# Patient Record
Sex: MALE | Race: WHITE | Employment: OTHER | URBAN - METROPOLITAN AREA
[De-identification: names, ages, dates, MRNs, and addresses within clinical notes are randomized per-mention and may not be internally consistent; named-entity substitution may affect disease eponyms.]

---

## 2017-10-31 NOTE — TELEPHONE ENCOUNTER
Requested Prescriptions     Pending Prescriptions Disp Refills    lisinopril (PRINIVIL, ZESTRIL) 40 mg tablet [Pharmacy Med Name: LISINOPRIL TABS 40MG] 90 Tab 1     Sig: TAKE 1 TABLET AT BEDTIME       Last Refill: 4/24/17  Last visit:5/31/17

## 2017-11-01 RX ORDER — LISINOPRIL 40 MG/1
TABLET ORAL
Qty: 90 TAB | Refills: 1 | Status: SHIPPED | OUTPATIENT
Start: 2017-11-01 | End: 2018-09-18 | Stop reason: DRUGHIGH

## 2017-12-11 ENCOUNTER — OFFICE VISIT (OUTPATIENT)
Dept: INTERNAL MEDICINE CLINIC | Age: 62
End: 2017-12-11

## 2017-12-11 VITALS
WEIGHT: 159 LBS | HEIGHT: 72 IN | SYSTOLIC BLOOD PRESSURE: 122 MMHG | DIASTOLIC BLOOD PRESSURE: 72 MMHG | BODY MASS INDEX: 21.54 KG/M2 | OXYGEN SATURATION: 98 % | HEART RATE: 59 BPM

## 2017-12-11 DIAGNOSIS — M75.21 BICIPITAL TENDONITIS OF RIGHT SHOULDER: Primary | ICD-10-CM

## 2017-12-11 PROBLEM — E10.3599: Status: ACTIVE | Noted: 2017-12-11

## 2017-12-11 PROBLEM — R80.9 MICROALBUMINURIA: Status: ACTIVE | Noted: 2017-12-11

## 2017-12-11 PROBLEM — I10 ESSENTIAL HYPERTENSION: Status: ACTIVE | Noted: 2017-12-11

## 2017-12-11 PROBLEM — K52.9 ILEITIS: Status: ACTIVE | Noted: 2017-12-11

## 2017-12-11 PROBLEM — E78.00 HYPERCHOLESTEROLEMIA: Status: ACTIVE | Noted: 2017-12-11

## 2017-12-11 PROBLEM — Z79.899 LONG-TERM USE OF HIGH-RISK MEDICATION: Status: ACTIVE | Noted: 2017-12-11

## 2017-12-11 RX ORDER — DICLOFENAC SODIUM 75 MG/1
75 TABLET, DELAYED RELEASE ORAL 2 TIMES DAILY
Qty: 60 TAB | Refills: 0 | Status: SHIPPED | OUTPATIENT
Start: 2017-12-11 | End: 2018-06-05 | Stop reason: ALTCHOICE

## 2017-12-11 NOTE — PATIENT INSTRUCTIONS
Tendon Injury (Tendinopathy): Care Instructions  Your Care Instructions    Tendons are tough, flexible tissues that connect muscle to bone. A tendon can hurt or get torn from overuse or aging, especially tendons in the shoulder, elbow, wrist, hip, knee, or ankle. Tendon injuries may be called tendinopathy or tendinitis. Tendon injuries can occur from any motion you have to repeat in a job, sports, or daily activities. Tennis elbow is one common tendon injury. You can treat most tendon problems with over-the-counter pain medicine, rest, changes in your activities, and physical therapy. Follow-up care is a key part of your treatment and safety. Be sure to make and go to all appointments, and call your doctor if you are having problems. It's also a good idea to know your test results and keep a list of the medicines you take. How can you care for yourself at home? · Rest the sore area. You may have to stop doing the activity that caused the tendon pain for a while. · Take an over-the-counter pain medicine, such as acetaminophen (Tylenol), ibuprofen (Advil, Motrin), or naproxen (Aleve). Read and follow all instructions on the label. · Do not take two or more pain medicines at the same time unless the doctor told you to. Many pain medicines have acetaminophen, which is Tylenol. Too much acetaminophen (Tylenol) can be harmful. · Put ice or a cold pack on the sore area for 10 to 20 minutes at a time. Try to do this every 1 to 2 hours for the next 3 days (when you are awake) or until any swelling goes down. Put a thin cloth between the ice and your skin. · Prop up the sore area on a pillow when you ice it or anytime you sit or lie down during the next 3 days. Try to keep it above the level of your heart. This will help reduce swelling.   · Follow your doctor's advice for wearing and caring for a sling, splint, or cast. In some cases, you may wear one of these for a while to help your tendon heal.  · Follow your doctor's advice for stretching and physical therapy. Gently move your joint through its full range of motion. This will prevent stiffness in your joint. · Go back to your activity slowly. Warm up before and stretch after the activity. You also can try making some changes. For example, if a sport caused your tendon pain, alternate the sport with another activity. If using a tool causes pain, switch hands or change your . Stop the activity if it hurts. After the activity, apply ice to prevent pain and swelling. · Do not smoke. Smoking can slow healing. If you need help quitting, talk to your doctor about stop-smoking programs and medicines. These can increase your chances of quitting for good. When should you call for help? Watch closely for changes in your health, and be sure to contact your doctor if:  ? · Your pain gets worse. ? · You do not get better as expected. Where can you learn more? Go to http://mana-christin.info/. Enter A157 in the search box to learn more about \"Tendon Injury (Tendinopathy): Care Instructions. \"  Current as of: March 21, 2017  Content Version: 11.4  © 0300-8489 AMS-Qi. Care instructions adapted under license by [employer] (which disclaims liability or warranty for this information). If you have questions about a medical condition or this instruction, always ask your healthcare professional. [address] any warranty or liability for your use of this information.

## 2017-12-11 NOTE — PROGRESS NOTES
This note will not be viewable in [address] Subjective:     Mr. Mark Eddy presents to the office today with shoulder pain. The pain is localized to the right shoulder and is been there for 3 weeks. It came after he injured it when he fell while he was jogging on [facility]. Patient states that there were a lot of leaves on the path and it was covering some rocks which he tripped over. He fell on an outstretched right arm/shoulder. The patient has not taken any anti-inflammatory medication. He denies any neck pain or radicular discomfort. He notes no loss of strength in the upper extremity. Past Medical History:   Diagnosis Date    Diabetes [facility] [address]     Essential hypertension 12/11/2017    Hypercholesterolemia 12/11/2017    Hypertension     Ileitis 12/11/2017    Long-term use of high-risk medication 12/11/2017    Microalbuminuria 12/11/2017    Type I diabetes mellitus with proliferative retinopathy ([facility] [address] 12/11/2017     Past Surgical History:   Procedure Laterality Date    HX ORTHOPAEDIC      RIGHT KNEE OPEN SURGERY    HX TONSILLECTOMY       Allergies   Allergen Reactions    Percocet [Oxycodone-Acetaminophen] Other (comments)     AGGRESIVE BEHAVIOR     Current Outpatient Prescriptions   Medication Sig Dispense Refill    diclofenac EC (VOLTAREN) 75 mg EC tablet Take 1 Tab by mouth two (2) times a day. 60 Tab 0    lisinopril (PRINIVIL, ZESTRIL) 40 mg tablet TAKE 1 TABLET AT BEDTIME (Patient taking differently: 20mg daily) 90 Tab 1    pravastatin (PRAVACHOL) 40 mg tablet Take 40 mg by mouth nightly.   insulin pump (PATIENT SUPPLIED) Misc by SubCUTAneous route as needed.  multivitamin (ONE A DAY) tablet Take 1 Tab by mouth daily.  ascorbic acid (VITAMIN C) 500 mg tablet Take 500 mg by mouth daily.  Cholecalciferol, Vitamin D3, (VITAMIN D3) 1,000 unit cap Take  by mouth daily.          Social History     Social History    Marital status:      Spouse name: N/A   [address] [place] [address] Number of children: N/A    Years of education: N/A     Social History Main Topics    Smoking status: Former Smoker     Years: 5.00     Quit date: 1/4/1975    Smokeless tobacco: Never Used    Alcohol use 10.5 oz/week     21 Glasses of wine per week    Drug use: None    Sexual activity: Not Asked     Other Topics Concern    None     Social History Narrative     Family History   Problem Relation Age of Onset    Cancer Mother      LUNG    Cancer Father      LUNG       Review of Systems:  GEN: no weight loss, weight gain, fatigue or night sweats  CV: no PND, orthopnea, or palpitations  Resp: no dyspnea on exertion, no cough  Abd: no nausea, vomiting or diarrhea  EXT: denies edema, claudication. Positive for right shoulder pain. Endocrine: no hair loss, excessive thirst or polyuria  Neurological ROS: no TIA or stroke symptoms  ROS otherwise negative      Objective:     Visit Vitals    /72 (BP 1 Location: Left arm, BP Patient Position: Sitting)    Pulse (!) 59    Ht 6' (1.829 m)    Wt 159 lb (72.1 kg)    SpO2 98%    BMI 21.56 kg/m2     Body mass index is 21.56 kg/(m^2). General:   alert, cooperative and no distress   Extremities: No edema or cyanosis. The range of motion of the right shoulder is normal although abduction is slightly limited. He has no pain over the supra or infra spinatus muscles. Speeds test is positive. There was no pain over the [facility] joint. Upper extremity strength with normal.   Neuro: ..alert, oriented x3,speech normal in context and clarity, cranial nerves II-XII intact,motor strength: full proximally and distally,gait: normal  reflexes: full and symmetric     Physical exam otherwise negative         Assessment/Plan:     Diagnoses and all orders for this visit:    Bicipital tendonitis of right shoulder  -     diclofenac EC (VOLTAREN) 75 mg EC tablet; Take 1 Tab by mouth two (2) times a day., Normal, Disp-60 Tab, R-0        Other instructions:    The patient will apply warm compresses to the right shoulder symptomatically. Start NSAID. We will refer to Rubia Orourke if there is no improvement in 2-3 weeks time. Follow-up Disposition:  Return if symptoms worsen or fail to improve.     Claudia Nichole MD

## 2017-12-11 NOTE — PROGRESS NOTES
Mundo Serna is a 58 y.o. male presenting for Shoulder Pain  . 1. Have you been to the ER, urgent care clinic since your last visit? Hospitalized since your last visit? No    2. Have you seen or consulted any other health care providers outside of the [address] [place] [address] since your last visit? Include any pap smears or colon screening. No    No flowsheet data found. No flowsheet data found. PHQ over the last two weeks 12/11/2017   Little interest or pleasure in doing things Not at all   Feeling down, depressed or hopeless Not at all   Total Score PHQ 2 0       There are no discontinued medications.

## 2018-01-01 RX ORDER — PRAVASTATIN SODIUM 40 MG/1
TABLET ORAL
Qty: 90 TAB | Refills: 1 | Status: SHIPPED | COMMUNITY
Start: 2018-01-01 | End: 2018-07-01 | Stop reason: SDUPTHER

## 2018-01-29 ENCOUNTER — TELEPHONE (OUTPATIENT)
Dept: INTERNAL MEDICINE CLINIC | Age: 63
End: 2018-01-29

## 2018-01-29 NOTE — TELEPHONE ENCOUNTER
Patient would like to know what he can do next for his shoulder pain that he was seen for back on 12/11/17 states that he finished the voltaren and it has not helped. Would like to know when he should heal or if he needs to see a specialist or maybe another prescription?

## 2018-02-22 ENCOUNTER — OFFICE VISIT (OUTPATIENT)
Dept: INTERNAL MEDICINE CLINIC | Age: 63
End: 2018-02-22

## 2018-02-22 VITALS
SYSTOLIC BLOOD PRESSURE: 118 MMHG | HEART RATE: 73 BPM | HEIGHT: 72 IN | OXYGEN SATURATION: 99 % | BODY MASS INDEX: 21.26 KG/M2 | DIASTOLIC BLOOD PRESSURE: 62 MMHG | WEIGHT: 157 LBS

## 2018-02-22 DIAGNOSIS — H61.23 BILATERAL IMPACTED CERUMEN: Primary | ICD-10-CM

## 2018-02-22 RX ORDER — ASPIRIN 81 MG/1
TABLET ORAL DAILY
COMMUNITY
End: 2019-08-01 | Stop reason: ALTCHOICE

## 2018-02-22 NOTE — PROGRESS NOTES
Subjective:     Sonido Nix is a 58 y.o. male who presents for evaluation of decreased hearing in both ears. . He has noticed bilateral symptoms 1 week ago. There is  a prior history of cerumen impaction. Patient denies ear pain. Patient denies hearing loss. He has noted some tinnitus but no balance issues. He does have chronic sinus congestion. Objective:     Visit Vitals    /62 (BP 1 Location: Right arm, BP Patient Position: Sitting)    Pulse 73    Ht 6' (1.829 m)    Wt 157 lb (71.2 kg)    SpO2 99%    BMI 21.29 kg/m2       General: alert, cooperative, no distress   Right Ear: ceruminosis noted. Left Ear:  ceruminosis noted. After removal: bilateral TM's and external ear canals normal, cerumen removed. Oropharynx:   normal.   Neck:  supple, symmetrical, trachea midline and no adenopathy. Assessment/Plan:     Cerumen Impaction, without otitis externa. 1. Cerumen removed by flushing, currettage. 2. Care instructions given. 3. Home treatment: none  4. Followup as needed. ICD-10-CM ICD-9-CM    1. Bilateral impacted cerumen H61.23 380.4 REMOVE IMPACTED EAR WAX   .

## 2018-02-22 NOTE — PROGRESS NOTES
Mohesn Fallon is a 58 y.o. male presenting for Ear Fullness  . 1. Have you been to the ER, urgent care clinic since your last visit? Hospitalized since your last visit? No    2. Have you seen or consulted any other health care providers outside of the [address] [place] [address] since your last visit? Include any pap smears or colon screening. No    No flowsheet data found. No flowsheet data found. PHQ over the last two weeks 12/11/2017   Little interest or pleasure in doing things Not at all   Feeling down, depressed or hopeless Not at all   Total Score PHQ 2 0       There are no discontinued medications.

## 2018-02-22 NOTE — MR AVS SNAPSHOT
[address] [address] [phone] Patient: Kathrin Gallagher MRN: [id number] DFZ:[id number] Visit Information Date & Time Provider Department Dept. Phone Encounter #  
 2/22/2018  8:50 AM Airane Pierce MD [facility] [id number] Follow-up Instructions Return if symptoms worsen or fail to improve. Upcoming Health Maintenance Date Due Hepatitis C Screening 1955 FOOT EXAM Q1 10/20/1965 MICROALBUMIN Q1 10/20/1965 EYE EXAM RETINAL OR DILATED Q1 10/20/1965 Pneumococcal 19-64 Medium Risk (1 of 1 - PPSV23) 10/20/1974 DTaP/Tdap/Td series (1 - Tdap) 10/20/1976 FOBT Q 1 YEAR AGE 50-75 10/20/2005 ZOSTER VACCINE AGE 60> 8/20/2015 Influenza Age 5 to Adult 8/1/2017 HEMOGLOBIN A1C Q6M 4/23/2018 LIPID PANEL Q1 10/23/2018 Allergies as of 2/22/2018  Review Complete On: 2/22/2018 By: Neo Ferreira LPN Severity Noted Reaction Type Reactions Percocet [Oxycodone-acetaminophen]  02/04/2013    Other (comments) AGGRESIVE BEHAVIOR Current Immunizations  Never Reviewed No immunizations on file. Not reviewed this visit You Were Diagnosed With   
  
 Codes Comments Bilateral impacted cerumen    -  Primary ICD-10-CM: F50.48 
ICD-9-CM: 380.4 Vitals BP Pulse Height(growth percentile) Weight(growth percentile) SpO2 BMI  
 118/62 (BP 1 Location: Right arm, BP Patient Position: Sitting) 73 6' (1.829 m) 157 lb (71.2 kg) 99% 21.29 kg/m2 Smoking Status Former Smoker BMI and BSA Data Body Mass Index Body Surface Area  
 21.29 kg/m 2 1.9 m 2 Preferred Pharmacy Pharmacy Name Phone [address] [facility] [phone] Your Updated Medication List  
  
   
 This list is accurate as of 2/22/18  9:25 AM.  Always use your most recent med list.  
  
  
  
  
  insulin pump Misc Commonly known as:  PATIENT SUPPLIED  
by SubCUTAneous route as needed. ascorbic acid (vitamin C) 500 mg tablet Commonly known as:  VITAMIN C Take 500 mg by mouth daily. aspirin delayed-release 81 mg tablet Take  by mouth daily. diclofenac EC 75 mg EC tablet Commonly known as:  VOLTAREN Take 1 Tab by mouth two (2) times a day. lisinopril 40 mg tablet Commonly known as:  PRINIVIL, ZESTRIL  
TAKE 1 TABLET AT BEDTIME  
  
 multivitamin tablet Commonly known as:  ONE A DAY Take 1 Tab by mouth daily. pravastatin 40 mg tablet Commonly known as:  PRAVACHOL  
TAKE 1 TABLET DAILY We Performed the Following REMOVE IMPACTED EAR WAX [59825 CPT(R)] Follow-up Instructions Return if symptoms worsen or fail to improve. Patient Instructions Earwax Blockage: Care Instructions Your Care Instructions Earwax is a natural substance that protects the ear canal. Normally, earwax drains from the ears and does not cause problems. Sometimes earwax builds up and hardens. Earwax blockage (also called cerumen impaction) can cause some loss of hearing and pain. When wax is tightly packed, you will need to have your doctor remove it. Follow-up care is a key part of your treatment and safety. Be sure to make and go to all appointments, and call your doctor if you are having problems. It's also a good idea to know your test results and keep a list of the medicines you take. How can you care for yourself at home? · Do not try to remove earwax with cotton swabs, fingers, or other objects. This can make the blockage worse and damage the eardrum. · If your doctor recommends that you try to remove earwax at home: ¨ Soften and loosen the earwax with warm mineral oil.  You also can try hydrogen peroxide mixed with an equal amount of room temperature water. Place 2 drops of the fluid, warmed to body temperature, in the ear two times a day for up to 5 days. ¨ Once the wax is loose and soft, all that is usually needed to remove it from the ear canal is a gentle, warm shower. Direct the water into the ear, then tip your head to let the earwax drain out. Dry your ear thoroughly with a hair dryer set on low. Hold the dryer several inches from your ear. ¨ If the warm mineral oil and shower do not work, use an over-the-counter wax softener followed by gentle flushing with an ear syringe each night for a week or two. Make sure the flushing solution is body temperature. Cool or hot fluids in the ear can cause dizziness. When should you call for help? Call your doctor now or seek immediate medical care if: 
? · Pus or blood drains from your ear. ? · Your ears are ringing or feel full. ? · You have a loss of hearing. ? Watch closely for changes in your health, and be sure to contact your doctor if: 
? · You have pain or reduced hearing after 1 week of home treatment. ? · You have any new symptoms, such as nausea or balance problems. Where can you learn more? Go to http://mana-christin.info/. Enter K249 in the search box to learn more about \"Earwax Blockage: Care Instructions. \" Current as of: March 20, 2017 Content Version: 11.4 © 3454-4727 Osen. Care instructions adapted under license by [employer] (which disclaims liability or warranty for this information). If you have questions about a medical condition or this instruction, always ask your healthcare professional. [place] [address] any warranty or liability for your use of this information. Introducing [facility]!    
 Dwayne Oropeza introduces POKKT patient portal. Now you can access parts of your medical record, email your doctor's office, and request medication refills online. 1. In your internet browser, go to [email or web address] 2. Click on the First Time User? Click Here link in the Sign In box. You will see the New Member Sign Up page. 3. Enter your [employer]Camp[employer] Access Code exactly as it appears below. You will not need to use this code after youve completed the sign-up process. If you do not sign up before the expiration date, you must request a new code. · GoCrossCamp[employer] Access Code: [email or web address] Expires: 3/11/2018 10:24 AM 
 
4. Enter the last four digits of your Social Security Number (xxxx) and Date of Birth (mm/dd/yyyy) as indicated and click Submit. You will be taken to the next sign-up page. 5. Create a [employer] ID. This will be your [employer]Camp[employer] login ID and cannot be changed, so think of one that is secure and easy to remember. 6. Create a [employer]Camp[employer] password. You can change your password at any time. 7. Enter your Password Reset Question and Answer. This can be used at a later time if you forget your password. 8. Enter your e-mail address. You will receive e-mail notification when new information is available in [address] 9. Click Sign Up. You can now view and download portions of your medical record. 10. Click the Download Summary menu link to download a portable copy of your medical information. If you have questions, please visit the Frequently Asked Questions section of the [employer]Camp[employer] website. Remember, [employer]Camp[employer] is NOT to be used for urgent needs. For medical emergencies, dial 911. Now available from your iPhone and Android! Please provide this summary of care documentation to your next provider. Your primary care clinician is listed as [address]. If you have any questions after today's visit, please call [phone].

## 2018-02-22 NOTE — PATIENT INSTRUCTIONS
Earwax Blockage: Care Instructions  Your Care Instructions    Earwax is a natural substance that protects the ear canal. Normally, earwax drains from the ears and does not cause problems. Sometimes earwax builds up and hardens. Earwax blockage (also called cerumen impaction) can cause some loss of hearing and pain. When wax is tightly packed, you will need to have your doctor remove it. Follow-up care is a key part of your treatment and safety. Be sure to make and go to all appointments, and call your doctor if you are having problems. It's also a good idea to know your test results and keep a list of the medicines you take. How can you care for yourself at home? · Do not try to remove earwax with cotton swabs, fingers, or other objects. This can make the blockage worse and damage the eardrum. · If your doctor recommends that you try to remove earwax at home:  ¨ Soften and loosen the earwax with warm mineral oil. You also can try hydrogen peroxide mixed with an equal amount of room temperature water. Place 2 drops of the fluid, warmed to body temperature, in the ear two times a day for up to 5 days. ¨ Once the wax is loose and soft, all that is usually needed to remove it from the ear canal is a gentle, warm shower. Direct the water into the ear, then tip your head to let the earwax drain out. Dry your ear thoroughly with a hair dryer set on low. Hold the dryer several inches from your ear. ¨ If the warm mineral oil and shower do not work, use an over-the-counter wax softener followed by gentle flushing with an ear syringe each night for a week or two. Make sure the flushing solution is body temperature. Cool or hot fluids in the ear can cause dizziness. When should you call for help? Call your doctor now or seek immediate medical care if:  ? · Pus or blood drains from your ear. ? · Your ears are ringing or feel full. ? · You have a loss of hearing. ? Watch closely for changes in your health, and be sure to contact your doctor if:  ? · You have pain or reduced hearing after 1 week of home treatment. ? · You have any new symptoms, such as nausea or balance problems. Where can you learn more? Go to http://mana-christin.info/. Enter Y988 in the search box to learn more about \"Earwax Blockage: Care Instructions. \"  Current as of: March 20, 2017  Content Version: 11.4  © 1464-1494 [employer]ak[employer]. Care instructions adapted under license by [employer] (which disclaims liability or warranty for this information). If you have questions about a medical condition or this instruction, always ask your healthcare professional. [place] [address] any warranty or liability for your use of this information.

## 2018-06-05 ENCOUNTER — OFFICE VISIT (OUTPATIENT)
Dept: INTERNAL MEDICINE CLINIC | Age: 63
End: 2018-06-05

## 2018-06-05 VITALS
WEIGHT: 159 LBS | HEART RATE: 66 BPM | BODY MASS INDEX: 21.54 KG/M2 | HEIGHT: 72 IN | DIASTOLIC BLOOD PRESSURE: 78 MMHG | OXYGEN SATURATION: 97 % | SYSTOLIC BLOOD PRESSURE: 102 MMHG

## 2018-06-05 DIAGNOSIS — I10 ESSENTIAL HYPERTENSION: ICD-10-CM

## 2018-06-05 DIAGNOSIS — E78.00 HYPERCHOLESTEROLEMIA: ICD-10-CM

## 2018-06-05 DIAGNOSIS — Z00.00 ROUTINE PHYSICAL EXAMINATION: Primary | ICD-10-CM

## 2018-06-05 DIAGNOSIS — E10.3559 TYPE 1 DIABETES MELLITUS WITH STABLE PROLIFERATIVE RETINOPATHY, UNSPECIFIED LATERALITY (HCC): ICD-10-CM

## 2018-06-05 DIAGNOSIS — Z79.899 LONG-TERM USE OF HIGH-RISK MEDICATION: ICD-10-CM

## 2018-06-05 LAB
ALBUMIN SERPL-MCNC: 4 G/DL (ref 3.9–5.4)
ALKALINE PHOS POC: 47 U/L (ref 38–126)
ALT SERPL-CCNC: 37 U/L (ref 9–52)
AST SERPL-CCNC: 37 U/L (ref 14–36)
BACTERIA UA POCT, BACTPOCT: NORMAL
BILIRUB UR QL STRIP: NEGATIVE
BUN BLD-MCNC: 23 MG/DL (ref 9–20)
CALCIUM BLD-MCNC: 9.5 MG/DL (ref 8.4–10.2)
CASTS UA POCT: 0
CHLORIDE BLD-SCNC: 100 MMOL/L (ref 98–107)
CHOLEST SERPL-MCNC: 147 MG/DL (ref 0–200)
CK (CPK) POC: 190 U/L (ref 30–135)
CLUE CELLS, CLUEPOCT: NEGATIVE
CO2 POC: 28 MMOL/L (ref 22–32)
CREAT BLD-MCNC: 1.1 MG/DL (ref 0.8–1.5)
CRYSTALS UA POCT, CRYSPOCT: NEGATIVE
EGFR (POC): 71.6
EPITHELIAL CELLS POCT: NORMAL
GLUCOSE POC: 125 MG/DL (ref 75–110)
GLUCOSE UR-MCNC: NEGATIVE MG/DL
GRAN# POC: 4.7 K/UL (ref 2–7.8)
GRAN% POC: 63.7 % (ref 37–92)
HBA1C MFR BLD HPLC: 5.7 % (ref 4.5–5.7)
HCT VFR BLD CALC: 37.4 % (ref 37–51)
HDLC SERPL-MCNC: 91 MG/DL (ref 35–130)
HGB BLD-MCNC: 12.9 G/DL (ref 12–18)
KETONES P FAST UR STRIP-MCNC: NEGATIVE MG/DL
LDL CHOLESTEROL POC: 38 MG/DL (ref 0–130)
LY# POC: 2.2 K/UL (ref 0.6–4.1)
LY% POC: 30.7 % (ref 10–58.5)
MCH RBC QN: 33.1 PG (ref 26–32)
MCHC RBC-ENTMCNC: 34.3 G/DL (ref 30–36)
MCV RBC: 96 FL (ref 80–97)
MICROALBUMIN UR TEST STR-MCNC: NEGATIVE MG/L (ref 0–20)
MID #, POC: 0.4 K/UL (ref 0–1.8)
MID% POC: 5.6 % (ref 0.1–24)
MUCUS UA POCT, MUCPOCT: NORMAL
PH UR STRIP: 6.5 [PH] (ref 5–7)
PLATELET # BLD: 246 K/UL (ref 140–440)
POTASSIUM SERPL-SCNC: 4.6 MMOL/L (ref 3.6–5)
PROT SERPL-MCNC: 7 G/DL (ref 6.3–8.2)
PROT UR QL STRIP: NEGATIVE
PSA SERPL-MCNC: 1.9 NG/ML (ref 0–4)
RBC # BLD: 3.89 M/UL (ref 4.2–6.3)
RBC UA POCT, RBCPOCT: 0
SODIUM SERPL-SCNC: 139 MMOL/L (ref 137–145)
SP GR UR STRIP: 1.01 (ref 1.01–1.02)
TCHOL/HDL RATIO (POC): 1.6 (ref 0–4)
TOTAL BILIRUBIN POC: 0.9 MG/DL (ref 0.2–1.3)
TRICH UA POCT, TRICHPOC: NEGATIVE
TRIGL SERPL-MCNC: 90 MG/DL (ref 0–200)
TSH BLD-ACNC: 1.24 UIU/ML (ref 0.4–4.2)
UA UROBILINOGEN AMB POC: NORMAL (ref 0.2–1)
URINALYSIS CLARITY POC: CLEAR
URINALYSIS COLOR POC: NORMAL
URINE BLOOD POC: NEGATIVE
URINE CULT COMMENT, POCT: NORMAL
URINE LEUKOCYTES POC: NEGATIVE
URINE NITRITES POC: NEGATIVE
VLDLC SERPL CALC-MCNC: 18 MG/DL
WBC # BLD: 7.3 K/UL (ref 4.1–10.9)
WBC UA POCT, WBCPOCT: NORMAL
YEAST UA POCT, YEASTPOC: NEGATIVE

## 2018-06-05 RX ORDER — MELOXICAM 15 MG/1
15 TABLET ORAL DAILY
COMMUNITY
End: 2019-08-01 | Stop reason: ALTCHOICE

## 2018-06-05 NOTE — LETTER
6/7/2018 3:22 PM 
 
Mr. uQe Banks [address] [facility] [address] [address] Dear Que Banks: 
 
Please find your most recent results below. Resulted Orders HEPATITIS C AB Result Value Ref Range Hep C Virus Ab <0.1 0.0 - 0.9 s/co ratio Comment:  
                                     Negative:     < 0.8 Indeterminate: 0.8 - 0.9 Positive:     > 0.9 The CDC recommends that a positive HCV antibody result 
 be followed up with a HCV Nucleic Acid Amplification 
 test (035221). Narrative Performed at:  [address]  [id number] : Jacob Jolyl MD, Phone:  [phone] AMB POC HEMOGLOBIN A1C Result Value Ref Range Hemoglobin A1c (POC) 5.7 4.5 - 5.7 % AMB POC COMPLETE CBC,AUTOMATED ENTER Result Value Ref Range WBC (POC) 7.3 4.1 - 10.9 K/uL  
 RBC (POC) 3.89 (A) 4.20 - 6.30 M/uL Comment:  
   verified by repeat analysis HGB (POC) 12.9 12.0 - 18.0 g/dL HCT (POC) 37.4 37.0 - 51.0 %  
 MCV (POC) 96 80.0 - 97.0 fL  
 MCH (POC) 33.1 (A) 26.0 - 32.0 pg  
 MCHC (POC) 34.3 30.0 - 36.0 g/dL PLATELET (POC) 390 677.2 - 440.0 K/uL  
 ABS. LYMPHS (POC) 2.2 0.6 - 4.1 K/uL LYMPHOCYTES (POC) 30.7 10.0 - 58.5 % Mid # (POC) 0.4 0.0 - 1.8 K/uL MID% POC 5.6 0.1 - 24.0 %  
 ABS. GRANS (POC) 4.7 2.0 - 7.8 K/uL GRANULOCYTES (POC) 63.7 37.0 - 92.0 % AMB POC COMPREHENSIVE METABOLIC PANEL Result Value Ref Range GLUCOSE 125 (A) 75 - 110 mg/dL BUN 23 (A) 9 - 20 mg/dL Creatinine (POC) 1.1 0.8 - 1.5 mg/dL Sodium (POC) 139 137 - 145 MMOL/L Potassium (POC) 4.6 3.6 - 5.0 MMOL/L  
 CHLORIDE 100 98 - 107 MMOL/L  
 CO2 28 22 - 32 MMOL/L  
 CALCIUM 9.5 8.4 - 10.2 mg/dL TOTAL PROTEIN 7 6.3 - 8.2 g/dL ALBUMIN 4 3.9 - 5.4 g/dL AST (POC) 37 (A) 14 - 36 U/L  
 ALT (POC) 37 9 - 52 U/L  ALKALINE PHOS 47 38 - 126 U/L  
 TOTAL BILIRUBIN 0.9 0.2 - 1.3 mg/dL  
 eGFR (POC) 71.6 AMB POC CK (CPK) Result Value Ref Range CK (CPK) (POC) 190 (A) 30 - 135 U/L AMB POC LIPID PROFILE Result Value Ref Range Cholesterol (POC) 147 0 - 200 mg/dL Triglycerides (POC) 90 0 - 200 mg/dL HDL Cholesterol (POC) 91 35 - 130 mg/dL VLDL (POC) 18 MG/DL  
 LDL Cholesterol (POC) 38 0.0 - 130.0 MG/DL TChol/HDL Ratio (POC) 1.6 0.0 - 4.0 AMB POC TSH Result Value Ref Range TSH POC 1.24 0.40 - 4.20 uIU/ml AMB POC URINALYSIS DIP STICK AUTO W/ MICRO Result Value Ref Range Color (UA POC) CIT Group Clarity (UA POC) Clear Glucose (UA POC) Negative Negative Bilirubin (UA POC) Negative Negative Ketones (UA POC) Negative Negative Specific gravity (UA POC) 1.015 1.010 - 1.025 Blood (UA POC) Negative Negative pH (UA POC) 6.5 5.0 - 7.0 Protein (UA POC) Negative Negative Urobilinogen (UA POC) 1 mg/dL 0.2 - 1 Nitrites (UA POC) Negative Negative Leukocyte esterase (UA POC) Negative Negative Epithelial cells (UA POC) Trace Mucus (UA POC) None WBCs (UA POC) 0-3 RBCs (UA POC) 0 Casts (UA POC) 0 Negative Crystals (UA POC) Negative Negative Clue Cells (UA POC) NEGATIVE Trichomonas (UA POC) Negative Yeast (UA POC) Negative Bacteria (UA POC) None Negative URINE CULT COMMENT (UA POC) Culture Not Indicated AMB POC URINE, MICROALBUMIN, SEMIQUANT (1 RESULT) Result Value Ref Range Microalbumin urine (POC) negative 0 - 20 MG/L  
AMB POC PSA Result Value Ref Range PSA (POC) 1.9 0.0 - 4.0 ng/mL RECOMMENDATIONS: 
None. Keep up the good work! Please call me if you have any questions: [phone] Sincerely, Greg Meyer MD

## 2018-06-05 NOTE — PROGRESS NOTES
This note will not be viewable in [address] Subjective:     Que Banks is a 58 y.o. male presenting for annual comprehensive personal healthcare examination. Nola Edgar presents to the office today for complete checkup. 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 He currently is followed by Dr. Kayode Lopez and  is currently using an insulin pump. Patient has good blood sugar control and checks his blood sugars frequently during the course of the day. He does have diabetic retinal eye exams done on a yearly basis. He denies any numbness tingling or burning in his feet and is had a diabetic foot examination done within the last year. He is on pravastatin for hypercholesterolemia. He denies muscle soreness or GI upset. He has no history of coronary artery disease and denies exertional chest pains or claudication. He is on 20 mg of lisinopril for his hypertension. He has had some problems with hyperkalemia in the past.  He denies any dizziness, cough or rash. He denies headaches, numbness, tingling or focal neurological problems. The patient is up-to-date on colonoscopy. He is a non-smoker. Review of systems otherwise negative is noted. History of present illness: This patient has multiple medical problems.   These include:  Patient Active Problem List   Diagnosis Code    Type I diabetes mellitus with proliferative retinopathy ([facility] E10.3599    Microalbuminuria R80.9    Ileitis K52.9    Essential hypertension I10    Hypercholesterolemia E78.00    Long-term use of high-risk medication Z79.899        Past Medical History:   Diagnosis Date    Diabetes ([address]     Essential hypertension 12/11/2017    Hypercholesterolemia 12/11/2017    Hypertension     Ileitis 12/11/2017    Long-term use of high-risk medication 12/11/2017    Microalbuminuria 12/11/2017    Type I diabetes mellitus with proliferative retinopathy ([address] 12/11/2017     Past Surgical History:   Procedure Laterality Date    HX ORTHOPAEDIC      RIGHT KNEE OPEN SURGERY    HX TONSILLECTOMY       Allergies   Allergen Reactions    Percocet [Oxycodone-Acetaminophen] Other (comments)     AGGRESIVE BEHAVIOR     Current Outpatient Prescriptions   Medication Sig Dispense Refill    meloxicam (MOBIC) 15 mg tablet Take 15 mg by mouth daily.  aspirin delayed-release 81 mg tablet Take  by mouth daily.  pravastatin (PRAVACHOL) 40 mg tablet TAKE 1 TABLET DAILY 90 Tab 1    lisinopril (PRINIVIL, ZESTRIL) 40 mg tablet TAKE 1 TABLET AT BEDTIME (Patient taking differently: 20mg daily) 90 Tab 1     insulin pump (PATIENT SUPPLIED) Misc by SubCUTAneous route as needed.  multivitamin (ONE A DAY) tablet Take 1 Tab by mouth daily.  ascorbic acid (VITAMIN C) 500 mg tablet Take 500 mg by mouth daily.          Social History     Social History    Marital status: SINGLE     Spouse name: N/A    Number of children: N/A    Years of education: N/A     Social History Main Topics    Smoking status: Former Smoker     Years: 5.00     Quit date: 1/4/1975    Smokeless tobacco: Never Used    Alcohol use 10.5 oz/week     21 Glasses of wine per week    Drug use: No    Sexual activity: Not Asked     Other Topics Concern    None     Social History Narrative     Family History   Problem Relation Age of Onset    Cancer Mother      LUNG    Cancer Father      LUNG       Health Maintenance   Topic Date Due    Hepatitis C Screening  1955    FOOT EXAM Q1  10/20/1965    MICROALBUMIN Q1  10/20/1965    EYE EXAM RETINAL OR DILATED Q1  10/20/1965    Pneumococcal 19-64 Medium Risk (1 of 1 - PPSV23) 10/20/1974    DTaP/Tdap/Td series (1 - Tdap) 10/20/1976    FOBT Q 1 YEAR AGE 50-75  10/20/2005    ZOSTER VACCINE AGE 60>  08/20/2015    HEMOGLOBIN A1C Q6M  04/23/2018    Influenza Age 9 to Adult  08/01/2018    LIPID PANEL Q1  10/23/2018       Review of Systems  Constitutional:  He denies fevers, weight loss, sweats, or fatigue. HEENT:  No blurred or double vision, headaches or dizziness. No difficulty with swallowing, taste, speech or smell. Respiratory:  No cough, wheezing or shortness of breath, or sputum production. Cardiac:  Denies chest pain, palpitations, unexplained indigestion, syncope, edema, PND or orthopnea. GI:  No changes in bowel habits, abdominal pain, no bloating, anorexia, nausea, vomiting or heartburn. :  He denies frequency, nocturia, stranguria, dysuria or sexual dysfunction. Extremities:  No joint pain, stiffness or swelling. Skin:  No recent rash or mole changes. Neurological:  No numbness, tingling, burning paresthesias or loss of motor strength. No syncope, dizziness, frequent headaches or memory loss. ROS otherwise negative       Objective:     Vitals:    06/05/18 1517   BP: 102/78   Pulse: 66   SpO2: 97%   Weight: 159 lb (72.1 kg)   Height: 6' (1.829 m)   PainSc:   6   PainLoc: Ankle      Body mass index is 21.56 kg/(m^2). Physical exam:   General Appearance:  Well-developed, well-nourished, no acute distress. Vision:  Deferred to ophthalmologist.    Hearing: HEENT:    Ears:  The TMs and ear canals were clear. Eyes:  The pupillary responses were normal.  Extraocular muscle function intact. Lids and conjunctiva not injected. Funduscopic exam revealed sharp disc margins. Pharynx:  Clear with teeth in good repair. No masses were noted. Neck:  Supple without thyromegaly or adenopathy. No JVD noted. No carotid bruits. Lungs: Clear to auscultation and percussion. Cardiac:  Regular rate and rhythm. No murmur. PMI not displaced. No gallop, rub or click. Abdomen:  Flat, soft, non-tender without palpable organomegaly or mass. No pulsatile mass was felt, and no bruit was heard. Bowel sounds were active. Extremities:  No clubbing, cyanosis or edema. Pulses:  Dorsalis pedis and posterior tibial pulses felt without difficulty.   Skin:  No unusual rash or mole changes noted. Lymph Nodes:  None felt in the cervical, supraclavicular, axillary or inguinal region. Neurological:  Cranial nerves II-XII grossly intact. Motor strength 5/5. DTRs 2+ and symmetric. Station and gait normal.         Assessment/Plan:   Impressions:  Diagnoses and all orders for this visit:    Routine physical examination  -     HEPATITIS C AB  -     AMB POC HEMOGLOBIN A1C  -     AMB POC COMPLETE CBC,AUTOMATED ENTER  -     AMB POC COMPREHENSIVE METABOLIC PANEL  -     AMB POC CK (CPK)  -     AMB POC LIPID PROFILE  -     COLLECTION VENOUS BLOOD,VENIPUNCTURE  -     AMB POC TSH  -     AMB POC URINALYSIS DIP STICK AUTO W/ MICRO   -     AMB POC URINE, MICROALBUMIN, SEMIQUANT (1 RESULT)  -     AMB POC PSA    Type 1 diabetes mellitus with stable proliferative retinopathy, unspecified laterality (HCC)    Essential hypertension    Hypercholesterolemia    Long-term use of high-risk medication        Other instructions: The patient's medications are reviewed and reconciled. No change in his current medical regimen is made. Continued endocrinology follow-up of his type 1 diabetes mellitus. Await results of multiple labs. Follow-up yearly    Follow-up Disposition:  Return in about 1 year (around 6/5/2019).     Yolande Ramirez MD

## 2018-06-05 NOTE — MR AVS SNAPSHOT
[address] [place] [address] [address] [id number] [phone] Patient: Kenia Davila MRN: [id number]:[id number] Visit Information Date & Time Provider Department Dept. Phone Encounter #  
 6/5/2018  3:00 PM David Mederos MD [place] [id number] [phone] [id number] Follow-up Instructions Return in about 1 year (around 6/5/2019). Upcoming Health Maintenance Date Due Hepatitis C Screening 1955 FOOT EXAM Q1 10/20/1965 MICROALBUMIN Q1 10/20/1965 EYE EXAM RETINAL OR DILATED Q1 10/20/1965 Pneumococcal 19-64 Medium Risk (1 of 1 - PPSV23) 10/20/1974 DTaP/Tdap/Td series (1 - Tdap) 10/20/1976 FOBT Q 1 YEAR AGE 50-75 10/20/2005 ZOSTER VACCINE AGE 60> 8/20/2015 HEMOGLOBIN A1C Q6M 4/23/2018 Influenza Age 5 to Adult 8/1/2018 LIPID PANEL Q1 10/23/2018 Allergies as of 6/5/2018  Review Complete On: 6/5/2018 By: David Mederos MD  
  
 Severity Noted Reaction Type Reactions Percocet [Oxycodone-acetaminophen]  02/04/2013    Other (comments) AGGRESIVE BEHAVIOR Current Immunizations  Never Reviewed No immunizations on file. Not reviewed this visit You Were Diagnosed With   
  
 Codes Comments Routine physical examination    -  Primary ICD-10-CM: Z00.00 ICD-9-CM: V70.0 Type 1 diabetes mellitus with stable proliferative retinopathy, unspecified laterality ([facility]     ICD-10-CM: A07.3674 ICD-9-CM: 250.51, 362.02 Essential hypertension     ICD-10-CM: I10 
ICD-9-CM: 401.9 Hypercholesterolemia     ICD-10-CM: E78.00 ICD-9-CM: 272.0 Long-term use of high-risk medication     ICD-10-CM: Z79.899 ICD-9-CM: V58.69 Vitals BP Pulse Height(growth percentile) Weight(growth percentile) SpO2 BMI  
 102/78 (BP 1 Location: Right arm, BP Patient Position: Sitting) 66 6' (1.829 m) 159 lb (72.1 kg) 97% 21.56 kg/m2 Smoking Status Former Smoker BMI and BSA Data Body Mass Index Body Surface Area  
 21.56 kg/m 2 1.91 m 2 Preferred Pharmacy Pharmacy Name Phone [facility], [facility] [phone] Your Updated Medication List  
  
   
This list is accurate as of 6/5/18  3:46 PM.  Always use your most recent med list.  
  
  
  
  
  insulin pump Misc Commonly known as:  PATIENT SUPPLIED  
by SubCUTAneous route as needed. ascorbic acid (vitamin C) 500 mg tablet Commonly known as:  VITAMIN C Take 500 mg by mouth daily. aspirin delayed-release 81 mg tablet Take  by mouth daily. lisinopril 40 mg tablet Commonly known as:  PRINIVIL, ZESTRIL  
TAKE 1 TABLET AT BEDTIME  
  
 meloxicam 15 mg tablet Commonly known as:  MOBIC Take 15 mg by mouth daily. multivitamin tablet Commonly known as:  ONE A DAY Take 1 Tab by mouth daily. pravastatin 40 mg tablet Commonly known as:  PRAVACHOL  
TAKE 1 TABLET DAILY We Performed the Following AMB POC CK (CPK) [07081 CPT(R)] AMB POC COMPLETE CBC,AUTOMATED ENTER [id number] CPT(R)] AMB POC COMPREHENSIVE METABOLIC PANEL [39850 CPT(R)] AMB POC HEMOGLOBIN A1C [35189 CPT(R)] AMB POC LIPID PROFILE [99235 CPT(R)] AMB POC PSA [29629 CPT(R)] AMB POC TSH [65224 CPT(R)] AMB POC URINALYSIS DIP STICK AUTO W/ MICRO  [11791 CPT(R)] AMB POC URINE, MICROALBUMIN, SEMIQUANT (1 RESULT) [27977 CPT(R)] COLLECTION VENOUS BLOOD,VENIPUNCTURE [id number] CPT(R)] HEPATITIS C AB [07646 CPT(R)] Follow-up Instructions Return in about 1 year (around 6/5/2019). Patient Instructions Learning About Diabetes Food Guidelines Your Care Instructions Meal planning is important to manage diabetes. It helps keep your blood sugar at a target level (which you set with your doctor).  You don't have to eat special foods. You can eat what your family eats, including sweets once in a while. But you do have to pay attention to how often you eat and how much you eat of certain foods. You may want to work with a dietitian or a certified diabetes educator (CDE) to help you plan meals and snacks. A dietitian or CDE can also help you lose weight if that is one of your goals. What should you know about eating carbs? Managing the amount of carbohydrate (carbs) you eat is an important part of healthy meals when you have diabetes. Carbohydrate is found in many foods. · Learn which foods have carbs. And learn the amounts of carbs in different foods. ¨ Bread, cereal, pasta, and rice have about 15 grams of carbs in a serving. A serving is 1 slice of bread (1 ounce), ½ cup of cooked cereal, or 1/3 cup of cooked pasta or rice. ¨ Fruits have 15 grams of carbs in a serving. A serving is 1 small fresh fruit, such as an apple or orange; ½ of a banana; ½ cup of cooked or canned fruit; ½ cup of fruit juice; 1 cup of melon or raspberries; or 2 tablespoons of dried fruit. ¨ Milk and no-sugar-added yogurt have 15 grams of carbs in a serving. A serving is 1 cup of milk or 2/3 cup of no-sugar-added yogurt. ¨ Starchy vegetables have 15 grams of carbs in a serving. A serving is ½ cup of mashed potatoes or sweet potato; 1 cup winter squash; ½ of a small baked potato; ½ cup of cooked beans; or ½ cup cooked corn or green peas. · Learn how much carbs to eat each day and at each meal. A dietitian or CDE can teach you how to keep track of the amount of carbs you eat. This is called carbohydrate counting. · If you are not sure how to count carbohydrate grams, use the Plate Method to plan meals. It is a good, quick way to make sure that you have a balanced meal. It also helps you spread carbs throughout the day. ¨ Divide your plate by types of foods.  Put non-starchy vegetables on half the plate, meat or other protein food on one-quarter of the plate, and a grain or starchy vegetable in the final quarter of the plate. To this you can add a small piece of fruit and 1 cup of milk or yogurt, depending on how many carbs you are supposed to eat at a meal. 
· Try to eat about the same amount of carbs at each meal. Do not \"save up\" your daily allowance of carbs to eat at one meal. 
· Proteins have very little or no carbs per serving. Examples of proteins are beef, chicken, turkey, fish, eggs, tofu, cheese, cottage cheese, and peanut butter. A serving size of meat is 3 ounces, which is about the size of a deck of cards. Examples of meat substitute serving sizes (equal to 1 ounce of meat) are 1/4 cup of cottage cheese, 1 egg, 1 tablespoon of peanut butter, and ½ cup of tofu. How can you eat out and still eat healthy? · Learn to estimate the serving sizes of foods that have carbohydrate. If you measure food at home, it will be easier to estimate the amount in a serving of restaurant food. · If the meal you order has too much carbohydrate (such as potatoes, corn, or baked beans), ask to have a low-carbohydrate food instead. Ask for a salad or green vegetables. · If you use insulin, check your blood sugar before and after eating out to help you plan how much to eat in the future. · If you eat more carbohydrate at a meal than you had planned, take a walk or do other exercise. This will help lower your blood sugar. What else should you know? · Limit saturated fat, such as the fat from meat and dairy products. This is a healthy choice because people who have diabetes are at higher risk of heart disease. So choose lean cuts of meat and nonfat or low-fat dairy products. Use olive or canola oil instead of butter or shortening when cooking. · Don't skip meals. Your blood sugar may drop too low if you skip meals and take insulin or certain medicines for diabetes. · Check with your doctor before you drink alcohol. Alcohol can cause your blood sugar to drop too low. Alcohol can also cause a bad reaction if you take certain diabetes medicines. Follow-up care is a key part of your treatment and safety. Be sure to make and go to all appointments, and call your doctor if you are having problems. It's also a good idea to know your test results and keep a list of the medicines you take. Where can you learn more? Go to http://mana-christin.info/. Enter Y246 in the search box to learn more about \"Learning About Diabetes Food Guidelines. \" Current as of: March 13, 2017 Content Version: 11.4 © 2825-6313 [employer]. Care instructions adapted under license by [employer] (which disclaims liability or warranty for this information). If you have questions about a medical condition or this instruction, always ask your healthcare professional. [address] any warranty or liability for your use of this information. Introducing [facility]! Louis Armijo introduces [employer] patient portal. Now you can access parts of your medical record, email your doctor's office, and request medication refills online. 1. In your internet browser, go to [email or web address] 2. Click on the First Time User? Click Here link in the Sign In box. You will see the New Member Sign Up page. 3. Enter your [employer] Access Code exactly as it appears below. You will not need to use this code after youve completed the sign-up process. If you do not sign up before the expiration date, you must request a new code. · [employer] Access Code: [email or web address] Expires: 9/3/2018  3:01 PM 
 
4. Enter the last four digits of your Social Security Number (xxxx) and Date of Birth (mm/dd/yyyy) as indicated and click Submit. You will be taken to the next sign-up page. 5. Create a [employer] ID. This will be your [employer] login ID and cannot be changed, so think of one that is secure and easy to remember. 6. Create a [employer] password. You can change your password at any time. 7. Enter your Password Reset Question and Answer. This can be used at a later time if you forget your password. 8. Enter your e-mail address. You will receive e-mail notification when new information is available in [address] 9. Click Sign Up. You can now view and download portions of your medical record. 10. Click the Download Summary menu link to download a portable copy of your medical information. If you have questions, please visit the Frequently Asked Questions section of the [employer] website. Remember, [employer] is NOT to be used for urgent needs. For medical emergencies, dial 911. Now available from your iPhone and Android! Please provide this summary of care documentation to your next provider. Your primary care clinician is listed as ROSALINO Sandoval. If you have any questions after today's visit, please call [phone].

## 2018-06-05 NOTE — PROGRESS NOTES
Anmol Alvarez is a 58 y.o. male presenting for Complete Physical  .     1. Have you been to the ER, urgent care clinic since your last visit? Hospitalized since your last visit? No    2. Have you seen or consulted any other health care providers outside of the [address] [place] [address] since your last visit? Include any pap smears or colon screening. No    No flowsheet data found. No flowsheet data found. PHQ over the last two weeks 12/11/2017   Little interest or pleasure in doing things Not at all   Feeling down, depressed or hopeless Not at all   Total Score PHQ 2 0       There are no discontinued medications.

## 2018-06-05 NOTE — PATIENT INSTRUCTIONS

## 2018-06-06 LAB — HCV AB S/CO SERPL IA: <0.1 S/CO RATIO (ref 0–0.9)

## 2018-06-07 NOTE — PROGRESS NOTES
Please notify patient. Labs stable.    No change in current management/meds  Send copy of labs to endocrinology

## 2018-07-02 RX ORDER — PRAVASTATIN SODIUM 40 MG/1
TABLET ORAL
Qty: 90 TAB | Refills: 1 | Status: SHIPPED | OUTPATIENT
Start: 2018-07-02 | End: 2018-12-27 | Stop reason: SDUPTHER

## 2018-07-19 ENCOUNTER — OFFICE VISIT (OUTPATIENT)
Dept: INTERNAL MEDICINE CLINIC | Age: 63
End: 2018-07-19

## 2018-07-19 VITALS
HEART RATE: 64 BPM | HEIGHT: 72 IN | BODY MASS INDEX: 21.13 KG/M2 | SYSTOLIC BLOOD PRESSURE: 132 MMHG | OXYGEN SATURATION: 98 % | WEIGHT: 156 LBS | DIASTOLIC BLOOD PRESSURE: 74 MMHG

## 2018-07-19 DIAGNOSIS — S90.32XA CONTUSION OF LEFT FOOT, INITIAL ENCOUNTER: Primary | ICD-10-CM

## 2018-07-19 NOTE — PROGRESS NOTES
Carleen Ibrahim is a 58 y.o. male presenting for Foot Injury (left)  . 1. Have you been to the ER, urgent care clinic since your last visit? Hospitalized since your last visit? No    2. Have you seen or consulted any other health care providers outside of the [address] [place] [address] since your last visit? Include any pap smears or colon screening. No    No flowsheet data found. No flowsheet data found. PHQ over the last two weeks 12/11/2017   Little interest or pleasure in doing things Not at all   Feeling down, depressed, irritable, or hopeless Not at all   Total Score PHQ 2 0       There are no discontinued medications.

## 2018-07-19 NOTE — MR AVS SNAPSHOT
[address] [id number] [phone] Patient: Michelle Monae MRN: [id number] QXT:03[birth year]11/[birth year]002 Visit Information Date & Time Provider Department Dept. Phone Encounter #  
 7/19/2018  9:20 AM Lorenza Litten, MD [facility] [id number] Follow-up Instructions Return if symptoms worsen or fail to improve. Upcoming Health Maintenance Date Due  
 FOOT EXAM Q1 10/20/1965 EYE EXAM RETINAL OR DILATED Q1 10/20/1965 Pneumococcal 19-64 Medium Risk (1 of 1 - PPSV23) 10/20/1974 DTaP/Tdap/Td series (1 - Tdap) 10/20/1976 FOBT Q 1 YEAR AGE 50-75 10/20/2005 ZOSTER VACCINE AGE 60> 8/20/2015 Influenza Age 5 to Adult 8/1/2018 HEMOGLOBIN A1C Q6M 12/5/2018 MICROALBUMIN Q1 6/5/2019 LIPID PANEL Q1 6/5/2019 Allergies as of 7/19/2018  Review Complete On: 7/19/2018 By: Lorenza Litten, MD  
  
 Severity Noted Reaction Type Reactions Percocet [Oxycodone-acetaminophen]  02/04/2013    Other (comments) AGGRESIVE BEHAVIOR Current Immunizations  Never Reviewed No immunizations on file. Not reviewed this visit You Were Diagnosed With   
  
 Codes Comments Contusion of left foot, initial encounter    -  Primary ICD-10-CM: K56.91IZ ICD-9-CM: 924.20 Vitals BP Pulse Height(growth percentile) Weight(growth percentile) SpO2 BMI  
 132/74 (BP 1 Location: Right arm, BP Patient Position: Sitting) 64 6' (1.829 m) 156 lb (70.8 kg) 98% 21.16 kg/m2 Smoking Status Former Smoker BMI and BSA Data Body Mass Index Body Surface Area  
 21.16 kg/m 2 1.9 m 2 Preferred Pharmacy Pharmacy Name Phone Jay Araya, [facility] [phone] Your Updated Medication List  
  
   
This list is accurate as of 7/19/18  9:46 AM.  Always use your most recent med list.  
  
  
  
  
  insulin pump Misc Commonly known as:  PATIENT SUPPLIED  
by SubCUTAneous route as needed. ascorbic acid (vitamin C) 500 mg tablet Commonly known as:  VITAMIN C Take 500 mg by mouth daily. aspirin delayed-release 81 mg tablet Take  by mouth daily. lisinopril 40 mg tablet Commonly known as:  PRINIVIL, ZESTRIL  
TAKE 1 TABLET AT BEDTIME  
  
 meloxicam 15 mg tablet Commonly known as:  MOBIC Take 15 mg by mouth daily. multivitamin tablet Commonly known as:  ONE A DAY Take 1 Tab by mouth daily. pravastatin 40 mg tablet Commonly known as:  PRAVACHOL  
TAKE 1 TABLET DAILY Follow-up Instructions Return if symptoms worsen or fail to improve. To-Do List   
 07/19/2018 Imaging:  XR FOOT LT MIN 3 V Patient Instructions Contusion: Care Instructions Your Care Instructions Contusion is the medical term for a bruise. It is the result of a direct blow or an impact, such as a fall. Contusions are common sports injuries. Most people think of a bruise as a black-and-blue spot. This happens when small blood vessels get torn and leak blood under the skin. But bones, muscles, and organs can also get bruised. This may damage deep tissues but not cause a bruise you can see. The doctor will do a physical exam to find the location of your contusion. You may also have tests to make sure you do not have a more serious injury, such as a broken bone or nerve damage. These may include X-rays or other imaging tests like a CT scan or MRI. Deep-tissue contusions may cause pain and swelling. But if there is no serious damage, they will often get better in a few weeks with home treatment. The doctor has checked you carefully, but problems can develop later. If you notice any problems or new symptoms, get medical treatment right away. Follow-up care is a key part of your treatment and safety.  Be sure to make and go to all appointments, and call your doctor if you are having problems. It's also a good idea to know your test results and keep a list of the medicines you take. How can you care for yourself at home? · Put ice or a cold pack on the sore area for 10 to 20 minutes at a time to stop swelling. Put a thin cloth between the ice pack and your skin. · Be safe with medicines. Read and follow all instructions on the label. ¨ If the doctor gave you a prescription medicine for pain, take it as prescribed. ¨ If you are not taking a prescription pain medicine, ask your doctor if you can take an over-the-counter medicine. · If you can, prop up the sore area on pillows as much as possible for the next few days. Try to keep the sore area above the level of your heart. When should you call for help? Call your doctor now or seek immediate medical care if: 
  · Your pain gets worse.  
  · You have new or worse swelling.  
  · You have tingling, weakness, or numbness in the area near the contusion.  
  · The area near the contusion is cold or pale.  
 Watch closely for changes in your health, and be sure to contact your doctor if: 
  · You do not get better as expected. Where can you learn more? Go to http://mana-christin.info/. Enter M704 in the search box to learn more about \"Contusion: Care Instructions. \" Current as of: November 20, 2017 Content Version: 11.7 © 8117-9274 [employer]. Care instructions adapted under license by [employer] (which disclaims liability or warranty for this information). If you have questions about a medical condition or this instruction, always ask your healthcare professional. [place] [address] any warranty or liability for your use of this information. Introducing [facility]!    
 [facility] introduces [email or web address] patient portal. Now you can access parts of your medical record, email your doctor's office, and request medication refills online. 1. In your internet browser, go to [email or web address] 2. Click on the First Time User? Click Here link in the Sign In box. You will see the New Member Sign Up page. 3. Enter your [employer] Access Code exactly as it appears below. You will not need to use this code after youve completed the sign-up process. If you do not sign up before the expiration date, you must request a new code. · [employer] Access Code: [email or web address] Expires: 9/3/2018  3:01 PM 
 
4. Enter the last four digits of your Social Security Number (xxxx) and Date of Birth (mm/dd/yyyy) as indicated and click Submit. You will be taken to the next sign-up page. 5. Create a [employer] ID. This will be your [employer] login ID and cannot be changed, so think of one that is secure and easy to remember. 6. Create a [employer] password. You can change your password at any time. 7. Enter your Password Reset Question and Answer. This can be used at a later time if you forget your password. 8. Enter your e-mail address. You will receive e-mail notification when new information is available in [address] 9. Click Sign Up. You can now view and download portions of your medical record. 10. Click the Download Summary menu link to download a portable copy of your medical information. If you have questions, please visit the Frequently Asked Questions section of the [employer] website. Remember, [employer] is NOT to be used for urgent needs. For medical emergencies, dial 911. Now available from your iPhone and Android! Please provide this summary of care documentation to your next provider. Your primary care clinician is listed as ROSAL[address]. If you have any questions after today's visit, please call [phone].

## 2018-07-19 NOTE — PROGRESS NOTES
This note will not be viewable in [address] Subjective:     Antonietta Mendoza presents the office today with complaints of left foot pain. The patient had gotten up off of the sofa last evening around 2 AM when it was dark in his house and upon walking across the floor hit his left foot on a table. The patient is noted pain in the left great toe metatarsal phalangeal joint and along the inside aspect of his foot. There is been pain with weightbearing. He has had some intermittent throbbing pain as well. The patient is a type I diabetic. Past Medical History:   Diagnosis Date    [facility] [address]     Essential hypertension 12/11/2017    Hypercholesterolemia 12/11/2017    Hypertension     Ileitis 12/11/2017    Long-term use of high-risk medication 12/11/2017    Microalbuminuria 12/11/2017    Type I diabetes mellitus with proliferative retinopathy [facility] 12/11/2017     Past Surgical History:   Procedure Laterality Date    HX ORTHOPAEDIC      RIGHT KNEE OPEN SURGERY    HX TONSILLECTOMY       Allergies   Allergen Reactions    Percocet [Oxycodone-Acetaminophen] Other (comments)     AGGRESIVE BEHAVIOR     Current Outpatient Prescriptions   Medication Sig Dispense Refill    pravastatin (PRAVACHOL) 40 mg tablet TAKE 1 TABLET DAILY 90 Tab 1    meloxicam (MOBIC) 15 mg tablet Take 15 mg by mouth daily.  aspirin delayed-release 81 mg tablet Take  by mouth daily.  lisinopril (PRINIVIL, ZESTRIL) 40 mg tablet TAKE 1 TABLET AT BEDTIME (Patient taking differently: 20mg daily) 90 Tab 1     insulin pump (PATIENT SUPPLIED) Misc by SubCUTAneous route as needed.  multivitamin (ONE A DAY) tablet Take 1 Tab by mouth daily.  ascorbic acid (VITAMIN C) 500 mg tablet Take 500 mg by mouth daily.          Social History     Social History    Marital status: SINGLE     Spouse name: N/A    Number of children: N/A    Years of education: N/A     Social History Main Topics    Smoking status: Former Smoker Years: 5.00     Quit date: 1/4/1975    Smokeless tobacco: Never Used    Alcohol use 10.5 oz/week     21 Glasses of wine per week    Drug use: No    Sexual activity: Not Asked     Other Topics Concern    None     Social History Narrative     Family History   Problem Relation Age of Onset    Cancer Mother      LUNG    Cancer Father      LUNG       Review of Systems:  GEN: no weight loss, weight gain, fatigue or night sweats  CV: no PND, orthopnea, or palpitations  Resp: no dyspnea on exertion, no cough  Abd: no nausea, vomiting or diarrhea  EXT: denies edema, claudication. Complains of throbbing pain in the left foot along the first metatarsal and toe region  Endocrine: no hair loss, excessive thirst or polyuria  Neurological ROS: no TIA or stroke symptoms  ROS otherwise negative      Objective:     Visit Vitals    /74 (BP 1 Location: Right arm, BP Patient Position: Sitting)    Pulse 64    Ht 6' (1.829 m)    Wt 156 lb (70.8 kg)    SpO2 98%    BMI 21.16 kg/m2     Body mass index is 21.16 kg/(m^2). General:   alert, cooperative and no distress   Extremities: There is localized swelling around the great toe metatarsal phalangeal joint with pain with palpation along the toe and the joint region. There is no redness. PT and DP pulses are intact. He is able to flex and extend the great toe. Neuro: ..alert, oriented x3,speech normal in context and clarity, cranial nerves II-XII intact,motor strength: full proximally and distally,gait: normal  reflexes: full and symmetric     Physical exam otherwise negative         Assessment/Plan:     Diagnoses and all orders for this visit:    Contusion of left foot, initial encounter  -     XR FOOT LT MIN 3 V; Future        Other instructions:   X-rays are reviewed and no fracture is apparent.     Foot elevations and ice for the next 48 hours have been recommended  Tylenol or Advil for pain and discomfort  Follow-up if there is any worsening    Follow-up Disposition: Not on File    Azam Rae MD

## 2018-07-19 NOTE — PATIENT INSTRUCTIONS

## 2018-09-17 ENCOUNTER — TELEPHONE (OUTPATIENT)
Dept: INTERNAL MEDICINE CLINIC | Age: 63
End: 2018-09-17

## 2018-09-17 NOTE — TELEPHONE ENCOUNTER
Patient is currently prescribed lisinopril 40 mg, however he has been cutting it in half. Patient is requesting a new prescription to be written for lisinopril 20 mg. Please send to [address] [facility].     Recent visit:       07/19/2018  Upcoming visit:  None

## 2018-09-18 RX ORDER — LISINOPRIL 20 MG/1
20 TABLET ORAL DAILY
Qty: 90 TAB | Refills: 3 | Status: SHIPPED | OUTPATIENT
Start: 2018-09-18 | End: 2019-07-09 | Stop reason: SDUPTHER

## 2018-12-19 LAB
CREATININE, EXTERNAL: 0.92
HBA1C MFR BLD HPLC: 5.3 %
LDL-C, EXTERNAL: 62

## 2018-12-28 RX ORDER — PRAVASTATIN SODIUM 40 MG/1
TABLET ORAL
Qty: 90 TAB | Refills: 1 | Status: SHIPPED | OUTPATIENT
Start: 2018-12-28 | End: 2019-07-09 | Stop reason: SDUPTHER

## 2019-05-20 ENCOUNTER — OFFICE VISIT (OUTPATIENT)
Dept: INTERNAL MEDICINE CLINIC | Age: 64
End: 2019-05-20

## 2019-05-20 VITALS
WEIGHT: 158 LBS | DIASTOLIC BLOOD PRESSURE: 82 MMHG | HEIGHT: 72 IN | RESPIRATION RATE: 16 BRPM | TEMPERATURE: 98.3 F | SYSTOLIC BLOOD PRESSURE: 138 MMHG | OXYGEN SATURATION: 96 % | BODY MASS INDEX: 21.4 KG/M2 | HEART RATE: 61 BPM

## 2019-05-20 DIAGNOSIS — H61.23 BILATERAL IMPACTED CERUMEN: ICD-10-CM

## 2019-05-20 DIAGNOSIS — H91.8X2 OTHER SPECIFIED HEARING LOSS OF LEFT EAR, UNSPECIFIED HEARING STATUS ON CONTRALATERAL SIDE: Primary | ICD-10-CM

## 2019-05-20 NOTE — PROGRESS NOTES
Subjective:     Shani Austin is a 61 y.o. male who presents for evaluation of a plugged ear. He has noticed bilateral symptoms 3 days ago. There is a prior history of cerumen impaction. Patient denies ear pain. Patient has hearing loss. Objective:     Visit Vitals  /82 (BP 1 Location: Right arm, BP Patient Position: Sitting)   Pulse 61   Temp 98.3 °F (36.8 °C) (Oral)   Resp 16   Ht 6' (1.829 m)   Wt 158 lb (71.7 kg)   SpO2 96%   BMI 21.43 kg/m²       General: alert, cooperative, no distress   Right Ear: ceruminosis noted. Left Ear:  ceruminosis noted. After removal: bilateral TM's and external ear canals normal, cerumen removed. Oropharynx:   normal.   Neck:  supple, symmetrical, trachea midline and no adenopathy. Assessment/Plan:     Cerumen Impaction, without otitis externa. 1. Cerumen removed by flushing. 2. Care instructions given. 3. Home treatment: none  4. Followup as needed. Encounter Diagnoses   Name Primary?  Other specified hearing loss of left ear, unspecified hearing status on contralateral side Yes   .

## 2019-05-20 NOTE — PROGRESS NOTES
Camilla Ramirez is a 61 y.o. male presenting for Ear Fullness (Both)  . 1. Have you been to the ER, urgent care clinic since your last visit? Hospitalized since your last visit? No    2. Have you seen or consulted any other health care providers outside of the [address] [place] [address] since your last visit? Include any pap smears or colon screening. Yes When: Dec 2018 Where: Endocrinologist Reason for visit: Yearly diabetic check up. No flowsheet data found. Abuse Screening Questionnaire 5/20/2019   Do you ever feel afraid of your partner? N   Are you in a relationship with someone who physically or mentally threatens you? N   Is it safe for you to go home? Y       3 most recent PHQ Screens 5/20/2019   Little interest or pleasure in doing things Not at all   Feeling down, depressed, irritable, or hopeless Not at all   Total Score PHQ 2 0       There are no discontinued medications.

## 2019-05-20 NOTE — PATIENT INSTRUCTIONS
Earwax Blockage: Care Instructions  Your Care Instructions    Earwax is a natural substance that protects the ear canal. Normally, earwax drains from the ears and does not cause problems. Sometimes earwax builds up and hardens. Earwax blockage (also called cerumen impaction) can cause some loss of hearing and pain. When wax is tightly packed, you will need to have your doctor remove it. Follow-up care is a key part of your treatment and safety. Be sure to make and go to all appointments, and call your doctor if you are having problems. It's also a good idea to know your test results and keep a list of the medicines you take. How can you care for yourself at home? · Do not try to remove earwax with cotton swabs, fingers, or other objects. This can make the blockage worse and damage the eardrum. · If your doctor recommends that you try to remove earwax at home:  ? Soften and loosen the earwax with warm mineral oil. You also can try hydrogen peroxide mixed with an equal amount of room temperature water. Place 2 drops of the fluid, warmed to body temperature, in the ear two times a day for up to 5 days. ? Once the wax is loose and soft, all that is usually needed to remove it from the ear canal is a gentle, warm shower. Direct the water into the ear, then tip your head to let the earwax drain out. Dry your ear thoroughly with a hair dryer set on low. Hold the dryer several inches from your ear. ? If the warm mineral oil and shower do not work, use an over-the-counter wax softener. Read and follow all instructions on the label. After using the wax softener, use an ear syringe to gently flush the ear. Make sure the flushing solution is body temperature. Cool or hot fluids in the ear can cause dizziness. When should you call for help? Call your doctor now or seek immediate medical care if:    · Pus or blood drains from your ear.     · Your ears are ringing or feel full.     · You have a loss of hearing.  Watch closely for changes in your health, and be sure to contact your doctor if:    · You have pain or reduced hearing after 1 week of home treatment.     · You have any new symptoms, such as nausea or balance problems. Where can you learn more? Go to http://mana-christin.info/. Enter O898 in the search box to learn more about \"Earwax Blockage: Care Instructions. \"  Current as of: September 23, 2018  Content Version: 11.9  © 5138-4279 [employer]. Care instructions adapted under license by [employer] (which disclaims liability or warranty for this information). If you have questions about a medical condition or this instruction, always ask your healthcare professional. [address] any warranty or liability for your use of this information.

## 2019-07-09 RX ORDER — LISINOPRIL 20 MG/1
20 TABLET ORAL DAILY
Qty: 90 TAB | Refills: 3 | Status: SHIPPED | OUTPATIENT
Start: 2019-07-09 | End: 2019-07-23 | Stop reason: SDUPTHER

## 2019-07-09 RX ORDER — PRAVASTATIN SODIUM 40 MG/1
TABLET ORAL
Qty: 90 TAB | Refills: 1 | Status: SHIPPED | OUTPATIENT
Start: 2019-07-09 | End: 2020-01-01

## 2019-07-09 NOTE — TELEPHONE ENCOUNTER
Pharmacy on file verified  **Patients insurance changed and he needs a new prescription sent to his mail order pharmacy**  Requested Prescriptions     Pending Prescriptions Disp Refills    lisinopril (PRINIVIL, ZESTRIL) 20 mg tablet 90 Tab 3     Sig: Take 1 Tab by mouth daily.     pravastatin (PRAVACHOL) 40 mg tablet 90 Tab 1     LOV 5/20/19  NOV 8/1/19  LF pravastatin- 12/28/18       Lisinopril- 9/18/18

## 2019-08-01 ENCOUNTER — OFFICE VISIT (OUTPATIENT)
Dept: INTERNAL MEDICINE CLINIC | Age: 64
End: 2019-08-01

## 2019-08-01 VITALS
RESPIRATION RATE: 16 BRPM | HEIGHT: 72 IN | DIASTOLIC BLOOD PRESSURE: 78 MMHG | TEMPERATURE: 98.2 F | HEART RATE: 77 BPM | WEIGHT: 155.8 LBS | BODY MASS INDEX: 21.1 KG/M2 | SYSTOLIC BLOOD PRESSURE: 118 MMHG | OXYGEN SATURATION: 97 %

## 2019-08-01 DIAGNOSIS — I10 ESSENTIAL HYPERTENSION: Primary | ICD-10-CM

## 2019-08-01 DIAGNOSIS — Z79.899 LONG-TERM USE OF HIGH-RISK MEDICATION: ICD-10-CM

## 2019-08-01 DIAGNOSIS — E78.00 HYPERCHOLESTEROLEMIA: ICD-10-CM

## 2019-08-01 DIAGNOSIS — E10.3559 TYPE 1 DIABETES MELLITUS WITH STABLE PROLIFERATIVE RETINOPATHY, UNSPECIFIED LATERALITY (HCC): ICD-10-CM

## 2019-08-01 LAB
A-G RATIO,AGRAT: 1.7 RATIO
ALBUMIN SERPL-MCNC: 4.2 G/DL (ref 3.9–5.4)
ALP SERPL-CCNC: 60 U/L (ref 38–126)
ALT SERPL-CCNC: 38 U/L (ref 9–52)
ANION GAP SERPL CALC-SCNC: 11 MMOL/L
AST SERPL W P-5'-P-CCNC: 40 U/L (ref 14–36)
BILIRUB SERPL-MCNC: 0.7 MG/DL (ref 0.2–1.3)
BILIRUB UR QL: NEGATIVE
BUN SERPL-MCNC: 18 MG/DL (ref 9–20)
BUN/CREATININE RATIO,BUCR: 20 RATIO
CALCIUM SERPL-MCNC: 9.5 MG/DL (ref 8.4–10.2)
CHLORIDE SERPL-SCNC: 96 MMOL/L (ref 98–107)
CHOL/HDL RATIO,CHHD: 2 RATIO (ref 0–4)
CHOLEST SERPL-MCNC: 144 MG/DL (ref 0–200)
CK SERPL-CCNC: 131 U/L (ref 30–135)
CLARITY: CLEAR
CO2 SERPL-SCNC: 31 MMOL/L (ref 22–32)
COLOR UR: NORMAL
CREAT SERPL-MCNC: 0.9 MG/DL (ref 0.8–1.5)
ERYTHROCYTE [DISTWIDTH] IN BLOOD BY AUTOMATED COUNT: 11.8 %
GLOBULIN,GLOB: 2.5
GLUCOSE 24H UR-MRATE: NEGATIVE G/(24.H)
GLUCOSE SERPL-MCNC: 64 MG/DL (ref 75–110)
HCT VFR BLD AUTO: 43.4 % (ref 37–51)
HDLC SERPL-MCNC: 82 MG/DL (ref 35–130)
HGB BLD-MCNC: 14.2 G/DL (ref 12–18)
HGB UR QL STRIP: NEGATIVE
KETONES UR QL STRIP.AUTO: NEGATIVE
LDL/HDL RATIO,LDHD: 1 RATIO
LDLC SERPL CALC-MCNC: 47 MG/DL (ref 0–130)
LEUKOCYTE ESTERASE: NEGATIVE
LYMPHOCYTES ABSOLUTE: 2.2 K/UL (ref 0.6–4.1)
LYMPHOCYTES NFR BLD: 33.5 % (ref 10–58.5)
MCH RBC QN AUTO: 33.4 PG (ref 26–32)
MCHC RBC AUTO-ENTMCNC: 32.7 G/DL (ref 30–36)
MCV RBC AUTO: 102.1 FL (ref 80–97)
MICROALBUMIN, URINE: NEGATIVE MG/L (ref 0–20)
MONOCYTES ABS-DIF,2141: 0.7 K/UL (ref 0–1.8)
MONOCYTES NFR BLD: 10.9 % (ref 0.1–24)
NEUTROPHILS # BLD: 55.6 % (ref 37–92)
NEUTROPHILS ABS,2156: 3.6 K/UL (ref 2–7.8)
NITRITE UR QL STRIP.AUTO: NEGATIVE
PH UR STRIP: 7 [PH] (ref 5–7)
PLATELET # BLD AUTO: 234 K/UL (ref 140–440)
PMV BLD AUTO: 8 FL
POTASSIUM SERPL-SCNC: 4.4 MMOL/L (ref 3.6–5)
PROT SERPL-MCNC: 6.7 G/DL (ref 6.3–8.2)
PROT UR STRIP-MCNC: NEGATIVE MG/DL
RBC # BLD AUTO: 4.25 M/UL (ref 4.2–6.3)
RBC #/AREA URNS HPF: 0 #/HPF
SODIUM SERPL-SCNC: 138 MMOL/L (ref 137–145)
SP GR UR REFRACTOMETRY: 1.01 (ref 1–1.03)
TRIGL SERPL-MCNC: 77 MG/DL (ref 0–200)
UROBILINOGEN UR QL STRIP.AUTO: NEGATIVE
VLDLC SERPL CALC-MCNC: 15 MG/DL
WBC # BLD AUTO: 6.5 K/UL (ref 4.1–10.9)
WBC URNS QL MICRO: 0 #/HPF

## 2019-08-01 NOTE — PROGRESS NOTES
Romain Btoello is a 61 y.o. male presenting for Diabetes and Hypertension  . 1. Have you been to the ER, urgent care clinic since your last visit? Hospitalized since your last visit? No    2. Have you seen or consulted any other health care providers outside of the [address] [place] [address] since your last visit? Include any pap smears or colon screening. No    No flowsheet data found. Abuse Screening Questionnaire 5/20/2019   Do you ever feel afraid of your partner? N   Are you in a relationship with someone who physically or mentally threatens you? N   Is it safe for you to go home? Y       3 most recent PHQ Screens 5/20/2019   Little interest or pleasure in doing things Not at all   Feeling down, depressed, irritable, or hopeless Not at all   Total Score PHQ 2 0       There are no discontinued medications.

## 2019-08-01 NOTE — PATIENT INSTRUCTIONS
Learning About Foot and Toenail Care  Foot and toenail care: Overview  Checking your loved one's feet and keeping them clean and soft can help prevent cracks and infection in the skin. This is especially important for people who have diabetes. Keeping toenails trimmed--and polished if that's what the person likes--also helps the person feel well-groomed. If the person you care for has diabetes or has foot problems, such as bad bunions and corns, think about taking them to see a podiatrist. This is a doctor who specializes in the care of the feet. Sometimes a podiatrist will come to the home if the person can't go out for visits. Try to take the person for salon pedicures if that is what they want. It's a chance to get out and see people and continue a favorite activity. You can do basic nail care at home. Usually all you need to do is keep the nails clean and at a safe length. How do you trim someone's toenails? Try to trim the person's nails every week. Or check the nails each week to see if they need to be trimmed. It's easiest to trim nails after the person has had a shower or foot bath. It makes the nails softer and easier to trim. Start by gathering your supplies. You will need toenail clippers and a nail file. You may also need nail polish and nail polish remover. To trim the nails:  1. Wash and dry your hands. You don't need to wear gloves. 2. Use nail polish remover to take off any polish. 3. Hold the person's foot and toe steady with one hand while you trim the nail with your other hand. Trim the nails straight across. Leave the nails a little longer at the corners so that the sharp ends don't cut into the skin. 4. Keep the nails no longer than the tip of the toes. 5. Let the nails dry if they are still damp and soft. 6. Use a nail file to gently smooth the edges of the nails, especially at the corners. They may be sharp after the nails are cut straight.   7. Apply nail polish, if the person wants it. If the person's nails are thick and discolored, it may be safest to have a podiatrist cut them. What else do you need to know? When you're caring for someone's nails, it is important to remember not to trim or cut the cuticles. A minor cut in a cuticle could lead to an infection. Wash the feet daily in the shower or bath or in a basin made for washing feet. It's extra important to wash the feet carefully if the person has diabetes. After washing the feet, dry gently. Put lotion on the feet, especially on the heels. But don't put it between the toes. If the person doesn't have diabetes and you see signs of athlete's foot (such as dry, cracking, or itchy skin between the toes), you can try an over-the-counter medicine. These medicines can kill the fungus that causes athlete's foot. If the problem doesn't go away, talk to the person's doctor. Look every day for cuts or signs of infection, such as pain, swelling, redness, or warmth. If you see any of these signs--especially in someone who has diabetes--call the doctor. Where can you learn more? Go to http://mana-christin.info/. Enter A726 in the search box to learn more about \"Learning About Foot and Toenail Care. \"  Current as of: April 1, 2019  Content Version: 12.1  © 0272-7704 [employer]. Care instructions adapted under license by [employer] (which disclaims liability or warranty for this information). If you have questions about a medical condition or this instruction, always ask your healthcare professional. [place] [address] any warranty or liability for your use of this information.

## 2019-08-01 NOTE — PROGRESS NOTES
This note will not be viewable in [address] Darrel Faye is a 61 y.o. male and presents with Diabetes and Hypertension  . Subjective:  Chele Walker returns to our office today in yearly follow-up of multiple medical problems. The patient has type 1 diabetes mellitus. He is on an insulin pump and is followed by Dr. Herbie Elizabeth and in general he sees her once a year. The patient checks his blood sugars 5-6 times a day. Average fasting blood sugars are in the 110-120 range. He has had no hypoglycemia. He is due for a diabetic retinal eye examination which he normally gets once a year. He denies any burning paresthesias of his feet. Blood pressure is currently managed on lisinopril. He tolerates this without cough, rash, dizziness or lower extremity edema. He has had no headaches, numbness, tingling or focal neurological problems. He has hypercholesterolemia currently on statin therapy. He denies muscle soreness or GI upset. He has no history of coronary artery disease and denies exertional chest pains or claudication. Past Medical History:   Diagnosis Date    Diabetes ([address]     Essential hypertension 12/11/2017    Hypercholesterolemia 12/11/2017    Hypertension     Ileitis 12/11/2017    Long-term use of high-risk medication 12/11/2017    Microalbuminuria 12/11/2017    Type I diabetes mellitus with proliferative retinopathy ([address] 12/11/2017     Past Surgical History:   Procedure Laterality Date    HX ORTHOPAEDIC      RIGHT KNEE OPEN SURGERY    HX TONSILLECTOMY       Allergies   Allergen Reactions    Oxycodone-Acetaminophen Other (comments)     AGGRESIVE BEHAVIOR  Other reaction(s): Other (see comments)  AGGRESIVE BEHAVIOR     Current Outpatient Medications   Medication Sig Dispense Refill    lisinopril (PRINIVIL, ZESTRIL) 30 mg tablet Take 1 Tab by mouth daily.  90 Tab 3    pravastatin (PRAVACHOL) 40 mg tablet TAKE 1 TABLET DAILY 90 Tab 1     insulin pump (PATIENT SUPPLIED) Misc by SubCUTAneous route as needed.  multivitamin (ONE A DAY) tablet Take 1 Tab by mouth daily.  ascorbic acid (VITAMIN C) 500 mg tablet Take 500 mg by mouth daily. Social History     Socioeconomic History    Marital status: SINGLE     Spouse name: Not on file    Number of children: Not on file    Years of education: Not on file    Highest education level: Not on file   Tobacco Use    Smoking status: Former Smoker     Years: 5.00     Last attempt to quit: 1975     Years since quittin.6    Smokeless tobacco: Never Used   Substance and Sexual Activity    Alcohol use:  Yes     Alcohol/week: 17.5 standard drinks     Types: 21 Glasses of wine per week    Drug use: No     Family History   Problem Relation Age of Onset    Cancer Mother         LUNG    Cancer Father         LUNG       Health Maintenance   Topic Date Due    Pneumococcal 0-64 years (1 of 1 - PPSV23) 10/20/1961    FOOT EXAM Q1  10/20/1965    EYE EXAM RETINAL OR DILATED  10/20/1965    DTaP/Tdap/Td series (1 - Tdap) 10/20/1976    Shingrix Vaccine Age 50> (1 of 2) 10/20/2005    FOBT Q 1 YEAR AGE 50-75  10/20/2005    MICROALBUMIN Q1  2019    HEMOGLOBIN A1C Q6M  2019    Influenza Age 9 to Adult  2019    LIPID PANEL Q1  2019    Hepatitis C Screening  Completed        Review of Systems  Constitutional: negative for fevers, chills, anorexia and weight loss  Eyes:   negative for visual disturbance and irritation  ENT:   negative for tinnitus,sore throat,nasal congestion,ear pain,hoarseness  Respiratory:  negative for cough, hemoptysis, dyspnea,wheezing  CV:   negative for chest pain, palpitations, lower extremity edema  GI:   negative for nausea, vomiting, diarrhea, abdominal pain,melena  Endo:               negative for polyuria,polydipsia,polyphagia,heat intolerance  Genitourinary: negative for frequency, dysuria and hematuria  Integumentary: negative for rash and pruritus  Hematologic:  negative for easy bruising and gum/nose bleeding  Musculoskel: negative for myalgias, arthralgias, back pain, muscle weakness, joint pain  Neurological:  negative for headaches, dizziness, vertigo, memory problems and gait   Behavl/Psych: negative for feelings of anxiety, depression, mood changes  ROS otherwise negative      Objective:  Visit Vitals  /78 (BP 1 Location: Right arm, BP Patient Position: Sitting)   Pulse 77   Temp 98.2 °F (36.8 °C) (Oral)   Resp 16   Ht 6' (1.829 m)   Wt 155 lb 12.8 oz (70.7 kg)   SpO2 97%   BMI 21.13 kg/m²     Body mass index is 21.13 kg/m². Physical Exam:   General appearance - alert, well appearing, and in no distress  Mental status - alert, oriented to person, place, and time  EYE-GISELLE, EOMI,conjunctiva normal bilaterally, lids normal  ENT-ENT exam normal, no neck nodes or sinus tenderness  Nose - normal and patent, no erythema,  Or discharge   Mouth - mucous membranes moist, pharynx normal without lesions  Neck - supple, no significant adenopathy or bruit  Chest - clear to auscultation, no wheezes, rales or rhonchi. Heart - normal rate, regular rhythm, normal S1, S2, no murmurs, rubs, clicks or gallops   Abdomen - soft, nontender, nondistended, no masses or organomegaly  Lymph- no adenopathy palpable  Ext-peripheral pulses normal, no pedal edema, no clubbing or cyanosis  Skin-Warm and dry.  no hyperpigmentation, vitiligo, or suspicious lesions  Neuro -alert, oriented, normal speech, no focal findings or movement disorder noted  Diabetic foot exam:     Left Foot:   Visual Exam: normal    Pulse DP: 2+ (normal)   Filament test: normal sensation    Vibratory sensation: normal      Right Foot:   Visual Exam: normal    Pulse DP: 2+ (normal)   Filament test: normal sensation    Vibratory sensation: normal        Assessment/Plan:  Diagnoses and all orders for this visit:    Essential hypertension    Hypercholesterolemia    Long-term use of high-risk medication    Type 1 diabetes mellitus with stable proliferative retinopathy, unspecified laterality ([address]        Other instructions: The patient's medications were reviewed and reconciled. No change in his current medical regimen is made. Body mass index is normal at 21. 13. Continued prudent/diabetic diet and exercise is encouraged    Continue to check blood sugars 5-6 times daily for control of his type 1 diabetes mellitus using insulin pump. Health maintenance issues were reviewed. The patient is due for diabetic retinal eye exam and will have a copy of this report forwarded to us. Age-appropriate vaccinations were reviewed and I have recommended influenza, Pneumovax, Tdap and shingles vaccine which she will consider. Await results of multiple labs    Continue yearly follow-up with endocrinology    Follow-up with us in 1 years time    Follow-up and Dispositions    · Return in about 1 year (around 8/1/2020). I have reviewed with the patient details of the assessment and plan and all questions were answered. Relevent patient education was performed. The most recent lab findings were reviewed with the patient. An After Visit Summary was printed and given to the patient.     Amanda Mccullough MD

## 2019-08-02 LAB — HBA1C MFR BLD HPLC: 4.9 % (ref 4–5.7)

## 2019-08-06 LAB — TSH SERPL DL<=0.05 MIU/L-ACNC: 1.56 UIU/ML (ref 0.34–5.6)

## 2019-08-07 NOTE — PROGRESS NOTES
Patient notified of lab results via copy mailed to patient per his request also a copy has been faxed to Dr. Herbie Elizabeth

## 2020-01-01 RX ORDER — PRAVASTATIN SODIUM 40 MG/1
TABLET ORAL
Qty: 90 TAB | Refills: 1 | Status: SHIPPED | OUTPATIENT
Start: 2020-01-01 | End: 2020-03-30

## 2020-02-03 ENCOUNTER — TELEPHONE (OUTPATIENT)
Dept: INTERNAL MEDICINE CLINIC | Age: 65
End: 2020-02-03

## 2020-02-03 NOTE — TELEPHONE ENCOUNTER
Patient called in to schedule an appointment for CP for this month. Patient requesting if he can just get his lab work done separately from his physical so he can turn it in to his job. Stated to patient that he must get the physical and labs done all at one time. Patient is very persistent is his request.Also wants a CB if theirs any cancellations this month.   CB:[phone]

## 2020-02-11 ENCOUNTER — OFFICE VISIT (OUTPATIENT)
Dept: INTERNAL MEDICINE CLINIC | Age: 65
End: 2020-02-11

## 2020-02-11 VITALS
WEIGHT: 159 LBS | TEMPERATURE: 99.1 F | RESPIRATION RATE: 22 BRPM | SYSTOLIC BLOOD PRESSURE: 130 MMHG | HEIGHT: 72 IN | HEART RATE: 66 BPM | OXYGEN SATURATION: 95 % | DIASTOLIC BLOOD PRESSURE: 78 MMHG | BODY MASS INDEX: 21.54 KG/M2

## 2020-02-11 DIAGNOSIS — E78.00 HYPERCHOLESTEROLEMIA: ICD-10-CM

## 2020-02-11 DIAGNOSIS — E10.3559 TYPE 1 DIABETES MELLITUS WITH STABLE PROLIFERATIVE RETINOPATHY, UNSPECIFIED LATERALITY (HCC): ICD-10-CM

## 2020-02-11 DIAGNOSIS — Z79.899 LONG-TERM USE OF HIGH-RISK MEDICATION: ICD-10-CM

## 2020-02-11 DIAGNOSIS — I10 ESSENTIAL HYPERTENSION: ICD-10-CM

## 2020-02-11 DIAGNOSIS — Z00.00 ROUTINE PHYSICAL EXAMINATION: Primary | ICD-10-CM

## 2020-02-11 LAB
A-G RATIO,AGRAT: 1.4 RATIO
ALBUMIN SERPL-MCNC: 4.4 G/DL (ref 3.9–5.4)
ALP SERPL-CCNC: 63 U/L (ref 38–126)
ALT SERPL-CCNC: 24 U/L (ref 0–50)
ANION GAP SERPL CALC-SCNC: 13 MMOL/L
AST SERPL W P-5'-P-CCNC: 41 U/L (ref 14–36)
BILIRUB SERPL-MCNC: 0.6 MG/DL (ref 0.2–1.3)
BILIRUB UR QL: NEGATIVE
BUN SERPL-MCNC: 11 MG/DL (ref 9–20)
BUN/CREATININE RATIO,BUCR: 14 RATIO
CALCIUM SERPL-MCNC: 9.4 MG/DL (ref 8.4–10.2)
CHLORIDE SERPL-SCNC: 94 MMOL/L (ref 98–107)
CHOL/HDL RATIO,CHHD: 2 RATIO (ref 0–4)
CHOLEST SERPL-MCNC: 160 MG/DL (ref 0–200)
CK SERPL-CCNC: 114 U/L (ref 30–135)
CLARITY: CLEAR
CO2 SERPL-SCNC: 30 MMOL/L (ref 22–32)
COLOR UR: NORMAL
CREAT SERPL-MCNC: 0.8 MG/DL (ref 0.8–1.5)
GLOBULIN,GLOB: 3.1
GLUCOSE 24H UR-MRATE: NEGATIVE G/(24.H)
GLUCOSE SERPL-MCNC: 75 MG/DL (ref 75–110)
HDLC SERPL-MCNC: 95 MG/DL (ref 35–130)
HGB UR QL STRIP: NEGATIVE
KETONES UR QL STRIP.AUTO: NEGATIVE
LDL/HDL RATIO,LDHD: 1 RATIO
LDLC SERPL CALC-MCNC: 53 MG/DL (ref 0–130)
LEUKOCYTE ESTERASE: NEGATIVE
MICROALBUMIN, URINE: NEGATIVE MG/L (ref 0–20)
NITRITE UR QL STRIP.AUTO: NEGATIVE
PH UR STRIP: 7 [PH] (ref 5–7)
POTASSIUM SERPL-SCNC: 4.1 MMOL/L (ref 3.6–5)
PROT SERPL-MCNC: 7.5 G/DL (ref 6.3–8.2)
PROT UR STRIP-MCNC: NEGATIVE MG/DL
PSA, TEST22: 2.4 NG/ML (ref 0–4)
RBC #/AREA URNS HPF: 0 #/HPF
SODIUM SERPL-SCNC: 137 MMOL/L (ref 137–145)
SP GR UR REFRACTOMETRY: 1.01 (ref 1–1.03)
TRIGL SERPL-MCNC: 60 MG/DL (ref 0–200)
TSH SERPL DL<=0.05 MIU/L-ACNC: 1.13 UIU/ML (ref 0.34–5.6)
UROBILINOGEN UR QL STRIP.AUTO: NEGATIVE
VLDLC SERPL CALC-MCNC: 12 MG/DL
WBC URNS QL MICRO: 0 #/HPF

## 2020-02-11 RX ORDER — LISINOPRIL 20 MG/1
20 TABLET ORAL DAILY
Qty: 60 TAB | Refills: 0 | Status: SHIPPED | OUTPATIENT
Start: 2020-02-11 | End: 2020-03-17 | Stop reason: SDUPTHER

## 2020-02-11 NOTE — PROGRESS NOTES
This note will not be viewable in [address] Subjective:     Loli Bills is a 59 y.o. male presenting for annual comprehensive personal healthcare examination. Eric Villalobos is a 71-year-old male who presents to the office today for complete checkup. The patient has type 1 diabetes mellitus followed by Dr. Mag Puckett and is currently on an insulin pump. He notes that his blood sugars have been somewhat poorly controlled recently as he had to change from NovoLog to Humalog due to insurance changes. The patient checks his blood sugar by fingerstick 5-6 times a day. Blood sugars are generally under good control and he denies hypoglycemia. He is up-to-date on diabetic retinal eye exam and foot exams. Blood pressure currently managed on lisinopril. He notes that blood pressure in the morning is generally well controlled but it climbs toward the later part of the day. He takes his medication at bedtime. He has had no cough, swelling, rash or dizziness. He denies headaches, numbness, tingling or focal neurological problems. He has hypercholesterolemia currently on statin therapy. He tolerates this without muscle soreness or GI upset. He has no history of coronary artery disease and denies exertional chest pains or claudication. History of present illness: This patient has multiple medical problems.   These include:  Patient Active Problem List   Diagnosis Code    Type I diabetes mellitus with proliferative retinopathy ([facility] E10.3599    Microalbuminuria R80.9    Ileitis K52.9    Essential hypertension I10    Hypercholesterolemia E78.00    Long-term use of high-risk medication Z79.899        Past Medical History:   Diagnosis Date    Diabetes ([address]     Essential hypertension 12/11/2017    Hypercholesterolemia 12/11/2017    Hypertension     Ileitis 12/11/2017    Long-term use of high-risk medication 12/11/2017    Microalbuminuria 12/11/2017    Type I diabetes mellitus with proliferative retinopathy ([facility] 2017     Past Surgical History:   Procedure Laterality Date    HX ORTHOPAEDIC      RIGHT KNEE OPEN SURGERY    HX TONSILLECTOMY       Allergies   Allergen Reactions    Oxycodone-Acetaminophen Other (comments)     AGGRESIVE BEHAVIOR  Other reaction(s): Other (see comments)  AGGRESIVE BEHAVIOR     Current Outpatient Medications   Medication Sig Dispense Refill    lisinopril (PRINIVIL, ZESTRIL) 20 mg tablet Take 1 Tab by mouth daily. 60 Tab 0    pravastatin (PRAVACHOL) 40 mg tablet TAKE 1 TABLET DAILY 90 Tab 1     insulin pump (PATIENT SUPPLIED) Misc by SubCUTAneous route as needed.  multivitamin (ONE A DAY) tablet Take 1 Tab by mouth daily.  ascorbic acid (VITAMIN C) 500 mg tablet Take 500 mg by mouth daily. Social History     Socioeconomic History    Marital status: SINGLE     Spouse name: Not on file    Number of children: Not on file    Years of education: Not on file    Highest education level: Not on file   Tobacco Use    Smoking status: Former Smoker     Years: 5.00     Last attempt to quit: 1975     Years since quittin.1    Smokeless tobacco: Never Used   Substance and Sexual Activity    Alcohol use:  Yes     Alcohol/week: 17.5 standard drinks     Types: 21 Glasses of wine per week    Drug use: No     Family History   Problem Relation Age of Onset    Cancer Mother         LUNG    Cancer Father         LUNG       Health Maintenance   Topic Date Due    FOBT Q1Y Age 54-65  10/20/2005    Influenza Age 5 to Adult  2020 (Originally 2019)    Shingrix Vaccine Age 50> (1 of 2) 2020 (Originally 10/20/2005)    DTaP/Tdap/Td series (1 - Tdap) 2021 (Originally 10/20/1966)    Pneumococcal 0-64 years (1 of 1 - PPSV23) 2021 (Originally 10/20/1961)    Foot Exam Q1  2020    A1C test (Diabetic or Prediabetic)  2020    MICROALBUMIN Q1  2020    Lipid Screen  2020    Eye Exam Retinal or Dilated 10/30/2021    Hepatitis C Screening  Completed       Review of Systems  Constitutional:  He denies fevers, weight loss, sweats, or fatigue. HEENT:  No blurred or double vision, headaches or dizziness. No difficulty with swallowing, taste, speech or smell. Respiratory:  No cough, wheezing or shortness of breath, or sputum production. Cardiac:  Denies chest pain, palpitations, unexplained indigestion, syncope, edema, PND or orthopnea. GI:  No changes in bowel habits, abdominal pain, no bloating, anorexia, nausea, vomiting or heartburn. :  He denies frequency, nocturia, stranguria, dysuria or sexual dysfunction. Extremities:  No joint pain, stiffness or swelling. Skin:  No recent rash or mole changes. Neurological:  No numbness, tingling, burning paresthesias or loss of motor strength. No syncope, dizziness, frequent headaches or memory loss. ROS otherwise negative       Objective:     Vitals:    02/11/20 1452   BP: 130/78   Pulse: 66   Resp: 22   Temp: 99.1 °F (37.3 °C)   TempSrc: Oral   SpO2: 95%   Weight: 159 lb (72.1 kg)   Height: 6' (1.829 m)   PainSc:   0 - No pain      Body mass index is 21.56 kg/m². Physical exam:   General Appearance:  Well-developed, well-nourished, no acute distress. Vision:  Deferred to ophthalmologist.    Hearing: HEENT:    Ears:  The TMs and ear canals were clear. Eyes:  The pupillary responses were normal.  Extraocular muscle function intact. Lids and conjunctiva not injected. Funduscopic exam revealed sharp disc margins. Pharynx:  Clear with teeth in good repair. No masses were noted. Neck:  Supple without thyromegaly or adenopathy. No JVD noted. No carotid bruits. Lungs: Clear to auscultation and percussion. Cardiac:  Regular rate and rhythm. No murmur. PMI not displaced. No gallop, rub or click. Abdomen:  Flat, soft, non-tender without palpable organomegaly or mass. No pulsatile mass was felt, and no bruit was heard.   Bowel sounds were active. Extremities:  No clubbing, cyanosis or edema. Pulses:  Dorsalis pedis and posterior tibial pulses felt without difficulty. Skin:  No unusual rash or mole changes noted. Lymph Nodes:  None felt in the cervical, supraclavicular, axillary or inguinal region. Neurological:  Cranial nerves II-XII grossly intact. Motor strength 5/5. DTRs 2+ and symmetric. Station and gait normal.         Assessment/Plan:   Impressions:  Diagnoses and all orders for this visit:    Routine physical examination  -     CBC WITH AUTOMATED DIFF  -     HEMOGLOBIN A1C W/O EAG  -     LIPID PANEL  -     METABOLIC PANEL, COMPREHENSIVE  -     CK  -     TSH 3RD GENERATION  -     PSA, DIAGNOSTIC (PROSTATE SPECIFIC AG)  -     URINALYSIS W/MICROSCOPIC  -     URINE, MICROALBUMIN, SEMIQUANTITATIVE    Type 1 diabetes mellitus with stable proliferative retinopathy, unspecified laterality (HCC)    Essential hypertension  -     lisinopril (PRINIVIL, ZESTRIL) 20 mg tablet; Take 1 Tab by mouth daily. , Normal, Disp-60 Tab, R-0    Hypercholesterolemia    Long-term use of high-risk medication        Other instructions: The patient's medications were reviewed and reconciled. No change in his current medical regimen is made. A no added salt, prudent diet and exercise is encouraged    Continued follow-up with endocrinology regarding his type 1 diabetes mellitus. Health maintenance issues were reviewed and patient states he has had a colonoscopy in the past and we will try to hunt down this report for his record. Await results of multiple labs    Follow-up 6 months    Follow-up and Dispositions    · Return in about 6 months (around 8/11/2020). Yung Kenyon MD    Please note that this dictation was completed with [employer], the [employer] voice recognition software. Quite often unanticipated grammatical, syntax, homophones, and other interpretive errors are inadvertently transcribed by the computer software.   Please disregard these errors. Please excuse any errors that have escaped final proofreading.

## 2020-02-11 NOTE — PATIENT INSTRUCTIONS

## 2020-02-11 NOTE — PROGRESS NOTES
Ca Carrillo is a 59 y.o. male presenting for Complete Physical  .     1. Have you been to the ER, urgent care clinic since your last visit? Hospitalized since your last visit? No    2. Have you seen or consulted any other health care providers outside of the [address] [place] [address] since your last visit? Include any pap smears or colon screening. Dr Tosin Cowart Sept 2019    No flowsheet data found. Abuse Screening Questionnaire 2/11/2020   Do you ever feel afraid of your partner? N   Are you in a relationship with someone who physically or mentally threatens you? N   Is it safe for you to go home? Y       3 most recent PHQ Screens 2/11/2020   Little interest or pleasure in doing things Not at all   Feeling down, depressed, irritable, or hopeless Not at all   Total Score PHQ 2 0       There are no discontinued medications.

## 2020-02-12 LAB
BASOPHILS # BLD AUTO: 0.1 X10E3/UL (ref 0–0.2)
BASOPHILS NFR BLD AUTO: 1 %
EOSINOPHIL # BLD AUTO: 0.2 X10E3/UL (ref 0–0.4)
EOSINOPHIL NFR BLD AUTO: 2 %
ERYTHROCYTE [DISTWIDTH] IN BLOOD BY AUTOMATED COUNT: 11.4 % (ref 11.6–15.4)
HBA1C MFR BLD HPLC: 5.2 % (ref 4–5.7)
HCT VFR BLD AUTO: 42 % (ref 37.5–51)
HGB BLD-MCNC: 14.6 G/DL (ref 13–17.7)
IMM GRANULOCYTES # BLD AUTO: 0 X10E3/UL (ref 0–0.1)
IMM GRANULOCYTES NFR BLD AUTO: 0 %
LYMPHOCYTES # BLD AUTO: 2 X10E3/UL (ref 0.7–3.1)
LYMPHOCYTES NFR BLD AUTO: 22 %
MCH RBC QN AUTO: 33.2 PG (ref 26.6–33)
MCHC RBC AUTO-ENTMCNC: 34.8 G/DL (ref 31.5–35.7)
MCV RBC AUTO: 96 FL (ref 79–97)
MONOCYTES # BLD AUTO: 1.1 X10E3/UL (ref 0.1–0.9)
MONOCYTES NFR BLD AUTO: 12 %
NEUTROPHILS # BLD AUTO: 6.1 X10E3/UL (ref 1.4–7)
NEUTROPHILS NFR BLD AUTO: 63 %
PLATELET # BLD AUTO: 237 X10E3/UL (ref 150–450)
RBC # BLD AUTO: 4.4 X10E6/UL (ref 4.14–5.8)
WBC # BLD AUTO: 9.5 X10E3/UL (ref 3.4–10.8)

## 2020-02-12 NOTE — PROGRESS NOTES
Labs are stable with A1c at 5.2. PSA is normal at 2.4 but has risen from 1.9 and we will continue to monitor this.

## 2020-03-17 ENCOUNTER — TELEPHONE (OUTPATIENT)
Dept: INTERNAL MEDICINE CLINIC | Age: 65
End: 2020-03-17

## 2020-03-17 DIAGNOSIS — I10 ESSENTIAL HYPERTENSION: ICD-10-CM

## 2020-03-17 RX ORDER — LISINOPRIL 20 MG/1
20 TABLET ORAL 2 TIMES DAILY
Qty: 180 TAB | Refills: 3 | Status: SHIPPED | OUTPATIENT
Start: 2020-03-17 | End: 2020-12-15 | Stop reason: SDUPTHER

## 2020-03-17 NOTE — TELEPHONE ENCOUNTER
Requested Prescriptions     Pending Prescriptions Disp Refills    lisinopriL (PRINIVIL, ZESTRIL) 20 mg tablet 180 Tab 3     Sig: Take 1 Tab by mouth two (2) times a day.      Please send to Mail order

## 2020-03-30 RX ORDER — PRAVASTATIN SODIUM 40 MG/1
TABLET ORAL
Qty: 90 TAB | Refills: 1 | Status: SHIPPED | OUTPATIENT
Start: 2020-03-30 | End: 2020-12-15 | Stop reason: SDUPTHER

## 2020-07-14 ENCOUNTER — TELEPHONE (OUTPATIENT)
Dept: INTERNAL MEDICINE CLINIC | Age: 65
End: 2020-07-14

## 2020-07-15 ENCOUNTER — OFFICE VISIT (OUTPATIENT)
Dept: INTERNAL MEDICINE CLINIC | Age: 65
End: 2020-07-15

## 2020-07-15 VITALS
DIASTOLIC BLOOD PRESSURE: 60 MMHG | BODY MASS INDEX: 20.72 KG/M2 | OXYGEN SATURATION: 95 % | HEART RATE: 66 BPM | HEIGHT: 72 IN | WEIGHT: 153 LBS | TEMPERATURE: 98 F | SYSTOLIC BLOOD PRESSURE: 114 MMHG

## 2020-07-15 DIAGNOSIS — H61.23 BILATERAL IMPACTED CERUMEN: ICD-10-CM

## 2020-07-15 DIAGNOSIS — H91.93 BILATERAL HEARING LOSS, UNSPECIFIED HEARING LOSS TYPE: Primary | ICD-10-CM

## 2020-07-15 NOTE — PROGRESS NOTES
Subjective:     Woody Estrella is a 59 y.o. male who presents for evaluation of a plugged ear. He has noticed bilateral symptoms 2 days ago. There is a prior history of cerumen impaction. Patient denies ear pain. Patient has hearing loss. Objective:     Visit Vitals  /60 (BP 1 Location: Left arm, BP Patient Position: Sitting)   Pulse 66   Temp 98 °F (36.7 °C)   Ht 6' (1.829 m)   Wt 153 lb (69.4 kg)   SpO2 95%   BMI 20.75 kg/m²       General: alert, cooperative, no distress   Right Ear: ceruminosis noted. Left Ear:  ceruminosis noted. After removal: right ear normal, cerumen removed. Oropharynx:   normal.   Neck:  supple, symmetrical, trachea midline and no adenopathy. Assessment/Plan:     Cerumen Impaction, without otitis externa. 1. Cerumen removed by flushing. 2. Care instructions given. 3. Home treatment: Cerumenex to be applied to the left ear daily due to persistent cerumen impaction which will need to be re-irrigated the first of next week  4. Followup the first of next week as scheduled      ICD-10-CM ICD-9-CM    1. Bilateral hearing loss, unspecified hearing loss type  H91.93 389.9    2. Bilateral impacted cerumen  H61.23 380.4 REMOVAL IMPACTED CERUMEN INSTRUMENTATION UNILAT   .

## 2020-07-15 NOTE — PROGRESS NOTES
Merlinda Bilis presents today at the clinic for    Chief Complaint   Patient presents with    Wax in Ear     both ears, left ear is worse        Wt Readings from Last 3 Encounters:   07/15/20 153 lb (69.4 kg)   02/11/20 159 lb (72.1 kg)   08/01/19 155 lb 12.8 oz (70.7 kg)     Temp Readings from Last 3 Encounters:   07/15/20 98 °F (36.7 °C)   02/11/20 99.1 °F (37.3 °C) (Oral)   08/01/19 98.2 °F (36.8 °C) (Oral)     BP Readings from Last 3 Encounters:   07/15/20 114/60   02/11/20 130/78   08/01/19 118/78     Pulse Readings from Last 3 Encounters:   07/15/20 66   02/11/20 66   08/01/19 77       Health Maintenance Due   Topic    Shingrix Vaccine Age 50> (1 of 2)    FOBT Q1Y Age 54-65     Foot Exam Q1          Learning Assessment:  :     Learning Assessment 12/11/2017   PRIMARY LEARNER Patient   PRIMARY LANGUAGE ENGLISH   LEARNER PREFERENCE PRIMARY LISTENING   ANSWERED BY patient   RELATIONSHIP SELF       Depression Screening:  :     3 most recent PHQ Screens 2/11/2020   Little interest or pleasure in doing things Not at all   Feeling down, depressed, irritable, or hopeless Not at all   Total Score PHQ 2 0       Fall Risk Assessment:  :     No flowsheet data found. Abuse Screening:  :     Abuse Screening Questionnaire 2/11/2020 5/20/2019   Do you ever feel afraid of your partner? N N   Are you in a relationship with someone who physically or mentally threatens you? N N   Is it safe for you to go home? Y Y       Coordination of Care Questionnaire:  :     1. Have you been to the ER, urgent care clinic since your last visit? Hospitalized since your last visit? no    2. Have you seen or consulted any other health care providers outside of the [address] [place] [address] since your last visit? Include any pap smears or colon screening.  no

## 2020-07-20 ENCOUNTER — OFFICE VISIT (OUTPATIENT)
Dept: INTERNAL MEDICINE CLINIC | Age: 65
End: 2020-07-20

## 2020-07-20 VITALS
SYSTOLIC BLOOD PRESSURE: 126 MMHG | RESPIRATION RATE: 20 BRPM | HEIGHT: 72 IN | DIASTOLIC BLOOD PRESSURE: 78 MMHG | WEIGHT: 153.2 LBS | OXYGEN SATURATION: 97 % | BODY MASS INDEX: 20.75 KG/M2 | HEART RATE: 75 BPM | TEMPERATURE: 98.5 F

## 2020-07-20 DIAGNOSIS — H91.92 HEARING LOSS OF LEFT EAR, UNSPECIFIED HEARING LOSS TYPE: Primary | ICD-10-CM

## 2020-07-20 DIAGNOSIS — H61.22 IMPACTED CERUMEN OF LEFT EAR: ICD-10-CM

## 2020-07-20 NOTE — PROGRESS NOTES
Leesa Rios is a 59 y.o. male presenting for Ear Fullness  . 1. Have you been to the ER, urgent care clinic since your last visit? Hospitalized since your last visit? No    2. Have you seen or consulted any other health care providers outside of the [address] [place] [address] since your last visit? Include any pap smears or colon screening. No    No flowsheet data found. Abuse Screening Questionnaire 2/11/2020   Do you ever feel afraid of your partner? N   Are you in a relationship with someone who physically or mentally threatens you? N   Is it safe for you to go home? Y       3 most recent PHQ Screens 2/11/2020   Little interest or pleasure in doing things Not at all   Feeling down, depressed, irritable, or hopeless Not at all   Total Score PHQ 2 0       There are no discontinued medications.

## 2020-07-20 NOTE — PROGRESS NOTES
Subjective: This note will not be viewable in [address] Tam Child is a 59 y.o. male who presents for evaluation of a plugged ear. He had been seen last week and multiple attempts to remove the cerumen impaction were unsuccessful. The patient has been using earwax softener in his left ear on a daily basis since that time. He continues to note a loss of hearing on that side as well as fullness  There is a prior history of cerumen impaction. Patient denies ear pain. Patient has hearing loss. Objective:     Visit Vitals  /78 (BP 1 Location: Right arm, BP Patient Position: Sitting)   Pulse 75   Temp 98.5 °F (36.9 °C) (Oral)   Resp 20   Ht 6' (1.829 m)   Wt 153 lb 3.2 oz (69.5 kg)   SpO2 97%   BMI 20.78 kg/m²       General: alert, cooperative, no distress   Right Ear: right ear normal.    Left Ear:  ceruminosis noted. After removal: bilateral TM's and external ear canals normal, cerumen removed. Oropharynx:   normal.   Neck:  supple, symmetrical, trachea midline and no adenopathy. Assessment/Plan:     Cerumen Impaction, without otitis externa. 1. Cerumen removed by flushing. 2. Care instructions given. 3. Home treatment: none  4. Followup as needed. ICD-10-CM ICD-9-CM    1. Hearing loss of left ear, unspecified hearing loss type  H91.92 389.9    2. Impacted cerumen of left ear  H61.22 380.4 REMOVAL IMPACTED CERUMEN INSTRUMENTATION UNILAT   .

## 2020-07-20 NOTE — PATIENT INSTRUCTIONS
Hearing Loss: Care Instructions  Your Care Instructions     Hearing loss is a sudden or slow decrease in how well you hear. It can range from mild to severe. Permanent hearing loss can occur with aging. It also can happen when you are exposed long-term to loud noise. Examples include listening to loud music, riding motorcycles, or being around other loud machines. Hearing loss can affect your work and home life. It can make you feel lonely or depressed. You may feel that you have lost your independence. But hearing aids and other devices can help you hear better and feel connected to others. Follow-up care is a key part of your treatment and safety. Be sure to make and go to all appointments, and call your doctor if you are having problems. It's also a good idea to know your test results and keep a list of the medicines you take. How can you care for yourself at home? · Avoid loud noises whenever possible. This helps keep your hearing from getting worse. · Always wear hearing protection around loud noises. · Wear a hearing aid as directed. See a person who can help you pick a hearing aid that fits you. · Have hearing tests as your doctor suggests. They can show whether your hearing has changed. Your hearing aid may need to be adjusted. · Use other devices as needed. These may include:  ? Telephone amplifiers and hearing aids that can connect to a television, stereo, radio, or microphone. ? Devices that use lights or vibrations. These alert you to the doorbell, a ringing telephone, or a baby monitor. ? Television closed-captioning. This shows the words at the bottom of the screen. Most new TVs can do this. ? TTY (text telephone). This lets you type messages back and forth on the telephone instead of talking or listening. These devices are also called TDD. When messages are typed on the keyboard, they are sent over the phone line to a receiving TTY. The message is shown on a monitor.   · Use pagers, fax machines, and email if it is hard for you to communicate by telephone. · Try to learn a listening technique called speech-reading. It is not lip-reading. You pay attention to people's gestures, expressions, posture, and tone of voice. These clues can help you understand what a person is saying. Face the person you are talking to, and have him or her face you. Make sure the lighting is good. You need to see the other person's face clearly. · Think about counseling if you need help to adjust to your hearing loss. When should you call for help? Watch closely for changes in your health, and be sure to contact your doctor if:  · You think your hearing is getting worse. · You have new symptoms, such as dizziness or nausea. Where can you learn more? Go to http://mana-christin.info/  Enter F736 in the search box to learn more about \"Hearing Loss: Care Instructions. \"  Current as of: July 29, 2019               Content Version: 12.5  © 9552-1306 Health[employer], [employer]. Care instructions adapted under license by [employer] (which disclaims liability or warranty for this information). If you have questions about a medical condition or this instruction, always ask your healthcare professional. [address] any warranty or liability for your use of this information.

## 2020-09-21 ENCOUNTER — TELEPHONE (OUTPATIENT)
Dept: INTERNAL MEDICINE CLINIC | Age: 65
End: 2020-09-21

## 2020-09-21 RX ORDER — NAPROXEN 500 MG/1
500 TABLET ORAL 2 TIMES DAILY WITH MEALS
Qty: 60 TAB | Refills: 0 | Status: SHIPPED | OUTPATIENT
Start: 2020-09-21 | End: 2021-06-25 | Stop reason: ALTCHOICE

## 2020-09-21 NOTE — TELEPHONE ENCOUNTER
Patient states he has nerve pain in his left ankle and would like a prescription called into [facility] [address] [facility].      [phone]

## 2020-09-21 NOTE — TELEPHONE ENCOUNTER
Please [facility]  Requested Prescriptions     Pending Prescriptions Disp Refills    naproxen (NAPROSYN) 500 mg tablet 60 Tab 0     Sig: Take 1 Tab by mouth two (2) times daily (with meals).

## 2020-12-07 ENCOUNTER — TELEPHONE (OUTPATIENT)
Dept: INTERNAL MEDICINE CLINIC | Age: 65
End: 2020-12-07

## 2020-12-07 RX ORDER — INSULIN LISPRO 100 [IU]/ML
INJECTION, SOLUTION INTRAVENOUS; SUBCUTANEOUS
Qty: 6 VIAL | Refills: 0 | Status: SHIPPED | COMMUNITY
Start: 2020-12-07 | End: 2021-05-13

## 2020-12-07 NOTE — TELEPHONE ENCOUNTER
Patients Endocrinologist has retired. He has an appointment with a new one, but it is 3 months away. In the meantime he needs labs- can we schedule him a FBS and A1C? He also needs Humalog sent to [facility]:  Requested Prescriptions     Pending Prescriptions Disp Refills    insulin lispro (HUMALOG) 100 unit/mL injection 6 Vial 0     Sig: To use via insulin pump     And needs printed Rxs faxed to [employer] for:   Insulin pump supplies-   5 boxes of mini med silhouettes 43 inch line   5 boxes of mini med 3.0ml reservoirs

## 2020-12-07 NOTE — TELEPHONE ENCOUNTER
Spoke to patient- he is now saying he needs a brief appointment ASAP because [employer] is going to need chart note along with the insulin pump supply order. He said they are also requesting a C-peptide level along with fbs, and a1c. He is running very low on supplies and is requesting to be seen asap.

## 2020-12-08 ENCOUNTER — OFFICE VISIT (OUTPATIENT)
Dept: INTERNAL MEDICINE CLINIC | Age: 65
End: 2020-12-08
Payer: MEDICARE

## 2020-12-08 VITALS
HEART RATE: 57 BPM | TEMPERATURE: 98.6 F | RESPIRATION RATE: 20 BRPM | DIASTOLIC BLOOD PRESSURE: 74 MMHG | SYSTOLIC BLOOD PRESSURE: 124 MMHG | WEIGHT: 152.6 LBS | BODY MASS INDEX: 20.67 KG/M2 | HEIGHT: 72 IN | OXYGEN SATURATION: 98 %

## 2020-12-08 DIAGNOSIS — Z23 NEEDS FLU SHOT: ICD-10-CM

## 2020-12-08 DIAGNOSIS — E10.3559 TYPE 1 DIABETES MELLITUS WITH STABLE PROLIFERATIVE RETINOPATHY, UNSPECIFIED LATERALITY (HCC): Primary | ICD-10-CM

## 2020-12-08 PROCEDURE — G8420 CALC BMI NORM PARAMETERS: HCPCS | Performed by: INTERNAL MEDICINE

## 2020-12-08 PROCEDURE — G8536 NO DOC ELDER MAL SCRN: HCPCS | Performed by: INTERNAL MEDICINE

## 2020-12-08 PROCEDURE — G8754 DIAS BP LESS 90: HCPCS | Performed by: INTERNAL MEDICINE

## 2020-12-08 PROCEDURE — 1101F PT FALLS ASSESS-DOCD LE1/YR: CPT | Performed by: INTERNAL MEDICINE

## 2020-12-08 PROCEDURE — G0008 ADMIN INFLUENZA VIRUS VAC: HCPCS | Performed by: INTERNAL MEDICINE

## 2020-12-08 PROCEDURE — 2022F DILAT RTA XM EVC RTNOPTHY: CPT | Performed by: INTERNAL MEDICINE

## 2020-12-08 PROCEDURE — G8432 DEP SCR NOT DOC, RNG: HCPCS | Performed by: INTERNAL MEDICINE

## 2020-12-08 PROCEDURE — 3017F COLORECTAL CA SCREEN DOC REV: CPT | Performed by: INTERNAL MEDICINE

## 2020-12-08 PROCEDURE — 90694 VACC AIIV4 NO PRSRV 0.5ML IM: CPT | Performed by: INTERNAL MEDICINE

## 2020-12-08 PROCEDURE — 3044F HG A1C LEVEL LT 7.0%: CPT | Performed by: INTERNAL MEDICINE

## 2020-12-08 PROCEDURE — G8427 DOCREV CUR MEDS BY ELIG CLIN: HCPCS | Performed by: INTERNAL MEDICINE

## 2020-12-08 PROCEDURE — G8752 SYS BP LESS 140: HCPCS | Performed by: INTERNAL MEDICINE

## 2020-12-08 PROCEDURE — 99213 OFFICE O/P EST LOW 20 MIN: CPT | Performed by: INTERNAL MEDICINE

## 2020-12-08 NOTE — PROGRESS NOTES
Subjective:     Mr. Larry Espinal presents to the office today in regards to his type 1 diabetes mellitus. The patient has been on an insulin pump for almost 10 years. Until recently he was followed by Dr. Zoey Gardner his endocrinologist.  She recently retired and the patient is transitioning to Dr. Libby Mera for his diabetic management. He is not been able to get an appointment to be seen by her until March 31. The patient comes to our office today for follow-up of his diabetes because he is running out of his pump supplies. We last saw him for his medical follow-up of other issues in February. The patient states that he checks his blood sugars 6 times a day. His average fasting blood sugar is 115. His diabetes has been complicated by retinopathy. He denies any hypoglycemia. Past Medical History:   Diagnosis Date    Diabetes ([address]     Essential hypertension 12/11/2017    Hypercholesterolemia 12/11/2017    Hypertension     Ileitis 12/11/2017    Long-term use of high-risk medication 12/11/2017    Microalbuminuria 12/11/2017    Type I diabetes mellitus with proliferative retinopathy ([facility] 12/11/2017     Past Surgical History:   Procedure Laterality Date    HX ORTHOPAEDIC      RIGHT KNEE OPEN SURGERY    HX TONSILLECTOMY       Allergies   Allergen Reactions    Oxycodone-Acetaminophen Other (comments)     AGGRESIVE BEHAVIOR  Other reaction(s): Other (see comments)  AGGRESIVE BEHAVIOR     Current Outpatient Medications   Medication Sig Dispense Refill    insulin lispro (HUMALOG) 100 unit/mL injection To use via insulin pump 6 Vial 0    pravastatin (PRAVACHOL) 40 mg tablet TAKE 1 TABLET DAILY 90 Tab 1    lisinopriL (PRINIVIL, ZESTRIL) 20 mg tablet Take 1 Tab by mouth two (2) times a day. 180 Tab 3     insulin pump (PATIENT SUPPLIED) Misc by SubCUTAneous route as needed.  multivitamin (ONE A DAY) tablet Take 1 Tab by mouth daily.         ascorbic acid (VITAMIN C) 500 mg tablet Take 500 mg by mouth daily.  naproxen (NAPROSYN) 500 mg tablet Take 1 Tab by mouth two (2) times daily (with meals). 61 Tab 0     Social History     Socioeconomic History    Marital status: SINGLE     Spouse name: Not on file    Number of children: Not on file    Years of education: Not on file    Highest education level: Not on file   Tobacco Use    Smoking status: Former Smoker     Years: 5.00     Last attempt to quit: 1975     Years since quittin.9    Smokeless tobacco: Never Used   Substance and Sexual Activity    Alcohol use: Yes     Alcohol/week: 17.5 standard drinks     Types: 21 Glasses of wine per week    Drug use: No     Family History   Problem Relation Age of Onset    Cancer Mother         LUNG    Cancer Father         LUNG       Review of Systems:  GEN: no weight loss, weight gain, fatigue or night sweats  CV: no PND, orthopnea, or palpitations  Resp: no dyspnea on exertion, no cough  Abd: no nausea, vomiting or diarrhea  EXT: denies edema, claudication  Endocrine: no hair loss, excessive thirst or polyuria  Neurological ROS: no TIA or stroke symptoms  ROS otherwise negative      Objective:     Visit Vitals  /74 (BP 1 Location: Right arm, BP Patient Position: Sitting)   Pulse (!) 57   Temp 98.6 °F (37 °C) (Oral)   Resp 20   Ht 6' (1.829 m)   Wt 152 lb 9.6 oz (69.2 kg)   SpO2 98%   BMI 20.70 kg/m²     Body mass index is 20.7 kg/m². General:   alert, cooperative and no distress         Physical exam not performed         Assessment/Plan:     Diagnoses and all orders for this visit:    Type 1 diabetes mellitus with stable proliferative retinopathy, unspecified laterality ([facility]  -     Cancel: GLUCOSE, FASTING  -     C-PEPTIDE  -     GLUCOSE, RANDOM    Needs flu shot  -     FLU (FLUAD QUAD INFLUENZA VACCINE,QUAD,ADJUVANTED)        Other instructions: The patient's medications were reviewed and reconciled.     Fasting blood sugar and C-peptide are drawn today and will be sent to [facility]    Due to his inability to be seen by his endocrinologist who has retired and his new endocrinologist who cannot see him until March 31st we will complete the paperwork for his insulin pump supplies. Follow-up with us as previously appointed    Follow-up and Dispositions    · Return for As previously scheduled. Shiela Davis MD    Please note that this dictation was completed with [employer], the computer voice recognition software. Quite often unanticipated grammatical, syntax, homophones, and other interpretive errors are inadvertently transcribed by the computer software. Please disregard these errors. Please excuse any errors that have escaped final proofreading.

## 2020-12-08 NOTE — PATIENT INSTRUCTIONS
Vaccine Information Statement    Influenza (Flu) Vaccine (Inactivated or Recombinant): What You Need to Know    Many Vaccine Information Statements are available in Pashto and other languages. See www.immunize.org/vis  Hojas de información sobre vacunas están disponibles en español y en muchos otros idiomas. Visite www.immunize.org/vis    1. Why get vaccinated? Influenza vaccine can prevent influenza (flu). Flu is a contagious disease that spreads around the [facility] every year, usually between October and May. Anyone can get the flu, but it is more dangerous for some people. Infants and young children, people 72years of age and older, pregnant women, and people with certain health conditions or a weakened immune system are at greatest risk of flu complications. Pneumonia, bronchitis, sinus infections and ear infections are examples of flu-related complications. If you have a medical condition, such as heart disease, cancer or diabetes, flu can make it worse. Flu can cause fever and chills, sore throat, muscle aches, fatigue, cough, headache, and runny or stuffy nose. Some people may have vomiting and diarrhea, though this is more common in children than adults. Each year thousands of people in the [facility] die from flu, and many more are hospitalized. Flu vaccine prevents millions of illnesses and flu-related visits to the doctor each year. 2. Influenza vaccines     CDC recommends everyone 10months of age and older get vaccinated every flu season. Children 6 months through 6years of age may need 2 doses during a single flu season. Everyone else needs only 1 dose each flu season. It takes about 2 weeks for protection to develop after vaccination. There are many flu viruses, and they are always changing. Each year a new flu vaccine is made to protect against three or four viruses that are likely to cause disease in the upcoming flu season.  Even when the vaccine doesnt exactly match these viruses, it may still provide some protection. Influenza vaccine does not cause flu. Influenza vaccine may be given at the same time as other vaccines. 3. Talk with your health care provider    Tell your vaccine provider if the person getting the vaccine:   Has had an allergic reaction after a previous dose of influenza vaccine, or has any severe, life-threatening allergies.  Has ever had Guillain-Barré Syndrome (also called GBS). In some cases, your health care provider may decide to postpone influenza vaccination to a future visit. People with minor illnesses, such as a cold, may be vaccinated. People who are moderately or severely ill should usually wait until they recover before getting influenza vaccine. Your health care provider can give you more information. 4. Risks of a reaction     Soreness, redness, and swelling where shot is given, fever, muscle aches, and headache can happen after influenza vaccine.  There may be a very small increased risk of Guillain-Barré Syndrome (GBS) after inactivated influenza vaccine (the flu shot). Moshe Hunt children who get the flu shot along with pneumococcal vaccine (PCV13), and/or DTaP vaccine at the same time might be slightly more likely to have a seizure caused by fever. Tell your health care provider if a child who is getting flu vaccine has ever had a seizure. People sometimes faint after medical procedures, including vaccination. Tell your provider if you feel dizzy or have vision changes or ringing in the ears. As with any medicine, there is a very remote chance of a vaccine causing a severe allergic reaction, other serious injury, or death. 5. What if there is a serious problem? An allergic reaction could occur after the vaccinated person leaves the clinic.  If you see signs of a severe allergic reaction (hives, swelling of the face and throat, difficulty breathing, a fast heartbeat, dizziness, or weakness), call 9-1-1 and get the person to the nearest hospital.    For other signs that concern you, call your health care provider. Adverse reactions should be reported to the Vaccine Adverse Event Reporting System (VAERS). Your health care provider will usually file this report, or you can do it yourself. Visit the VAERS website at www.vaers. [facility].gov or call 8-341.894.1650. VAERS is only for reporting reactions, and VAERS staff do not give medical advice. 6. The National Vaccine Injury Compensation Program    The [facility] Vaccine Injury Compensation Program (VICP) is a federal program that was created to compensate people who may have been injured by certain vaccines. Visit the VICP website at www.hrsa.gov/vaccinecompensation or call 1-638.382.6431 to learn about the program and about filing a claim. There is a time limit to file a claim for compensation. 7. How can I learn more?  Ask your health care provider.  Call your local or state health department.  Contact the Centers for Disease Control and Prevention (CDC):  - Call 5-871.856.4445 (9-296-JNL-INFO) or  - Visit CDCs influenza website at www.cdc.gov/flu    Vaccine Information Statement (Interim)  Inactivated Influenza Vaccine   8/15/2019  42 ANDREA Patterson [id number]   Department of Health and Human Services  Centers for Disease Control and Prevention    Office Use Only

## 2020-12-08 NOTE — PROGRESS NOTES
Raimundo Del Valle is a 72 y.o. male presenting for Diabetes  . 1. Have you been to the ER, urgent care clinic since your last visit? Hospitalized since your last visit? No    2. Have you seen or consulted any other health care providers outside of the [address] [place] [address] since your last visit? Include any pap smears or colon screening. No    No flowsheet data found. Abuse Screening Questionnaire 2/11/2020   Do you ever feel afraid of your partner? N   Are you in a relationship with someone who physically or mentally threatens you? N   Is it safe for you to go home? Y       3 most recent PHQ Screens 2/11/2020   Little interest or pleasure in doing things Not at all   Feeling down, depressed, irritable, or hopeless Not at all   Total Score PHQ 2 0       There are no discontinued medications. After obtaining written consent and per orders of Dr. Issa Ivory, injection of Fluad given by Iliana Wray [address] [place] [address]. Order and injection/medication verified by Dr Arcelia Landry. Patient tolerated procedure well. VIS was given to them. No reactions noted.

## 2020-12-09 LAB
C PEPTIDE SERPL-MCNC: <0.1 NG/ML (ref 1.1–4.4)
GLUCOSE SERPL-MCNC: 161 MG/DL (ref 65–99)

## 2020-12-15 DIAGNOSIS — I10 ESSENTIAL HYPERTENSION: ICD-10-CM

## 2020-12-15 RX ORDER — LISINOPRIL 20 MG/1
20 TABLET ORAL 2 TIMES DAILY
Qty: 180 TAB | Refills: 3 | Status: SHIPPED | OUTPATIENT
Start: 2020-12-15 | End: 2021-08-04

## 2020-12-15 RX ORDER — PRAVASTATIN SODIUM 40 MG/1
TABLET ORAL
Qty: 90 TAB | Refills: 3 | Status: SHIPPED | OUTPATIENT
Start: 2020-12-15 | End: 2022-01-06

## 2020-12-15 NOTE — TELEPHONE ENCOUNTER
Requested Prescriptions     Pending Prescriptions Disp Refills    lisinopriL (PRINIVIL, ZESTRIL) 20 mg tablet 180 Tab 3     Sig: Take 1 Tab by mouth two (2) times a day.     pravastatin (PRAVACHOL) 40 mg tablet 90 Tab 3     Sig: TAKE 1 TABLET DAILY

## 2021-01-04 ENCOUNTER — OFFICE VISIT (OUTPATIENT)
Dept: INTERNAL MEDICINE CLINIC | Age: 66
End: 2021-01-04
Payer: MEDICARE

## 2021-01-04 VITALS
RESPIRATION RATE: 20 BRPM | SYSTOLIC BLOOD PRESSURE: 124 MMHG | BODY MASS INDEX: 20.7 KG/M2 | HEIGHT: 72 IN | TEMPERATURE: 98.6 F | HEART RATE: 65 BPM | OXYGEN SATURATION: 99 % | WEIGHT: 152.8 LBS | DIASTOLIC BLOOD PRESSURE: 74 MMHG

## 2021-01-04 DIAGNOSIS — E10.3559 TYPE 1 DIABETES MELLITUS WITH STABLE PROLIFERATIVE RETINOPATHY, UNSPECIFIED LATERALITY (HCC): Primary | ICD-10-CM

## 2021-01-04 DIAGNOSIS — Z79.899 LONG-TERM USE OF HIGH-RISK MEDICATION: ICD-10-CM

## 2021-01-04 DIAGNOSIS — I10 ESSENTIAL HYPERTENSION: ICD-10-CM

## 2021-01-04 DIAGNOSIS — E78.00 HYPERCHOLESTEROLEMIA: ICD-10-CM

## 2021-01-04 LAB
A-G RATIO,AGRAT: 1.6 RATIO
ALBUMIN SERPL-MCNC: 4.5 G/DL (ref 3.9–5.4)
ALP SERPL-CCNC: 57 U/L (ref 38–126)
ALT SERPL-CCNC: 24 U/L (ref 0–50)
ANION GAP SERPL CALC-SCNC: 7 MMOL/L
AST SERPL W P-5'-P-CCNC: 41 U/L (ref 14–36)
BILIRUB SERPL-MCNC: 1.6 MG/DL (ref 0.2–1.3)
BILIRUB UR QL: NEGATIVE
BUN SERPL-MCNC: 15 MG/DL (ref 9–20)
BUN/CREATININE RATIO,BUCR: 19 RATIO
CALCIUM SERPL-MCNC: 9.7 MG/DL (ref 8.4–10.2)
CHLORIDE SERPL-SCNC: 101 MMOL/L (ref 98–107)
CHOL/HDL RATIO,CHHD: 2 RATIO (ref 0–4)
CHOLEST SERPL-MCNC: 182 MG/DL (ref 0–200)
CK SERPL-CCNC: 134 U/L (ref 30–135)
CLARITY: CLEAR
CO2 SERPL-SCNC: 30 MMOL/L (ref 22–32)
COLOR UR: NORMAL
CREAT SERPL-MCNC: 0.8 MG/DL (ref 0.8–1.5)
ERYTHROCYTE [DISTWIDTH] IN BLOOD BY AUTOMATED COUNT: 12.7 %
GLOBULIN,GLOB: 2.9
GLUCOSE 24H UR-MRATE: NEGATIVE G/(24.H)
GLUCOSE SERPL-MCNC: 87 MG/DL (ref 75–110)
HBA1C MFR BLD HPLC: 5 % (ref 4–5.7)
HCT VFR BLD AUTO: 46.1 % (ref 37–51)
HDLC SERPL-MCNC: 97 MG/DL (ref 35–130)
HGB BLD-MCNC: 14.8 G/DL (ref 12–18)
HGB UR QL STRIP: NEGATIVE
KETONES UR QL STRIP.AUTO: NEGATIVE
LDL/HDL RATIO,LDHD: 1 RATIO
LDLC SERPL CALC-MCNC: 72 MG/DL (ref 0–130)
LEUKOCYTE ESTERASE: NEGATIVE
LYMPHOCYTES ABSOLUTE: 2 K/UL (ref 0.6–4.1)
LYMPHOCYTES NFR BLD: 31.9 % (ref 10–58.5)
MCH RBC QN AUTO: 33 PG (ref 26–32)
MCHC RBC AUTO-ENTMCNC: 32.1 G/DL (ref 30–36)
MCV RBC AUTO: 102.7 FL (ref 80–97)
MICROALBUMIN, URINE: NEGATIVE MG/L (ref 0–20)
MONOCYTES ABS-DIF,2141: 0.6 K/UL (ref 0–1.8)
MONOCYTES NFR BLD: 8.7 % (ref 0.1–24)
NEUTROPHILS # BLD: 59.4 % (ref 37–92)
NEUTROPHILS ABS,2156: 3.8 K/UL (ref 2–7.8)
NITRITE UR QL STRIP.AUTO: NEGATIVE
PH UR STRIP: 6.5 [PH] (ref 5–7)
PLATELET # BLD AUTO: 243 K/UL (ref 140–440)
POTASSIUM SERPL-SCNC: 4.7 MMOL/L (ref 3.6–5)
PROT SERPL-MCNC: 7.4 G/DL (ref 6.3–8.2)
PROT UR STRIP-MCNC: NEGATIVE MG/DL
RBC # BLD AUTO: 4.49 M/UL (ref 4.2–6.3)
RBC #/AREA URNS HPF: 0 #/HPF
SODIUM SERPL-SCNC: 138 MMOL/L (ref 137–145)
SP GR UR REFRACTOMETRY: 1.01 (ref 1–1.03)
TRIGL SERPL-MCNC: 66 MG/DL (ref 0–200)
UROBILINOGEN UR QL STRIP.AUTO: NEGATIVE
VLDLC SERPL CALC-MCNC: 13 MG/DL
WBC # BLD AUTO: 6.4 K/UL (ref 4.1–10.9)
WBC URNS QL MICRO: 0 #/HPF

## 2021-01-04 PROCEDURE — 3046F HEMOGLOBIN A1C LEVEL >9.0%: CPT | Performed by: INTERNAL MEDICINE

## 2021-01-04 PROCEDURE — G8510 SCR DEP NEG, NO PLAN REQD: HCPCS | Performed by: INTERNAL MEDICINE

## 2021-01-04 PROCEDURE — 83036 HEMOGLOBIN GLYCOSYLATED A1C: CPT | Performed by: INTERNAL MEDICINE

## 2021-01-04 PROCEDURE — G8427 DOCREV CUR MEDS BY ELIG CLIN: HCPCS | Performed by: INTERNAL MEDICINE

## 2021-01-04 PROCEDURE — 36415 COLL VENOUS BLD VENIPUNCTURE: CPT | Performed by: INTERNAL MEDICINE

## 2021-01-04 PROCEDURE — G8754 DIAS BP LESS 90: HCPCS | Performed by: INTERNAL MEDICINE

## 2021-01-04 PROCEDURE — G8420 CALC BMI NORM PARAMETERS: HCPCS | Performed by: INTERNAL MEDICINE

## 2021-01-04 PROCEDURE — G8752 SYS BP LESS 140: HCPCS | Performed by: INTERNAL MEDICINE

## 2021-01-04 PROCEDURE — 85025 COMPLETE CBC W/AUTO DIFF WBC: CPT | Performed by: INTERNAL MEDICINE

## 2021-01-04 PROCEDURE — 82550 ASSAY OF CK (CPK): CPT | Performed by: INTERNAL MEDICINE

## 2021-01-04 PROCEDURE — 99214 OFFICE O/P EST MOD 30 MIN: CPT | Performed by: INTERNAL MEDICINE

## 2021-01-04 PROCEDURE — 2022F DILAT RTA XM EVC RTNOPTHY: CPT | Performed by: INTERNAL MEDICINE

## 2021-01-04 PROCEDURE — 80061 LIPID PANEL: CPT | Performed by: INTERNAL MEDICINE

## 2021-01-04 PROCEDURE — G8536 NO DOC ELDER MAL SCRN: HCPCS | Performed by: INTERNAL MEDICINE

## 2021-01-04 PROCEDURE — 80053 COMPREHEN METABOLIC PANEL: CPT | Performed by: INTERNAL MEDICINE

## 2021-01-04 PROCEDURE — 3017F COLORECTAL CA SCREEN DOC REV: CPT | Performed by: INTERNAL MEDICINE

## 2021-01-04 PROCEDURE — 81001 URINALYSIS AUTO W/SCOPE: CPT | Performed by: INTERNAL MEDICINE

## 2021-01-04 PROCEDURE — 1101F PT FALLS ASSESS-DOCD LE1/YR: CPT | Performed by: INTERNAL MEDICINE

## 2021-01-04 PROCEDURE — 84443 ASSAY THYROID STIM HORMONE: CPT | Performed by: INTERNAL MEDICINE

## 2021-01-04 PROCEDURE — 82044 UR ALBUMIN SEMIQUANTITATIVE: CPT | Performed by: INTERNAL MEDICINE

## 2021-01-04 RX ORDER — LANCETS
EACH MISCELLANEOUS
Qty: 7 PACKAGE | Refills: 3 | Status: SHIPPED | OUTPATIENT
Start: 2021-01-04 | End: 2021-01-15 | Stop reason: SDUPTHER

## 2021-01-04 RX ORDER — BLOOD SUGAR DIAGNOSTIC
STRIP MISCELLANEOUS
Qty: 700 STRIP | Refills: 3 | Status: SHIPPED | OUTPATIENT
Start: 2021-01-04 | End: 2021-01-15 | Stop reason: SDUPTHER

## 2021-01-04 NOTE — PATIENT INSTRUCTIONS

## 2021-01-04 NOTE — PROGRESS NOTES
Candy Peters is a 72 y.o. male and presents with Follow Up Chronic Condition  . Subjective:  Tracie Bolton is a 71-year-old male who presents to the office today in follow-up of multiple medical problems. The patient has type 1 diabetes mellitus with proliferative retinopathy. The patient had been followed by an endocrinologist who recently retired and he is transitioning to see Dr. Kae Mcdonough. The patient is on an insulin pump using Humalog. He checks his blood sugars 7 times daily with average fasting blood sugar less than 120. He denies any hypoglycemia. The patient is up-to-date on diabetic retinal eye exam and denies burning paresthesias of his feet. He follows a very strict diabetic diet and maintains a body mass index of less than 25. He remains physically active. His blood pressure is currently managed on lisinopril. He tolerates this without any cough, rash, orthostatic dizziness. He has had no headaches, numbness, tingling or focal neurological problems. Hypercholesterolemia is currently managed on statin therapy. The patient denies muscle soreness or GI upset. He has no history of vascular disease and denies exertional chest pains or claudication. Past Medical History:   Diagnosis Date    Diabetes ([address]     Essential hypertension 12/11/2017    Hypercholesterolemia 12/11/2017    Hypertension     Ileitis 12/11/2017    Long-term use of high-risk medication 12/11/2017    Microalbuminuria 12/11/2017    Type I diabetes mellitus with proliferative retinopathy ([address] 12/11/2017     Past Surgical History:   Procedure Laterality Date    HX ORTHOPAEDIC      RIGHT KNEE OPEN SURGERY    HX TONSILLECTOMY       Allergies   Allergen Reactions    Oxycodone-Acetaminophen Other (comments)     AGGRESIVE BEHAVIOR  Other reaction(s):  Other (see comments)  AGGRESIVE BEHAVIOR     Current Outpatient Medications   Medication Sig Dispense Refill    glucose blood VI test strips (Accu-Chek Elizabet Plus test strp) strip Use to check blood sugars 7 times per day. 7 boxes of 100 per box. Dx code H82.6159 700 Strip 3    lancets misc Use to check blood sugars 7 times per day. 7 boxes of 100 per box. Dx code E16.7332 7 Package 3    lisinopriL (PRINIVIL, ZESTRIL) 20 mg tablet Take 1 Tab by mouth two (2) times a day. 180 Tab 3    pravastatin (PRAVACHOL) 40 mg tablet TAKE 1 TABLET DAILY 90 Tab 3    insulin lispro (HUMALOG) 100 unit/mL injection To use via insulin pump 6 Vial 0     insulin pump (PATIENT SUPPLIED) Misc by SubCUTAneous route as needed.  multivitamin (ONE A DAY) tablet Take 1 Tab by mouth daily.  naproxen (NAPROSYN) 500 mg tablet Take 1 Tab by mouth two (2) times daily (with meals). 60 Tab 0    ascorbic acid (VITAMIN C) 500 mg tablet Take 500 mg by mouth daily. Social History     Socioeconomic History    Marital status: SINGLE     Spouse name: Not on file    Number of children: Not on file    Years of education: Not on file    Highest education level: Not on file   Tobacco Use    Smoking status: Former Smoker     Years: 5.00     Quit date: 1975     Years since quittin.0    Smokeless tobacco: Never Used   Substance and Sexual Activity    Alcohol use:  Yes     Alcohol/week: 17.5 standard drinks     Types: 21 Glasses of wine per week    Drug use: No     Family History   Problem Relation Age of Onset    Cancer Mother         LUNG    Cancer Father         LUNG       Health Maintenance   Topic Date Due    Shingrix Vaccine Age 49> (1 of 2) 10/20/2005    Colorectal Cancer Screening Combo  10/20/2005    Foot Exam Q1  2020    Pneumococcal 65+ years (1 of 1 - PPSV23) 10/20/2020    Medicare Yearly Exam  2020    DTaP/Tdap/Td series (1 - Tdap) 2021 (Originally 10/20/1976)    A1C test (Diabetic or Prediabetic)  2021    MICROALBUMIN Q1  2021    Lipid Screen  2021    Eye Exam Retinal or Dilated  10/30/2021    GLAUCOMA SCREENING Q2Y  10/28/2022    Hepatitis C Screening  Completed    Flu Vaccine  Completed        Review of Systems  Constitutional: negative for fevers, chills, anorexia and weight loss  Eyes:   negative for visual disturbance and irritation  ENT:   negative for tinnitus,sore throat,nasal congestion,ear pain,hoarseness  Respiratory:  negative for cough, hemoptysis, dyspnea,wheezing  CV:   negative for chest pain, palpitations, lower extremity edema  GI:   negative for nausea, vomiting, diarrhea, abdominal pain,melena  Endo:               negative for polyuria,polydipsia,polyphagia,heat intolerance  Genitourinary: negative for frequency, dysuria and hematuria  Integumentary: negative for rash and pruritus  Hematologic:  negative for easy bruising and gum/nose bleeding  Musculoskel: negative for myalgias, arthralgias, back pain, muscle weakness, joint pain  Neurological:  negative for headaches, dizziness, vertigo, memory problems and gait   Behavl/Psych: negative for feelings of anxiety, depression, mood changes  ROS otherwise negative      Objective:  Visit Vitals  /74 (BP 1 Location: Right arm, BP Patient Position: Sitting)   Pulse 65   Temp 98.6 °F (37 °C) (Oral)   Resp 20   Ht 6' (1.829 m)   Wt 152 lb 12.8 oz (69.3 kg)   SpO2 99%   BMI 20.72 kg/m²     Body mass index is 20.72 kg/m². Physical Exam:   General appearance - alert, well appearing, and in no distress  Mental status - alert, oriented to person, place, and time  EYE-GISELLE, EOMI,conjunctiva normal bilaterally, lids normal  ENT-ENT exam normal, no neck nodes or sinus tenderness  Nose - normal and patent, no erythema,  Or discharge   Mouth - mucous membranes moist, pharynx normal without lesions  Neck - supple, no significant adenopathy or bruit  Chest - clear to auscultation, no wheezes, rales or rhonchi.   Heart - normal rate, regular rhythm, normal S1, S2, no murmurs, rubs, clicks or gallops   Abdomen - soft, nontender, nondistended, no masses or organomegaly  Lymph- no adenopathy palpable  Ext-peripheral pulses normal, no pedal edema, no clubbing or cyanosis  Skin-Warm and dry. no hyperpigmentation, vitiligo, or suspicious lesions  Neuro -alert, oriented, normal speech, no focal findings or movement disorder noted      Assessment/Plan:  Diagnoses and all orders for this visit:    Type 1 diabetes mellitus with stable proliferative retinopathy, unspecified laterality (HCC)  -     glucose blood VI test strips (Accu-Chek Elizabet Plus test strp) strip; Use to check blood sugars 7 times per day. 7 boxes of 100 per box. Dx code A41.6231, Normal, Disp-700 Strip, R-3  -     lancets misc; Use to check blood sugars 7 times per day. 7 boxes of 100 per box. Dx code P58.7227, Normal, Disp-7 Package, R-3  -     COLLECTION VENOUS BLOOD,VENIPUNCTURE  -     HEMOGLOBIN A1C W/O EAG  -     URINE, MICROALBUMIN, SEMIQUANTITATIVE  -     C-PEPTIDE    Essential hypertension  -     CBC WITH AUTOMATED DIFF  -     METABOLIC PANEL, COMPREHENSIVE  -     URINALYSIS W/MICROSCOPIC    Hypercholesterolemia  -     LIPID PANEL  -     TSH 3RD GENERATION    Long-term use of high-risk medication  -     CK        Other instructions: The patient's medications were reviewed and reconciled. No change in his current medical regimen will be made. A no added salt, prudent diet and exercise is encouraged. Continued check of blood sugars at least 7 times per day. Patient will bring a log of his blood sugars to the office such that they may be entered into the electronic record. He does have an endocrinology appointment scheduled within the next 3 months. Await results of multiple labs. Follow-up with me in 6 months    Follow-up and Dispositions    · Return in about 6 months (around 7/4/2021). I have reviewed with the patient details of the assessment and plan and all questions were answered. Relevent patient education was performed. The most recent lab findings were reviewed with the patient. An After Visit Summary was printed and given to the patient. Carmen Hyde MD    Please note that this dictation was completed with [employer], the computer voice recognition software. Quite often unanticipated grammatical, syntax, homophones, and other interpretive errors are inadvertently transcribed by the computer software. Please disregard these errors. Please excuse any errors that have escaped final proofreading.

## 2021-01-05 LAB
C PEPTIDE SERPL-MCNC: <0.1 NG/ML (ref 1.1–4.4)
TSH SERPL DL<=0.05 MIU/L-ACNC: 1.65 UIU/ML (ref 0.34–5.6)

## 2021-01-15 DIAGNOSIS — E10.3559 TYPE 1 DIABETES MELLITUS WITH STABLE PROLIFERATIVE RETINOPATHY, UNSPECIFIED LATERALITY (HCC): ICD-10-CM

## 2021-01-15 NOTE — TELEPHONE ENCOUNTER
Please send Rxs to [facility]- patient is changing from [facility] to [facility] per Medicare suggestion    Requested Prescriptions     Pending Prescriptions Disp Refills    glucose blood VI test strips (Accu-Chek Elizabet Plus test strp) strip 700 Strip 3     Sig: Use to check blood sugars 7 times per day. 7 boxes of 100 per box for 90d supply. Dx code X72.6571 Patient is an insulin dependant diabetic    lancets misc 7 Package 3     Sig: Use to check blood sugars 7 times per day. 7 boxes of 100 per box for 90d supply.  Dx code V15.6042 Patient is an insulin dependant diabetic

## 2021-01-16 RX ORDER — BLOOD SUGAR DIAGNOSTIC
STRIP MISCELLANEOUS
Qty: 700 STRIP | Refills: 3 | Status: SHIPPED | OUTPATIENT
Start: 2021-01-16 | End: 2022-04-03

## 2021-01-16 RX ORDER — LANCETS
EACH MISCELLANEOUS
Qty: 7 PACKAGE | Refills: 3 | Status: SHIPPED | OUTPATIENT
Start: 2021-01-16 | End: 2022-04-03

## 2021-03-18 ENCOUNTER — IMMUNIZATION (OUTPATIENT)
Dept: INTERNAL MEDICINE CLINIC | Age: 66
End: 2021-03-18
Payer: MEDICARE

## 2021-03-18 DIAGNOSIS — Z23 ENCOUNTER FOR IMMUNIZATION: Primary | ICD-10-CM

## 2021-03-18 PROCEDURE — 0001A COVID-19, MRNA, LNP-S, PF, 30MCG/0.3ML DOSE(PFIZER): CPT | Performed by: FAMILY MEDICINE

## 2021-03-18 PROCEDURE — 91300 COVID-19, MRNA, LNP-S, PF, 30MCG/0.3ML DOSE(PFIZER): CPT | Performed by: FAMILY MEDICINE

## 2021-04-08 ENCOUNTER — IMMUNIZATION (OUTPATIENT)
Dept: INTERNAL MEDICINE CLINIC | Age: 66
End: 2021-04-08
Payer: MEDICARE

## 2021-04-08 DIAGNOSIS — Z23 ENCOUNTER FOR IMMUNIZATION: Primary | ICD-10-CM

## 2021-04-08 PROCEDURE — 91300 COVID-19, MRNA, LNP-S, PF, 30MCG/0.3ML DOSE(PFIZER): CPT | Performed by: FAMILY MEDICINE

## 2021-04-08 PROCEDURE — 0002A COVID-19, MRNA, LNP-S, PF, 30MCG/0.3ML DOSE(PFIZER): CPT | Performed by: FAMILY MEDICINE

## 2021-05-10 ENCOUNTER — TELEPHONE (OUTPATIENT)
Dept: INTERNAL MEDICINE CLINIC | Age: 66
End: 2021-05-10

## 2021-05-10 NOTE — TELEPHONE ENCOUNTER
We have naproxen that is on his list of medications.   Recommend a foot and ankle specialist such as Dr. Mook Mccarthy with [address] [place] [address] at [facility]

## 2021-05-10 NOTE — TELEPHONE ENCOUNTER
Patient states his arthritis in his ankle has flared up and he can hardly walk. Can you call in a prescription and what doctor do you recommend for arthritis? He currently does not have a prescription for this.      [phone]

## 2021-05-13 RX ORDER — INSULIN LISPRO 100 [IU]/ML
INJECTION, SOLUTION INTRAVENOUS; SUBCUTANEOUS
Qty: 60 ML | Refills: 3 | Status: SHIPPED | OUTPATIENT
Start: 2021-05-13 | End: 2022-07-25 | Stop reason: SDUPTHER

## 2021-05-21 ENCOUNTER — TELEPHONE (OUTPATIENT)
Dept: INTERNAL MEDICINE CLINIC | Age: 66
End: 2021-05-21

## 2021-05-21 NOTE — TELEPHONE ENCOUNTER
Unfortunately, there are very few foot and ankle specialist available and so it takes time to get into see them. I think it is important that he see a foot and ankle specialist due to his type 1 diabetes.

## 2021-05-21 NOTE — TELEPHONE ENCOUNTER
Patient states you referred him to Dr Valdo Silvestre for his foot but he can't see him until June 10th. Can you recommend someone else? He is in pain and dragging his foot around.      [phone]

## 2021-05-24 LAB — SARS-COV-2, NAA: POSITIVE

## 2021-06-15 ENCOUNTER — TELEPHONE (OUTPATIENT)
Dept: INTERNAL MEDICINE CLINIC | Age: 66
End: 2021-06-15

## 2021-06-15 NOTE — TELEPHONE ENCOUNTER
Patient has had a 8-10 lb weight loss within the last few months. He said his blood sugars have been stable. His appetite is good. Would like an appointment. I weighed him at 144 lbs today.

## 2021-06-25 ENCOUNTER — OFFICE VISIT (OUTPATIENT)
Dept: INTERNAL MEDICINE CLINIC | Age: 66
End: 2021-06-25
Payer: MEDICARE

## 2021-06-25 VITALS
BODY MASS INDEX: 19.31 KG/M2 | DIASTOLIC BLOOD PRESSURE: 82 MMHG | HEART RATE: 57 BPM | OXYGEN SATURATION: 99 % | RESPIRATION RATE: 18 BRPM | WEIGHT: 142.6 LBS | HEIGHT: 72 IN | SYSTOLIC BLOOD PRESSURE: 118 MMHG | TEMPERATURE: 97.9 F

## 2021-06-25 DIAGNOSIS — Z86.16 HISTORY OF COVID-19: ICD-10-CM

## 2021-06-25 DIAGNOSIS — M10.071 ACUTE IDIOPATHIC GOUT OF RIGHT FOOT: Chronic | ICD-10-CM

## 2021-06-25 DIAGNOSIS — E78.00 HYPERCHOLESTEROLEMIA: ICD-10-CM

## 2021-06-25 DIAGNOSIS — E10.3559 TYPE 1 DIABETES MELLITUS WITH STABLE PROLIFERATIVE RETINOPATHY, UNSPECIFIED LATERALITY (HCC): ICD-10-CM

## 2021-06-25 DIAGNOSIS — Z00.00 MEDICARE ANNUAL WELLNESS VISIT, SUBSEQUENT: Primary | ICD-10-CM

## 2021-06-25 DIAGNOSIS — I10 ESSENTIAL HYPERTENSION: ICD-10-CM

## 2021-06-25 DIAGNOSIS — Z79.899 LONG-TERM USE OF HIGH-RISK MEDICATION: ICD-10-CM

## 2021-06-25 LAB
COMMENT, HOLDF: NORMAL
SAMPLES BEING HELD,HOLD: NORMAL

## 2021-06-25 PROCEDURE — G8754 DIAS BP LESS 90: HCPCS | Performed by: INTERNAL MEDICINE

## 2021-06-25 PROCEDURE — G8427 DOCREV CUR MEDS BY ELIG CLIN: HCPCS | Performed by: INTERNAL MEDICINE

## 2021-06-25 PROCEDURE — 99214 OFFICE O/P EST MOD 30 MIN: CPT | Performed by: INTERNAL MEDICINE

## 2021-06-25 PROCEDURE — 3044F HG A1C LEVEL LT 7.0%: CPT | Performed by: INTERNAL MEDICINE

## 2021-06-25 PROCEDURE — G8420 CALC BMI NORM PARAMETERS: HCPCS | Performed by: INTERNAL MEDICINE

## 2021-06-25 PROCEDURE — G8432 DEP SCR NOT DOC, RNG: HCPCS | Performed by: INTERNAL MEDICINE

## 2021-06-25 PROCEDURE — G8752 SYS BP LESS 140: HCPCS | Performed by: INTERNAL MEDICINE

## 2021-06-25 PROCEDURE — G0402 INITIAL PREVENTIVE EXAM: HCPCS | Performed by: INTERNAL MEDICINE

## 2021-06-25 PROCEDURE — 1101F PT FALLS ASSESS-DOCD LE1/YR: CPT | Performed by: INTERNAL MEDICINE

## 2021-06-25 PROCEDURE — 2022F DILAT RTA XM EVC RTNOPTHY: CPT | Performed by: INTERNAL MEDICINE

## 2021-06-25 PROCEDURE — G8536 NO DOC ELDER MAL SCRN: HCPCS | Performed by: INTERNAL MEDICINE

## 2021-06-25 PROCEDURE — 3017F COLORECTAL CA SCREEN DOC REV: CPT | Performed by: INTERNAL MEDICINE

## 2021-06-25 RX ORDER — INDOMETHACIN 25 MG/1
25 CAPSULE ORAL 3 TIMES DAILY
Qty: 30 CAPSULE | Refills: 0 | Status: SHIPPED | OUTPATIENT
Start: 2021-06-25 | End: 2021-07-13 | Stop reason: SDUPTHER

## 2021-06-25 NOTE — PROGRESS NOTES
Chief Complaint   Patient presents with    Follow Up Chronic Condition    Annual Wellness Visit       Depression Risk Factor Screening:     3 most recent PHQ Screens 1/4/2021   Little interest or pleasure in doing things Not at all   Feeling down, depressed, irritable, or hopeless Not at all   Total Score PHQ 2 0       Functional Ability and Level of Safety:     Activities of Daily Living  ADL Assessment 6/25/2021   Feeding yourself No Help Needed   Getting from bed to chair No Help Needed   Getting dressed No Help Needed   Bathing or showering No Help Needed   Walk across the room (includes cane/walker) No Help Needed   Using the telphone No Help Needed   Taking your medications No Help Needed   Preparing meals No Help Needed   Managing money (expenses/bills) No Help Needed   Moderately strenuous housework (laundry) No Help Needed   Shopping for personal items (toiletries/medicines) No Help Needed   Shopping for groceries No Help Needed   Driving No Help Needed   Climbing a flight of stairs No Help Needed   Getting to places beyond walking distances No Help Needed       Fall Risk  Fall Risk Assessment, last 12 mths 1/4/2021   Able to walk? Yes   Fall in past 12 months? 0   Do you feel unsteady? 0   Are you worried about falling 0       Abuse Screen  Abuse Screening Questionnaire 6/25/2021   Do you ever feel afraid of your partner? N   Are you in a relationship with someone who physically or mentally threatens you? N   Is it safe for you to go home?  Y         Patient Care Team   Patient Care Team:  Gilma Brink MD as PCP - General (Internal Medicine)  Gilma Brink MD as PCP - Rick Avery Provider

## 2021-06-25 NOTE — PATIENT INSTRUCTIONS
The best way to stay healthy is to live a healthy lifestyle. A healthy lifestyle includes regular exercise, eating a well-balanced diet, keeping a healthy weight and not smoking. Regular physical exams and screening tests are another important way to take care of yourself. Preventive exams provided by health care providers can find health problems early when treatment works best and can keep you from getting certain diseases or illnesses. Preventive services include exams, lab tests, screenings, shots, monitoring and information to help you take care of your own health. All people over 65 should have a pneumonia shot. Pneumonia shots are usually only needed once in a lifetime unless your doctor decides differently. In addition to your physical exam, some screening tests are recommended:    All people over 65 should have a yearly flu shot. People over 65 are at medium to high risk for Hepatitis B. Three shots are needed for complete protection. Bone mass measurement (dexa scan) is recommended every two years. Diabetes Mellitus screening is recommended every year. Glaucoma is an eye disease caused by high pressure in the eye. An eye exam is recommended every year. Cardiovascular screening tests that check your cholesterol and other blood fat (lipid) levels are recommended every five years. Colorectal Cancer screening tests help to find pre-cancerous polyps (growths in the colon) so they can be removed before they turn into cancer. Tests ordered for screening depend on your personal and family history risk factors. Prostate Cancer Screening (annually up to age 76)    Screening for breast cancer is recommended yearly with a Mammogram.    Screening for cervical and vaginal cancer is recommended with a pelvic and Pap test every two years.  However if you have had an abnormal pap in the past  three years or at high risk for cervical or vaginal cancer Medicare will cover a pap test and a pelvic exam every year. Here is a list of your current Health Maintenance items with a due date:  Health Maintenance Due   Topic Date Due    DTaP/Tdap/Td  (1 - Tdap) Never done    Shingles Vaccine (1 of 2) Never done    Colorectal Screening  Never done    Diabetic Foot Care  08/01/2020    Pneumococcal Vaccine (1 of 1 - PPSV23) Never done    Annual Well Visit  Never done          Learning About Carbohydrate (Carb) Counting and Eating Out When You Have Diabetes  Why plan your meals? Meal planning can be a key part of managing diabetes. Planning meals and snacks with the right balance of carbohydrate, protein, and fat can help you keep your blood sugar at the target level you set with your doctor. You don't have to eat special foods. You can eat what your family eats, including sweets once in a while. But you do have to pay attention to how often you eat and how much you eat of certain foods. You may want to work with a dietitian or a certified diabetes educator. He or she can give you tips and meal ideas and can answer your questions about meal planning. This health professional can also help you reach a healthy weight if that is one of your goals. What should you know about eating carbs? Managing the amount of carbohydrate (carbs) you eat is an important part of healthy meals when you have diabetes. Carbohydrate is found in many foods. · Learn which foods have carbs. And learn the amounts of carbs in different foods. ? Bread, cereal, pasta, and rice have about 15 grams of carbs in a serving. A serving is 1 slice of bread (1 ounce), ½ cup of cooked cereal, or 1/3 cup of cooked pasta or rice. ? Fruits have 15 grams of carbs in a serving. A serving is 1 small fresh fruit, such as an apple or orange; ½ of a banana; ½ cup of cooked or canned fruit; ½ cup of fruit juice; 1 cup of melon or raspberries; or 2 tablespoons of dried fruit.   ? Milk and no-sugar-added yogurt have 15 grams of carbs in a serving. A serving is 1 cup of milk or 2/3 cup of no-sugar-added yogurt. ? Starchy vegetables have 15 grams of carbs in a serving. A serving is ½ cup of mashed potatoes or sweet potato; 1 cup winter squash; ½ of a small baked potato; ½ cup of cooked beans; or ½ cup cooked corn or green peas. · Learn how much carbs to eat each day and at each meal. A dietitian or CDE can teach you how to keep track of the amount of carbs you eat. This is called carbohydrate counting. · If you are not sure how to count carbohydrate grams, use the Plate Method to plan meals. It is a good, quick way to make sure that you have a balanced meal. It also helps you spread carbs throughout the day. ? Divide your plate by types of foods. Put non-starchy vegetables on half the plate, meat or other protein food on one-quarter of the plate, and a grain or starchy vegetable in the final quarter of the plate. To this you can add a small piece of fruit and 1 cup of milk or yogurt, depending on how many carbs you are supposed to eat at a meal.  · Try to eat about the same amount of carbs at each meal. Do not \"save up\" your daily allowance of carbs to eat at one meal.  · Proteins have very little or no carbs per serving. Examples of proteins are beef, chicken, turkey, fish, eggs, tofu, cheese, cottage cheese, and peanut butter. A serving size of meat is 3 ounces, which is about the size of a deck of cards. Examples of meat substitute serving sizes (equal to 1 ounce of meat) are 1/4 cup of cottage cheese, 1 egg, 1 tablespoon of peanut butter, and ½ cup of tofu. How can you eat out and still eat healthy? · Learn to estimate the serving sizes of foods that have carbohydrate. If you measure food at home, it will be easier to estimate the amount in a serving of restaurant food. · If the meal you order has too much carbohydrate (such as potatoes, corn, or baked beans), ask to have a low-carbohydrate food instead.  Ask for a salad or green vegetables. · If you use insulin, check your blood sugar before and after eating out to help you plan how much to eat in the future. · If you eat more carbohydrate at a meal than you had planned, take a walk or do other exercise. This will help lower your blood sugar. What are some tips for eating healthy? · Limit saturated fat, such as the fat from meat and dairy products. This is a healthy choice because people who have diabetes are at higher risk of heart disease. So choose lean cuts of meat and nonfat or low-fat dairy products. Use olive or canola oil instead of butter or shortening when cooking. · Don't skip meals. Your blood sugar may drop too low if you skip meals and take insulin or certain medicines for diabetes. · Check with your doctor before you drink alcohol. Alcohol can cause your blood sugar to drop too low. Alcohol can also cause a bad reaction if you take certain diabetes medicines. Follow-up care is a key part of your treatment and safety. Be sure to make and go to all appointments, and call your doctor if you are having problems. It's also a good idea to know your test results and keep a list of the medicines you take. Where can you learn more? Go to http://www.[employer].com/  Enter I147 in the search box to learn more about \"Learning About Carbohydrate (Carb) Counting and Eating Out When You Have Diabetes. \"  Current as of: August 31, 2020               Content Version: 12.8  © 2787-9677 [employer]. Care instructions adapted under license by [employer] (which disclaims liability or warranty for this information). If you have questions about a medical condition or this instruction, always ask your healthcare professional. [place] [address] any warranty or liability for your use of this information.

## 2021-06-25 NOTE — PROGRESS NOTES
Silvana Schaefer is a 72 y.o. male and presents with Follow Up Chronic Condition and Annual Wellness Visit  . Subjective:  Mr. Gilmar Alexandre is a 66-year-old male who presents to the office today for Medicare wellness check and follow-up of multiple medical problems. The patient has type 1 diabetes mellitus. He currently is using an insulin pump. The patient checks his blood sugar 7 times daily. Average fasting blood sugars have been around 115 and he infrequently has hypoglycemia. He has had a history of microalbuminuria but no history of burning paresthesias. The patient has hypertension currently on lisinopril. Tolerates this without cough, rash, orthostatic dizziness. Has had no headaches, numbness, tingling or focal neurological problems. Remains on pravastatin for hypercholesterolemia. Tolerates this without muscle soreness or GI upset. Has no history of coronary artery disease and denies exertional chest pains or claudication. Patient states that in April he got his second Covid shot and then approximately 4 to 5 weeks later he came down with Covid-19. He is recovered but has lost weight and has chronic fatigue and weakness. He has had no cough, shortness of breath or tachycardia. He denies any loss of smell or taste. The patient also notes the acute onset within the last 24 hours of gout in his left foot. The pain is in the metatarsal phalangeal joint. He notes that the pain is exquisite and he has having difficulty wearing a sock and shoe because of the pain. He denies any trauma.     Past Medical History:   Diagnosis Date    Diabetes ([address]     Essential hypertension 12/11/2017    Hypercholesterolemia 12/11/2017    Hypertension     Ileitis 12/11/2017    Long-term use of high-risk medication 12/11/2017    Microalbuminuria 12/11/2017    Type I diabetes mellitus with proliferative retinopathy ([address] 12/11/2017     Past Surgical History:   Procedure Laterality Date    HX ORTHOPAEDIC      RIGHT KNEE OPEN SURGERY    HX TONSILLECTOMY       Allergies   Allergen Reactions    Oxycodone-Acetaminophen Other (comments)     AGGRESIVE BEHAVIOR  Other reaction(s): Other (see comments)  AGGRESIVE BEHAVIOR     Current Outpatient Medications   Medication Sig Dispense Refill    indomethacin (INDOCIN) 25 mg capsule Take 1 Capsule by mouth three (3) times daily. 30 Capsule 0    insulin lispro (HumaLOG U-100 Insulin) 100 unit/mL injection USE VIA INSULIN PUMP 60 mL 3    glucose blood VI test strips (Accu-Chek Elizabet Plus test strp) strip Use to check blood sugars 7 times per day. 7 boxes of 100 per box for 90d supply. Dx code J17.9245 Patient is an insulin dependant diabetic 700 Strip 3    lancets misc Use to check blood sugars 7 times per day. 7 boxes of 100 per box for 90d supply. Dx code E88.2429 Patient is an insulin dependant diabetic 7 Package 3    lisinopriL (PRINIVIL, ZESTRIL) 20 mg tablet Take 1 Tab by mouth two (2) times a day. 180 Tab 3    pravastatin (PRAVACHOL) 40 mg tablet TAKE 1 TABLET DAILY 90 Tab 3     insulin pump (PATIENT SUPPLIED) Misc by SubCUTAneous route as needed.  multivitamin (ONE A DAY) tablet Take 1 Tab by mouth daily.  ascorbic acid (VITAMIN C) 500 mg tablet Take 500 mg by mouth daily. Social History     Socioeconomic History    Marital status: SINGLE     Spouse name: Not on file    Number of children: Not on file    Years of education: Not on file    Highest education level: Not on file   Tobacco Use    Smoking status: Former Smoker     Years: 5.00     Quit date: 1975     Years since quittin.5    Smokeless tobacco: Never Used   Vaping Use    Vaping Use: Never used   Substance and Sexual Activity    Alcohol use:  Yes     Alcohol/week: 17.5 standard drinks     Types: 21 Glasses of wine per week    Drug use: No     Social Determinants of Health     Financial Resource Strain:     Difficulty of Paying Living Expenses:    Food Insecurity:     Worried About Running Out of Food in the Last Year:     [address] in the Last Year:    Transportation Needs:     Lack of Transportation (Medical):      Lack of Transportation (Non-Medical):    Physical Activity:     Days of Exercise per Week:     Minutes of Exercise per Session:    Stress:     Feeling of Stress :    Social Connections:     Frequency of Communication with Friends and Family:     Frequency of Social Gatherings with Friends and Family:     Attends [employer] Services:     Active Member of Clubs or Organizations:     Attends Club or Organization Meetings:     Marital Status:      Family History   Problem Relation Age of Onset    Cancer Mother         LUNG    Cancer Father         LUNG       Health Maintenance   Topic Date Due    DTaP/Tdap/Td series (1 - Tdap) Never done    Shingrix Vaccine Age 50> (1 of 2) Never done    Colorectal Cancer Screening Combo  Never done    Foot Exam Q1  08/01/2020    Pneumococcal 65+ years (1 of 1 - PPSV23) Never done    Eye Exam Retinal or Dilated  10/30/2021    MICROALBUMIN Q1  01/04/2022    A1C test (Diabetic or Prediabetic)  04/08/2022    Lipid Screen  04/08/2022    Medicare Yearly Exam  06/26/2022    Hepatitis C Screening  Completed    Flu Vaccine  Completed    COVID-19 Vaccine  Completed        Review of Systems  Constitutional: Positive for recent fatigue and weight loss  Eyes:   negative for visual disturbance and irritation  ENT:   negative for tinnitus,sore throat,nasal congestion,ear pain,hoarseness  Respiratory:  negative for cough, hemoptysis, dyspnea,wheezing  CV:   negative for chest pain, palpitations, lower extremity edema  GI:   negative for nausea, vomiting, diarrhea, abdominal pain,melena  Endo:               negative for polyuria,polydipsia,polyphagia,heat intolerance  Genitourinary: negative for frequency, dysuria and hematuria  Integumentary: negative for rash and pruritus  Hematologic:  negative for easy bruising and gum/nose bleeding  Musculoskel: Positive for acute left great toe pain  Neurological:  negative for headaches, dizziness, vertigo, memory problems and gait   Behavl/Psych: negative for feelings of anxiety, depression, mood changes  ROS otherwise negative      Objective:  Visit Vitals  /82 (BP 1 Location: Right upper arm, BP Patient Position: Sitting, BP Cuff Size: Adult)   Pulse (!) 57   Temp 97.9 °F (36.6 °C) (Oral)   Resp 18   Ht 6' (1.829 m)   Wt 142 lb 9.6 oz (64.7 kg)   SpO2 99%   BMI 19.34 kg/m²     Body mass index is 19.34 kg/m². Physical Exam:   General appearance - alert, well appearing, and in no distress  Mental status - alert, oriented to person, place, and time  EYE-GISELLE, EOMI,conjunctiva normal bilaterally, lids normal  ENT-ENT exam normal, no neck nodes or sinus tenderness  Nose - normal and patent, no erythema,  Or discharge   Mouth - mucous membranes moist, pharynx normal without lesions  Neck - supple, no significant adenopathy or bruit  Chest - clear to auscultation, no wheezes, rales or rhonchi. Heart - normal rate, regular rhythm, normal S1, S2, no murmurs, rubs, clicks or gallops   Abdomen - soft, nontender, nondistended, no masses or organomegaly  Lymph- no adenopathy palpable  Ext-there is redness, swelling and warmth along with exquisite tenderness to palpation in the metatarsal phalangeal region of the left great toe  Skin-Warm and dry. no hyperpigmentation, vitiligo, or suspicious lesions  Neuro -alert, oriented, normal speech, no focal findings or movement disorder noted    In addition this patient is seen for AWV  as detailed below: This is a Subsequent Medicare Annual Wellness Exam (AWV) (Performed 12 months after IPPE or effective date of Medicare Part B enrollment)    I have reviewed the patient's medical history in detail and updated the computerized patient record. Problem list reviewed with patient and risk factors discussed.   PSH, SH, FH, Medications and  issues also reviewed and discussed. Depression screen, fall risk assessment, functional abilities and ACP also reviewed and discussed as above and below. Depression Risk Factor Screening:     3 most recent PHQ Screens 1/4/2021   Little interest or pleasure in doing things Not at all   Feeling down, depressed, irritable, or hopeless Not at all   Total Score PHQ 2 0     Alcohol Risk Factor Screening: You average more than 14 drinks a week. Functional Ability and Level of Safety:   Hearing Loss  Hearing is good. Activities of Daily Living  The home contains: no safety equipment. Patient does total self care    Fall Risk  Fall Risk Assessment, last 12 mths 1/4/2021   Able to walk? Yes   Fall in past 12 months? 0   Do you feel unsteady? 0   Are you worried about falling 0       Abuse Screen  Patient is not abused    Cognitive Screening   Evaluation of Cognitive Function:  Has your family/caregiver stated any concerns about your memory: no  Normal    Patient Care Team   Patient Care Team:  Italia Watson MD as PCP - General (Internal Medicine)  Italia Watson MD as PCP - [facility] Empaneled Provider    Assessment/Plan   Education and counseling provided:  Are appropriate based on today's review and evaluation    Assessment/Plan:   Impressions:      ICD-10-CM ICD-9-CM    1. Medicare annual wellness visit, subsequent  Z00.00 V70.0    2. Type 1 diabetes mellitus with stable proliferative retinopathy, unspecified laterality ([facility] [address]  E10.3559 250.51 HEMOGLOBIN A1C WITH EAG     362.02 MICROALBUMIN, UR, RAND W/ MICROALB/CREAT RATIO   3. History of COVID-19  Z86.16 V12.09    4. Essential hypertension  I10 401.9 COLLECTION VENOUS BLOOD,VENIPUNCTURE      CBC WITH AUTOMATED DIFF      METABOLIC PANEL, COMPREHENSIVE      URINALYSIS W/ REFLEX CULTURE   5. Hypercholesterolemia  E78.00 272.0 LIPID PANEL      TSH 3RD GENERATION   6. Long-term use of high-risk medication  Z79.899 V58.69 CK   7.  Acute idiopathic gout of right foot  M10.071 274.01 indomethacin (INDOCIN) 25 mg capsule      URIC ACID        Plan:  1. Continue present meds  2. Lifestyle modifications including Na restriction, low carb/fat diet, weight reduction and exercise (at least a walking program). Follow-up and Dispositions    · Return in about 3 months (around 9/25/2021). Orders Placed This Encounter    CBC WITH AUTOMATED DIFF     Standing Status:   Future     Standing Expiration Date:   6/25/2022    CK     Standing Status:   Future     Standing Expiration Date:   6/25/2022    HEMOGLOBIN A1C WITH EAG     Standing Status:   Future     Standing Expiration Date:   6/25/2022    LIPID PANEL     Standing Status:   Future     Standing Expiration Date:   3/69/6421    METABOLIC PANEL, COMPREHENSIVE     Standing Status:   Future     Standing Expiration Date:   6/25/2022    MICROALBUMIN, UR, RAND W/ MICROALB/CREAT RATIO     Standing Status:   Future     Standing Expiration Date:   6/25/2022    TSH 3RD GENERATION     Standing Status:   Future     Standing Expiration Date:   6/25/2022    URIC ACID     Standing Status:   Future     Standing Expiration Date:   6/25/2022    URINALYSIS W/ REFLEX CULTURE     Standing Status:   Future     Standing Expiration Date:   6/25/2022    COLLECTION VENOUS BLOOD,VENIPUNCTURE    indomethacin (INDOCIN) 25 mg capsule     Sig: Take 1 Capsule by mouth three (3) times daily. Dispense:  30 Capsule     Refill:  0       Donell Young MD   Assessment/Plan:  Diagnoses and all orders for this visit:    Medicare annual wellness visit, subsequent    Type 1 diabetes mellitus with stable proliferative retinopathy, unspecified laterality ([facility] [address]  -     HEMOGLOBIN A1C WITH EAG; Future  -     MICROALBUMIN, UR, RAND W/ MICROALB/CREAT RATIO; Future    History of COVID-19    Essential hypertension  -     COLLECTION VENOUS BLOOD,VENIPUNCTURE  -     CBC WITH AUTOMATED DIFF;  Future  -     METABOLIC PANEL, COMPREHENSIVE; Future  -     URINALYSIS W/ REFLEX CULTURE; Future    Hypercholesterolemia  -     LIPID PANEL; Future  -     TSH 3RD GENERATION; Future    Long-term use of high-risk medication  -     CK; Future    Acute idiopathic gout of right foot  -     indomethacin (INDOCIN) 25 mg capsule; Take 1 Capsule by mouth three (3) times daily. , Normal, Disp-30 Capsule, R-0  -     URIC ACID; Future        Health Maintenance Due   Topic Date Due    DTaP/Tdap/Td series (1 - Tdap) Never done    Shingrix Vaccine Age 50> (1 of 2) Never done    Colorectal Cancer Screening Combo  Never done    Foot Exam Q1  08/01/2020    Pneumococcal 65+ years (1 of 1 - PPSV23) Never done       Other instructions: The patient's medications were reviewed and reconciled. No change in his current medical regimen will be made. A no added salt, prudent diet is encouraged. We talked about increasing his calories due to the recent weight loss related to his Covid-19 infection    Continue to check blood sugar 7 times daily    Health maintenance issues were reviewed and he is up-to-date on colonoscopy. Age-appropriate vaccinations were reviewed and Tdap, shingles vaccine series and Pneumovax 23 have all been recommended    Prescription for indomethacin given to the patient for his acute gout    Await results of multiple labs    Follow-up 6 months    Follow-up and Dispositions    · Return in about 3 months (around 9/25/2021). I have reviewed with the patient details of the assessment and plan and all questions were answered. Relevent patient education was performed. The most recent lab findings were reviewed with the patient. An After Visit Summary was printed and given to the patient. Rachel Gustafson MD    Please note that this dictation was completed with [employer], the "[employer] voice recognition software.   Quite often unanticipated grammatical, syntax, homophones, and other interpretive errors are inadvertently transcribed by the computer software. Please disregard these errors. Please excuse any errors that have escaped final proofreading.

## 2021-06-28 LAB
ALBUMIN SERPL-MCNC: 4.1 G/DL (ref 3.5–5)
ALBUMIN/GLOB SERPL: 1.1 {RATIO} (ref 1.1–2.2)
ALP SERPL-CCNC: 101 U/L (ref 45–117)
ALT SERPL-CCNC: 29 U/L (ref 12–78)
ANION GAP SERPL CALC-SCNC: 4 MMOL/L (ref 5–15)
APPEARANCE UR: CLEAR
AST SERPL-CCNC: 23 U/L (ref 15–37)
BACTERIA URNS QL MICRO: NEGATIVE /HPF
BASOPHILS # BLD: 0 K/UL (ref 0–0.1)
BASOPHILS NFR BLD: 0 % (ref 0–1)
BILIRUB SERPL-MCNC: 0.9 MG/DL (ref 0.2–1)
BILIRUB UR QL: NEGATIVE
BUN SERPL-MCNC: 18 MG/DL (ref 6–20)
BUN/CREAT SERPL: 23 (ref 12–20)
CALCIUM SERPL-MCNC: 9.5 MG/DL (ref 8.5–10.1)
CHLORIDE SERPL-SCNC: 101 MMOL/L (ref 97–108)
CHOLEST SERPL-MCNC: 174 MG/DL
CK SERPL-CCNC: 73 U/L (ref 39–308)
CO2 SERPL-SCNC: 30 MMOL/L (ref 21–32)
COLOR UR: ABNORMAL
CREAT SERPL-MCNC: 0.8 MG/DL (ref 0.7–1.3)
DIFFERENTIAL METHOD BLD: ABNORMAL
EOSINOPHIL # BLD: 0.1 K/UL (ref 0–0.4)
EOSINOPHIL NFR BLD: 1 % (ref 0–7)
EPITH CASTS URNS QL MICRO: ABNORMAL /LPF
ERYTHROCYTE [DISTWIDTH] IN BLOOD BY AUTOMATED COUNT: 13.1 % (ref 11.5–14.5)
EST. AVERAGE GLUCOSE BLD GHB EST-MCNC: 108 MG/DL
GLOBULIN SER CALC-MCNC: 3.6 G/DL (ref 2–4)
GLUCOSE SERPL-MCNC: 144 MG/DL (ref 65–100)
GLUCOSE UR STRIP.AUTO-MCNC: NEGATIVE MG/DL
HBA1C MFR BLD: 5.4 % (ref 4–5.6)
HCT VFR BLD AUTO: 42.7 % (ref 36.6–50.3)
HDLC SERPL-MCNC: 83 MG/DL
HDLC SERPL: 2.1 {RATIO} (ref 0–5)
HGB BLD-MCNC: 13.7 G/DL (ref 12.1–17)
HGB UR QL STRIP: NEGATIVE
HYALINE CASTS URNS QL MICRO: ABNORMAL /LPF (ref 0–5)
IMM GRANULOCYTES # BLD AUTO: 0.1 K/UL (ref 0–0.04)
IMM GRANULOCYTES NFR BLD AUTO: 1 % (ref 0–0.5)
KETONES UR QL STRIP.AUTO: ABNORMAL MG/DL
LDLC SERPL CALC-MCNC: 80.2 MG/DL (ref 0–100)
LEUKOCYTE ESTERASE UR QL STRIP.AUTO: NEGATIVE
LYMPHOCYTES # BLD: 1.3 K/UL (ref 0.8–3.5)
LYMPHOCYTES NFR BLD: 15 % (ref 12–49)
MCH RBC QN AUTO: 32 PG (ref 26–34)
MCHC RBC AUTO-ENTMCNC: 32.1 G/DL (ref 30–36.5)
MCV RBC AUTO: 99.8 FL (ref 80–99)
MONOCYTES # BLD: 1 K/UL (ref 0–1)
MONOCYTES NFR BLD: 11 % (ref 5–13)
NEUTS SEG # BLD: 6.6 K/UL (ref 1.8–8)
NEUTS SEG NFR BLD: 72 % (ref 32–75)
NITRITE UR QL STRIP.AUTO: NEGATIVE
NRBC # BLD: 0 K/UL (ref 0–0.01)
NRBC BLD-RTO: 0 PER 100 WBC
PH UR STRIP: 7 [PH] (ref 5–8)
PLATELET # BLD AUTO: 270 K/UL (ref 150–400)
PMV BLD AUTO: 10.1 FL (ref 8.9–12.9)
POTASSIUM SERPL-SCNC: 4.7 MMOL/L (ref 3.5–5.1)
PROT SERPL-MCNC: 7.7 G/DL (ref 6.4–8.2)
PROT UR STRIP-MCNC: NEGATIVE MG/DL
RBC # BLD AUTO: 4.28 M/UL (ref 4.1–5.7)
RBC #/AREA URNS HPF: ABNORMAL /HPF (ref 0–5)
SODIUM SERPL-SCNC: 135 MMOL/L (ref 136–145)
SP GR UR REFRACTOMETRY: 1.02 (ref 1–1.03)
TRIGL SERPL-MCNC: 54 MG/DL (ref ?–150)
TSH SERPL DL<=0.05 MIU/L-ACNC: 1.52 UIU/ML (ref 0.36–3.74)
UA: UC IF INDICATED,UAUC: ABNORMAL
URATE SERPL-MCNC: 3.1 MG/DL (ref 3.5–7.2)
UROBILINOGEN UR QL STRIP.AUTO: 0.2 EU/DL (ref 0.2–1)
VLDLC SERPL CALC-MCNC: 10.8 MG/DL
WBC # BLD AUTO: 9.1 K/UL (ref 4.1–11.1)
WBC URNS QL MICRO: ABNORMAL /HPF (ref 0–4)

## 2021-07-01 DIAGNOSIS — M75.21 BICIPITAL TENDONITIS OF RIGHT SHOULDER: ICD-10-CM

## 2021-07-01 RX ORDER — DICLOFENAC SODIUM 75 MG/1
75 TABLET, DELAYED RELEASE ORAL 2 TIMES DAILY
Qty: 180 TABLET | Refills: 0 | Status: SHIPPED | OUTPATIENT
Start: 2021-07-01 | End: 2021-07-27 | Stop reason: ALTCHOICE

## 2021-07-01 NOTE — TELEPHONE ENCOUNTER
Patient requesting Diclofenac for R ankle arthritis:  Last Visit: 6/25/2021   Next Visit: 12/27/2021     Requested Prescriptions     Pending Prescriptions Disp Refills    diclofenac EC (VOLTAREN) 75 mg EC tablet 180 Tablet 0     Sig: Take 1 Tablet by mouth two (2) times a day.

## 2021-07-02 ENCOUNTER — TELEPHONE (OUTPATIENT)
Dept: INTERNAL MEDICINE CLINIC | Age: 66
End: 2021-07-02

## 2021-07-02 NOTE — TELEPHONE ENCOUNTER
Patient states he had covid mid May. He has noticed he has more frequent urination especially at night. He wasn't sure if this is from covid. Does he need to see a urologist and if so which one do you recommend?      [phone]

## 2021-07-13 DIAGNOSIS — M10.071 ACUTE IDIOPATHIC GOUT OF RIGHT FOOT: Chronic | ICD-10-CM

## 2021-07-13 RX ORDER — INDOMETHACIN 25 MG/1
25 CAPSULE ORAL 3 TIMES DAILY
Qty: 270 CAPSULE | Refills: 0 | Status: SHIPPED | OUTPATIENT
Start: 2021-07-13 | End: 2021-07-14

## 2021-07-13 NOTE — TELEPHONE ENCOUNTER
Requested Prescriptions     Pending Prescriptions Disp Refills    indomethacin (INDOCIN) 25 mg capsule 270 Capsule 0     Sig: Take 1 Capsule by mouth three (3) times daily. Last Refill: 6/25/21  Next Appointment:12/27/21    Patient is having ankle surgery next week and will be in a cast and unable to go to the pharmacy. He would like a 90 day supply.

## 2021-07-14 ENCOUNTER — HOSPITAL ENCOUNTER (OUTPATIENT)
Dept: PREADMISSION TESTING | Age: 66
Discharge: HOME | End: 2021-07-14
Payer: MEDICARE

## 2021-07-14 VITALS
DIASTOLIC BLOOD PRESSURE: 56 MMHG | WEIGHT: 148.59 LBS | RESPIRATION RATE: 20 BRPM | HEART RATE: 70 BPM | TEMPERATURE: 98.3 F | HEIGHT: 72 IN | SYSTOLIC BLOOD PRESSURE: 119 MMHG | BODY MASS INDEX: 20.13 KG/M2

## 2021-07-14 LAB
ATRIAL RATE: 67 BPM
CALCULATED P AXIS, ECG09: 75 DEGREES
CALCULATED R AXIS, ECG10: -3 DEGREES
CALCULATED T AXIS, ECG11: 69 DEGREES
DIAGNOSIS, 93000: NORMAL
P-R INTERVAL, ECG05: 152 MS
Q-T INTERVAL, ECG07: 384 MS
QRS DURATION, ECG06: 80 MS
QTC CALCULATION (BEZET), ECG08: 405 MS
VENTRICULAR RATE, ECG03: 67 BPM

## 2021-07-14 PROCEDURE — 93005 ELECTROCARDIOGRAM TRACING: CPT

## 2021-07-14 RX ORDER — TAMSULOSIN HYDROCHLORIDE 0.4 MG/1
0.4 CAPSULE ORAL
COMMUNITY
End: 2022-02-25

## 2021-07-14 NOTE — PERIOP NOTES
Patient given surgical site infection FAQ handout and CHG soap. Preop instructions reviewed and patient verbalizes understanding of instructions. Patient has been given the opportunity to ask additional questions. PT HAS COMPLETED COVID 19 VACCINE 2 WEEKS OR GREATER FROM SURGERY DATE AND DOES NOT REQUIRE COVID TESTING PER HOSPITAL POLICY. PT INSTRUCTED TO BRING PROOF OF VACCINE DOCUMENTATION ON DOS.     PATIENT RECEIIVED PFFIZER VACCINE 3-18-21 AND 4-8-21

## 2021-07-27 ENCOUNTER — HOSPITAL ENCOUNTER (OUTPATIENT)
Dept: GENERAL RADIOLOGY | Age: 66
Discharge: HOME | DRG: 289 | End: 2021-07-27
Payer: MEDICARE

## 2021-07-27 ENCOUNTER — OFFICE VISIT (OUTPATIENT)
Dept: INTERNAL MEDICINE CLINIC | Age: 66
End: 2021-07-27
Payer: MEDICARE

## 2021-07-27 ENCOUNTER — DOCUMENTATION ONLY (OUTPATIENT)
Dept: INTERNAL MEDICINE CLINIC | Age: 66
End: 2021-07-27

## 2021-07-27 VITALS
TEMPERATURE: 99.4 F | BODY MASS INDEX: 20.26 KG/M2 | HEART RATE: 83 BPM | WEIGHT: 149.6 LBS | HEIGHT: 72 IN | OXYGEN SATURATION: 97 % | SYSTOLIC BLOOD PRESSURE: 120 MMHG | DIASTOLIC BLOOD PRESSURE: 66 MMHG | RESPIRATION RATE: 18 BRPM

## 2021-07-27 DIAGNOSIS — Z86.16 HISTORY OF COVID-19: Primary | ICD-10-CM

## 2021-07-27 DIAGNOSIS — Z86.16 HISTORY OF COVID-19: ICD-10-CM

## 2021-07-27 DIAGNOSIS — G93.31 POSTVIRAL FATIGUE SYNDROME: ICD-10-CM

## 2021-07-27 DIAGNOSIS — D63.8 ANEMIA WITH CHRONIC ILLNESS: ICD-10-CM

## 2021-07-27 LAB
ALBUMIN SERPL-MCNC: 3.1 G/DL (ref 3.5–5)
ALBUMIN/GLOB SERPL: 0.8 {RATIO} (ref 1.1–2.2)
ALP SERPL-CCNC: 86 U/L (ref 45–117)
ALT SERPL-CCNC: 32 U/L (ref 12–78)
ANION GAP SERPL CALC-SCNC: 5 MMOL/L (ref 5–15)
AST SERPL-CCNC: 28 U/L (ref 15–37)
BASOPHILS # BLD: 0 K/UL (ref 0–0.1)
BASOPHILS NFR BLD: 0 % (ref 0–1)
BILIRUB SERPL-MCNC: 0.4 MG/DL (ref 0.2–1)
BUN SERPL-MCNC: 20 MG/DL (ref 6–20)
BUN/CREAT SERPL: 21 (ref 12–20)
CALCIUM SERPL-MCNC: 8.7 MG/DL (ref 8.5–10.1)
CHLORIDE SERPL-SCNC: 103 MMOL/L (ref 97–108)
CO2 SERPL-SCNC: 27 MMOL/L (ref 21–32)
COMMENT, HOLDF: NORMAL
CREAT SERPL-MCNC: 0.94 MG/DL (ref 0.7–1.3)
CRP SERPL-MCNC: 3.36 MG/DL (ref 0–0.6)
DIFFERENTIAL METHOD BLD: ABNORMAL
EOSINOPHIL # BLD: 0 K/UL (ref 0–0.4)
EOSINOPHIL NFR BLD: 0 % (ref 0–7)
ERYTHROCYTE [DISTWIDTH] IN BLOOD BY AUTOMATED COUNT: 13.4 % (ref 11.5–14.5)
ERYTHROCYTE [SEDIMENTATION RATE] IN BLOOD: 50 MM/HR (ref 0–20)
FERRITIN SERPL-MCNC: 528 NG/ML (ref 26–388)
GLOBULIN SER CALC-MCNC: 3.7 G/DL (ref 2–4)
GLUCOSE SERPL-MCNC: 41 MG/DL (ref 65–100)
HCT VFR BLD AUTO: 34.8 % (ref 36.6–50.3)
HGB BLD-MCNC: 10.8 G/DL (ref 12.1–17)
IMM GRANULOCYTES # BLD AUTO: 0.2 K/UL (ref 0–0.04)
IMM GRANULOCYTES NFR BLD AUTO: 1 % (ref 0–0.5)
IRON SATN MFR SERPL: 7 % (ref 20–50)
IRON SERPL-MCNC: 14 UG/DL (ref 35–150)
LYMPHOCYTES # BLD: 0.8 K/UL (ref 0.8–3.5)
LYMPHOCYTES NFR BLD: 6 % (ref 12–49)
MCH RBC QN AUTO: 31 PG (ref 26–34)
MCHC RBC AUTO-ENTMCNC: 31 G/DL (ref 30–36.5)
MCV RBC AUTO: 100 FL (ref 80–99)
MONOCYTES # BLD: 0.9 K/UL (ref 0–1)
MONOCYTES NFR BLD: 7 % (ref 5–13)
NEUTS SEG # BLD: 11.4 K/UL (ref 1.8–8)
NEUTS SEG NFR BLD: 86 % (ref 32–75)
NRBC # BLD: 0 K/UL (ref 0–0.01)
NRBC BLD-RTO: 0 PER 100 WBC
PLATELET # BLD AUTO: 353 K/UL (ref 150–400)
PMV BLD AUTO: 10.2 FL (ref 8.9–12.9)
POTASSIUM SERPL-SCNC: 5 MMOL/L (ref 3.5–5.1)
PROT SERPL-MCNC: 6.8 G/DL (ref 6.4–8.2)
RBC # BLD AUTO: 3.48 M/UL (ref 4.1–5.7)
SAMPLES BEING HELD,HOLD: NORMAL
SODIUM SERPL-SCNC: 135 MMOL/L (ref 136–145)
TIBC SERPL-MCNC: 212 UG/DL (ref 250–450)
WBC # BLD AUTO: 13.3 K/UL (ref 4.1–11.1)

## 2021-07-27 PROCEDURE — G8427 DOCREV CUR MEDS BY ELIG CLIN: HCPCS | Performed by: INTERNAL MEDICINE

## 2021-07-27 PROCEDURE — 99214 OFFICE O/P EST MOD 30 MIN: CPT | Performed by: INTERNAL MEDICINE

## 2021-07-27 PROCEDURE — G8536 NO DOC ELDER MAL SCRN: HCPCS | Performed by: INTERNAL MEDICINE

## 2021-07-27 PROCEDURE — 3017F COLORECTAL CA SCREEN DOC REV: CPT | Performed by: INTERNAL MEDICINE

## 2021-07-27 PROCEDURE — G8432 DEP SCR NOT DOC, RNG: HCPCS | Performed by: INTERNAL MEDICINE

## 2021-07-27 PROCEDURE — G8420 CALC BMI NORM PARAMETERS: HCPCS | Performed by: INTERNAL MEDICINE

## 2021-07-27 PROCEDURE — G8752 SYS BP LESS 140: HCPCS | Performed by: INTERNAL MEDICINE

## 2021-07-27 PROCEDURE — G8754 DIAS BP LESS 90: HCPCS | Performed by: INTERNAL MEDICINE

## 2021-07-27 PROCEDURE — 71046 X-RAY EXAM CHEST 2 VIEWS: CPT

## 2021-07-27 PROCEDURE — 1101F PT FALLS ASSESS-DOCD LE1/YR: CPT | Performed by: INTERNAL MEDICINE

## 2021-07-27 NOTE — PROGRESS NOTES
Ivett June is a 72 y.o. male presenting for Fatigue  . 1. Have you been to the ER, urgent care clinic since your last visit? Hospitalized since your last visit? [facility] 7-18-21    2. Have you seen or consulted any other health care providers outside of the [address] [place] [address] since your last visit? Include any pap smears or colon screening. No    Fall Risk Assessment, last 12 mths 1/4/2021   Able to walk? Yes   Fall in past 12 months? 0   Do you feel unsteady? 0   Are you worried about falling 0         Abuse Screening Questionnaire 6/25/2021   Do you ever feel afraid of your partner? N   Are you in a relationship with someone who physically or mentally threatens you? N   Is it safe for you to go home? Y       3 most recent PHQ Screens 1/4/2021   Little interest or pleasure in doing things Not at all   Feeling down, depressed, irritable, or hopeless Not at all   Total Score PHQ 2 0       There are no discontinued medications.

## 2021-07-27 NOTE — PROGRESS NOTES
Subjective:     Zoila Do is a 59-year-old type I diabetic who presents to the office today with complaints of extreme fatigue. The patient's history is remarkable in that he received the [employer] Covid-19 vaccination on 3/18 and 4/8. He states that about 2 weeks later he developed high fevers, body aches, nausea and extreme fatigue which lasted for for 5 days and got slightly better. Because of continued symptoms [facility] at which time he had a rapid Covid test which was positive. The patient developed extreme exhaustion in mid June which is continuous during the entire day. He finds that he has to sleep 2 or 3 times during the day because of it. The patient [facility] at which time his white blood cell count was 12,000 and his hemoglobin was 11.2 with previous hemoglobins here in the 13-14 range. A thyroid test was also done which was unremarkable. The patient states that he is continued to have very low-grade fevers without chills or sweats. He has no energy to do anything and states that he has not lost any weight. He has had no recent cough or shortness of breath. Past Medical History:   Diagnosis Date    Arthritis     COVID-19 vaccine series completed     PFIZER 3-18-21 AND 4-8-21    Diabetes ([facility] [address]     Essential hypertension 12/11/2017    Hypercholesterolemia 12/11/2017    Hypertension     Ileitis 12/11/2017    Long-term use of high-risk medication 12/11/2017    Microalbuminuria 12/11/2017    Type I diabetes mellitus with proliferative retinopathy ([address] 12/11/2017     Past Surgical History:   Procedure Laterality Date    HX LUMBAR LAMINECTOMY      HX ORTHOPAEDIC      RIGHT KNEE OPEN SURGERY    HX TONSILLECTOMY       Allergies   Allergen Reactions    Oxycodone-Acetaminophen Other (comments)     AGGRESIVE BEHAVIOR  Other reaction(s):  Other (see comments)  AGGRESIVE BEHAVIOR     Current Outpatient Medications   Medication Sig Dispense Refill    tamsulosin (Flomax) 0.4 mg capsule Take 0.4 mg by mouth nightly.  insulin lispro (HumaLOG U-100 Insulin) 100 unit/mL injection USE VIA INSULIN PUMP 60 mL 3    glucose blood VI test strips (Accu-Chek Elizabet Plus test strp) strip Use to check blood sugars 7 times per day. 7 boxes of 100 per box for 90d supply. Dx code X18.1592 Patient is an insulin dependant diabetic 700 Strip 3    lancets misc Use to check blood sugars 7 times per day. 7 boxes of 100 per box for 90d supply. Dx code H96.6720 Patient is an insulin dependant diabetic 7 Package 3    lisinopriL (PRINIVIL, ZESTRIL) 20 mg tablet Take 1 Tab by mouth two (2) times a day. 180 Tab 3    pravastatin (PRAVACHOL) 40 mg tablet TAKE 1 TABLET DAILY 90 Tab 3     insulin pump (PATIENT SUPPLIED) Misc by SubCUTAneous route as needed. NOVALOG INSULIN      multivitamin (ONE A DAY) tablet Take 1 Tab by mouth daily.  ascorbic acid (VITAMIN C) 500 mg tablet Take 500 mg by mouth daily. Social History     Socioeconomic History    Marital status: SINGLE     Spouse name: Not on file    Number of children: Not on file    Years of education: Not on file    Highest education level: Not on file   Tobacco Use    Smoking status: Former Smoker     Packs/day: 1.00     Years: 5.00     Pack years: 5.00     Quit date: 1975     Years since quittin.5    Smokeless tobacco: Never Used   Vaping Use    Vaping Use: Never used   Substance and Sexual Activity    Alcohol use: Yes     Alcohol/week: 17.5 standard drinks     Types: 21 Glasses of wine per week    Drug use: No     Social Determinants of Health     Financial Resource Strain:     Difficulty of Paying Living Expenses:    Food Insecurity:     Worried About Running Out of Food in the Last Year:     [address] in the Last Year:    Transportation Needs:     Lack of Transportation (Medical):      Lack of Transportation (Non-Medical):    Physical Activity:     Days of Exercise per Week:     Minutes of Exercise per Session:    Stress:     Feeling of Stress :    Social Connections:     Frequency of Communication with Friends and Family:     Frequency of Social Gatherings with Friends and Family:     Attends [employer] Services:     Active Member of Clubs or Organizations:     Attends Club or Organization Meetings:     Marital Status:      Family History   Problem Relation Age of Onset    Cancer Mother         LUNG    Cancer Father         LUNG    No Known Problems Sister     Anesth Problems Neg Hx        Review of Systems:  GEN: no weight loss. Positive for overwhelming fatigue  CV: no PND, orthopnea, or palpitations  Resp: no dyspnea on exertion, no cough  Abd: no nausea, vomiting or diarrhea  EXT: denies edema, claudication  Endocrine: no hair loss, excessive thirst or polyuria  Neurological ROS: no TIA or stroke symptoms  ROS otherwise negative      Objective:     Visit Vitals  /66 (BP 1 Location: Left upper arm, BP Patient Position: Sitting, BP Cuff Size: Adult)   Pulse 83   Temp 99.4 °F (37.4 °C) (Oral)   Resp 18   Ht 6' (1.829 m)   Wt 149 lb 9.6 oz (67.9 kg)   SpO2 97%   BMI 20.29 kg/m²     Body mass index is 20.29 kg/m². General:   alert, cooperative and no distress   Eyes: conjunctivae/sclerae clear. PERRL, EOM's intact   Mouth:  No oral lesions, no pharyngeal erythema, no exudates   Neck: Trachea midline, no thyromegaly, no bruits   Heart: S1 and S2 normal,no murmurs noted    Lungs: Clear to auscultation bilaterally, no increased work of breathing   Abdomen: Soft, nontender.   Normal bowel sounds   Extremities: No edema or cyanosis   Neuro: ..alert, oriented x3,speech normal in context and clarity, cranial nerves II-XII intact,motor strength: full proximally and distally,gait: normal  reflexes: full and symmetric     Physical exam otherwise negative         Assessment/Plan:     Diagnoses and all orders for this visit:    History of COVID-19  -     XR CHEST PA LAT; Future  -     COLLECTION VENOUS BLOOD,VENIPUNCTURE  -     CBC WITH AUTOMATED DIFF; Future  -     FERRITIN; Future  -     IRON PROFILE; Future  -     METABOLIC PANEL, COMPREHENSIVE; Future  -     SED RATE (ESR); Future  -     C REACTIVE PROTEIN, QT; Future    Postviral fatigue syndrome  -     FERRITIN; Future  -     IRON PROFILE; Future    Anemia with chronic illness        Other instructions: The patient's medications were reviewed and reconciled. No change in his current medical regimen will be made. His fatigue likely is related to Covid-19 is a post viral fatigue. He also notes slight taste alteration which is likely related to Covid as well. [facility] was slightly lower than what we had been getting recently and we will repeat his CBC along with anemia studies. Inflammatory markers-sed rate and C-reactive protein will be obtained today. We will obtain chest x-ray because of his No-19 infection to make sure there are no abnormalities that we are unaware of. Otherwise supportive care with follow-up here pending results of the above    30-minute office visit occurred today taking history, discussing symptoms and plans. Follow-up and Dispositions    · Return if symptoms worsen or fail to improve. Genesis Barrera MD    Please note that this dictation was completed with [employer], the [employer] voice recognition software. Quite often unanticipated grammatical, syntax, homophones, and other interpretive errors are inadvertently transcribed by the computer software. Please disregard these errors. Please excuse any errors that have escaped final proofreading.

## 2021-07-27 NOTE — PATIENT INSTRUCTIONS
Learning About Being High-Risk for COVID-19  Who is at high risk? COVID-19 causes a mild illness in many people who have it. But certain things may increase your risk for more serious illness. These include:  · Age. The risk increases with age. Older adults are at highest risk. · Smoking. · Obesity. · Living in a long-term care facility. · Having ongoing serious health issues. Some examples are:  ? Chronic lung disease or asthma. ? Heart problems. ? A weakened immune system. ? Cancer treatment. ? Diabetes. ? Chronic kidney disease and having dialysis. ? Sickle cell disease. This is not a complete list. If you have a chronic health problem, ask your doctor if you should take extra precautions during the outbreak. If you are pregnant, it's safest to consider yourself at higher risk. You and your baby may be at risk for pregnancy problems, such as  birth. And you may be at higher risk for serious illness if you get COVID-19. How do you stay safe? · Stay home. ? Stay home as much as you can. This may be the easiest way to avoid exposure, as long as no one else in your household has the virus. ? If there are a lot of COVID-19 cases in your community, do not leave your home except to seek medical care. ? Limit visitors right now. It's especially important to avoid contact with anyone who is sick or who might have been exposed. Remember that people may have been exposed without knowing it or having any symptoms. ? Have enough food, medicines, and other supplies on hand so that you don't have to go out. Try some of these options if you don't have what you need. ? Use delivery and takeout services for groceries and meals. ? Have a healthy family member, friend, or neighbor shop for you. ? Ask your doctor for extra prescription medicine. ? Routinely clean and disinfect high-touch surfaces. These include countertops, faucets, door handles, doorknobs, and phones. ? Do not travel.   · Wash your hands often and well. ? Wash your hands often, especially after you cough or sneeze. Use soap and water, and scrub for at least 20 seconds. If soap and water aren't available, use an alcohol-based hand . · Be extra careful if you have to go out. ? Avoid crowds and crowded places. Try to keep 6 feet of space between yourself and others. ? Don't use public transportation, ride-shares, or taxis unless you have no choice. ? Try not to touch things that many other people have touched. Door handles, elevator buttons, shopping cart handles, and handrails on escalators get a lot of touches. ? Carry tissues or paper towels with you. If you must touch something, you'll be able to protect your hands. ? Don't shake hands with anyone. Try a friendly wave instead. ? Don't touch your face, and wash your hands often. ? Wash your hands again as soon as you get home. · Know when to call your doctor. ? Call a doctor if you have symptoms of COVID-19 (fever, cough, shortness of breath). If you are told to get testing or care and must go out, wear a cloth face cover. Where can you learn more? Go to http://www.gray.com/  Enter A131 in the search box to learn more about \"Learning About Being High-Risk for COVID-19. \"  Current as of: December 18, 2020               Content Version: 12.8  © 2006-2021 Healthwise, [facility]. Care instructions adapted under license by [employer] (which disclaims liability or warranty for this information). If you have questions about a medical condition or this instruction, always ask your healthcare professional. [place] [address] any warranty or liability for your use of this information.

## 2021-07-28 ENCOUNTER — LAB ONLY (OUTPATIENT)
Dept: INTERNAL MEDICINE CLINIC | Age: 66
End: 2021-07-28

## 2021-07-28 ENCOUNTER — TELEPHONE (OUTPATIENT)
Dept: INTERNAL MEDICINE CLINIC | Age: 66
End: 2021-07-28

## 2021-07-28 DIAGNOSIS — D63.8 ANEMIA WITH CHRONIC ILLNESS: Primary | ICD-10-CM

## 2021-07-28 LAB
APPEARANCE UR: CLEAR
BILIRUB UR QL: NEGATIVE
COLOR UR: NORMAL
GLUCOSE UR STRIP.AUTO-MCNC: NEGATIVE MG/DL
HGB UR QL STRIP: NEGATIVE
KETONES UR QL STRIP.AUTO: NEGATIVE MG/DL
LEUKOCYTE ESTERASE UR QL STRIP.AUTO: NEGATIVE
NITRITE UR QL STRIP.AUTO: NEGATIVE
PH UR STRIP: 6 [PH] (ref 5–8)
PROT UR STRIP-MCNC: NEGATIVE MG/DL
SP GR UR REFRACTOMETRY: 1.02 (ref 1–1.03)
UROBILINOGEN UR QL STRIP.AUTO: 1 EU/DL (ref 0.2–1)

## 2021-07-28 RX ORDER — LANOLIN ALCOHOL/MO/W.PET/CERES
CREAM (GRAM) TOPICAL 2 TIMES DAILY
Status: ON HOLD | COMMUNITY
End: 2022-02-07

## 2021-07-28 NOTE — PROGRESS NOTES
Chest xray clear  BS was low at 41  Hgb low and iron levels low -  start FeSO4 325 mg BID, vitamin C 500 mg daily and MVI  Sed rate and C-reactive protein elevated  Need stool FOBT done and would like urinalysis and blood cultures done  Would like him to see hematology regarding anemia, Fe deficiency

## 2021-07-28 NOTE — TELEPHONE ENCOUNTER
----- Message from Barby Madison MD sent at 7/28/2021  5:20 AM EDT -----  Chest xray clear  BS was low at 41  Hgb low and iron levels low -  start FeSO4 325 mg BID, vitamin C 500 mg daily and MVI  Sed rate and C-reactive protein elevated  Need stool FOBT done and would like urinalysis and blood cultures done  Would like him to see hematology regarding anemia, Fe deficiency

## 2021-07-29 ENCOUNTER — HOSPITAL ENCOUNTER (EMERGENCY)
Age: 66
Discharge: LEFT BEFORE DISCHARGE | DRG: 289 | End: 2021-07-29
Attending: EMERGENCY MEDICINE
Payer: MEDICARE

## 2021-07-29 VITALS
BODY MASS INDEX: 20.25 KG/M2 | DIASTOLIC BLOOD PRESSURE: 55 MMHG | HEIGHT: 72 IN | WEIGHT: 149.47 LBS | SYSTOLIC BLOOD PRESSURE: 110 MMHG | HEART RATE: 77 BPM | TEMPERATURE: 98.1 F | RESPIRATION RATE: 13 BRPM | OXYGEN SATURATION: 100 %

## 2021-07-29 DIAGNOSIS — R78.81 BACTEREMIA: Primary | ICD-10-CM

## 2021-07-29 LAB
BACTERIA SPEC CULT: NORMAL
SERVICE CMNT-IMP: NORMAL

## 2021-07-29 PROCEDURE — 99281 EMR DPT VST MAYX REQ PHY/QHP: CPT

## 2021-07-29 NOTE — ED PROVIDER NOTES
EMERGENCY DEPARTMENT HISTORY AND PHYSICAL EXAM    Please note that this dictation was completed with [employer], the computer voice recognition software. Quite often unanticipated grammatical, syntax, homophones, and other interpretive errors are inadvertently transcribed by the computer software. Please disregard these errors. Please excuse any errors that have escaped final proofreading. Date: 7/29/2021  Patient Name: Makenna Reyna  Patient Age and Sex: 72 y.o. male    History of Presenting Illness     Chief Complaint   Patient presents with    Abnormal Lab Results     Ambulatory into the ED per Dr. Gentry Loco Bias he did a blood culture and said I have a blood infection. \" Reports intermittent fever x several week. Alert and interacting appropriately. No obvious distress noted. Pt states Dr. Valentino Burns called over here with orders. History Provided By: Patient    HPI: Makenna Reyna, is a 72 y.o. male with history of type 2 DM, referred to the ED by Dr. Valentino Burns for treatment of positive blood cultures. Patient states that he had his second covid vaccine in May but unfortunately 2 weeks later, on May 24th, was diagnosed with covid. He had a typical viral syndrome for 4-5 days before getting better. Last tested for covid on July 21st and was negative. Ever since the covid dx,however, he has had persisted symptoms of extreme fatigue and intermittent fevers that come on ever other day and go up to 101.9F. Blood work with his pcp, Dr. Valentino Burns, most recently showed him to be anemic. Blood cultures sent during that recent visit a few days ago and were positive. He was called by the office and advised to come in immediately. In addition to the bacteremia, Dr. Valentino Burns was concerned about possible long-term cardiac sequelae of covid as well and wanted him fully evaluated.     In addition to the fatigue he has experienced and the fever, patient also reports low appetite and 10lbs wt loss over last month.      Pt denies any other alleviating or exacerbating factors. No other associated signs or symptoms. There are no other complaints, changes or physical findings at this time. PCP: Ru Vizcarra MD    Past History   All documented elements of the  [address] reviewed and verified by me. -Santa Rodriges MD    Past Medical History:  Past Medical History:   Diagnosis Date    Arthritis     COVID-19 vaccine series completed     PFIZER 3-18-21 AND 21    Diabetes ([facility] [address]     Essential hypertension 2017    Hypercholesterolemia 2017    Hypertension     Ileitis 2017    Long-term use of high-risk medication 2017    Microalbuminuria 2017    Type I diabetes mellitus with proliferative retinopathy ([facility] [address] 2017       Past Surgical History:  Past Surgical History:   Procedure Laterality Date    HX LUMBAR LAMINECTOMY      HX ORTHOPAEDIC      RIGHT KNEE OPEN SURGERY    HX TONSILLECTOMY         Family History:  Family History   Problem Relation Age of Onset    Cancer Mother         LUNG    Cancer Father         LUNG    No Known Problems Sister     Anesth Problems Neg Hx        Social History:  Social History     Tobacco Use    Smoking status: Former Smoker     Packs/day: 1.00     Years: 5.00     Pack years: 5.00     Quit date: 1975     Years since quittin.5    Smokeless tobacco: Never Used   Vaping Use    Vaping Use: Never used   Substance Use Topics    Alcohol use: Yes     Alcohol/week: 17.5 standard drinks     Types: 21 Glasses of wine per week    Drug use: No       Allergies: Allergies   Allergen Reactions    Oxycodone-Acetaminophen Other (comments)     AGGRESIVE BEHAVIOR  Other reaction(s): Other (see comments)  AGGRESIVE BEHAVIOR       Review of Systems   All other systems reviewed and negative    Review of Systems   Constitutional: Positive for appetite change, fatigue and fever. HENT: Negative. Eyes: Negative.   Negative for photophobia and visual disturbance. Respiratory: Negative for cough and shortness of breath. Cardiovascular: Negative for chest pain, palpitations and leg swelling. Gastrointestinal: Negative for abdominal pain, blood in stool, diarrhea, nausea and vomiting. Endocrine: Negative. Genitourinary: Negative for dysuria, flank pain and hematuria. Musculoskeletal: Negative for back pain, joint swelling, neck pain and neck stiffness. Skin: Negative. Negative for color change, pallor, rash and wound. Neurological: Negative for dizziness, weakness, light-headedness, numbness and headaches. Hematological: Negative for adenopathy. Does not bruise/bleed easily. All other systems reviewed and are negative. Physical Exam   Reviewed patients vital signs and nursing note    Physical Exam  Vitals and nursing note reviewed. Constitutional:       Appearance: Normal appearance. He is not diaphoretic. HENT:      Head: Atraumatic. Mouth/Throat:      Mouth: Mucous membranes are moist.   Eyes:      General: No scleral icterus. Extraocular Movements: Extraocular movements intact. Conjunctiva/sclera: Conjunctivae normal.      Pupils: Pupils are equal, round, and reactive to light. Cardiovascular:      Rate and Rhythm: Normal rate and regular rhythm. Pulses: Normal pulses. Heart sounds: Normal heart sounds. Pulmonary:      Effort: Pulmonary effort is normal.      Breath sounds: Normal breath sounds. Abdominal:      General: Bowel sounds are normal.      Palpations: Abdomen is soft. Tenderness: There is no abdominal tenderness. There is no right CVA tenderness or left CVA tenderness. Musculoskeletal:         General: Normal range of motion. Cervical back: Normal range of motion and neck supple. No rigidity or tenderness. Lymphadenopathy:      Cervical: No cervical adenopathy. Skin:     General: Skin is warm and dry. Capillary Refill: Capillary refill takes less than 2 seconds. Neurological:      General: No focal deficit present. Mental Status: He is alert and oriented to person, place, and time. Psychiatric:         Attention and Perception: Attention normal.         Mood and Affect: Mood normal.         Behavior: Behavior normal.         Cognition and Memory: Cognition and memory normal.         Diagnostic Study Results     Labs - I have personally reviewed and interpreted all laboratory results. Darian Dave MD, MSc  No results found for this or any previous visit (from the past 24 hour(s)). Radiologic Studies - I have personally reviewed and interpreted all imaging studies and agree with radiology interpretation and report. - Darian Dave MD, MSc  No orders to display         Medical Decision Making   I am the first provider for this patient. Records Reviewed:   I reviewed our electronic medical record system for any past medical records that were available that may contribute to the patient's current condition, including their PMH, surgical history, social and family history. Reviewed the nursing notes and vital signs from today's visit. Nursing notes will be reviewed as they become available in realtime while the pt has been in the ED. Darian Dave MD, MSc    In addition, I read most recent ED visits, discharge summaries, if available and reviewed and interpreted prior ECGs. Darian Dave MD, MSc    I personally reviewed/interpreted pt's imaging. Agree with official read by radiology as noted above. Darian Dave MD MSc    Vital Signs-Reviewed the patient's vital signs. Patient Vitals for the past 24 hrs:   Temp Pulse Resp BP SpO2   07/29/21 1719 98.1 °F (36.7 °C) 77 13 (!) 110/55 100 %         Provider Notes (Medical Decision Making):   Patient sent to use by pmd for further workup and treatment of positive blood cultures. Patient is well appearing, normal vital signs ,currently not febrile.  Benign exam.    He reports that he was not aware of plan to admit him and does not want to be admitted right now. He needs to go home and get some of his things before he is willing to stay in the hospital. Offered patient option to get some of the blood work done now including first dose of iv abx, he would rather come back tomorrow and get everything done at once. Aware of potential risks of his condition including death but is still not willing to stay. Will return tomorrow first thing in the am and updated his pmd on this plan. ED Course:   Initial assessment performed. The patients presenting problems have been discussed, and they are in agreement with the care plan formulated and outlined with them. I have encouraged them to ask questions as they arise throughout their visit. 7:17 PM  I informed the pt that he needed admission and further workup for adequate evaluation for his bacteremia, fatigue and weight loss  and that by refusing the above, he is at risk for sudden death, further deterioration and coma  He is awake, alert, and he understands his condition and the risks involved in leaving. Patient is not intoxicated and has not received any medications in the ED. He is clinically aware of his surroundings and able to ask appropriate questions,and the nurse present confirms he is not clinically intoxicated and able to make medical decisions. He verbalized knowing the risks and understood it was recommended that he stay and could also return at any time. He was provided with warnings regarding worsening of his condition and were provided instructions to return to the Emergency Room tomorrow morning or as soon as possible. This discussion was witnessed by nurse Rae Fletcher. Savannah Cramer MD    Dispo: Left Savannah GONZALEZ MD, am the attending of record for this patient encounter. Diagnosis     Clinical Impression:   1.  Bacteremia        Attestation:  I personally performed the services described in this documentation on this date 7/29/2021 for patient Cyril Rob.   Glenny Acosta MD

## 2021-07-30 ENCOUNTER — APPOINTMENT (OUTPATIENT)
Dept: NON INVASIVE DIAGNOSTICS | Age: 66
DRG: 289 | End: 2021-07-30
Attending: INTERNAL MEDICINE
Payer: MEDICARE

## 2021-07-30 ENCOUNTER — APPOINTMENT (OUTPATIENT)
Dept: GENERAL RADIOLOGY | Age: 66
DRG: 289 | End: 2021-07-30
Attending: EMERGENCY MEDICINE
Payer: MEDICARE

## 2021-07-30 ENCOUNTER — HOSPITAL ENCOUNTER (INPATIENT)
Age: 66
LOS: 5 days | Discharge: HOME HEALTH | DRG: 289 | End: 2021-08-04
Attending: EMERGENCY MEDICINE | Admitting: INTERNAL MEDICINE
Payer: MEDICARE

## 2021-07-30 DIAGNOSIS — E10.3559 TYPE 1 DIABETES MELLITUS WITH STABLE PROLIFERATIVE RETINOPATHY, UNSPECIFIED LATERALITY (HCC): ICD-10-CM

## 2021-07-30 DIAGNOSIS — R78.81 BACTEREMIA: Primary | ICD-10-CM

## 2021-07-30 DIAGNOSIS — I10 ESSENTIAL HYPERTENSION: ICD-10-CM

## 2021-07-30 PROBLEM — B95.5 BACTEREMIA DUE TO STREPTOCOCCUS: Status: ACTIVE | Noted: 2021-07-30

## 2021-07-30 LAB
ALBUMIN SERPL-MCNC: 2.7 G/DL (ref 3.5–5)
ALBUMIN/GLOB SERPL: 0.7 {RATIO} (ref 1.1–2.2)
ALP SERPL-CCNC: 80 U/L (ref 45–117)
ALT SERPL-CCNC: 29 U/L (ref 12–78)
ANION GAP SERPL CALC-SCNC: 5 MMOL/L (ref 5–15)
APPEARANCE UR: CLEAR
AST SERPL-CCNC: 25 U/L (ref 15–37)
ATRIAL RATE: 70 BPM
BACTERIA URNS QL MICRO: NEGATIVE /HPF
BASE DEFICIT BLD-SCNC: 0.8 MMOL/L
BASOPHILS # BLD: 0 K/UL (ref 0–0.1)
BASOPHILS NFR BLD: 0 % (ref 0–1)
BILIRUB SERPL-MCNC: 0.4 MG/DL (ref 0.2–1)
BILIRUB UR QL: NEGATIVE
BUN SERPL-MCNC: 23 MG/DL (ref 6–20)
BUN/CREAT SERPL: 25 (ref 12–20)
CA-I BLD-MCNC: 1.28 MMOL/L (ref 1.12–1.32)
CALCIUM SERPL-MCNC: 8.1 MG/DL (ref 8.5–10.1)
CALCULATED P AXIS, ECG09: 62 DEGREES
CALCULATED R AXIS, ECG10: -7 DEGREES
CALCULATED T AXIS, ECG11: 56 DEGREES
CHLORIDE BLD-SCNC: 103 MMOL/L (ref 100–108)
CHLORIDE SERPL-SCNC: 106 MMOL/L (ref 97–108)
CO2 BLD-SCNC: 26 MMOL/L (ref 19–24)
CO2 SERPL-SCNC: 25 MMOL/L (ref 21–32)
COLOR UR: NORMAL
CREAT SERPL-MCNC: 0.92 MG/DL (ref 0.7–1.3)
CREAT UR-MCNC: 1 MG/DL (ref 0.6–1.3)
DIAGNOSIS, 93000: NORMAL
DIFFERENTIAL METHOD BLD: ABNORMAL
EOSINOPHIL # BLD: 0 K/UL (ref 0–0.4)
EOSINOPHIL NFR BLD: 0 % (ref 0–7)
EPITH CASTS URNS QL MICRO: NORMAL /LPF
ERYTHROCYTE [DISTWIDTH] IN BLOOD BY AUTOMATED COUNT: 13.3 % (ref 11.5–14.5)
GLOBULIN SER CALC-MCNC: 4 G/DL (ref 2–4)
GLUCOSE BLD STRIP.AUTO-MCNC: 150 MG/DL (ref 74–106)
GLUCOSE BLD STRIP.AUTO-MCNC: 53 MG/DL (ref 65–117)
GLUCOSE BLD STRIP.AUTO-MCNC: 77 MG/DL (ref 65–117)
GLUCOSE SERPL-MCNC: 129 MG/DL (ref 65–100)
GLUCOSE UR STRIP.AUTO-MCNC: NEGATIVE MG/DL
HCO3 BLDA-SCNC: 25 MMOL/L
HCT VFR BLD AUTO: 31.8 % (ref 36.6–50.3)
HGB BLD-MCNC: 10.2 G/DL (ref 12.1–17)
HGB UR QL STRIP: NEGATIVE
HYALINE CASTS URNS QL MICRO: NORMAL /LPF (ref 0–5)
IMM GRANULOCYTES # BLD AUTO: 0.1 K/UL (ref 0–0.04)
IMM GRANULOCYTES NFR BLD AUTO: 1 % (ref 0–0.5)
KETONES UR QL STRIP.AUTO: NEGATIVE MG/DL
LACTATE BLD-SCNC: 0.75 MMOL/L (ref 0.4–2)
LACTATE BLD-SCNC: 0.88 MMOL/L (ref 0.4–2)
LEUKOCYTE ESTERASE UR QL STRIP.AUTO: NEGATIVE
LYMPHOCYTES # BLD: 1 K/UL (ref 0.8–3.5)
LYMPHOCYTES NFR BLD: 10 % (ref 12–49)
MCH RBC QN AUTO: 30.9 PG (ref 26–34)
MCHC RBC AUTO-ENTMCNC: 32.1 G/DL (ref 30–36.5)
MCV RBC AUTO: 96.4 FL (ref 80–99)
MONOCYTES # BLD: 0.8 K/UL (ref 0–1)
MONOCYTES NFR BLD: 8 % (ref 5–13)
NEUTS SEG # BLD: 7.9 K/UL (ref 1.8–8)
NEUTS SEG NFR BLD: 81 % (ref 32–75)
NITRITE UR QL STRIP.AUTO: NEGATIVE
NRBC # BLD: 0 K/UL (ref 0–0.01)
NRBC BLD-RTO: 0 PER 100 WBC
P-R INTERVAL, ECG05: 142 MS
PCO2 BLDV: 45.6 MMHG (ref 41–51)
PH BLDV: 7.35 [PH] (ref 7.32–7.42)
PH UR STRIP: 6 [PH] (ref 5–8)
PLATELET # BLD AUTO: 304 K/UL (ref 150–400)
PMV BLD AUTO: 9.6 FL (ref 8.9–12.9)
PO2 BLDV: 15 MMHG (ref 25–40)
POTASSIUM BLD-SCNC: 4.4 MMOL/L (ref 3.5–5.5)
POTASSIUM SERPL-SCNC: 4.2 MMOL/L (ref 3.5–5.1)
PROT SERPL-MCNC: 6.7 G/DL (ref 6.4–8.2)
PROT UR STRIP-MCNC: NEGATIVE MG/DL
Q-T INTERVAL, ECG07: 392 MS
QRS DURATION, ECG06: 88 MS
QTC CALCULATION (BEZET), ECG08: 423 MS
RBC # BLD AUTO: 3.3 M/UL (ref 4.1–5.7)
RBC #/AREA URNS HPF: NORMAL /HPF (ref 0–5)
SERVICE CMNT-IMP: ABNORMAL
SERVICE CMNT-IMP: NORMAL
SODIUM BLD-SCNC: 137 MMOL/L (ref 136–145)
SODIUM SERPL-SCNC: 136 MMOL/L (ref 136–145)
SP GR UR REFRACTOMETRY: 1.03 (ref 1–1.03)
SPECIMEN SITE: ABNORMAL
TROPONIN I SERPL-MCNC: <0.05 NG/ML
UA: UC IF INDICATED,UAUC: NORMAL
UROBILINOGEN UR QL STRIP.AUTO: 1 EU/DL (ref 0.2–1)
VENTRICULAR RATE, ECG03: 70 BPM
WBC # BLD AUTO: 9.8 K/UL (ref 4.1–11.1)
WBC URNS QL MICRO: NORMAL /HPF (ref 0–4)

## 2021-07-30 PROCEDURE — 87040 BLOOD CULTURE FOR BACTERIA: CPT

## 2021-07-30 PROCEDURE — 74011250637 HC RX REV CODE- 250/637: Performed by: INTERNAL MEDICINE

## 2021-07-30 PROCEDURE — 74011000258 HC RX REV CODE- 258: Performed by: INTERNAL MEDICINE

## 2021-07-30 PROCEDURE — 65270000029 HC RM PRIVATE

## 2021-07-30 PROCEDURE — 87186 SC STD MICRODIL/AGAR DIL: CPT

## 2021-07-30 PROCEDURE — 74011250636 HC RX REV CODE- 250/636: Performed by: INTERNAL MEDICINE

## 2021-07-30 PROCEDURE — 99284 EMERGENCY DEPT VISIT MOD MDM: CPT

## 2021-07-30 PROCEDURE — 82962 GLUCOSE BLOOD TEST: CPT

## 2021-07-30 PROCEDURE — 36415 COLL VENOUS BLD VENIPUNCTURE: CPT

## 2021-07-30 PROCEDURE — 84295 ASSAY OF SERUM SODIUM: CPT

## 2021-07-30 PROCEDURE — 84484 ASSAY OF TROPONIN QUANT: CPT

## 2021-07-30 PROCEDURE — 80053 COMPREHEN METABOLIC PANEL: CPT

## 2021-07-30 PROCEDURE — 93306 TTE W/DOPPLER COMPLETE: CPT

## 2021-07-30 PROCEDURE — 85025 COMPLETE CBC W/AUTO DIFF WBC: CPT

## 2021-07-30 PROCEDURE — 71045 X-RAY EXAM CHEST 1 VIEW: CPT

## 2021-07-30 PROCEDURE — 83605 ASSAY OF LACTIC ACID: CPT

## 2021-07-30 PROCEDURE — 81001 URINALYSIS AUTO W/SCOPE: CPT

## 2021-07-30 PROCEDURE — 87077 CULTURE AEROBIC IDENTIFY: CPT

## 2021-07-30 PROCEDURE — 93005 ELECTROCARDIOGRAM TRACING: CPT

## 2021-07-30 PROCEDURE — 99233 SBSQ HOSP IP/OBS HIGH 50: CPT | Performed by: CLINICAL NURSE SPECIALIST

## 2021-07-30 RX ORDER — ONDANSETRON 4 MG/1
4 TABLET, ORALLY DISINTEGRATING ORAL
Status: DISCONTINUED | OUTPATIENT
Start: 2021-07-30 | End: 2021-08-04 | Stop reason: HOSPADM

## 2021-07-30 RX ORDER — LANOLIN ALCOHOL/MO/W.PET/CERES
1 CREAM (GRAM) TOPICAL 2 TIMES DAILY WITH MEALS
Status: DISCONTINUED | OUTPATIENT
Start: 2021-07-30 | End: 2021-08-04 | Stop reason: HOSPADM

## 2021-07-30 RX ORDER — PRAVASTATIN SODIUM 40 MG/1
40 TABLET ORAL DAILY
Status: DISCONTINUED | OUTPATIENT
Start: 2021-07-31 | End: 2021-08-04 | Stop reason: HOSPADM

## 2021-07-30 RX ORDER — ACETAMINOPHEN 650 MG/1
650 SUPPOSITORY RECTAL
Status: DISCONTINUED | OUTPATIENT
Start: 2021-07-30 | End: 2021-08-04 | Stop reason: HOSPADM

## 2021-07-30 RX ORDER — DEXTROSE 50 % IN WATER (D50W) INTRAVENOUS SYRINGE
12.5-25 AS NEEDED
Status: DISCONTINUED | OUTPATIENT
Start: 2021-07-30 | End: 2021-07-31 | Stop reason: SDUPTHER

## 2021-07-30 RX ORDER — ONDANSETRON 2 MG/ML
4 INJECTION INTRAMUSCULAR; INTRAVENOUS
Status: DISCONTINUED | OUTPATIENT
Start: 2021-07-30 | End: 2021-08-04 | Stop reason: HOSPADM

## 2021-07-30 RX ORDER — MAGNESIUM SULFATE 100 %
4 CRYSTALS MISCELLANEOUS AS NEEDED
Status: DISCONTINUED | OUTPATIENT
Start: 2021-07-30 | End: 2021-07-31 | Stop reason: SDUPTHER

## 2021-07-30 RX ORDER — SODIUM CHLORIDE 0.9 % (FLUSH) 0.9 %
5-40 SYRINGE (ML) INJECTION EVERY 8 HOURS
Status: DISCONTINUED | OUTPATIENT
Start: 2021-07-30 | End: 2021-08-04 | Stop reason: HOSPADM

## 2021-07-30 RX ORDER — ACETAMINOPHEN 325 MG/1
650 TABLET ORAL
Status: DISCONTINUED | OUTPATIENT
Start: 2021-07-30 | End: 2021-08-04 | Stop reason: HOSPADM

## 2021-07-30 RX ORDER — SODIUM CHLORIDE 0.9 % (FLUSH) 0.9 %
5-40 SYRINGE (ML) INJECTION AS NEEDED
Status: DISCONTINUED | OUTPATIENT
Start: 2021-07-30 | End: 2021-08-04 | Stop reason: HOSPADM

## 2021-07-30 RX ORDER — SODIUM CHLORIDE 9 MG/ML
75 INJECTION, SOLUTION INTRAVENOUS CONTINUOUS
Status: DISCONTINUED | OUTPATIENT
Start: 2021-07-30 | End: 2021-08-03

## 2021-07-30 RX ORDER — TAMSULOSIN HYDROCHLORIDE 0.4 MG/1
0.4 CAPSULE ORAL
Status: DISCONTINUED | OUTPATIENT
Start: 2021-07-30 | End: 2021-08-04 | Stop reason: HOSPADM

## 2021-07-30 RX ORDER — SODIUM CHLORIDE 0.9 % (FLUSH) 0.9 %
5-10 SYRINGE (ML) INJECTION AS NEEDED
Status: DISCONTINUED | OUTPATIENT
Start: 2021-07-30 | End: 2021-08-04 | Stop reason: HOSPADM

## 2021-07-30 RX ORDER — INSULIN LISPRO 100 [IU]/ML
INJECTION, SOLUTION INTRAVENOUS; SUBCUTANEOUS
Status: DISCONTINUED | OUTPATIENT
Start: 2021-07-30 | End: 2021-07-30

## 2021-07-30 RX ORDER — ENOXAPARIN SODIUM 100 MG/ML
40 INJECTION SUBCUTANEOUS DAILY
Status: DISCONTINUED | OUTPATIENT
Start: 2021-07-31 | End: 2021-08-04 | Stop reason: HOSPADM

## 2021-07-30 RX ORDER — POLYETHYLENE GLYCOL 3350 17 G/17G
17 POWDER, FOR SOLUTION ORAL DAILY PRN
Status: DISCONTINUED | OUTPATIENT
Start: 2021-07-30 | End: 2021-08-04 | Stop reason: HOSPADM

## 2021-07-30 RX ADMIN — CEFTRIAXONE SODIUM 2 G: 2 INJECTION, POWDER, FOR SOLUTION INTRAMUSCULAR; INTRAVENOUS at 11:43

## 2021-07-30 RX ADMIN — Medication 10 ML: at 15:35

## 2021-07-30 RX ADMIN — SODIUM CHLORIDE 75 ML/HR: 9 INJECTION, SOLUTION INTRAVENOUS at 12:58

## 2021-07-30 RX ADMIN — SODIUM CHLORIDE 1000 ML: 9 INJECTION, SOLUTION INTRAVENOUS at 12:18

## 2021-07-30 RX ADMIN — SODIUM CHLORIDE 328 ML: 9 INJECTION, SOLUTION INTRAVENOUS at 12:38

## 2021-07-30 RX ADMIN — SODIUM CHLORIDE 1000 ML: 9 INJECTION, SOLUTION INTRAVENOUS at 11:27

## 2021-07-30 RX ADMIN — Medication 10 ML: at 22:43

## 2021-07-30 RX ADMIN — FERROUS SULFATE TAB 325 MG (65 MG ELEMENTAL FE) 325 MG: 325 (65 FE) TAB at 20:00

## 2021-07-30 RX ADMIN — SODIUM CHLORIDE 75 ML/HR: 9 INJECTION, SOLUTION INTRAVENOUS at 22:43

## 2021-07-30 NOTE — DIABETES MGMT
[facility]    CLINICAL NURSE SPECIALIST CONSULT     Initial Presentation   Bear Mcnally is a 72 y.o. male admitted from the ER with complaints of fatigue. HX:   Past Medical History:   Diagnosis Date    Arthritis     COVID-19 vaccine series completed     PFIZER 3-18-21 AND 4-8-21    Diabetes ([facility] [address]     Essential hypertension 12/11/2017    Hypercholesterolemia 12/11/2017    Hypertension     Ileitis 12/11/2017    Long-term use of high-risk medication 12/11/2017    Microalbuminuria 12/11/2017    Type I diabetes mellitus with proliferative retinopathy ([facility] [address] 12/11/2017      INITIAL DX: Bacteremia    Treatment plan     TX: ABx    Hospital course   Clinical progress has been uncomplicated. Diabetes    Patient has known Type 1 diabetes, treated with insulin pump PTA. [employer] insulin pump without sensing capability:   Basal   Mid-5am  0.45     5am-6am 0.575     6am-9am 0.875     9am-noon 0.75     Noon-mid 0.6   Bolus ratio 1:10   Correction 1:50   Target BG   Ambulatory blood glucose management provided by endocrinologist, Olivia Calderón MD.  Miguel Alexis by Provider for advanced diabetes nursing assessment and care, specifically related to   [x] Competence in insulin pump therapy    Diabetes-related medical history  Neurological complications  Peripheral neuropathy    Diabetes medication history  Drug class Currently in use Discontinued Never used   Biguanide      DDP-4 inhibitor       Sulfonylurea      Thiazolidinedione      GLP-1 RA      SGLT-2 inhibitors      Basal insulin Humalog via pump     Bolus insulin Humalog via pump     Fixed Dose  Combinations        Subjective   I've been doing this a long time (referring to insulin pump therapy)     Objective   Physical exam  General Underweight male in no acute distress. Conversant and cooperative  Neuro  Alert, oriented   Vital Signs Afebrile.  Hypertensive  Visit Vitals  BP (!) 134/96   Pulse 82   Temp 98.1 °F (36.7 °C)   Resp 18   Ht 6' (1.829 m)   Wt 66.7 kg (147 lb)   SpO2 98%   BMI 19.94 kg/m²     Extremities No foot wounds    Diabetic foot exam:    Left Foot     Visual Exam: normal    Pulse DP: 2+ (normal)    Right Foot   Visual Exam: normal    Pulse DP: 2+ (normal)    Laboratory  Tests Today   A1c       Anion gap 5   Serum triglycerides    WBC 9.8   Serum creatinine 0.92   GFR >60   AST 25   ALT 29   Troponin Neg     Factors impacting BG management  Factor Dose Comments   Nutrition:  Standard meals   60 grams/meal   Has not eaten yet   Pain Tylenol PRN    Infection Rocephin Q24 hrs \"Hot\" right ankle     Blood glucose pattern      Assessment and Plan   Nursing Diagnosis Risk for unstable blood glucose pattern   Nursing Intervention Domain 3496 Decision-making Support   Nursing Interventions Examined current inpatient diabetes control   Explored factors facilitating and impeding inpatient management     Evaluation   This thin  male, with Type 1 diabetes, did achieve diabetes control prior to admission, as evidenced by A1c of 5.4% (6/2021). Patient has been using an insulin pump since 2000. Is currently under the care of Netta Arreguin MD.     If patient becomes incapacitated, his basal insulin dose would be 15 units of Lantus daily. Recommendations     [x] Competent to use insulin pump    [x] Insulin pump site change due tomorrow    Billing Code(s)   [x] 92329 IP subsequent hospital care - 35 minutes     Before making these care recommendations, I personally reviewed the hospitalization record, including notes, laboratory & diagnostic data and current medications, and examined the patient at the bedside (circumstances permitting) before making care recommendations.      Total minutes: Andree Champion, CNS  Diabetes Clinical Nurse Specialist  [facility]  Access via [address] [place] [address]

## 2021-07-30 NOTE — ED NOTES
Patient is being transferred to [facility] General Surgery, Room # 2121. Report given to SHANTE Bradshaw, RN on Tarah Pierre for routine progression of care. Report consisted of the following information SBAR. Patient transferred to receiving unit by: transport (RN or tech name). Outstanding consults needed: Yes    Next labs due: Yes    The following personal items will be sent with the patient during transfer to the floor: All valuables:    Cardiac monitoring ordered: Yes    The following CURRENT information was reported to the receiving RN:    Code status: Full Code at time of transfer    Last set of vital signs:  Vital Signs  Level of Consciousness: Alert (0) (07/30/21 0939)  Temp: 98.1 °F (36.7 °C) (07/30/21 0939)  Temp Source: Oral (07/30/21 0939)  Pulse (Heart Rate): 73 (07/30/21 1400)  Resp Rate: 17 (07/30/21 1400)  BP: (!) 88/44 (07/30/21 1417)  MAP (Monitor): 86 (07/30/21 1400)  MAP (Calculated): (!) 59 (07/30/21 1417)  MEWS Score: 1 (07/30/21 0939)         Oxygen Therapy  O2 Sat (%): 98 % (07/30/21 1400)  Pulse via Oximetry: 73 beats per minute (07/30/21 1400)  O2 Device: None (Room air) (07/30/21 1205)      Last pain assessment:  Pain 1  Pain Scale 1: Numeric (0 - 10)  Pain Intensity 1: 0  Pain Reassessment 1: Yes      Wounds: No     Urinary catheter: voiding  Is there a lemon order: No     LDAs:       Peripheral IV 07/30/21 Left;Upper Cephalic (Active)       Peripheral IV 43/38/43 Right Cephalic (Active)       Blood pressure was retaken and patient has been running 120s over 70s all stay. Last BP is erroneous. More V/s were tracked. Patient does have an insulin pump that he wants to manage, consult with Diabetes management was not called due to perfect serve being down, PCN nurse SHANTE Bradshaw was advised. Opportunity for questions and clarification was provided.     Gabrielle Anthony RN

## 2021-07-30 NOTE — PROGRESS NOTES
Problem: Falls - Risk of  Goal: *Absence of Falls  Description: Document Thora Bare Fall Risk and appropriate interventions in the flowsheet.   Outcome: Progressing Towards Goal  Note: Fall Risk Interventions:

## 2021-07-30 NOTE — H&P
Hospitalist Admission Note    NAME: Makenna Reyna   :  1955   MRN:  [id number]     Date/Time:  2021 11:56 AM    Patient PCP: Enoc Sainz MD  ________________________________________________________________________    My assessment of this patient's clinical condition and my plan of care is as follows. Assessment / Plan:  Alpha Streptococcus bacteremia  Borderline hypotension  -Blood cultures on  showing alpha Streptococcus bacteremia  -Exact source of bacteremia is unclear. He does not report any pharyngitis, skin ulcers are tender joints  -Check echocardiogram to rule out endocarditis. He did report fever about 2 days ago  -Consider ID consultation  -Start ceftriaxone 2 g IV daily. Follow repeat blood cultures.  -Lactic acid is normal    Diabetes mellitus type 2  with insulin infusion pump  -Patient is requesting that he will manage his insulin pump on his own and does not want any subcutaneous insulin. Will check blood sugars before meals at bedtime. Patient typically checks blood sugar 7 times per day  -Consult diabetes management for help with insulin pump supplies and also changes    BPH  Hypertension  Dyslipidemia  -Currently blood pressure is low so we will hold his home antihypertensives. Starting IV fluids due to borderline low blood pressure and alpha Streptococcus bacteremia  -Continue home Flomax and pravastatin    Iron deficiency anemia  -Continue ferrous sulfate        Code Status: Full code  Surrogate Decision Maker: [place]    DVT Prophylaxis: Lovenox  GI Prophylaxis: not indicated    Baseline: From home, independent of ADLs        Subjective:   CHIEF COMPLAINT: Positive blood cultures    HISTORY OF PRESENT ILLNESS:     Uday Villanueva is a 72 y.o.   male who presents with past medical history of diabetes mellitus, BPH, hypertension is coming to the hospital with chief complaints of positive blood cultures.   Patient went to see his PCP for complaints of fatigue and had blood cultures which revealed alphahemolytic streptococci for which he was advised to go to the emergency department for further evaluation. Patient reports that he had fever about 2 days ago but did not check his temperature. He does not report any chest pain, shortness of breath, cough or phlegm. Does report occasional congestion in his upper respiratory tract but that is chronic. Does not report any abdominal pain, nausea, vomiting, diarrhea or constipation. Does not report any chills. Denies focal weakness, tingling or numbness. Denies sick contacts. He reports that he had Covid vaccinations shot and spit up and got Covid in May. On arrival to the hospital, blood pressure was borderline low at 88/44. On labs CBC showed a hemoglobin of 10.2. BMP was normal.  Troponin was normal.  Repeat blood cultures were drawn. We were asked to admit for work up and evaluation of the above problems. Past Medical History:   Diagnosis Date    Arthritis     COVID-19 vaccine series completed     PFIZER 3-18-21 AND 21    Diabetes ([facility] [address]     Essential hypertension 2017    Hypercholesterolemia 2017    Hypertension     Ileitis 2017    Long-term use of high-risk medication 2017    Microalbuminuria 2017    Type I diabetes mellitus with proliferative retinopathy ([address] 2017        Past Surgical History:   Procedure Laterality Date    HX LUMBAR LAMINECTOMY      HX ORTHOPAEDIC      RIGHT KNEE OPEN SURGERY    HX TONSILLECTOMY         Social History     Tobacco Use    Smoking status: Former Smoker     Packs/day: 1.00     Years: 5.00     Pack years: 5.00     Quit date: 1975     Years since quittin.6    Smokeless tobacco: Never Used   Substance Use Topics    Alcohol use:  Yes     Alcohol/week: 17.5 standard drinks     Types: 21 Glasses of wine per week        Family History   Problem Relation Age of Onset    Cancer Mother LUNG    Cancer Father         LUNG    No Known Problems Sister     Anesth Problems Neg Hx      Allergies   Allergen Reactions    Oxycodone-Acetaminophen Other (comments)     AGGRESIVE BEHAVIOR  Other reaction(s): Other (see comments)  AGGRESIVE BEHAVIOR        Prior to Admission medications    Medication Sig Start Date End Date Taking? Authorizing Provider   ferrous sulfate (Iron) 325 mg (65 mg iron) tablet Take  by mouth two (2) times a day. Provider, Historical   tamsulosin (Flomax) 0.4 mg capsule Take 0.4 mg by mouth nightly. Provider, Historical   insulin lispro (HumaLOG U-100 Insulin) 100 unit/mL injection USE VIA INSULIN PUMP 5/13/21   Krystle Ballard MD   glucose blood VI test strips (Accu-Chek Elizabet Plus test strp) strip Use to check blood sugars 7 times per day. 7 boxes of 100 per box for 90d supply. Dx code K27.6753 Patient is an insulin dependant diabetic 1/16/21   Krystle Ballard MD   lancets misc Use to check blood sugars 7 times per day. 7 boxes of 100 per box for 90d supply. Dx code B47.0370 Patient is an insulin dependant diabetic 1/16/21   Krystle Ballard MD   lisinopriL (PRINIVIL, ZESTRIL) 20 mg tablet Take 1 Tab by mouth two (2) times a day. 12/15/20   Krystle Ballard MD   pravastatin (PRAVACHOL) 40 mg tablet TAKE 1 TABLET DAILY 12/15/20   Krystle Ballard MD    insulin pump (PATIENT SUPPLIED) Misc by SubCUTAneous route as needed. NOVALOG INSULIN    Provider, Historical   multivitamin (ONE A DAY) tablet Take 1 Tab by mouth daily. Provider, Historical   ascorbic acid (VITAMIN C) 500 mg tablet Take 500 mg by mouth daily. Provider, Historical       REVIEW OF SYSTEMS:     I am not able to complete the review of systems because:    The patient is intubated and sedated    The patient has altered mental status due to his acute medical problems    The patient has baseline aphasia from prior stroke(s)    The patient has baseline dementia and is not reliable historian    The patient is in acute medical distress and unable to provide information           Total of 12 systems reviewed as follows:       POSITIVE= underlined text  Negative = text not underlined  General:  fever, chills, sweats, generalized weakness, weight loss/gain,      loss of appetite   Eyes:    blurred vision, eye pain, loss of vision, double vision  ENT:    rhinorrhea, pharyngitis   Respiratory:   cough, sputum production, SOB, MISHRA, wheezing, pleuritic pain   Cardiology:   chest pain, palpitations, orthopnea, PND, edema, syncope   Gastrointestinal:  abdominal pain , N/V, diarrhea, dysphagia, constipation, bleeding   Genitourinary:  frequency, urgency, dysuria, hematuria, incontinence   Muskuloskeletal :  arthralgia, myalgia, back pain  Hematology:  easy bruising, nose or gum bleeding, lymphadenopathy   Dermatological: rash, ulceration, pruritis, color change / jaundice  Endocrine:   hot flashes or polydipsia   Neurological:  headache, dizziness, confusion, focal weakness, paresthesia,     Speech difficulties, memory loss, gait difficulty  Psychological: Feelings of anxiety, depression, agitation    Objective:   VITALS:    Visit Vitals  BP (!) 98/55   Pulse 67   Temp 98.1 °F (36.7 °C)   Resp 14   Ht 6' (1.829 m)   Wt 67.1 kg (147 lb 14.9 oz)   SpO2 100%   BMI 20.06 kg/m²       PHYSICAL EXAM:    General:    Alert, cooperative, no distress, appears stated age. HEENT: Atraumatic, anicteric sclerae, pink conjunctivae     No oral ulcers, mucosa moist, throat clear, dentition fair  Neck:  Supple, symmetrical,  thyroid: non tender  Lungs:   Clear to auscultation bilaterally. No Wheezing or Rhonchi. No rales. Chest wall:  No tenderness  No Accessory muscle use. Heart:   Regular  rhythm,  No  murmur   No edema  Abdomen:   Soft, non-tender. Not distended. Bowel sounds normal  Extremities: No cyanosis.   No clubbing,      Skin turgor normal, Capillary refill normal, Radial dial pulse 2+  Skin:     Not pale.  Not Jaundiced  No rashes   Psych:  Not anxious or agitated. Neurologic: EOMs intact. No facial asymmetry. No aphasia or slurred speech. Symmetrical strength, Sensation grossly intact. Alert and oriented X 4.     _______________________________________________________________________  Care Plan discussed with:    Comments   Patient y    Family      RN y    Care Manager                    Consultant:      _______________________________________________________________________  Expected  Disposition:   Home with Family y   HH/PT/OT/RN    SNF/LTC    SHAKEEL    ________________________________________________________________________  TOTAL TIME:  61  Minutes    Critical Care Provided     Minutes non procedure based      Comments    y Reviewed previous records   >50% of visit spent in counseling and coordination of care y Discussion with patient and/or family and questions answered       ________________________________________________________________________  Signed: Kelly Hyde MD    Procedures: see electronic medical records for all procedures/Xrays and details which were not copied into this note but were reviewed prior to creation of Plan.     LAB DATA REVIEWED:    Recent Results (from the past 24 hour(s))   EKG, 12 LEAD, INITIAL    Collection Time: 07/30/21  9:50 AM   Result Value Ref Range    Ventricular Rate 70 BPM    Atrial Rate 70 BPM    P-R Interval 142 ms    QRS Duration 88 ms    Q-T Interval 392 ms    QTC Calculation (Bezet) 423 ms    Calculated P Axis 62 degrees    Calculated R Axis -7 degrees    Calculated T Axis 56 degrees    Diagnosis       Normal sinus rhythm  When compared with ECG of 14-JUL-2021 10:31,  No significant change was found  Confirmed by Fran Gaston MD, Jaspal Claudio ([id number]) on 7/30/2021 10:56:57 AM     BLOOD GAS,CHEM8,LACTIC ACID POC    Collection Time: 07/30/21  9:54 AM   Result Value Ref Range    Calcium, ionized (POC) 1.28 1.12 - 1.32 mmol/L    BICARBONATE 25 mmol/L    Base deficit (POC) 0.8 mmol/L    Sample source VENOUS BLOOD      CO2, POC 26 (H) 19 - 24 MMOL/L    Sodium,  136 - 145 MMOL/L    Potassium, POC 4.4 3.5 - 5.5 MMOL/L    Chloride,  100 - 108 MMOL/L    Glucose,  (H) 74 - 106 MG/DL    Creatinine, POC 1.0 0.6 - 1.3 MG/DL    Lactic Acid (POC) 0.75 0.40 - 2.00 mmol/L    pH, venous (POC) 7.35 7.32 - 7.42      pCO2, venous (POC) 45.6 41 - 51 MMHG    pO2, venous (POC) 15 (L) 25 - 40 mmHg   METABOLIC PANEL, COMPREHENSIVE    Collection Time: 07/30/21 10:03 AM   Result Value Ref Range    Sodium 136 136 - 145 mmol/L    Potassium 4.2 3.5 - 5.1 mmol/L    Chloride 106 97 - 108 mmol/L    CO2 25 21 - 32 mmol/L    Anion gap 5 5 - 15 mmol/L    Glucose 129 (H) 65 - 100 mg/dL    BUN 23 (H) 6 - 20 MG/DL    Creatinine 0.92 0.70 - 1.30 MG/DL    BUN/Creatinine ratio 25 (H) 12 - 20      GFR est AA >60 >60 ml/min/1.73m2    GFR est non-AA >60 >60 ml/min/1.73m2    Calcium 8.1 (L) 8.5 - 10.1 MG/DL    Bilirubin, total 0.4 0.2 - 1.0 MG/DL    ALT (SGPT) 29 12 - 78 U/L    AST (SGOT) 25 15 - 37 U/L    Alk. phosphatase 80 45 - 117 U/L    Protein, total 6.7 6.4 - 8.2 g/dL    Albumin 2.7 (L) 3.5 - 5.0 g/dL    Globulin 4.0 2.0 - 4.0 g/dL    A-G Ratio 0.7 (L) 1.1 - 2.2     CBC WITH AUTOMATED DIFF    Collection Time: 07/30/21 10:03 AM   Result Value Ref Range    WBC 9.8 4.1 - 11.1 K/uL    RBC 3.30 (L) 4.10 - 5.70 M/uL    HGB 10.2 (L) 12.1 - 17.0 g/dL    HCT 31.8 (L) 36.6 - 50.3 %    MCV 96.4 80.0 - 99.0 FL    MCH 30.9 26.0 - 34.0 PG    MCHC 32.1 30.0 - 36.5 g/dL    RDW 13.3 11.5 - 14.5 %    PLATELET 249 711 - 138 K/uL    MPV 9.6 8.9 - 12.9 FL    NRBC 0.0 0  WBC    ABSOLUTE NRBC 0.00 0.00 - 0.01 K/uL    NEUTROPHILS 81 (H) 32 - 75 %    LYMPHOCYTES 10 (L) 12 - 49 %    MONOCYTES 8 5 - 13 %    EOSINOPHILS 0 0 - 7 %    BASOPHILS 0 0 - 1 %    IMMATURE GRANULOCYTES 1 (H) 0.0 - 0.5 %    ABS. NEUTROPHILS 7.9 1.8 - 8.0 K/UL    ABS. LYMPHOCYTES 1.0 0.8 - 3.5 K/UL    ABS. MONOCYTES 0.8 0.0 - 1.0 K/UL    ABS. EOSINOPHILS 0.0 0.0 - 0.4 K/UL    ABS. BASOPHILS 0.0 0.0 - 0.1 K/UL    ABS. IMM.  GRANS. 0.1 (H) 0.00 - 0.04 K/UL    DF AUTOMATED     TROPONIN I    Collection Time: 07/30/21 10:03 AM   Result Value Ref Range    Troponin-I, Qt. <0.05 <0.05 ng/mL   URINALYSIS W/ REFLEX CULTURE    Collection Time: 07/30/21 10:04 AM    Specimen: Urine   Result Value Ref Range    Color YELLOW/STRAW      Appearance CLEAR CLEAR      Specific gravity 1.026 1.003 - 1.030      pH (UA) 6.0 5.0 - 8.0      Protein Negative NEG mg/dL    Glucose Negative NEG mg/dL    Ketone Negative NEG mg/dL    Bilirubin Negative NEG      Blood Negative NEG      Urobilinogen 1.0 0.2 - 1.0 EU/dL    Nitrites Negative NEG      Leukocyte Esterase Negative NEG      WBC 0-4 0 - 4 /hpf    RBC 0-5 0 - 5 /hpf    Epithelial cells FEW FEW /lpf    Bacteria Negative NEG /hpf    UA:UC IF INDICATED CULTURE NOT INDICATED BY UA RESULT CNI      Hyaline cast 0-2 0 - 5 /lpf

## 2021-07-30 NOTE — ED NOTES
Food tray here and is continuing to manage his blood sugar, continues to want to keep his insulin pump

## 2021-07-30 NOTE — ED PROVIDER NOTES
EMERGENCY DEPARTMENT HISTORY AND PHYSICAL EXAM      Date: 7/30/2021  Patient Name: Afshan Zepeda    History of Presenting Illness     Chief Complaint   Patient presents with    Fatigue     told to come back to ED to be checked in for bacteremia and to get iv antibiotics by Dr. Jesus Coe       History Provided By: Patient    HPI: Afshan Zepeda, 72 y.o. male presents to the ED with cc of abnormal blood cultures. 40-year-old male with history of type 1 diabetes, no history immunosuppression or implanted foreign objects presents emergency department with abnormal labs. Patient reports he had the Covid vaccine and April, subsequently tested positive for Covid in May. Patient reports he has continued to have fatigue requiring 2-3 naps a day. He reports intermittent fevers and night sweats. Patient had a work-up at his PCP including blood cultures drawn 2 days ago. These cultures were positive for gram-positive bacteremia in 2 out of 2 bottles that is speciated as alpha strep. Patient reports his last fever was 2 days ago, denies travel or sick contacts. Denies implanted foreign objects. Denies cough, shortness of breath, dental pain, nausea, vomiting or diarrhea. States sugar slightly harder to control. There are no other complaints, changes, or physical findings at this time. PCP: Idalia Luciano MD    No current facility-administered medications on file prior to encounter. Current Outpatient Medications on File Prior to Encounter   Medication Sig Dispense Refill    ferrous sulfate (Iron) 325 mg (65 mg iron) tablet Take  by mouth two (2) times a day.  tamsulosin (Flomax) 0.4 mg capsule Take 0.4 mg by mouth nightly.  insulin lispro (HumaLOG U-100 Insulin) 100 unit/mL injection USE VIA INSULIN PUMP 60 mL 3    glucose blood VI test strips (Accu-Chek Elizabet Plus test strp) strip Use to check blood sugars 7 times per day. 7 boxes of 100 per box for 90d supply.  Dx code Q78.9621 Patient is an insulin dependant diabetic 700 Strip 3    lancets misc Use to check blood sugars 7 times per day. 7 boxes of 100 per box for 90d supply. Dx code O14.8293 Patient is an insulin dependant diabetic 7 Package 3    lisinopriL (PRINIVIL, ZESTRIL) 20 mg tablet Take 1 Tab by mouth two (2) times a day. 180 Tab 3    pravastatin (PRAVACHOL) 40 mg tablet TAKE 1 TABLET DAILY 90 Tab 3     insulin pump (PATIENT SUPPLIED) Misc by SubCUTAneous route as needed. NOVALOG INSULIN      multivitamin (ONE A DAY) tablet Take 1 Tab by mouth daily.  ascorbic acid (VITAMIN C) 500 mg tablet Take 500 mg by mouth daily. Past History     Past Medical History:  Past Medical History:   Diagnosis Date    Arthritis     COVID-19 vaccine series completed     PFIZER 3-18-21 AND 21    Diabetes ([facility] [address]     Essential hypertension 2017    Hypercholesterolemia 2017    Hypertension     Ileitis 2017    Long-term use of high-risk medication 2017    Microalbuminuria 2017    Type I diabetes mellitus with proliferative retinopathy ([facility] [address] 2017       Past Surgical History:  Past Surgical History:   Procedure Laterality Date    HX LUMBAR LAMINECTOMY      HX ORTHOPAEDIC      RIGHT KNEE OPEN SURGERY    HX TONSILLECTOMY         Family History:  Family History   Problem Relation Age of Onset    Cancer Mother         LUNG    Cancer Father         LUNG    No Known Problems Sister     Anesth Problems Neg Hx        Social History:  Social History     Tobacco Use    Smoking status: Former Smoker     Packs/day: 1.00     Years: 5.00     Pack years: 5.00     Quit date: 1975     Years since quittin.6    Smokeless tobacco: Never Used   Vaping Use    Vaping Use: Never used   Substance Use Topics    Alcohol use: Yes     Alcohol/week: 17.5 standard drinks     Types: 21 Glasses of wine per week    Drug use: No       Allergies:   Allergies   Allergen Reactions    Oxycodone-Acetaminophen Other (comments)     AGGRESIVE BEHAVIOR  Other reaction(s): Other (see comments)  AGGRESIVE BEHAVIOR         Review of Systems   Review of Systems   Constitutional: Positive for activity change, chills, diaphoresis and fever. HENT: Negative for voice change. Eyes: Negative for pain and redness. Respiratory: Negative for cough and chest tightness. Cardiovascular: Negative for chest pain and leg swelling. Gastrointestinal: Negative for abdominal pain, diarrhea, nausea and vomiting. Genitourinary: Negative for hematuria. Musculoskeletal: Negative for gait problem. Skin: Negative for color change, pallor and rash. Neurological: Negative for facial asymmetry, weakness and headaches. Hematological: Does not bruise/bleed easily. Psychiatric/Behavioral: Negative for behavioral problems. All other systems reviewed and are negative. Physical Exam   Physical Exam  Vitals and nursing note reviewed. Constitutional:       Comments: 70-year-old male, resting in bed, no acute distress   HENT:      Head: Normocephalic. Right Ear: External ear normal.      Left Ear: External ear normal.      Nose: Nose normal.   Eyes:      Conjunctiva/sclera: Conjunctivae normal.   Cardiovascular:      Rate and Rhythm: Normal rate and regular rhythm. Heart sounds: No murmur heard. No friction rub. No gallop. Pulmonary:      Effort: Pulmonary effort is normal.      Breath sounds: Normal breath sounds. No wheezing, rhonchi or rales. Abdominal:      Palpations: Abdomen is soft. Tenderness: There is no abdominal tenderness. Comments: Insulin pump in place   Musculoskeletal:         General: No swelling. Normal range of motion. Skin:     General: Skin is warm. Capillary Refill: Capillary refill takes less than 2 seconds. Comments: No splinter lesions, Janeway or Osler nodes   Neurological:      General: No focal deficit present. Mental Status: He is alert. Mental status is at baseline. Motor: No weakness.    Psychiatric:         Mood and Affect: Mood normal.         Behavior: Behavior normal.         Diagnostic Study Results     Labs -     Recent Results (from the past 12 hour(s))   EKG, 12 LEAD, INITIAL    Collection Time: 07/30/21  9:50 AM   Result Value Ref Range    Ventricular Rate 70 BPM    Atrial Rate 70 BPM    P-R Interval 142 ms    QRS Duration 88 ms    Q-T Interval 392 ms    QTC Calculation (Bezet) 423 ms    Calculated P Axis 62 degrees    Calculated R Axis -7 degrees    Calculated T Axis 56 degrees    Diagnosis       Normal sinus rhythm  When compared with ECG of 14-JUL-2021 10:31,  No significant change was found  Confirmed by Chloe Camarillo MD, Byron Reyes ([id number]) on 7/30/2021 10:56:57 AM     BLOOD GAS,CHEM8,LACTIC ACID POC    Collection Time: 07/30/21  9:54 AM   Result Value Ref Range    Calcium, ionized (POC) 1.28 1.12 - 1.32 mmol/L    BICARBONATE 25 mmol/L    Base deficit (POC) 0.8 mmol/L    Sample source VENOUS BLOOD      CO2, POC 26 (H) 19 - 24 MMOL/L    Sodium,  136 - 145 MMOL/L    Potassium, POC 4.4 3.5 - 5.5 MMOL/L    Chloride,  100 - 108 MMOL/L    Glucose,  (H) 74 - 106 MG/DL    Creatinine, POC 1.0 0.6 - 1.3 MG/DL    Lactic Acid (POC) 0.75 0.40 - 2.00 mmol/L    pH, venous (POC) 7.35 7.32 - 7.42      pCO2, venous (POC) 45.6 41 - 51 MMHG    pO2, venous (POC) 15 (L) 25 - 40 mmHg   METABOLIC PANEL, COMPREHENSIVE    Collection Time: 07/30/21 10:03 AM   Result Value Ref Range    Sodium 136 136 - 145 mmol/L    Potassium 4.2 3.5 - 5.1 mmol/L    Chloride 106 97 - 108 mmol/L    CO2 25 21 - 32 mmol/L    Anion gap 5 5 - 15 mmol/L    Glucose 129 (H) 65 - 100 mg/dL    BUN 23 (H) 6 - 20 MG/DL    Creatinine 0.92 0.70 - 1.30 MG/DL    BUN/Creatinine ratio 25 (H) 12 - 20      GFR est AA >60 >60 ml/min/1.73m2    GFR est non-AA >60 >60 ml/min/1.73m2    Calcium 8.1 (L) 8.5 - 10.1 MG/DL    Bilirubin, total 0.4 0.2 - 1.0 MG/DL    ALT (SGPT) 29 12 - 78 U/L AST (SGOT) 25 15 - 37 U/L    Alk. phosphatase 80 45 - 117 U/L    Protein, total 6.7 6.4 - 8.2 g/dL    Albumin 2.7 (L) 3.5 - 5.0 g/dL    Globulin 4.0 2.0 - 4.0 g/dL    A-G Ratio 0.7 (L) 1.1 - 2.2     CBC WITH AUTOMATED DIFF    Collection Time: 07/30/21 10:03 AM   Result Value Ref Range    WBC 9.8 4.1 - 11.1 K/uL    RBC 3.30 (L) 4.10 - 5.70 M/uL    HGB 10.2 (L) 12.1 - 17.0 g/dL    HCT 31.8 (L) 36.6 - 50.3 %    MCV 96.4 80.0 - 99.0 FL    MCH 30.9 26.0 - 34.0 PG    MCHC 32.1 30.0 - 36.5 g/dL    RDW 13.3 11.5 - 14.5 %    PLATELET 739 643 - 631 K/uL    MPV 9.6 8.9 - 12.9 FL    NRBC 0.0 0  WBC    ABSOLUTE NRBC 0.00 0.00 - 0.01 K/uL    NEUTROPHILS 81 (H) 32 - 75 %    LYMPHOCYTES 10 (L) 12 - 49 %    MONOCYTES 8 5 - 13 %    EOSINOPHILS 0 0 - 7 %    BASOPHILS 0 0 - 1 %    IMMATURE GRANULOCYTES 1 (H) 0.0 - 0.5 %    ABS. NEUTROPHILS 7.9 1.8 - 8.0 K/UL    ABS. LYMPHOCYTES 1.0 0.8 - 3.5 K/UL    ABS. MONOCYTES 0.8 0.0 - 1.0 K/UL    ABS. EOSINOPHILS 0.0 0.0 - 0.4 K/UL    ABS. BASOPHILS 0.0 0.0 - 0.1 K/UL    ABS. IMM.  GRANS. 0.1 (H) 0.00 - 0.04 K/UL    DF AUTOMATED     TROPONIN I    Collection Time: 07/30/21 10:03 AM   Result Value Ref Range    Troponin-I, Qt. <0.05 <0.05 ng/mL   URINALYSIS W/ REFLEX CULTURE    Collection Time: 07/30/21 10:04 AM    Specimen: Urine   Result Value Ref Range    Color YELLOW/STRAW      Appearance CLEAR CLEAR      Specific gravity 1.026 1.003 - 1.030      pH (UA) 6.0 5.0 - 8.0      Protein Negative NEG mg/dL    Glucose Negative NEG mg/dL    Ketone Negative NEG mg/dL    Bilirubin Negative NEG      Blood Negative NEG      Urobilinogen 1.0 0.2 - 1.0 EU/dL    Nitrites Negative NEG      Leukocyte Esterase Negative NEG      WBC 0-4 0 - 4 /hpf    RBC 0-5 0 - 5 /hpf    Epithelial cells FEW FEW /lpf    Bacteria Negative NEG /hpf    UA:UC IF INDICATED CULTURE NOT INDICATED BY UA RESULT CNI      Hyaline cast 0-2 0 - 5 /lpf   GLUCOSE, POC    Collection Time: 07/30/21 11:55 AM   Result Value Ref Range Glucose (POC) 53 (LL) 65 - 117 mg/dL    Performed by Annita Barron*    GLUCOSE, POC    Collection Time: 07/30/21 12:25 PM   Result Value Ref Range    Glucose (POC) 77 65 - 117 mg/dL    Performed by Annita Barron*    POC LACTIC ACID    Collection Time: 07/30/21 12:37 PM   Result Value Ref Range    Lactic Acid (POC) 0.88 0.40 - 2.00 mmol/L   ECHO ADULT COMPLETE    Collection Time: 07/30/21  2:42 PM   Result Value Ref Range    IVSd 0.89 0.60 - 1.00 cm    LVIDd 5.19 4.20 - 5.90 cm    LVIDs 3.69 cm    LVOT d 1.99 cm    LVPWd 1.09 (A) 0.60 - 1.00 cm    LV Ejection Fraction MOD 4C 45 percent    LV ED Vol A4C 106. 36 mL    LV ES Vol A4C 58.45 mL    LVOT Peak Gradient 2.16 mmHg    Left Ventricular Outflow Tract Mean Gradient 1.33 mmHg    LVOT SV 45.9 mL    LVOT Peak Velocity 73.42 cm/s    LVOT VTI 14.79 cm    Left Atrium Major Axis 3.84 cm    LA Area 4C 15.49 cm2    LA Vol 4C 35.68 18.00 - 58.00 mL    Right Atrial Area 4C 18.44 cm2    Aortic Valve Area by Continuity of Peak Velocity 2.05 cm2    AoV PG 4.97 mmHg    Aortic Valve Systolic Peak Velocity 651.38 cm/s    MV A Adam 59.09 cm/s    Mitral Valve E Wave Deceleration Time 190.48 ms    MV E Adam 99.15 cm/s    E/E' ratio (averaged) 7.75     E/E' lateral 7.29     E/E' septal 8.21     LV E' Lateral Velocity 13.60 cm/s    LV E' Septal Velocity 12.08 cm/s    MVA VTI 1.55 cm2    TR Max Velocity 435.04 cm/s    MV Peak Gradient 5.45 mmHg    MV Mean Gradient 1.91 mmHg    Mitral Valve Pressure Half-time 100.79 ms    Mitral Valve Max Velocity 116.75 cm/s    Mitral Valve Annulus Velocity Time Integral 29.64 cm    MVA (PHT) 2.18 cm2    Pulmonic Valve Systolic Peak Instantaneous Gradient 3.24 mmHg    Pulmonic Valve Max Velocity 90.03 cm/s    Triscuspid Valve Regurgitation Peak Gradient 19.55 mmHg    TR Max Velocity 218.92 cm/s    AO ASC D 3.02 cm    Ao Root D 3.35 cm    IVC proximal 2.65 cm    MV E/A 1.68     LV Mass .0 88.0 - 224.0 g    LV Mass AL Index 102.1 49.0 - 115.0 g/m2 Left Atrium Minor Axis 2.05 cm    LA Vol Index 19.08 16.00 - 28.00 ml/m2    LVED Vol Index A4C 56.9 mL/m2    LVES Vol Index A4C 31.3 mL/m2    SHMUEL/BSA Pk Adam 1.1 cm2/m2       Radiologic Studies -   XR CHEST PORT   Final Result   No acute process. CT Results  (Last 48 hours)    None        CXR Results  (Last 48 hours)               07/30/21 1035  XR CHEST PORT Final result    Impression:  No acute process. Narrative: Indication: Fatigue, abnormal blood cultures       Comparison: 7/27/2021       Portable exam of the chest obtained at 1045 demonstrates normal heart size. There is no acute process in the lung fields. The osseous structures are   unremarkable. Medical Decision Making   I am the first provider for this patient. I reviewed the vital signs, available nursing notes, past medical history, past surgical history, family history and social history. Vital Signs-Reviewed the patient's vital signs. Patient Vitals for the past 12 hrs:   Temp Pulse Resp BP SpO2   07/30/21 1645 98.3 °F (36.8 °C) 86 18 124/68 98 %   07/30/21 1501 98.1 °F (36.7 °C) 82 18 (!) 134/96 98 %   07/30/21 1417 -- -- -- (!) 88/44 --   07/30/21 1400 -- 73 17 113/76 98 %   07/30/21 1345 -- 77 21 106/64 97 %   07/30/21 1315 -- 80 20 124/68 99 %   07/30/21 1215 -- -- -- 125/76 99 %   07/30/21 0940 -- -- -- (!) 98/55 --   07/30/21 0939 98.1 °F (36.7 °C) 67 14 (!) 88/44 100 %     Records Reviewed: Nursing Notes, Old Medical Records and Previous Laboratory Studies    Provider Notes (Medical Decision Making):     27-year-old male presents emergency department with a chief complaint of fatigue. Hypotensive at triage, code sepsis initiated given known bacteremia. Will repeat cultures, point-of-care Chem-8 unremarkable, patient is not in DKA. Will hydrate with IV fluids given mild hypotension. Reviewed previous cultures, alpha strep, will give rocephin. ED Course:   Initial assessment performed.  The patients presenting problems have been discussed, and they are in agreement with the care plan formulated and outlined with them. I have encouraged them to ask questions as they arise throughout their visit. ED Course as of Jul 30 1740 Fri Jul 30, 2021   1416 Preliminary EKG interpreted by me. Shows normal sinus rhythm with a HR of seventy. No ST elevations or depressions concerning for ischemia. Normal intervals. [MB]      ED Course User Index  [MB] Marco Ochoa MD       Patient's labs are reassuring including lactate and urine. Chest x-ray shows no infiltrate. Will complete fluid bolus given patient was hypotensive upon arrival.  Patient given ceftriaxone after his discussion with pharmacy to cover for strep bacteremia. Patient discussed with hospitalist for admission. Critical Care Time:   CRITICAL CARE NOTE :    9:40 AM    IMPENDING DETERIORATION -Cardiovascular and Metabolic  ASSOCIATED RISK FACTORS - Hypotension and Metabolic changes  MANAGEMENT- Bedside Assessment  INTERPRETATION -  Xrays, ECG and Blood Pressure  INTERVENTIONS - hemodynamic mngmt and Metobolic interventions  CASE REVIEW - Hospitalist/Intensivist, Nursing and Family  TREATMENT RESPONSE -Improved  PERFORMED BY - Self    NOTES   :  I have spent 30 minutes of critical care time involved in lab review, consultations with specialist, family decision- making, bedside attention and documentation. This time excludes time spent in any separate billed procedures. During this entire length of time I was immediately available to the patient . Nataliia Gannon MD      Disposition:    Admitted    Diagnosis     Clinical Impression:   1. Bacteremia    2. Type 1 diabetes mellitus with stable proliferative retinopathy, unspecified laterality (HCC)        Attestations:    Nataliia Gannon MD    Please note that this dictation was completed with Clio, the computer voice recognition software.   Quite often unanticipated grammatical, syntax, homophones, and other interpretive errors are inadvertently transcribed by the computer software. Please disregard these errors. Please excuse any errors that have escaped final proofreading. Thank you.

## 2021-07-30 NOTE — PROGRESS NOTES
Patient was notified regarding the blood culture results on 7/29 and directed to go to the emergency room for probable admission for treatment and further evaluation of his bacteremia. History and lab results were discussed by me with Dr. Kaden Baker in the emergency room at [facility] prior to the patient arriving. Patient was seen in the emergency room where admission was declined by the patient and he left AMA.

## 2021-07-31 LAB
BACTERIA SPEC CULT: ABNORMAL
ECHO AO ASC DIAM: 3.02 CM
ECHO AO ROOT DIAM: 3.35 CM
ECHO AV AREA PEAK VELOCITY: 2.05 CM2
ECHO AV AREA/BSA PEAK VELOCITY: 1.1 CM2/M2
ECHO AV PEAK GRADIENT: 4.97 MMHG
ECHO AV PEAK VELOCITY: 111.41 CM/S
ECHO IVC PROX: 2.65 CM
ECHO LA AREA 4C: 15.49 CM2
ECHO LA MAJOR AXIS: 3.84 CM
ECHO LA MINOR AXIS: 2.05 CM
ECHO LA VOL 4C: 35.68 ML (ref 18–58)
ECHO LA VOLUME INDEX A4C: 19.08 ML/M2 (ref 16–28)
ECHO LV E' LATERAL VELOCITY: 13.6 CM/S
ECHO LV E' SEPTAL VELOCITY: 12.08 CM/S
ECHO LV EDV A4C: 106.36 ML
ECHO LV EDV INDEX A4C: 56.9 ML/M2
ECHO LV EJECTION FRACTION A4C: 45 PERCENT
ECHO LV ESV A4C: 58.45 ML
ECHO LV ESV INDEX A4C: 31.3 ML/M2
ECHO LV INTERNAL DIMENSION DIASTOLIC: 5.19 CM (ref 4.2–5.9)
ECHO LV INTERNAL DIMENSION SYSTOLIC: 3.69 CM
ECHO LV IVSD: 0.89 CM (ref 0.6–1)
ECHO LV MASS 2D: 191 G (ref 88–224)
ECHO LV MASS INDEX 2D: 102.1 G/M2 (ref 49–115)
ECHO LV POSTERIOR WALL DIASTOLIC: 1.09 CM (ref 0.6–1)
ECHO LVOT DIAM: 1.99 CM
ECHO LVOT PEAK GRADIENT: 2.16 MMHG
ECHO LVOT PEAK VELOCITY: 73.42 CM/S
ECHO LVOT SV: 45.9 ML
ECHO LVOT VTI: 14.79 CM
ECHO MV A VELOCITY: 59.09 CM/S
ECHO MV AREA PHT: 2.18 CM2
ECHO MV AREA VTI: 1.55 CM2
ECHO MV E DECELERATION TIME (DT): 190.48 MS
ECHO MV E VELOCITY: 99.15 CM/S
ECHO MV E/A RATIO: 1.68
ECHO MV E/E' LATERAL: 7.29
ECHO MV E/E' RATIO (AVERAGED): 7.75
ECHO MV E/E' SEPTAL: 8.21
ECHO MV MAX VELOCITY: 116.75 CM/S
ECHO MV MEAN GRADIENT: 1.91 MMHG
ECHO MV PEAK GRADIENT: 5.45 MMHG
ECHO MV PRESSURE HALF TIME (PHT): 100.79 MS
ECHO MV VTI: 29.64 CM
ECHO PV MAX VELOCITY: 90.03 CM/S
ECHO PV PEAK INSTANTANEOUS GRADIENT SYSTOLIC: 3.24 MMHG
ECHO RA AREA 4C: 18.44 CM2
ECHO TV REGURGITANT MAX VELOCITY: 218.92 CM/S
ECHO TV REGURGITANT MAX VELOCITY: 435.04 CM/S
ECHO TV REGURGITANT PEAK GRADIENT: 19.55 MMHG
HEMOCCULT STL QL IA: NEGATIVE
LVOT MG: 1.33 MMHG
SERVICE CMNT-IMP: ABNORMAL

## 2021-07-31 PROCEDURE — 74011000258 HC RX REV CODE- 258: Performed by: INTERNAL MEDICINE

## 2021-07-31 PROCEDURE — 74011250636 HC RX REV CODE- 250/636: Performed by: INTERNAL MEDICINE

## 2021-07-31 PROCEDURE — 74011250637 HC RX REV CODE- 250/637: Performed by: INTERNAL MEDICINE

## 2021-07-31 PROCEDURE — 65270000029 HC RM PRIVATE

## 2021-07-31 RX ORDER — MAGNESIUM SULFATE 100 %
4 CRYSTALS MISCELLANEOUS AS NEEDED
Status: DISCONTINUED | OUTPATIENT
Start: 2021-07-31 | End: 2021-08-04 | Stop reason: HOSPADM

## 2021-07-31 RX ORDER — INSULIN LISPRO 100 [IU]/ML
INJECTION, SOLUTION INTRAVENOUS; SUBCUTANEOUS AS NEEDED
Status: DISCONTINUED | OUTPATIENT
Start: 2021-07-31 | End: 2021-08-04 | Stop reason: HOSPADM

## 2021-07-31 RX ORDER — DEXTROSE 50 % IN WATER (D50W) INTRAVENOUS SYRINGE
12.5-25 AS NEEDED
Status: DISCONTINUED | OUTPATIENT
Start: 2021-07-31 | End: 2021-08-04 | Stop reason: HOSPADM

## 2021-07-31 RX ADMIN — TAMSULOSIN HYDROCHLORIDE 0.4 MG: 0.4 CAPSULE ORAL at 21:42

## 2021-07-31 RX ADMIN — CEFTRIAXONE SODIUM 2 G: 2 INJECTION, POWDER, FOR SOLUTION INTRAMUSCULAR; INTRAVENOUS at 12:45

## 2021-07-31 RX ADMIN — FERROUS SULFATE TAB 325 MG (65 MG ELEMENTAL FE) 325 MG: 325 (65 FE) TAB at 17:22

## 2021-07-31 RX ADMIN — SODIUM CHLORIDE 75 ML/HR: 9 INJECTION, SOLUTION INTRAVENOUS at 10:18

## 2021-07-31 RX ADMIN — SODIUM CHLORIDE 75 ML/HR: 9 INJECTION, SOLUTION INTRAVENOUS at 21:42

## 2021-07-31 RX ADMIN — Medication 10 ML: at 15:47

## 2021-07-31 RX ADMIN — FERROUS SULFATE TAB 325 MG (65 MG ELEMENTAL FE) 325 MG: 325 (65 FE) TAB at 10:16

## 2021-07-31 RX ADMIN — ENOXAPARIN SODIUM 40 MG: 40 INJECTION SUBCUTANEOUS at 10:16

## 2021-07-31 RX ADMIN — PRAVASTATIN SODIUM 40 MG: 40 TABLET ORAL at 10:16

## 2021-07-31 NOTE — PROGRESS NOTES
Problem: Falls - Risk of  Goal: *Absence of Falls  Description: Document [place] Fall Risk and appropriate interventions in the flowsheet.   Outcome: Progressing Towards Goal  Note: Fall Risk Interventions:

## 2021-07-31 NOTE — PROGRESS NOTES
Problem: Falls - Risk of  Goal: *Absence of Falls  Description: Document [place] Fall Risk and appropriate interventions in the flowsheet.   Outcome: Progressing Towards Goal  Note: Fall Risk Interventions:

## 2021-07-31 NOTE — PROGRESS NOTES
End of Shift Note    Bedside shift change report given to SHANTE Ascencio (oncoming nurse) by Alysa Sosa (offgoing nurse). Report included the following information SBAR, Kardex, Procedure Summary, Intake/Output and Recent Results    Shift worked:  7a-7p     Shift summary and any significant changes:    Blood culture results back this shift, MD notified, see note. Patient has been checking his blood glucose levels this shift and letting nursing know/see his meter readings. Patient reported his home insulin pump was low on Humalog and was refilled this afternoon after MD placed those orders this shift. **extra Humalog vial in [employer] fridge. **    Up ad mohit in room and to bathroom this shift. Passing flatus and BM, voiding without issues. Tolerating current diet, adequate intake. Concerns for physician to address:  Patient would like to know when he can re-start his Lisinopril. B/P's stable since admission to this unit. Zone phone for oncoming shift:        Activity:  Activity Level: Up ad mohit  Number times ambulated in hallways past shift: 0  Number of times OOB to chair past shift: 5    Cardiac:   Cardiac Monitoring: Yes           Access:   Current line(s): PIV     Genitourinary:   Urinary status: voiding    Respiratory:   O2 Device: None (Room air)  Chronic home O2 use?: NO  Incentive spirometer at bedside: NO     GI:  Last Bowel Movement Date: 07/31/21  Current diet:  ADULT DIET Regular; 4 carb choices (60 gm/meal)  Passing flatus: YES  Tolerating current diet: YES       Pain Management:   Patient states pain is manageable on current regimen: YES    Skin:  Andrews Score: 22  Interventions: increase time out of bed    Patient Safety:  Fall Score:  Total Score: 0  Interventions: gripper socks       Length of Stay:  Expected LOS: - - -  Actual LOS: [id number] [facility] 9

## 2021-07-31 NOTE — PROGRESS NOTES
ID cross coverage. Chart reviewed. Streptococcus sanguinis bacteremia. Patient will need echocardiogram.  Agree with ceftriaxone.   Full consult to follow

## 2021-07-31 NOTE — PROGRESS NOTES
Sivan from [facility] lab called and the blood cultures from 7/30/21 grew gram positive cocci in chains. L(Bi) and R(AC). Patient is currently on Rocephin 2g in 50ml IVPG q12h. Perfect serve message sent to MD to notify, awaiting response.

## 2021-07-31 NOTE — PROGRESS NOTES
1900--bedside report received from Nish rn, pt resting in bed oriented x 4, has no complaints at this time, family at bedside, call bell in reach assessment as noted    2100--pt has own insulin pump and will only allow his own treatments as opposed to hospital interventions    0400--pt refuses to have am labs,  drawn,,and \"dose not want any else coming in my room tonight\", lab bag in room with have to obtain on day shift as well as vital signs

## 2021-07-31 NOTE — PROGRESS NOTES
Hospitalist Progress Note    NAME: Cyril Rob   :  1955   MRN:  [id number]       Assessment / Plan:  Alpha Streptococcus bacteremia  Borderline hypotension  Blood cultures on  showing alpha Streptococcus bacteremia  We do not have a clear source for the bacteremia yet. All work-ups are negative so far. We will check an echocardiogram.  ID consulted. Patient is on ceftriaxone 2 g IV daily. Will be following up on the new blood culture sent which is negative so far. Patient does not look septic at this moment. I am not sure if this is could just be a contamination or not but there was a borderline to low blood pressure reported when he comes to the hospital.  Diabetes mellitus type 2  with insulin infusion pump  And insisted to use his own pump that he used for the last 24 years for no problems. Communicated with pharmacy so we can give him insulin so he can use the pump. BPH  Hypertension  Dyslipidemia  Pressure has been this morning. Will be continue to monitor. His lactic acid was negative. Iron deficiency anemia  Continue ferrous sulfate  Code Status: Full code  Surrogate Decision Maker: Jhony Hunter  DVT Prophylaxis: Lovenox  GI Prophylaxis: not indicated  Baseline: From home, independent of ADLs     Subjective:     Chief Complaint / Reason for Physician Visit  \"Shunt was seen and examined this morning. Denied any chest pain or shortness of breath. Denied any fever or chills. \". Discussed with RN events overnight. Review of Systems:  Symptom Y/N Comments  Symptom Y/N Comments   Fever/Chills n   Chest Pain n    Poor Appetite    Edema     Cough n   Abdominal Pain n    Sputum    Joint Pain     SOB/MISHRA n   Pruritis/Rash     Nausea/vomit    Tolerating PT/OT     Diarrhea    Tolerating Diet     Constipation    Other       Could NOT obtain due to:      Objective:     VITALS:   Last 24hrs VS reviewed since prior progress note.  Most recent are:  Patient Vitals for the past 24 hrs:   Temp Pulse Resp BP SpO2   07/30/21 2356 98.9 °F (37.2 °C) 75 18 112/65 96 %   07/30/21 2047 98.7 °F (37.1 °C) 74 18 125/73 95 %   07/30/21 1645 98.3 °F (36.8 °C) 86 18 124/68 98 %   07/30/21 1501 98.1 °F (36.7 °C) 82 18 (!) 134/96 98 %   07/30/21 1417 -- -- -- (!) 88/44 --   07/30/21 1400 -- 73 17 113/76 98 %   07/30/21 1345 -- 77 21 106/64 97 %   07/30/21 1315 -- 80 20 124/68 99 %   07/30/21 1215 -- -- -- 125/76 99 %   07/30/21 0940 -- -- -- (!) 98/55 --   07/30/21 0939 98.1 °F (36.7 °C) 67 14 (!) 88/44 100 %       Intake/Output Summary (Last 24 hours) at 7/31/2021 0753  Last data filed at 7/31/2021 0222  Gross per 24 hour   Intake 2428 ml   Output 650 ml   Net 1778 ml        PHYSICAL EXAM:  General: WD, WN. Alert, cooperative, no acute distress    EENT:  EOMI. Anicteric sclerae. MMM  Resp:  CTA bilaterally, no wheezing or rales. No accessory muscle use  CV:  Regular  rhythm,  No edema  GI:  Soft, Non distended, Non tender.  +Bowel sounds  Neurologic:  Alert and oriented X 3, normal speech,   Psych:   Good insight. Not anxious nor agitated  Skin:  No rashes. No jaundice    Reviewed most current lab test results and cultures  YES  Reviewed most current radiology test results   YES  Review and summation of old records today    NO  Reviewed patient's current orders and MAR    YES  PMH/SH reviewed - no change compared to H&P  ________________________________________________________________________  Care Plan discussed with:    Comments   Patient y    Family      RN y    Care Manager     Consultant                        Multidiciplinary team rounds were held today with , nursing, pharmacist and clinical coordinator. Patient's plan of care was discussed; medications were reviewed and discharge planning was addressed.      ________________________________________________________________________  Total NON critical care TIME: 35  Minutes    Total CRITICAL CARE TIME Spent:   Minutes non procedure based      Comments   >50% of visit spent in counseling and coordination of care     ________________________________________________________________________  Deisy Salamanca MD     Procedures: see electronic medical records for all procedures/Xrays and details which were not copied into this note but were reviewed prior to creation of Plan. LABS:  I reviewed today's most current labs and imaging studies. Pertinent labs include:  Recent Labs     07/30/21  1003   WBC 9.8   HGB 10.2*   HCT 31.8*        Recent Labs     07/30/21  1003      K 4.2      CO2 25   *   BUN 23*   CREA 0.92   CA 8.1*   ALB 2.7*   TBILI 0.4   ALT 29       Signed:  Deisy Salamanca MD

## 2021-08-01 LAB
ALBUMIN SERPL-MCNC: 2.7 G/DL (ref 3.5–5)
ALBUMIN/GLOB SERPL: 0.7 {RATIO} (ref 1.1–2.2)
ALP SERPL-CCNC: 75 U/L (ref 45–117)
ALT SERPL-CCNC: 29 U/L (ref 12–78)
ANION GAP SERPL CALC-SCNC: 9 MMOL/L (ref 5–15)
AST SERPL-CCNC: 30 U/L (ref 15–37)
BASOPHILS # BLD: 0 K/UL (ref 0–0.1)
BASOPHILS NFR BLD: 1 % (ref 0–1)
BILIRUB SERPL-MCNC: 0.4 MG/DL (ref 0.2–1)
BUN SERPL-MCNC: 11 MG/DL (ref 6–20)
BUN/CREAT SERPL: 16 (ref 12–20)
CALCIUM SERPL-MCNC: 8.1 MG/DL (ref 8.5–10.1)
CHLORIDE SERPL-SCNC: 106 MMOL/L (ref 97–108)
CO2 SERPL-SCNC: 22 MMOL/L (ref 21–32)
CREAT SERPL-MCNC: 0.67 MG/DL (ref 0.7–1.3)
DIFFERENTIAL METHOD BLD: ABNORMAL
EOSINOPHIL # BLD: 0.1 K/UL (ref 0–0.4)
EOSINOPHIL NFR BLD: 1 % (ref 0–7)
ERYTHROCYTE [DISTWIDTH] IN BLOOD BY AUTOMATED COUNT: 13.3 % (ref 11.5–14.5)
GLOBULIN SER CALC-MCNC: 3.9 G/DL (ref 2–4)
GLUCOSE SERPL-MCNC: 107 MG/DL (ref 65–100)
HCT VFR BLD AUTO: 29.2 % (ref 36.6–50.3)
HGB BLD-MCNC: 9.3 G/DL (ref 12.1–17)
IMM GRANULOCYTES # BLD AUTO: 0.1 K/UL (ref 0–0.04)
IMM GRANULOCYTES NFR BLD AUTO: 1 % (ref 0–0.5)
LYMPHOCYTES # BLD: 1.6 K/UL (ref 0.8–3.5)
LYMPHOCYTES NFR BLD: 19 % (ref 12–49)
MCH RBC QN AUTO: 30.2 PG (ref 26–34)
MCHC RBC AUTO-ENTMCNC: 31.8 G/DL (ref 30–36.5)
MCV RBC AUTO: 94.8 FL (ref 80–99)
MONOCYTES # BLD: 0.9 K/UL (ref 0–1)
MONOCYTES NFR BLD: 10 % (ref 5–13)
NEUTS SEG # BLD: 5.7 K/UL (ref 1.8–8)
NEUTS SEG NFR BLD: 68 % (ref 32–75)
NRBC # BLD: 0 K/UL (ref 0–0.01)
NRBC BLD-RTO: 0 PER 100 WBC
PLATELET # BLD AUTO: 273 K/UL (ref 150–400)
PMV BLD AUTO: 10.2 FL (ref 8.9–12.9)
POTASSIUM SERPL-SCNC: 4 MMOL/L (ref 3.5–5.1)
PROT SERPL-MCNC: 6.6 G/DL (ref 6.4–8.2)
RBC # BLD AUTO: 3.08 M/UL (ref 4.1–5.7)
SODIUM SERPL-SCNC: 137 MMOL/L (ref 136–145)
WBC # BLD AUTO: 8.3 K/UL (ref 4.1–11.1)

## 2021-08-01 PROCEDURE — 36415 COLL VENOUS BLD VENIPUNCTURE: CPT

## 2021-08-01 PROCEDURE — 74011250636 HC RX REV CODE- 250/636: Performed by: INTERNAL MEDICINE

## 2021-08-01 PROCEDURE — 85025 COMPLETE CBC W/AUTO DIFF WBC: CPT

## 2021-08-01 PROCEDURE — 74011000258 HC RX REV CODE- 258: Performed by: INTERNAL MEDICINE

## 2021-08-01 PROCEDURE — 80053 COMPREHEN METABOLIC PANEL: CPT

## 2021-08-01 PROCEDURE — 65270000029 HC RM PRIVATE

## 2021-08-01 PROCEDURE — 87040 BLOOD CULTURE FOR BACTERIA: CPT

## 2021-08-01 PROCEDURE — 74011250637 HC RX REV CODE- 250/637: Performed by: INTERNAL MEDICINE

## 2021-08-01 RX ADMIN — Medication 10 ML: at 22:28

## 2021-08-01 RX ADMIN — PRAVASTATIN SODIUM 40 MG: 40 TABLET ORAL at 09:00

## 2021-08-01 RX ADMIN — Medication 10 ML: at 18:42

## 2021-08-01 RX ADMIN — TAMSULOSIN HYDROCHLORIDE 0.4 MG: 0.4 CAPSULE ORAL at 22:27

## 2021-08-01 RX ADMIN — CEFTRIAXONE SODIUM 2 G: 2 INJECTION, POWDER, FOR SOLUTION INTRAMUSCULAR; INTRAVENOUS at 11:06

## 2021-08-01 RX ADMIN — FERROUS SULFATE TAB 325 MG (65 MG ELEMENTAL FE) 325 MG: 325 (65 FE) TAB at 08:00

## 2021-08-01 RX ADMIN — FERROUS SULFATE TAB 325 MG (65 MG ELEMENTAL FE) 325 MG: 325 (65 FE) TAB at 18:40

## 2021-08-01 RX ADMIN — SODIUM CHLORIDE 75 ML/HR: 9 INJECTION, SOLUTION INTRAVENOUS at 08:55

## 2021-08-01 RX ADMIN — SODIUM CHLORIDE 75 ML/HR: 9 INJECTION, SOLUTION INTRAVENOUS at 22:27

## 2021-08-01 RX ADMIN — ENOXAPARIN SODIUM 40 MG: 40 INJECTION SUBCUTANEOUS at 09:22

## 2021-08-01 NOTE — PROGRESS NOTES
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   Resolved    Plan:    Recommend ceftriaxone 2 g IV daily x28 days once blood cultures clear  May place PICC line once blood cultures sterile at 48 hours  Recommend cardiology evaluation if he does not already have a cardiologist for outpatient follow-up and repeat echo once antibiotics complete  Recommend dental evaluation at discharge  This was discussed at length with patient via phone today    Dr. Sneha Thacker will take over coverage starting 8/2/2021

## 2021-08-01 NOTE — PROGRESS NOTES
End of Shift Note    Bedside shift change report given to SHANTE Bradshaw (oncoming nurse) by Lyle Sharp (offgoing nurse). Report included the following information SBAR, Kardex, Procedure Summary, Intake/Output, MAR and Recent Results    Shift worked:  7a-7p     Shift summary and any significant changes:    Paired cultures drawn this shift per MD orders. ID to see patient today, see note. Paired blood cultures ordered for 8/2/21 0400 and per Dr. Mariano Kanner ID MD paired cultures to be drawn daily to track the growth. IV ABX - q24h per MD orders. Up ad mohit, tolerating diet with adequate intake and output this shift. Patient continues to monitor his BG via home insulin pump. No c/o pain reported. Concerns for physician to address:       Zone phone for oncoming shift:   [phone]       Activity:  Activity Level: Up ad mohit  Number times ambulated in hallways past shift: 0  Number of times OOB to chair past shift: 3    Cardiac:   Cardiac Monitoring: Yes           Access:   Current line(s): PIV     Genitourinary:   Urinary status: voiding    Respiratory:   O2 Device: None (Room air)  Chronic home O2 use?: NO  Incentive spirometer at bedside: N/A     GI:  Last Bowel Movement Date: 07/31/21  Current diet:  ADULT DIET Regular; 4 carb choices (60 gm/meal)  DIET ONE TIME MESSAGE  Passing flatus: YES  Tolerating current diet: YES       Pain Management:   Patient states pain is manageable on current regimen: YES    Skin:  Andrews Score: 22  Interventions: increase time out of bed    Patient Safety:  Fall Score:  Total Score: 0  Interventions: gripper socks       Length of Stay:  Expected LOS: - - -  Actual LOS: [facility]

## 2021-08-01 NOTE — PROGRESS NOTES
Hospitalist Progress Note    NAME: Tomer Yu   :  1955   MRN:  [id number]       Assessment / Plan:  Alpha Streptococcus bacteremia  Borderline hypotension  Blood cultures on  showing alpha Streptococcus bacteremia, repeat culture on  also showing same organism. Repeat blood culture sent again today. Echocardiogram done Vegetation present on the posterior leaflet of the mitral valve. ID is on board now. We will follow-up recommendations ID agreed with ceftriaxone. .  Patient is on ceftriaxone 2 g IV daily. Patient does not look septic at this moment. Diabetes mellitus type 2  with insulin infusion pump  And insisted to use his own pump that he used for the last 24 years for no problems. Communicated with pharmacy so we can give him insulin so he can use the pump. BPH  Hypertension  Dyslipidemia  Pressure has been this morning. Will be continue to monitor. His lactic acid was negative. Iron deficiency anemia  Continue ferrous sulfate  Code Status: Full code  [facility]  DVT Prophylaxis: Lovenox  GI Prophylaxis: not indicated  Baseline: From home, independent of ADLs     Subjective:     Chief Complaint / Reason for Physician Visit  Major overnight events. Patient had questions and answered all his questions. No fever or chills no any other complaints. Review of Systems:  Symptom Y/N Comments  Symptom Y/N Comments   Fever/Chills n   Chest Pain n    Poor Appetite    Edema     Cough n   Abdominal Pain n    Sputum    Joint Pain     SOB/MISHRA n   Pruritis/Rash     Nausea/vomit    Tolerating PT/OT     Diarrhea    Tolerating Diet     Constipation    Other       Could NOT obtain due to:      Objective:     VITALS:   Last 24hrs VS reviewed since prior progress note.  Most recent are:  Patient Vitals for the past 24 hrs:   Temp Pulse Resp BP SpO2   21 0745 98.1 °F (36.7 °C) 74 18 106/68 97 %   21 0319 97.7 °F (36.5 °C) 68 18 120/72 97 %   21 2319 98.5 °F (36.9 °C) 70 18 111/62 98 %   07/31/21 1957 97.9 °F (36.6 °C) 65 18 133/60 98 %   07/31/21 1549 97.9 °F (36.6 °C) 70 18 120/76 99 %   07/31/21 1225 97.6 °F (36.4 °C) 64 18 119/68 97 %       Intake/Output Summary (Last 24 hours) at 8/1/2021 1124  Last data filed at 8/1/2021 0618  Gross per 24 hour   Intake 640 ml   Output --   Net 640 ml        PHYSICAL EXAM:  General: WD, WN. Alert, cooperative, no acute distress    EENT:  EOMI. Anicteric sclerae. MMM  Resp:  CTA bilaterally, no wheezing or rales. No accessory muscle use  CV:  Regular  rhythm,  No edema  GI:  Soft, Non distended, Non tender.  +Bowel sounds  Neurologic:  Alert and oriented X 3, normal speech,   Psych:   Good insight. Not anxious nor agitated  Skin:  No rashes. No jaundice    Reviewed most current lab test results and cultures  YES  Reviewed most current radiology test results   YES  Review and summation of old records today    NO  Reviewed patient's current orders and MAR    YES  PMH/ reviewed - no change compared to H&P  ________________________________________________________________________  Care Plan discussed with:    Comments   Patient y    Family      RN y    Care Manager     Consultant                        Multidiciplinary team rounds were held today with , nursing, pharmacist and clinical coordinator. Patient's plan of care was discussed; medications were reviewed and discharge planning was addressed.      ________________________________________________________________________  Total NON critical care TIME: 35  Minutes    Total CRITICAL CARE TIME Spent:   Minutes non procedure based      Comments   >50% of visit spent in counseling and coordination of care     ________________________________________________________________________  Dixon Loredo MD     Procedures: see electronic medical records for all procedures/Xrays and details which were not copied into this note but were reviewed prior to creation of Plan.      LABS:  I reviewed today's most current labs and imaging studies. Pertinent labs include:  Recent Labs     08/01/21  0020 07/30/21  1003   WBC 8.3 9.8   HGB 9.3* 10.2*   HCT 29.2* 31.8*    304     Recent Labs     08/01/21  0020 07/30/21  1003    136   K 4.0 4.2    106   CO2 22 25   * 129*   BUN 11 23*   CREA 0.67* 0.92   CA 8.1* 8.1*   ALB 2.7* 2.7*   TBILI 0.4 0.4   ALT 29 29       Signed:  Lee Harden MD

## 2021-08-02 LAB
BACTERIA SPEC CULT: ABNORMAL
BACTERIA SPEC CULT: ABNORMAL
BASOPHILS # BLD: 0 K/UL (ref 0–0.1)
BASOPHILS NFR BLD: 1 % (ref 0–1)
DIFFERENTIAL METHOD BLD: ABNORMAL
EOSINOPHIL # BLD: 0.1 K/UL (ref 0–0.4)
EOSINOPHIL NFR BLD: 1 % (ref 0–7)
ERYTHROCYTE [DISTWIDTH] IN BLOOD BY AUTOMATED COUNT: 13.2 % (ref 11.5–14.5)
HCT VFR BLD AUTO: 29.5 % (ref 36.6–50.3)
HGB BLD-MCNC: 9.7 G/DL (ref 12.1–17)
IMM GRANULOCYTES # BLD AUTO: 0.1 K/UL (ref 0–0.04)
IMM GRANULOCYTES NFR BLD AUTO: 1 % (ref 0–0.5)
LYMPHOCYTES # BLD: 1.4 K/UL (ref 0.8–3.5)
LYMPHOCYTES NFR BLD: 16 % (ref 12–49)
MCH RBC QN AUTO: 30.7 PG (ref 26–34)
MCHC RBC AUTO-ENTMCNC: 32.9 G/DL (ref 30–36.5)
MCV RBC AUTO: 93.4 FL (ref 80–99)
MONOCYTES # BLD: 0.8 K/UL (ref 0–1)
MONOCYTES NFR BLD: 9 % (ref 5–13)
NEUTS SEG # BLD: 6.1 K/UL (ref 1.8–8)
NEUTS SEG NFR BLD: 72 % (ref 32–75)
NRBC # BLD: 0 K/UL (ref 0–0.01)
NRBC BLD-RTO: 0 PER 100 WBC
PLATELET # BLD AUTO: 285 K/UL (ref 150–400)
PMV BLD AUTO: 9.9 FL (ref 8.9–12.9)
RBC # BLD AUTO: 3.16 M/UL (ref 4.1–5.7)
SERVICE CMNT-IMP: ABNORMAL
SERVICE CMNT-IMP: ABNORMAL
WBC # BLD AUTO: 8.4 K/UL (ref 4.1–11.1)

## 2021-08-02 PROCEDURE — 74011250637 HC RX REV CODE- 250/637: Performed by: INTERNAL MEDICINE

## 2021-08-02 PROCEDURE — 74011250636 HC RX REV CODE- 250/636: Performed by: INTERNAL MEDICINE

## 2021-08-02 PROCEDURE — 85025 COMPLETE CBC W/AUTO DIFF WBC: CPT

## 2021-08-02 PROCEDURE — 65270000029 HC RM PRIVATE

## 2021-08-02 PROCEDURE — 36415 COLL VENOUS BLD VENIPUNCTURE: CPT

## 2021-08-02 PROCEDURE — 87040 BLOOD CULTURE FOR BACTERIA: CPT

## 2021-08-02 PROCEDURE — 74011000258 HC RX REV CODE- 258: Performed by: INTERNAL MEDICINE

## 2021-08-02 RX ADMIN — Medication 10 ML: at 05:23

## 2021-08-02 RX ADMIN — FERROUS SULFATE TAB 325 MG (65 MG ELEMENTAL FE) 325 MG: 325 (65 FE) TAB at 08:53

## 2021-08-02 RX ADMIN — ENOXAPARIN SODIUM 40 MG: 40 INJECTION SUBCUTANEOUS at 08:53

## 2021-08-02 RX ADMIN — TAMSULOSIN HYDROCHLORIDE 0.4 MG: 0.4 CAPSULE ORAL at 21:26

## 2021-08-02 RX ADMIN — CEFTRIAXONE SODIUM 2 G: 2 INJECTION, POWDER, FOR SOLUTION INTRAMUSCULAR; INTRAVENOUS at 12:15

## 2021-08-02 RX ADMIN — Medication 10 ML: at 14:00

## 2021-08-02 RX ADMIN — PRAVASTATIN SODIUM 40 MG: 40 TABLET ORAL at 08:53

## 2021-08-02 RX ADMIN — FERROUS SULFATE TAB 325 MG (65 MG ELEMENTAL FE) 325 MG: 325 (65 FE) TAB at 18:07

## 2021-08-02 RX ADMIN — Medication 10 ML: at 22:00

## 2021-08-02 NOTE — PROGRESS NOTES
Spiritual Care Partner Volunteer visited patient at [address] in [facility] on 8/2/2021   Documented by:  [id number] [facility] Aaron Pantoja., M.S., Th.M.  Spiritual Care Provider   Paging Service [phone])

## 2021-08-02 NOTE — PROGRESS NOTES
Problem: Falls - Risk of  Goal: *Absence of Falls  Description: Document Penny Blood Fall Risk and appropriate interventions in the flowsheet.   Outcome: Progressing Towards Goal  Note: Fall Risk Interventions:

## 2021-08-02 NOTE — PROGRESS NOTES
Reason for Admission:  Bacteremia                   RUR Score:  14%                   Plan for utilizing home health:   TBD       PCP: First and Last name:  Analy Zhang MD     Name of Practice:    Are you a current patient: Yes/No: yes   Approximate date of last visit: 3 weeks ago   Can you participate in a virtual visit with your PCP: yes                    Current Advanced Directive/Advance Care Plan: Full Code [address] (ACP) Conversation      Date of Conversation: 8/2/21  Conducted with: Patient with [address] Decision Maker:     Click here to complete [address] including selection of the [address] Relationship (ie \"Primary\")  Today we discussed Healthcare Decision Makers. The patient is considering options. Content/Action Overview:   Has NO ACP documents/care preferences - information provided, considering goals and options    Length of Voluntary ACP Conversation in minutes:  <16 minutes (Non-Billable)    Ken Bills                         Transition of Care Plan:  CM met w/pt & ex-wife. Prior to admission pt was independent w/ADL/IADL to include driving. No history of HH/Rehab or DME use. Patients support system includes, son & ex-wife, Raul. Patient is expected to d/c w/IV antibiotics. Ex-wife, Raul, stated, she plans to stay w/pt for a couple of days at d/c. CM will continue to follow. PCP- Analy Zhang MD  [facility]    Care Management Interventions  PCP Verified by CM: Yes (Analy Zhang MD)  Mode of Transport at Discharge:  Other (see comment) (ex-wife, Raul)  Discharge Durable Medical Equipment: No (no DME use)  Physical Therapy Consult: No  Occupational Therapy Consult: No  Speech Therapy Consult: No  Current Support Network: Lives Alone (LIves in a two story home w/4 STE)  Confirm Follow Up Transport: Self  Discharge Location  Discharge Placement:  (Anticipate Home w/HH & IV antibiotics)      [facility]  [phone]    Transition of Care Plan:    RUR:14%  Disposition:Home w/possible IV antibiotics & HH  Follow up appointments:  DME needed:n/a  Transportation at Discharge:ex-wifePankaj Files or means to access home:   yes     IM Medicare Letter:2nd IM needed  BCPI-A Bundle Letter:n/a  Caregiver Contact:ex-Ghulam terry [facility] [phone]  Discharge Caregiver contacted prior to discharge?

## 2021-08-02 NOTE — PROGRESS NOTES
End of Shift Note    Bedside shift change report given to Mckay Bangura RN (oncoming nurse) by Anna Espinoza RN (offgoing nurse). Report included the following information SBAR, Kardex and Recent Results    Shift worked: 7a-7p     Shift summary and any significant changes:     Patient had no complaints today. Patient receiving ceftriaxone. Will discharge home with abx orders     Concerns for physician to address: Will need a picc line order     Zone phone for oncoming shift:  [phone]       Activity:  Activity Level: Ambulate X (#)  Number times ambulated in hallways past shift: 0  Number of times OOB to chair past shift: 5    Cardiac:   Cardiac Monitoring: Yes      Cardiac Rhythm: Sinus Rhythm    Access:   Current line(s): PIV     Genitourinary:   Urinary status: voiding    Respiratory:   O2 Device: None (Room air)  Chronic home O2 use?: NO  Incentive spirometer at bedside: YES     GI:  Last Bowel Movement Date: 08/02/21  Current diet:  ADULT DIET Regular; 4 carb choices (60 gm/meal)  DIET ONE TIME MESSAGE  Passing flatus: YES  Tolerating current diet: YES       Pain Management:   Patient states pain is manageable on current regimen: YES    Skin:  Andrews Score: 23  Interventions: increase time out of bed    Patient Safety:  Fall Score:  Total Score: 0  Interventions: gripper socks       Length of Stay:  Expected LOS: 2d 9h  Actual LOS: 3      Mona Daniels RN

## 2021-08-02 NOTE — PROGRESS NOTES
End of Shift Note    Bedside shift change report given to Angelina Marcos RN (oncoming nurse) by Kingsley Mora RN (offgoing nurse). Report included the following information SBAR, Kardex and Recent Results    Shift worked:  7a-7p     Shift summary and any significant changes:    Patient had no complaints today. Patient receiving ceftriaxone. Will discharge home with abx orders. Concerns for physician to address: Will need a picc line order     Zone phone for oncoming shift:   [phone]       Activity:  Activity Level: Ambulate X (#)  Number times ambulated in hallways past shift: 5  Number of times OOB to chair past shift: 4    Cardiac:   Cardiac Monitoring: Yes      Cardiac Rhythm: Sinus Rhythm    Access:   Current line(s): PIV     Genitourinary:   Urinary status: voiding    Respiratory:   O2 Device: None (Room air)  Chronic home O2 use?: NO  Incentive spirometer at bedside: YES     GI:  Last Bowel Movement Date: 08/02/21  Current diet:  ADULT DIET Regular; 4 carb choices (60 gm/meal)  DIET ONE TIME MESSAGE  Passing flatus: YES  Tolerating current diet: YES       Pain Management:   Patient states pain is manageable on current regimen: N/A    Skin:  Andrews Score: 23  Interventions: increase time out of bed    Patient Safety:  Fall Score:  Total Score: 0  Interventions: gripper socks       Length of Stay:  Expected LOS: 2d 9h  Actual LOS: 3      Mona Daniels RN

## 2021-08-02 NOTE — DIABETES MGMT
Patient using own insulin pump without incident. No further intervention required. Signing off.     Joslyn Kaufman DNP, RN, ACNS-BC, BC-ADM, [facility]  Clinical Nurse Specialist-Diabetes & Endocrine disorders    Program for Diabetes Health (In-patient CNS consult service)  (A)  [phone]  (CTS) [phone]

## 2021-08-02 NOTE — PROGRESS NOTES
Hospitalist Progress Note    NAME: Bear Mcnally   :  1955   MRN:  [id number]       Assessment / Plan:  Alpha Streptococcus bacteremia  Borderline hypotension  Blood cultures on  showing alpha Streptococcus bacteremia, repeat culture on  also showing same organism. Repeat blood culture sent again yesterday NGTD   Echocardiogram done Vegetation present on the posterior leaflet of the mitral valve. ID is on board   We will follow-up recommendations, ID agreed with ceftriaxone. .  Patient is on ceftriaxone 2 g IV daily. Patient does not look septic at this moment. Patient has a lot of questions for the ID   Diabetes mellitus type 2  with insulin infusion pump  And insisted to use his own pump that he used for the last 24 years for no problems. Communicated with pharmacy so we can give him insulin so he can use the pump. BPH  Hypertension  Dyslipidemia  Pressure has been this morning. Will be continue to monitor. His lactic acid was negative. Iron deficiency anemia  Continue ferrous sulfate  Code Status: Full code  Surrogate Decision Maker: Jhony Araujo  DVT Prophylaxis: Lovenox  GI Prophylaxis: not indicated  Baseline: From home, independent of ADLs     Subjective:     Chief Complaint / Reason for Physician Visit  Patient worried about his health status and answered most of his questions and he wants to talk to ID   Review of Systems:  Symptom Y/N Comments  Symptom Y/N Comments   Fever/Chills n   Chest Pain n    Poor Appetite    Edema     Cough n   Abdominal Pain n    Sputum    Joint Pain     SOB/MISHRA n   Pruritis/Rash     Nausea/vomit    Tolerating PT/OT     Diarrhea    Tolerating Diet     Constipation    Other       Could NOT obtain due to:      Objective:     VITALS:   Last 24hrs VS reviewed since prior progress note.  Most recent are:  Patient Vitals for the past 24 hrs:   Temp Pulse Resp BP SpO2   21 0822 98 °F (36.7 °C) 64 17 (!) 100/54 99 %   21 0415 98.4 °F (36.9 °C) 72 18 119/65 98 %   08/01/21 2230 97.8 °F (36.6 °C) 67 18 (!) 142/77 97 %   08/01/21 1600 98.2 °F (36.8 °C) 63 16 125/63 98 %       Intake/Output Summary (Last 24 hours) at 8/2/2021 1318  Last data filed at 8/1/2021 2227  Gross per 24 hour   Intake 1000 ml   Output --   Net 1000 ml        PHYSICAL EXAM:  General: WD, WN. Alert, cooperative, no acute distress    EENT:  EOMI. Anicteric sclerae. MMM  Resp:  CTA bilaterally, no wheezing or rales. No accessory muscle use  CV:  Regular  rhythm,  No edema  GI:  Soft, Non distended, Non tender.  +Bowel sounds  Neurologic:  Alert and oriented X 3, normal speech,   Psych:   Good insight. Not anxious nor agitated  Skin:  No rashes. No jaundice    Reviewed most current lab test results and cultures  YES  Reviewed most current radiology test results   YES  Review and summation of old records today    NO  Reviewed patient's current orders and MAR    YES  PMH/ reviewed - no change compared to H&P  ________________________________________________________________________  Care Plan discussed with:    Comments   Patient y    Family      RN y    Care Manager     Consultant                        Multidiciplinary team rounds were held today with , nursing, pharmacist and clinical coordinator. Patient's plan of care was discussed; medications were reviewed and discharge planning was addressed. ________________________________________________________________________  Total NON critical care TIME: 35  Minutes    Total CRITICAL CARE TIME Spent:   Minutes non procedure based      Comments   >50% of visit spent in counseling and coordination of care y    ________________________________________________________________________  Marco León MD     Procedures: see electronic medical records for all procedures/Xrays and details which were not copied into this note but were reviewed prior to creation of Plan.       LABS:  I reviewed today's most current labs and imaging studies. Pertinent labs include:  Recent Labs     08/02/21  0600 08/01/21  0020   WBC 8.4 8.3   HGB 9.7* 9.3*   HCT 29.5* 29.2*    273     Recent Labs     08/01/21  0020      K 4.0      CO2 22   *   BUN 11   CREA 0.67*   CA 8.1*   ALB 2.7*   TBILI 0.4   ALT 29       Signed:  Meron Moy MD

## 2021-08-02 NOTE — PROGRESS NOTES
Received a consult reference f/u for this 72year old patient as an outpt for repeat echo. Alpha Streptococcus bacteremia, unclear source of infection  He did have COVID19 infection in May, after he received vaccinations  Fever prior to admission. ID recommending ceftriaxone 2g IV  X 28 days once blood cx clear  Recommending dental evaluation at discharge  Repeat echo in September.      Appointment scheduled with Dr. Zac Martinez 9/22/2021 at 9:00AM

## 2021-08-02 NOTE — PROGRESS NOTES
Patient seen and examined this afternoon. Patient reports he had a regular dental cleaning procedure on 6/17/21. He started feeling a lot of exhaustion, fevers and malaise after this procedure. This makes sense as Strep sanguinous is an oral pathogen. Explained to patient that we will want blood cultures from 8/1 to be negative till atleast 8/4/21 before being sure to place the PICC line. Continue ceftriaxone 2gm daily. All Qs answered. Cardiology note reviewed. Will follow.      Nasra Maya MD  Infectious Diseases

## 2021-08-03 LAB
BASOPHILS # BLD: 0.1 K/UL (ref 0–0.1)
BASOPHILS NFR BLD: 1 % (ref 0–1)
DIFFERENTIAL METHOD BLD: ABNORMAL
EOSINOPHIL # BLD: 0.1 K/UL (ref 0–0.4)
EOSINOPHIL NFR BLD: 2 % (ref 0–7)
ERYTHROCYTE [DISTWIDTH] IN BLOOD BY AUTOMATED COUNT: 13.4 % (ref 11.5–14.5)
HCT VFR BLD AUTO: 29.5 % (ref 36.6–50.3)
HGB BLD-MCNC: 9.8 G/DL (ref 12.1–17)
IMM GRANULOCYTES # BLD AUTO: 0.1 K/UL (ref 0–0.04)
IMM GRANULOCYTES NFR BLD AUTO: 1 % (ref 0–0.5)
LYMPHOCYTES # BLD: 1.5 K/UL (ref 0.8–3.5)
LYMPHOCYTES NFR BLD: 17 % (ref 12–49)
MCH RBC QN AUTO: 31.1 PG (ref 26–34)
MCHC RBC AUTO-ENTMCNC: 33.2 G/DL (ref 30–36.5)
MCV RBC AUTO: 93.7 FL (ref 80–99)
MONOCYTES # BLD: 0.8 K/UL (ref 0–1)
MONOCYTES NFR BLD: 9 % (ref 5–13)
NEUTS SEG # BLD: 6.4 K/UL (ref 1.8–8)
NEUTS SEG NFR BLD: 70 % (ref 32–75)
NRBC # BLD: 0 K/UL (ref 0–0.01)
NRBC BLD-RTO: 0 PER 100 WBC
PLATELET # BLD AUTO: 279 K/UL (ref 150–400)
PMV BLD AUTO: 9.7 FL (ref 8.9–12.9)
RBC # BLD AUTO: 3.15 M/UL (ref 4.1–5.7)
WBC # BLD AUTO: 8.9 K/UL (ref 4.1–11.1)

## 2021-08-03 PROCEDURE — 36415 COLL VENOUS BLD VENIPUNCTURE: CPT

## 2021-08-03 PROCEDURE — 74011250637 HC RX REV CODE- 250/637: Performed by: INTERNAL MEDICINE

## 2021-08-03 PROCEDURE — 74011250636 HC RX REV CODE- 250/636: Performed by: INTERNAL MEDICINE

## 2021-08-03 PROCEDURE — 65270000029 HC RM PRIVATE

## 2021-08-03 PROCEDURE — 74011000258 HC RX REV CODE- 258: Performed by: INTERNAL MEDICINE

## 2021-08-03 PROCEDURE — 85025 COMPLETE CBC W/AUTO DIFF WBC: CPT

## 2021-08-03 RX ADMIN — CEFTRIAXONE SODIUM 2 G: 2 INJECTION, POWDER, FOR SOLUTION INTRAMUSCULAR; INTRAVENOUS at 12:13

## 2021-08-03 RX ADMIN — FERROUS SULFATE TAB 325 MG (65 MG ELEMENTAL FE) 325 MG: 325 (65 FE) TAB at 17:03

## 2021-08-03 RX ADMIN — Medication 10 ML: at 21:10

## 2021-08-03 RX ADMIN — PRAVASTATIN SODIUM 40 MG: 40 TABLET ORAL at 08:44

## 2021-08-03 RX ADMIN — Medication 10 ML: at 05:48

## 2021-08-03 RX ADMIN — FERROUS SULFATE TAB 325 MG (65 MG ELEMENTAL FE) 325 MG: 325 (65 FE) TAB at 08:45

## 2021-08-03 RX ADMIN — SODIUM CHLORIDE 75 ML/HR: 9 INJECTION, SOLUTION INTRAVENOUS at 04:11

## 2021-08-03 RX ADMIN — TAMSULOSIN HYDROCHLORIDE 0.4 MG: 0.4 CAPSULE ORAL at 21:09

## 2021-08-03 RX ADMIN — Medication 10 ML: at 17:04

## 2021-08-03 RX ADMIN — ENOXAPARIN SODIUM 40 MG: 40 INJECTION SUBCUTANEOUS at 08:44

## 2021-08-03 NOTE — PROGRESS NOTES
Patient stated that he does not want to have his vital signs to be taken at midnight until in the morning. He also refused his blood sugar checked as he monitors his own BS and would prefer his own monitoring.

## 2021-08-03 NOTE — PROGRESS NOTES
Hospitalist Progress Note    NAME: Miguel Garber   :  1955   MRN:  [id number]       Assessment / Plan:  Alpha Streptococcus bacteremia  Borderline hypotension  Blood cultures on  showing alpha Streptococcus bacteremia, repeat culture on  also showing same organism. Repeat blood culture sent again yesterday NGTD   Echocardiogram done Vegetation present on the posterior leaflet of the mitral valve. ID is on board   We will follow-up recommendations, ID agreed with ceftriaxone. .  Patient is on ceftriaxone 2 g IV daily. Patient does not look septic at this moment. Patient has a lot of questions for the ID   : Waiting for final recommendation per ID,, reviewed ID note if repeat blood culture remain negative by tomorrow, will place PICC line and possible discharge on IV ceftriaxone    Diabetes mellitus type 2  with insulin infusion pump  And insisted to use his own pump that he used for the last 24 years for no problems. Communicated with pharmacy so we can give him insulin so he can use the pump.   : Patient insisted on increasing his carbs intake within his diet  Reported that he knows how to manage very well his blood sugars   BPH  Hypertension  Dyslipidemia  BP soft, hold lisinopril  Iron deficiency anemia  Continue ferrous sulfate  Code Status: Full code  Surrogate Decision Maker: Jhony Hicks  DVT Prophylaxis: Lovenox  GI Prophylaxis: not indicated  Baseline: From home, independent of ADLs     Subjective:     Chief Complaint / Reason for Physician Visit  Follow-up bacteremia  No acute issues, patient wanted to see his ID doctor every day  Review of Systems:  Symptom Y/N Comments  Symptom Y/N Comments   Fever/Chills n   Chest Pain n    Poor Appetite    Edema     Cough n   Abdominal Pain n    Sputum    Joint Pain     SOB/MISHRA n   Pruritis/Rash     Nausea/vomit    Tolerating PT/OT     Diarrhea    Tolerating Diet     Constipation    Other       Could NOT obtain due to:      Objective: VITALS:   Last 24hrs VS reviewed since prior progress note. Most recent are:  Patient Vitals for the past 24 hrs:   Temp Pulse Resp BP SpO2   08/03/21 1200 98.2 °F (36.8 °C) 70 17 115/74 100 %   08/03/21 0819 98.2 °F (36.8 °C) (!) 57 16 98/74 97 %   08/02/21 2022 97.9 °F (36.6 °C) 64 17 130/62 100 %       Intake/Output Summary (Last 24 hours) at 8/3/2021 1511  Last data filed at 8/3/2021 0600  Gross per 24 hour   Intake 900 ml   Output 900 ml   Net 0 ml        PHYSICAL EXAM:  General: WD, WN. Alert, cooperative, no acute distress    EENT:  EOMI. Anicteric sclerae. MMM  Resp:  CTA bilaterally, no wheezing or rales. No accessory muscle use  CV:  Regular  rhythm,  No edema  GI:  Soft, Non distended, Non tender.  +Bowel sounds  Neurologic:  Alert and oriented X 3, normal speech,   Psych:   Fair insight. Not anxious nor agitated  Skin:  No rashes. No jaundice    Reviewed most current lab test results and cultures  YES  Reviewed most current radiology test results   YES  Review and summation of old records today    NO  Reviewed patient's current orders and MAR    YES  PMH/SH reviewed - no change compared to H&P  ________________________________________________________________________  Care Plan discussed with:    Comments   Patient y    Family      RN y    Care Manager     Consultant                        Multidiciplinary team rounds were held today with , nursing, pharmacist and clinical coordinator. Patient's plan of care was discussed; medications were reviewed and discharge planning was addressed.      ________________________________________________________________________  Total NON critical care TIME: 35  Minutes    Total CRITICAL CARE TIME Spent:   Minutes non procedure based      Comments   >50% of visit spent in counseling and coordination of care y    ________________________________________________________________________  Tanya Osorio MD     Procedures: see electronic medical records for all procedures/Xrays and details which were not copied into this note but were reviewed prior to creation of Plan. LABS:  I reviewed today's most current labs and imaging studies.   Pertinent labs include:  Recent Labs     08/03/21  0546 08/02/21  0600 08/01/21  0020   WBC 8.9 8.4 8.3   HGB 9.8* 9.7* 9.3*   HCT 29.5* 29.5* 29.2*    285 273     Recent Labs     08/01/21  0020      K 4.0      CO2 22   *   BUN 11   CREA 0.67*   CA 8.1*   ALB 2.7*   TBILI 0.4   ALT 29       Signed: Holli Hernández MD

## 2021-08-03 NOTE — PROGRESS NOTES
End of Shift Note    Bedside shift change report given to  Mayelin Banerjee RN (oncoming nurse) by Evonne Demarco RN (offgoing nurse). Report included the following information SBAR and Kardex    Shift worked:  7a-7p     Shift summary and any significant changes:     No new events today patient received his rocephin at noon. ID to look at cultures tomorrow then possibly discharge. Concerns for physician to address:  will need picc line placement     Zone phone for oncoming shift:  [phone]       Activity:  Activity Level: Ambulate X (#)  Number times ambulated in hallways past shift: 2  Number of times OOB to chair past shift: 5    Cardiac:   Cardiac Monitoring: Yes      Cardiac Rhythm: Sinus Rhythm    Access:   Current line(s): PIV     Genitourinary:   Urinary status: voiding    Respiratory:   O2 Device: None (Room air)  Chronic home O2 use?: NO  Incentive spirometer at bedside: YES     GI:  Last Bowel Movement Date: 08/03/21  Current diet:  DIET ONE TIME MESSAGE  ADULT DIET Regular; 5 carb choices (75 gm/meal)  Passing flatus: YES  Tolerating current diet: YES       Pain Management:   Patient states pain is manageable on current regimen: YES    Skin:  Andrews Score: 23  Interventions: increase time out of bed    Patient Safety:  Fall Score:  Total Score: 0  Interventions: gripper socks       Length of Stay:  Expected LOS: 2d 9h  Actual LOS: 4      Mona Daniels RN

## 2021-08-03 NOTE — PROGRESS NOTES
Problem: Falls - Risk of  Goal: *Absence of Falls  Description: Document Cornelio Mcfarland Fall Risk and appropriate interventions in the flowsheet.   Outcome: Progressing Towards Goal  Note: Fall Risk Interventions:                                Problem: Patient Education: Go to Patient Education Activity  Goal: Patient/Family Education  Outcome: Progressing Towards Goal

## 2021-08-04 ENCOUNTER — APPOINTMENT (OUTPATIENT)
Dept: GENERAL RADIOLOGY | Age: 66
DRG: 289 | End: 2021-08-04
Attending: STUDENT IN AN ORGANIZED HEALTH CARE EDUCATION/TRAINING PROGRAM
Payer: MEDICARE

## 2021-08-04 VITALS
SYSTOLIC BLOOD PRESSURE: 137 MMHG | HEIGHT: 72 IN | BODY MASS INDEX: 19.91 KG/M2 | WEIGHT: 147 LBS | OXYGEN SATURATION: 100 % | RESPIRATION RATE: 18 BRPM | HEART RATE: 65 BPM | TEMPERATURE: 97.7 F | DIASTOLIC BLOOD PRESSURE: 61 MMHG

## 2021-08-04 LAB
ANION GAP SERPL CALC-SCNC: 4 MMOL/L (ref 5–15)
BASOPHILS # BLD: 0.1 K/UL (ref 0–0.1)
BASOPHILS NFR BLD: 1 % (ref 0–1)
BUN SERPL-MCNC: 9 MG/DL (ref 6–20)
BUN/CREAT SERPL: 13 (ref 12–20)
CALCIUM SERPL-MCNC: 8.5 MG/DL (ref 8.5–10.1)
CHLORIDE SERPL-SCNC: 107 MMOL/L (ref 97–108)
CO2 SERPL-SCNC: 26 MMOL/L (ref 21–32)
CREAT SERPL-MCNC: 0.69 MG/DL (ref 0.7–1.3)
DIFFERENTIAL METHOD BLD: ABNORMAL
EOSINOPHIL # BLD: 0.1 K/UL (ref 0–0.4)
EOSINOPHIL NFR BLD: 2 % (ref 0–7)
ERYTHROCYTE [DISTWIDTH] IN BLOOD BY AUTOMATED COUNT: 13.2 % (ref 11.5–14.5)
GLUCOSE SERPL-MCNC: 150 MG/DL (ref 65–100)
HCT VFR BLD AUTO: 30.7 % (ref 36.6–50.3)
HGB BLD-MCNC: 9.9 G/DL (ref 12.1–17)
IMM GRANULOCYTES # BLD AUTO: 0.1 K/UL (ref 0–0.04)
IMM GRANULOCYTES NFR BLD AUTO: 1 % (ref 0–0.5)
LYMPHOCYTES # BLD: 1.5 K/UL (ref 0.8–3.5)
LYMPHOCYTES NFR BLD: 20 % (ref 12–49)
MCH RBC QN AUTO: 30.5 PG (ref 26–34)
MCHC RBC AUTO-ENTMCNC: 32.2 G/DL (ref 30–36.5)
MCV RBC AUTO: 94.5 FL (ref 80–99)
MONOCYTES # BLD: 0.7 K/UL (ref 0–1)
MONOCYTES NFR BLD: 9 % (ref 5–13)
NEUTS SEG # BLD: 5.2 K/UL (ref 1.8–8)
NEUTS SEG NFR BLD: 67 % (ref 32–75)
NRBC # BLD: 0 K/UL (ref 0–0.01)
NRBC BLD-RTO: 0 PER 100 WBC
PLATELET # BLD AUTO: 303 K/UL (ref 150–400)
PMV BLD AUTO: 9.7 FL (ref 8.9–12.9)
POTASSIUM SERPL-SCNC: 4.4 MMOL/L (ref 3.5–5.1)
RBC # BLD AUTO: 3.25 M/UL (ref 4.1–5.7)
SODIUM SERPL-SCNC: 137 MMOL/L (ref 136–145)
WBC # BLD AUTO: 7.7 K/UL (ref 4.1–11.1)

## 2021-08-04 PROCEDURE — 74011000258 HC RX REV CODE- 258: Performed by: INTERNAL MEDICINE

## 2021-08-04 PROCEDURE — 74011250637 HC RX REV CODE- 250/637: Performed by: INTERNAL MEDICINE

## 2021-08-04 PROCEDURE — 05HY33Z INSERTION OF INFUSION DEVICE INTO UPPER VEIN, PERCUTANEOUS APPROACH: ICD-10-PCS | Performed by: STUDENT IN AN ORGANIZED HEALTH CARE EDUCATION/TRAINING PROGRAM

## 2021-08-04 PROCEDURE — 36415 COLL VENOUS BLD VENIPUNCTURE: CPT

## 2021-08-04 PROCEDURE — 77030018786 HC NDL GD F/USND BARD -B

## 2021-08-04 PROCEDURE — 76937 US GUIDE VASCULAR ACCESS: CPT

## 2021-08-04 PROCEDURE — 85025 COMPLETE CBC W/AUTO DIFF WBC: CPT

## 2021-08-04 PROCEDURE — C1751 CATH, INF, PER/CENT/MIDLINE: HCPCS

## 2021-08-04 PROCEDURE — 74011250636 HC RX REV CODE- 250/636: Performed by: INTERNAL MEDICINE

## 2021-08-04 PROCEDURE — 71045 X-RAY EXAM CHEST 1 VIEW: CPT

## 2021-08-04 PROCEDURE — 36573 INSJ PICC RS&I 5 YR+: CPT | Performed by: STUDENT IN AN ORGANIZED HEALTH CARE EDUCATION/TRAINING PROGRAM

## 2021-08-04 PROCEDURE — 80048 BASIC METABOLIC PNL TOTAL CA: CPT

## 2021-08-04 RX ORDER — HEPARIN 100 UNIT/ML
300 SYRINGE INTRAVENOUS AS NEEDED
Status: DISCONTINUED | OUTPATIENT
Start: 2021-08-04 | End: 2021-08-04 | Stop reason: HOSPADM

## 2021-08-04 RX ORDER — BACITRACIN 500 UNIT/G
1 PACKET (EA) TOPICAL AS NEEDED
Status: DISCONTINUED | OUTPATIENT
Start: 2021-08-04 | End: 2021-08-04 | Stop reason: HOSPADM

## 2021-08-04 RX ADMIN — CEFTRIAXONE SODIUM 2 G: 2 INJECTION, POWDER, FOR SOLUTION INTRAMUSCULAR; INTRAVENOUS at 12:00

## 2021-08-04 RX ADMIN — FERROUS SULFATE TAB 325 MG (65 MG ELEMENTAL FE) 325 MG: 325 (65 FE) TAB at 09:35

## 2021-08-04 RX ADMIN — PRAVASTATIN SODIUM 40 MG: 40 TABLET ORAL at 09:35

## 2021-08-04 RX ADMIN — Medication 10 ML: at 06:53

## 2021-08-04 RX ADMIN — ENOXAPARIN SODIUM 40 MG: 40 INJECTION SUBCUTANEOUS at 09:35

## 2021-08-04 NOTE — PROGRESS NOTES
Transition of Care Plan:    RUR: 12% low risk  Disposition: Home with IV antibiotics (IV infusion through [facility]) and [place] ([facility])  Follow up appointments: PCP and Cardiology  DME needed: IV infusion - Provided through [facility])  Transportation at Discharge: Ex-Spouse, Fabio Rubins or means to access home:    Ex-Spouse has access to home    IM Medicare Letter: 2nd IMM letter given to patient, signed and copy placed on medical record  Is patient a BCPI-A Bundle: no           If yes, was Bundle Letter given?:     Caregiver Contact: Ex Spouse Tim Brito [phone]  Discharge Caregiver contacted prior to discharge? Caregiver present in room and aware of discharge plan. CM received information that [facility] for patient at discharge. Name of agency and name of IV infusion company placed on AVS.    Ex-Spouse will transport patient home. No other CM needs identified for patient at this time and patient is ready for discharge from a CM standpoint. Care Management Interventions  PCP Verified by CM: Yes (Ez Del Real MD)  Mode of Transport at Discharge: Other (see comment) (ex-wife, Jb Manzo)  Discharge Durable Medical Equipment: No (no DME use)  Physical Therapy Consult: No  Occupational Therapy Consult: No  Speech Therapy Consult: No  Current Support Network: Lives Alone (LIves in a two story home w/4 STE)  Confirm Follow Up Transport: Self  Discharge Location  Discharge Placement:  ([address] w/HH & IV antibiotics)      Liam Greene.  Diogenes Chandra, [address] [place] - [facility]  [employer] ACP Facilitator  Zone Phone: [phone]

## 2021-08-04 NOTE — PROGRESS NOTES
Blood cultures from 8/1 negative so far. OK to proceed with PICC line. Diagnosis: Strep sanguinous bacteremia (Strep viridans, Pen G-Susceptible) and MV endocarditis. Recommendations:  - Ceftriaxone 2gm daily  - Duration 4 weeks, 8/1/21 to 8/31/21.   - F/u with cardiology as outpatient.   - No need for ID follow up after discharge. · Please have labs done on weekly basis for CBC with diff, CMP  and have results sent to me by faxing to  [phone]. · Call with critical labs at [phone]. · Please ensure that patient has PICC care arranged per protocol   · Once IV antibiotics have been discontinued, please ensure that PICC/IV access is promptly removed.    · Smoking cessation encouraged if patient is current smoker to aid in infection and wound healing      Wayne Arteaga MD  Infectious Diseases

## 2021-08-04 NOTE — DISCHARGE SUMMARY
Hospitalist Discharge Summary     Patient ID:  Meliton Schneider  [id number]  81 y.o.  1955  7/30/2021    PCP on record: Jennifer Michaels MD    Admit date: 7/30/2021  Discharge date and time: 8/4/2021    DISCHARGE DIAGNOSIS:    Alpha Streptococcus bacteremia  Infective endocarditis MV  Borderline hypotension  Diabetes mellitus type 2  with insulin infusion pump  BPH  Hypertension  Dyslipidemia  Iron deficiency anemia      CONSULTATIONS:  IP CONSULT TO HOSPITALIST  IP CONSULT TO INFECTIOUS DISEASES  IP CONSULT TO CARDIOLOGY    Excerpted HPI from H&P of Donald Alonso MD:  HISTORY OF PRESENT ILLNESS:     James Orlando is a 72 y.o.   male who presents with past medical history of diabetes mellitus, BPH, hypertension is coming to the hospital with chief complaints of positive blood cultures. Patient went to see his PCP for complaints of fatigue and had blood cultures which revealed alphahemolytic streptococci for which he was advised to go to the emergency department for further evaluation. Patient reports that he had fever about 2 days ago but did not check his temperature. He does not report any chest pain, shortness of breath, cough or phlegm. Does report occasional congestion in his upper respiratory tract but that is chronic. Does not report any abdominal pain, nausea, vomiting, diarrhea or constipation. Does not report any chills. Denies focal weakness, tingling or numbness. Denies sick contacts. He reports that he had Covid vaccinations shot and spit up and got Covid in May.     On arrival to the hospital, blood pressure was borderline low at 88/44. On labs CBC showed a hemoglobin of 10.2. BMP was normal.  Troponin was normal.  Repeat blood cultures were drawn.    ______________________________________________________________________  DISCHARGE SUMMARY/HOSPITAL COURSE:  for full details see H&P, daily progress notes, labs, consult notes.      Alpha Streptococcus Sanguinis bacteremia  Infective endocarditis MV  Borderline hypotension  Suspect related to recent dental cleaning procedure  Blood cultures on 7/28  And 7/30 grew alpha Streptococcus bacteremia  Blood culture on 08/01: NGTD  Echocardiogram showed vegetation on the posterior leaflet of the mitral valve. ID was consulted. He is on ceftriaxone 2gm daily. PICC line placed on 8/4. patient and patient's ex-wife (who is an RN) educated on how to administer IV antibiotics. Duration of antibiotics is for 4 weeks, 8/1/21 to 8/31/21. He is to follow up with ID outpatient     Diabetes mellitus type 2  with insulin infusion pump  Continue insulin pump   His BG control inpatient was good    BPH  Hypertension  Dyslipidemia  BP borderline low. Will continue to hold lisinopril on discharge. patient advised to monitor his BP at home and restart takin lisinopril of SBP >140 or DBP >90. He is to follow up with PCP as well. Iron deficiency anemia  Continue ferrous sulfate    _____________________________________________________________________  Patient seen and examined by me on discharge day. Pertinent Findings:  Gen:    Not in distress  Chest: Clear lungs  CVS:   Regular rhythm. No edema  Abd:  Soft, not distended, not tender  Neuro:  Alert, oriented x 3  _______________________________________________________________________  DISCHARGE MEDICATIONS:   Current Discharge Medication List      CONTINUE these medications which have NOT CHANGED    Details   ferrous sulfate (Iron) 325 mg (65 mg iron) tablet Take  by mouth two (2) times a day. tamsulosin (Flomax) 0.4 mg capsule Take 0.4 mg by mouth nightly. insulin lispro (HumaLOG U-100 Insulin) 100 unit/mL injection USE VIA INSULIN PUMP  Qty: 60 mL, Refills: 3      glucose blood VI test strips (Accu-Chek Elizabet Plus test strp) strip Use to check blood sugars 7 times per day. 7 boxes of 100 per box for 90d supply.  Dx code E51.1553 Patient is an insulin dependant diabetic  Qty: 700 Strip, Refills: 3    Associated Diagnoses: Type 1 diabetes mellitus with stable proliferative retinopathy, unspecified laterality (HCC)      lancets misc Use to check blood sugars 7 times per day. 7 boxes of 100 per box for 90d supply. Dx code P71.4211 Patient is an insulin dependant diabetic  Qty: 7 Package, Refills: 3    Associated Diagnoses: Type 1 diabetes mellitus with stable proliferative retinopathy, unspecified laterality (HCC)      pravastatin (PRAVACHOL) 40 mg tablet TAKE 1 TABLET DAILY  Qty: 90 Tab, Refills: 3       insulin pump (PATIENT SUPPLIED) Misc by SubCUTAneous route as needed. NOVALOG INSULIN      multivitamin (ONE A DAY) tablet Take 1 Tab by mouth daily. ascorbic acid (VITAMIN C) 500 mg tablet Take 500 mg by mouth daily. STOP taking these medications       lisinopriL (PRINIVIL, ZESTRIL) 20 mg tablet Comments:   Reason for Stopping:                 Patient Follow Up Instructions:    Activity: Activity as tolerated  Diet: Diabetic Diet  Wound Care: None needed    Follow-up with ID and PCP  Follow-up Information     Follow up With Specialties Details Why Contact Info    Cristino Turner MD Cardiology, [address] [place] [address] Vascular Surgery, Internal Medicine On 9/22/2021 at 9:00AM at [facility] [address] [facility]  [address] Josh Zhang MD Internal Medicine On 8/9/2021 For [facility] follow up appointment at 11:30AM  [place] [address]  [address] [address]  [phone]       [facility] [address]  This is your Home Health agency that will help with PICC care for your IV antibiotic needs [address] [place] [address] Infusion   This is the [address] [place] [address] providing your IV antibiotic medications Phone: [phone]        ________________________________________________________________    Risk of deterioration: Low    Condition at Discharge: Stable  __________________________________________________________________    Disposition  Home with family and home health services    ____________________________________________________________________    Code Status: Full Code  ___________________________________________________________________      Total time in minutes spent coordinating this discharge (includes going over instructions, follow-up, prescriptions, and preparing report for sign off to her PCP) :  40 minutes    Signed:  Kalin Sutton MD

## 2021-08-04 NOTE — PROGRESS NOTES
Problem: Falls - Risk of  Goal: *Absence of Falls  Description: Document Earma Samy Fall Risk and appropriate interventions in the flowsheet.   Outcome: Progressing Towards Goal  Note: Fall Risk Interventions:                                Problem: Patient Education: Go to Patient Education Activity  Goal: Patient/Family Education  Outcome: Progressing Towards Goal

## 2021-08-04 NOTE — PROGRESS NOTES
Transition of Care Plan:     RUR:12%  Disposition:Home w/ [facility]  Follow up appointments:  DME needed:n/a  Transportation at Discharge:ex-wife, Arcelia Stevens or means to access home:   yes     IM Medicare Letter:2nd  provided  BCPI-A Bundle Letter:n/a  Caregiver Contact:ex-wife, Arielle Villanueva [phone]  Discharge Caregiver contacted prior to discharge?  yes, in room    Patient is expected to d/c home today w/IV antibiotics & HH. Patient waiting on PICC. CM will continue to follow.     Han Nolen  Ext [phone]

## 2021-08-04 NOTE — PROGRESS NOTES
Problem: Falls - Risk of  Goal: *Absence of Falls  Description: Document Abisai Waters Fall Risk and appropriate interventions in the flowsheet.   8/4/2021 1701 by William Nina RN  Outcome: Resolved/Met  Note: Fall Risk Interventions:                             8/4/2021 1121 by William Nina RN  Outcome: Progressing Towards Goal  Note: Fall Risk Interventions:                                Problem: Patient Education: Go to Patient Education Activity  Goal: Patient/Family Education  8/4/2021 1701 by William Nina RN  Outcome: Resolved/Met  8/4/2021 1121 by William Nina RN  Outcome: Progressing Towards Goal

## 2021-08-04 NOTE — PROGRESS NOTES
Patient's IV was removed. Discharge paperwork was gone over with the patient and signed. Patient's belongings were gathered up. Patient walked out of the hospital with his wife and belongings. Patient left with a PICC line in place.

## 2021-08-04 NOTE — PROGRESS NOTES
PICC Education: Explained reason and rationale for PICC placement along with providing education in order to make an informed consent including nature, risks, benefits, potential complications, care and maintenance of PICC line. The opportunity for questions or concerns was given. A 'Patient PICC Handbook was also provided. Patient  gave written consent for PICC procedure to be done at the bedside. Patient  verbalizes understanding at this time. Procedure:  Time out completed. Pre procedure assessment done. Maximum sterile barrier precautions observed throughout procedure. Lidocaine 1% 3.0ml sc given prior to cannulation  Cannulated Basilic vein using ultrasound guidance and modified seldinger technique. Inserted SINGLE lumen 4 fr PICC in  RIGHT arm using, Arrow VPS Tip Tracker System and 3CG     Patient has questionable rhythm. Unable to determine the presence of a P wave. Blood return verified and flushed with 20ml NS in each port. Sterile dressing applied with Biopatch, Stat loc, occlusive dressing per protocol. Curios caps applied to each port. Reason for access (Long term antibiotics X4 weeks). Complications related to insertion (None)  Patient tolerated procedure well with minimal blood loss. PICC procedure performed by: John Cain RN, BSN,  [facility] / Vascular Access Nurse  Assisted by: Sharlene Zarate RN,  / Vascular Access Nurse    RIGHT  arm circumference: 27 cm. Catheter internal length:  41 cm  Catheter external length: 2 cm  Total Length:43 cm    PICC catheter occupies 9% of vein  Brand of catheter Arrow PICC,  Lot #[id number]   REF# [id number]    EXP 04/30/2022.     Primary nurse Terra Justin RN, notified NOT TO USE PICC UNTIL VASCULAR ACCESS TEAM CALLS WITH CONFIRMATIION OF TIP LOCATION BY CXR PER RADIOLOGIST.      @[phone]  PICC line placement overlies SVC, per Dr. Serena Meckel, radiologist.  Notified primary nurse Terra Justin RN, PICC is in satisfactory position per radiologist and may be used as well as to hang new infusion tubing prior to use.     Ramírez Ordonez RN, BSN, [facility]  Vascular Access Team

## 2021-08-05 ENCOUNTER — PATIENT OUTREACH (OUTPATIENT)
Dept: CASE MANAGEMENT | Age: 66
End: 2021-08-05

## 2021-08-05 RX ORDER — CEFTRIAXONE 1 G/1
2 INJECTION, POWDER, FOR SOLUTION INTRAMUSCULAR; INTRAVENOUS DAILY
COMMUNITY
End: 2021-09-22

## 2021-08-05 NOTE — PROGRESS NOTES
Care Transitions Initial Call    Call within 2 business days of discharge: Yes     Patient: Royce Rasheed Patient : 1955 MRN: [id number]    Last Discharge [facility]HABIL[facility]ATION [facility] Facility       Complaint Diagnosis Description Type Department Provider    21 Fatigue Bacteremia . .. ED to Hosp-Admission (Discharged) (ADMIT) Jose Quiñones MD; [place]. .. Was this an external facility discharge? No     Challenges to be reviewed by the provider   Additional needs identified to be addressed with provider: yes  advance care planning- Patient does not have an ACP or current HIPAA on file         Method of communication with provider : staff message    Discussed COVID-19 related testing which was not done at this time. Test results were not done. Patient informed of results, if available? no     Advance Care Planning:   Does patient have an Advance Directive: decision makers updated. Inpatient Readmission Risk score: Unplanned Readmit Risk Score: 15    Was this a readmission? no   Patient stated reason for the admission: blood culture results    Patients top risk factors for readmission: medical condition-DM, Bactermia   Interventions to address risk factors: Scheduled appointment with PCP-Dr Thanh Chowdary and Scheduled appointment with [address] [place] [address] Transition Nurse (CTN) contacted the patient by telephone to perform post hospital discharge assessment. Verified name and  with patient as identifiers. Provided introduction to self, and explanation of the CTN role. CTN reviewed discharge instructions, medical action plan and red flags with patient who verbalized understanding. Were discharge instructions available to patient? yes. Reviewed appropriate site of care based on symptoms and resources available to patient including: PCP and Specialist. Patient given an opportunity to ask questions and does not have any further questions or concerns at this time.  The patient agrees to contact the PCP office for questions related to their healthcare. Medication reconciliation was performed with patient, who verbalizes understanding of administration of home medications. Referral to Pharm D needed: no     Home Health/Outpatient orders at discharge: home health care and [address]: At [facility]  Date of initial visit: 8/5/21      Home Health/Outpatient orders at discharge: [address]: [id number]  Date of initial visit: 8/5/21      Durable Medical Equipment ordered at discharge: None     Covid Risk Education    Patient has already received [address] and had Nikhil Costain in 5/2021. Discussed follow-up appointments. If no appointment was previously scheduled, appointment scheduling offered: yes. Is follow up appointment scheduled within 7 days of discharge? yes. [facility] follow up appointment(s):   Future Appointments   Date Time Provider Carmel Vargas   8/9/2021 10:30 AM Bouchra Reyes MD [facility]   9/22/2021  9:00 AM Apollo Adamson MD [facility]   12/27/2021  2:30 PM Bello Maddox MD [facility]     Non-[facility] follow up appointment(s): none    Plan for follow-up call in 7-10 days based on severity of symptoms and risk factors. Plan for next call: self management-DM, fever, C/P, SOB and follow up appointment-Dr Reyes  CTN provided contact information for future needs. Goals Addressed                 This Visit's Progress     Attends follow-up appointments as directed. 08/05/21   -Patient has an appt with Dr Miranda Turner on 8/9/21 at 10:30 am.  -Patient has an appt with Dr Jv Hicks on 9/22/21 for repeat ECHO. -Patient not required to F/U with ID per notes. Monitor status of these appt on next call. Merced Bloom MSN, RN, CCM / Care Transition Nurse / [phone]        Understands red flags post discharge.         08/05/21   -Patient denies any fever, cough or SOB, glucose this Am as 53 and he knows what to do for hypoglycemia-candy.    -Patient has PICC line and will receive Ceftriaxone gm Q24 hr  unitl 8/31 and then his PICC will be pulled.    -Patient to get weekly CBC with Diff and CMP weekly with results faxed to ID.    -Patient has received his ABX from [address] and has [address] coming today.    -Patient with (R) ankle pain-arthritis.    -Patient states he is eating and drinking well, denies constipation. Monitor glucose, fever, SOB, cough, monitor PICC line and IV ABX status, PO intake and activity status on next call.     Oanh Rahman MSN, RN, CCM / Care Transition Nurse / [phone]

## 2021-08-06 LAB
BACTERIA SPEC CULT: NORMAL
SERVICE CMNT-IMP: NORMAL

## 2021-08-07 LAB
BACTERIA SPEC CULT: NORMAL
SERVICE CMNT-IMP: NORMAL

## 2021-08-09 ENCOUNTER — OFFICE VISIT (OUTPATIENT)
Dept: INTERNAL MEDICINE CLINIC | Age: 66
End: 2021-08-09
Payer: MEDICARE

## 2021-08-09 VITALS
RESPIRATION RATE: 18 BRPM | SYSTOLIC BLOOD PRESSURE: 118 MMHG | BODY MASS INDEX: 19.5 KG/M2 | HEIGHT: 72 IN | DIASTOLIC BLOOD PRESSURE: 77 MMHG | HEART RATE: 71 BPM | OXYGEN SATURATION: 98 % | WEIGHT: 144 LBS

## 2021-08-09 DIAGNOSIS — R78.81 BACTEREMIA DUE TO STREPTOCOCCUS: ICD-10-CM

## 2021-08-09 DIAGNOSIS — B95.5 BACTEREMIA DUE TO STREPTOCOCCUS: ICD-10-CM

## 2021-08-09 DIAGNOSIS — I33.0 ACUTE BACTERIAL ENDOCARDITIS: Primary | ICD-10-CM

## 2021-08-09 DIAGNOSIS — E10.3559 TYPE 1 DIABETES MELLITUS WITH STABLE PROLIFERATIVE RETINOPATHY, UNSPECIFIED LATERALITY (HCC): ICD-10-CM

## 2021-08-09 DIAGNOSIS — D63.8 ANEMIA WITH CHRONIC ILLNESS: ICD-10-CM

## 2021-08-09 PROCEDURE — 99496 TRANSJ CARE MGMT HIGH F2F 7D: CPT | Performed by: INTERNAL MEDICINE

## 2021-08-09 PROCEDURE — G8427 DOCREV CUR MEDS BY ELIG CLIN: HCPCS | Performed by: INTERNAL MEDICINE

## 2021-08-09 NOTE — PROGRESS NOTES
1. Have you been to the ER, urgent care clinic since your last visit? Hospitalized since your last visit? No    2. Have you seen or consulted any other health care providers outside of the [address] [place] [address] since your last visit? Include any pap smears or colon screening.  No

## 2021-08-09 NOTE — PROGRESS NOTES
Subjective:     Mr. Luciana Seo returns to the office today and transition of care subsequent to a hospitalization from 7/30 until 8/4 at [facility]. Patient originally was seen by me on 7/27 with complaints of extreme fatigue. Patient's history was remarkable for Covid vaccination in March and April and then later in April he developed Covid breakthrough infection. Covid testing was positive in May. The patient's exhaustion got worse in June and he targets it at a time after he had had some dental work done. [facility] which time his white blood cell count was elevated and his hemoglobin was lower than normal at 11.2. In addition to his fatigue he had been having low-grade fevers. On 7/27 we ordered a chest x-ray which was normal however he was iron deficient and did have elevated sed rate and C-reactive protein. He was asked to return to the office at which time we did a urinalysis and urine culture along with paired blood cultures. Blood cultures returned showing strep sanguinis and he was referred to the emergency room where he left AMA the first time he was there and then returned the following day to be admitted. Further blood cultures were obtained which were again positive with the same organism. Patient was treated with ceftriaxone 2 g daily. An echocardiogram revealed a vegetation on the mitral valve posterior leaflet. He was seen in the hospital by infectious disease and cardiology. A PICC line was placed and it was recommended that he receive 2 g of ceftriaxone from 8/1 until 8/31. He has a home health company that is supposed to come out weekly for blood work and dressing change. He has yet to hear from them. The patient denies any fevers or chills but continues to feel somewhat fatigued. He denies chest pains or shortness of breath.     Past Medical History:   Diagnosis Date    Arthritis     COVID-19 vaccine series completed PFIZER 3-18-21 AND 4-8-21    Diabetes ([facility]     Essential hypertension 12/11/2017    Hypercholesterolemia 12/11/2017    Hypertension     Ileitis 12/11/2017    Long-term use of high-risk medication 12/11/2017    Microalbuminuria 12/11/2017    Type I diabetes mellitus with proliferative retinopathy ([facility] 12/11/2017     Past Surgical History:   Procedure Laterality Date    HX LUMBAR LAMINECTOMY      HX ORTHOPAEDIC      RIGHT KNEE OPEN SURGERY    HX TONSILLECTOMY       Allergies   Allergen Reactions    Oxycodone-Acetaminophen Other (comments)     AGGRESIVE BEHAVIOR  Other reaction(s): Other (see comments)  AGGRESIVE BEHAVIOR     Current Outpatient Medications   Medication Sig Dispense Refill    cefTRIAXone (ROCEPHIN) 1 gram injection 2 g by IntraVENous route daily.  ferrous sulfate (Iron) 325 mg (65 mg iron) tablet Take  by mouth two (2) times a day.  tamsulosin (Flomax) 0.4 mg capsule Take 0.4 mg by mouth nightly.  insulin lispro (HumaLOG U-100 Insulin) 100 unit/mL injection USE VIA INSULIN PUMP 60 mL 3    glucose blood VI test strips (Accu-Chek Elizabet Plus test strp) strip Use to check blood sugars 7 times per day. 7 boxes of 100 per box for 90d supply. Dx code Z99.8944 Patient is an insulin dependant diabetic 700 Strip 3    lancets misc Use to check blood sugars 7 times per day. 7 boxes of 100 per box for 90d supply. Dx code O63.2787 Patient is an insulin dependant diabetic 7 Package 3    pravastatin (PRAVACHOL) 40 mg tablet TAKE 1 TABLET DAILY 90 Tab 3     insulin pump (PATIENT SUPPLIED) Misc by SubCUTAneous route as needed. NOVALOG INSULIN      multivitamin (ONE A DAY) tablet Take 1 Tab by mouth daily.  ascorbic acid (VITAMIN C) 500 mg tablet Take 500 mg by mouth daily.          Social History     Socioeconomic History    Marital status: SINGLE     Spouse name: Not on file    Number of children: Not on file    Years of education: Not on file    Highest education level: Not on file   Tobacco Use    Smoking status: Former Smoker     Packs/day: 1.00     Years: 5.00     Pack years: 5.00     Quit date: 1975     Years since quittin.6    Smokeless tobacco: Never Used   Vaping Use    Vaping Use: Never used   Substance and Sexual Activity    Alcohol use: Yes     Alcohol/week: 17.5 standard drinks     Types: 21 Glasses of wine per week    Drug use: No     Social Determinants of Health     Financial Resource Strain:     Difficulty of Paying Living Expenses:    Food Insecurity:     Worried About Running Out of Food in the Last Year:     [address] in the Last Year:    Transportation Needs:     Lack of Transportation (Medical):  Lack of Transportation (Non-Medical):    Physical Activity:     Days of Exercise per Week:     Minutes of Exercise per Session:    Stress:     Feeling of Stress :    Social Connections:     Frequency of Communication with Friends and Family:     Frequency of Social Gatherings with Friends and Family:     Attends Druze Services:     Active Member of Clubs or Organizations:     Attends Club or Organization Meetings:     Marital Status:      Family History   Problem Relation Age of Onset    Cancer Mother         LUNG    Cancer Father         LUNG    No Known Problems Sister     Anesth Problems Neg Hx        Review of Systems:  GEN: no weight loss, weight gain, fatigue or night sweats  CV: no PND, orthopnea, or palpitations  Resp: no dyspnea on exertion, no cough  Abd: no nausea, vomiting or diarrhea  EXT: denies edema, claudication  Endocrine: no hair loss, excessive thirst or polyuria  Neurological ROS: no TIA or stroke symptoms  ROS otherwise negative      Objective:     Visit Vitals  /77 (BP 1 Location: Left arm, BP Patient Position: Sitting, BP Cuff Size: Large adult)   Pulse 71   Resp 18   Ht 6' (1.829 m)   Wt 144 lb (65.3 kg)   SpO2 98%   BMI 19.53 kg/m²     Body mass index is 19.53 kg/m².     General:   alert, cooperative and no distress   Eyes: conjunctivae/sclerae clear. PERRL, EOM's intact   Mouth:  No oral lesions, no pharyngeal erythema, no exudates   Neck: Trachea midline, no thyromegaly, no bruits   Heart: S1 and S2 normal,no murmurs noted    Lungs: Clear to auscultation bilaterally, no increased work of breathing   Abdomen: Soft, nontender. Normal bowel sounds   Extremities: No edema or cyanosis   Neuro: ..alert, oriented x3,speech normal in context and clarity, cranial nerves II-XII intact,motor strength: full proximally and distally,gait: normal  reflexes: full and symmetric     Physical exam otherwise negative         Assessment/Plan:     Diagnoses and all orders for this visit:    Acute bacterial endocarditis    Bacteremia due to Streptococcus    Type 1 diabetes mellitus with stable proliferative retinopathy, unspecified laterality (HCC)    Anemia with chronic illness        Other instructions: The patient's medications were reviewed and reconciled. No change in his current medical regimen will be made. Continue IV antibiotics daily for 1 month    Laboratory studies to be obtained by home health and forwarded to infectious disease consultant    Continued follow-up with cardiology and ID    Follow-up with us as previously appointed    Follow-up and Dispositions    · Return for As previously scheduled. Ivan Calhoun MD    Please note that this dictation was completed with Yowza, the computer voice recognition software. Quite often unanticipated grammatical, syntax, homophones, and other interpretive errors are inadvertently transcribed by the computer software. Please disregard these errors. Please excuse any errors that have escaped final proofreading.

## 2021-08-13 ENCOUNTER — PATIENT OUTREACH (OUTPATIENT)
Dept: CASE MANAGEMENT | Age: 66
End: 2021-08-13

## 2021-08-13 NOTE — PROGRESS NOTES
Goals Addressed                 This Visit's Progress     COMPLETED: Attends follow-up appointments as directed. 08/05/21   -Patient has an appt with Dr Alonso Xiong on 8/9/21 at 10:30 am.  -Patient has an appt with Dr Joy Stewart on 9/22/21 for repeat ECHO. -Patient not required to F/U with ID per notes. Monitor status of these appt on next call. Sera HOPKINS, RN, CCM / Care Transition Nurse / [phone]     08/13/21   -Patient did attend his appt with Dr Alonso Xiong on 8/9 as scheduled. -Still has Cards appt on 9/22. Sera HOPKNIS, RN, CCM / Care Transition Nurse / [phone]        Understands red flags post discharge. On track     08/05/21   -Patient denies any fever, cough or SOB, glucose this Am as 53 and he knows what to do for hypoglycemia-candy.    -Patient has PICC line and will receive Ceftriaxone gm Q24 hr  unitl 8/31 and then his PICC will be pulled.    -Patient to get weekly CBC with Diff and CMP weekly with results faxed to ID.    -Patient has received his ABX from [facility] [place] and has [facility] coming today.    -Patient with (R) ankle pain-arthritis.    -Patient states he is eating and drinking well, denies constipation. Monitor glucose, fever, SOB, cough, monitor PICC line and IV ABX status, PO intake and activity status on next call. Sera HOPKINS, RN, CCM / Care Transition Nurse / [phone]     08/13/21   -Patient denies any fever, chills, glucose level is 's  -Continues to have ankle pain.    -Patient states that he did have labs drawn on Tuesday.  -Patient states that he only has 1 dose of ABX for today-already used 1. Only has 1 dose for tomorrow. Patient was told he should be getting doses today and has not received them yet. -I called [facility] and was told that he will be getting shipment by UPS today or tomorrow.    -Patient notified of delivery plan and also has the # to [facility] [address] [phone].       Monitor for fever, chills, glucose level and status of IV ABX and weekly lab work on next call.     Ector Blum MSN, RN, CCM / Care Transition Nurse / [phone]

## 2021-08-17 DIAGNOSIS — I33.0 ACUTE BACTERIAL ENDOCARDITIS: Primary | ICD-10-CM

## 2021-08-17 NOTE — PROGRESS NOTES
PICC line issues. Needs to be replaced by IR within 3 days as need IV abx for endocarditis. New order placed.

## 2021-08-19 ENCOUNTER — HOSPITAL ENCOUNTER (OUTPATIENT)
Dept: INTERVENTIONAL RADIOLOGY/VASCULAR | Age: 66
Discharge: HOME | End: 2021-08-19
Attending: STUDENT IN AN ORGANIZED HEALTH CARE EDUCATION/TRAINING PROGRAM
Payer: MEDICARE

## 2021-08-19 VITALS
SYSTOLIC BLOOD PRESSURE: 130 MMHG | OXYGEN SATURATION: 100 % | DIASTOLIC BLOOD PRESSURE: 60 MMHG | WEIGHT: 150 LBS | BODY MASS INDEX: 20.32 KG/M2 | HEART RATE: 67 BPM | RESPIRATION RATE: 14 BRPM | HEIGHT: 72 IN

## 2021-08-19 DIAGNOSIS — I33.0 ACUTE BACTERIAL ENDOCARDITIS: ICD-10-CM

## 2021-08-19 PROCEDURE — 36584 COMPL RPLCMT PICC RS&I: CPT

## 2021-08-19 PROCEDURE — 74011000250 HC RX REV CODE- 250: Performed by: RADIOLOGY

## 2021-08-19 PROCEDURE — 36573 INSJ PICC RS&I 5 YR+: CPT

## 2021-08-19 PROCEDURE — C1751 CATH, INF, PER/CENT/MIDLINE: HCPCS

## 2021-08-19 PROCEDURE — 2709999900 HC NON-CHARGEABLE SUPPLY

## 2021-08-19 PROCEDURE — 74011250636 HC RX REV CODE- 250/636: Performed by: RADIOLOGY

## 2021-08-19 RX ORDER — HEPARIN SODIUM 200 [USP'U]/100ML
400 INJECTION, SOLUTION INTRAVENOUS ONCE
Status: COMPLETED | OUTPATIENT
Start: 2021-08-19 | End: 2021-08-19

## 2021-08-19 RX ORDER — LIDOCAINE HYDROCHLORIDE 20 MG/ML
18 INJECTION, SOLUTION INFILTRATION; PERINEURAL ONCE
Status: COMPLETED | OUTPATIENT
Start: 2021-08-19 | End: 2021-08-19

## 2021-08-19 RX ADMIN — HEPARIN SODIUM IN SODIUM CHLORIDE 800 UNITS: 200 INJECTION INTRAVENOUS at 08:58

## 2021-08-19 RX ADMIN — LIDOCAINE HYDROCHLORIDE 200 MG: 20 INJECTION, SOLUTION INFILTRATION; PERINEURAL at 08:45

## 2021-08-19 NOTE — DISCHARGE INSTRUCTIONS
[facility]  Department of Interventional Radiology/Special Procedures   [phone]      Radiologist: Marimar Pereira PA-C      Date:   August 19, 2021      PICC Catheter Discharge Instructions      Keep the dressing clean and dry. Do not get it wet. No showers. Baths are OK. Watch for signs of infection:  a. Redness/pain  b. Fever/Chills  c. Increased pain  d. Drainage  e. Pus  f. Arm becomes red or swollen    Please call your physician if you note any signs of infection. You may take Tylenol, as directed on the label, for pain. Resume previous diet and follow your Medication Reconciliation list.    Change the dressing every 3 days or any time the dressing becomes soiled or loose. If you have [facility], they will take care of the dressing changes.

## 2021-08-24 ENCOUNTER — TELEPHONE (OUTPATIENT)
Dept: FAMILY MEDICINE CLINIC | Age: 66
End: 2021-08-24

## 2021-08-24 ENCOUNTER — PATIENT OUTREACH (OUTPATIENT)
Dept: CASE MANAGEMENT | Age: 66
End: 2021-08-24

## 2021-08-24 NOTE — TELEPHONE ENCOUNTER
----- Message from [facility] sent at 8/24/2021  3:02 PM EDT -----  Regarding: Dr. Francois / Darylene Keas first and last name:Leonora with [facility] [address]       Reason for call: Antibiotic Extension       Callback required yes/no and why:Y       Best contact number(s): [phone]      Details to clarify the request: Pt had to back into intervental radiology to get his pic line replaced on last Tuesday. Pt has missed two days worth of IV antibiotics because of this. Pt would like the medication to be extended for two day.        Wally Maynard

## 2021-08-24 NOTE — PROGRESS NOTES
Goals Addressed                 This Visit's Progress     Understands red flags post discharge. 08/05/21   -Patient denies any fever, cough or SOB, glucose this Am as 53 and he knows what to do for hypoglycemia-candy.    -Patient has PICC line and will receive Ceftriaxone gm Q24 hr  unitl 8/31 and then his PICC will be pulled.    -Patient to get weekly CBC with Diff and CMP weekly with results faxed to ID.    -Patient has received his ABX from [facility] and has [facility] coming today.    -Patient with (R) ankle pain-arthritis.    -Patient states he is eating and drinking well, denies constipation. Monitor glucose, fever, SOB, cough, monitor PICC line and IV ABX status, PO intake and activity status on next call. Eddie So MSN, RN, CCM / Care Transition Nurse / [phone]     08/13/21   -Patient denies any fever, chills, glucose level is 's  -Continues to have ankle pain.    -Patient states that he did have labs drawn on Tuesday.  -Patient states that he only has 1 dose of ABX for today-already used 1. Only has 1 dose for tomorrow. Patient was told he should be getting doses today and has not received them yet. -[facility] and was told that he will be getting shipment by UPS today or tomorrow.    -Patient notified of delivery plan and also [place] the # [place] [address] [phone]. Monitor for fever, chills, glucose level and status of IV ABX and weekly lab work on next call. Eddie So MSN, RN, CCM / Care Transition Nurse / [phone]     08/24/21   Care Transitions Outreach Attempt-LMTCB.     Eddie HOPKINS, RN, CCM / Care Transition Nurse / [phone]

## 2021-08-25 NOTE — TELEPHONE ENCOUNTER
LVM for Leonora with [facility] [address] to return call regarding antibiotic extension.     Tarah Bright LPN

## 2021-08-25 NOTE — TELEPHONE ENCOUNTER
Spoke w/ Jamie Bucio at [facility]; two patient identifiers confirmed. Informed per Dr. Viridiana Baron, extend by 2 days    Jamie Bucio verbalized understanding and verified order to be extended through 9/2/2021 with verbal readback.     Janay Cifuentes LPN

## 2021-08-25 NOTE — TELEPHONE ENCOUNTER
Spoke w/ Leonora with [facility]; two patient identifiers confirmed. Informed that per Dr. Shad Gallo, extend by 2 days    Informed that [facility] has also been notified of extension of therapy. Σ[place]ΤΙ verbalized understanding and states that she will notify the patient.     Oscar Ewing LPN

## 2021-09-01 ENCOUNTER — TELEPHONE (OUTPATIENT)
Dept: FAMILY MEDICINE CLINIC | Age: 66
End: 2021-09-01

## 2021-09-01 NOTE — TELEPHONE ENCOUNTER
----- Message from Chase Vallecillo sent at 9/1/2021  9:10 AM EDT -----  Regarding: Dr. Nancy Smith telephone  General Message/Vendor Calls    Caller's first and last name: Alondra      Reason for call: Caller wanted to check if its ok to end therapy.  Also checking on the status of a order that was faxed over on 8/25/21      Callback required yes/no and why: yes      Best contact number(s): [phone]      Details to clarify the request: n/a      Chase Vallecillo

## 2021-09-02 NOTE — TELEPHONE ENCOUNTER
Spoke w/ Lyn Rich at [facility]; two patient identifiers confirmed. Informed per Dr. Althea Lynn Yes, abx can be stopped    Cristel verbalized understanding.     Jose Etienne LPN

## 2021-09-13 ENCOUNTER — PATIENT OUTREACH (OUTPATIENT)
Dept: CASE MANAGEMENT | Age: 66
End: 2021-09-13

## 2021-09-13 NOTE — PROGRESS NOTES
Patient has graduated from the Transitions of Care Coordination  program on 9/13/21  Patient/family has the ability to self-manage at this time Care management goals have been completed. Patient was not referred to the [place] team for further management. Goals Addressed                 This Visit's Progress     COMPLETED: Understands red flags post discharge. 08/05/21   -Patient denies any fever, cough or SOB, glucose this Am as 53 and he knows what to do for hypoglycemia-candy.    -Patient has PICC line and will receive Ceftriaxone gm Q24 hr  unitl 8/31 and then his PICC will be pulled.    -Patient to get weekly CBC with Diff and CMP weekly with results faxed to ID.    -Patient has received his [facility]X from [facility] and has [facility] [address] coming today.    -Patient with (R) ankle pain-arthritis.    -Patient states he is eating and drinking well, denies constipation. Monitor glucose, fever, SOB, cough, monitor PICC line and IV ABX status, PO intake and activity status on next call. Samantha HOPKINS, RN, CCM / Care Transition Nurse / [phone]     08/13/21   -Patient denies any fever, chills, glucose level is 's  -Continues to have ankle pain.    -Patient states that he did have labs drawn on Tuesday.  -Patient states that he only has 1 dose of ABX for today-already used 1. Only has 1 dose for tomorrow. Patient was told he should be getting doses today and has not received them yet. -I called [facility] [place] [address] and was told that he will be getting shipment by UPS today or tomorrow.    -Patient notified of delivery plan and also has the # [facility] [phone]. Monitor for fever, chills, glucose level and status of IV ABX and weekly lab work on next call. Samantha HOPKINS, RN, CCM / Care Transition Nurse / [phone]     08/24/21   Care Transitions Outreach Attempt-LMTCB.     Samantha HOPKINS, RN, CCM / Care Transition Nurse / [phone]      09/13/21   Patient states he is feeling good, done with IV ABX infusions. Glucose levels are leveling out now that ABX /infection are done. Continues to have ankle pain and needs clearance that his infection is gone. Encouraged to call PCP or ID for this clearance. Ry HOPKINS, RN, [facility] / Care Transition Nurse / [phone]             Patient has Care Transition Nurse's contact information for any further questions, concerns, or needs.   Patients upcoming visits:    Future Appointments   Date Time Provider Carmel Vargas   9/16/2021 10:00 AM Dang Paris MD [facility] BS AMB   9/22/2021  9:00 AM Rebeca Knight MD [facility] BS AMB   12/27/2021  2:30 PM Zainab Fajardo MD [facility] BS AMB

## 2021-09-14 NOTE — PROGRESS NOTES
Vincenzo Rousseau is a 72 y.o. male here for new patient appt for anemia. Referred by Dr Caridad Christianson. 1. Have you been to the ER, urgent care clinic since your last visit? Hospitalized since your last visit? New Pt    2. Have you seen or consulted any other health care providers outside of the [address] [place] [address] since your last visit? Include any pap smears or colon screening. New Pt    Pt thinks it has been around 10 years since last colonoscopy. Pt has never had blood or iron transfusion before. Pt states he feels fine.

## 2021-09-16 ENCOUNTER — OFFICE VISIT (OUTPATIENT)
Dept: ONCOLOGY | Age: 66
End: 2021-09-16
Payer: MEDICARE

## 2021-09-16 VITALS
HEIGHT: 72 IN | DIASTOLIC BLOOD PRESSURE: 64 MMHG | TEMPERATURE: 98.4 F | WEIGHT: 150.7 LBS | OXYGEN SATURATION: 98 % | SYSTOLIC BLOOD PRESSURE: 109 MMHG | HEART RATE: 60 BPM | BODY MASS INDEX: 20.41 KG/M2

## 2021-09-16 DIAGNOSIS — D64.9 NORMOCYTIC ANEMIA: Primary | ICD-10-CM

## 2021-09-16 DIAGNOSIS — R68.89 OTHER GENERAL SYMPTOMS AND SIGNS: ICD-10-CM

## 2021-09-16 PROCEDURE — 1101F PT FALLS ASSESS-DOCD LE1/YR: CPT | Performed by: STUDENT IN AN ORGANIZED HEALTH CARE EDUCATION/TRAINING PROGRAM

## 2021-09-16 PROCEDURE — G8420 CALC BMI NORM PARAMETERS: HCPCS | Performed by: STUDENT IN AN ORGANIZED HEALTH CARE EDUCATION/TRAINING PROGRAM

## 2021-09-16 PROCEDURE — 3017F COLORECTAL CA SCREEN DOC REV: CPT | Performed by: STUDENT IN AN ORGANIZED HEALTH CARE EDUCATION/TRAINING PROGRAM

## 2021-09-16 PROCEDURE — G8752 SYS BP LESS 140: HCPCS | Performed by: STUDENT IN AN ORGANIZED HEALTH CARE EDUCATION/TRAINING PROGRAM

## 2021-09-16 PROCEDURE — G8432 DEP SCR NOT DOC, RNG: HCPCS | Performed by: STUDENT IN AN ORGANIZED HEALTH CARE EDUCATION/TRAINING PROGRAM

## 2021-09-16 PROCEDURE — G0463 HOSPITAL OUTPT CLINIC VISIT: HCPCS | Performed by: STUDENT IN AN ORGANIZED HEALTH CARE EDUCATION/TRAINING PROGRAM

## 2021-09-16 PROCEDURE — G8754 DIAS BP LESS 90: HCPCS | Performed by: STUDENT IN AN ORGANIZED HEALTH CARE EDUCATION/TRAINING PROGRAM

## 2021-09-16 PROCEDURE — G8427 DOCREV CUR MEDS BY ELIG CLIN: HCPCS | Performed by: STUDENT IN AN ORGANIZED HEALTH CARE EDUCATION/TRAINING PROGRAM

## 2021-09-16 PROCEDURE — 99204 OFFICE O/P NEW MOD 45 MIN: CPT | Performed by: STUDENT IN AN ORGANIZED HEALTH CARE EDUCATION/TRAINING PROGRAM

## 2021-09-16 PROCEDURE — G8536 NO DOC ELDER MAL SCRN: HCPCS | Performed by: STUDENT IN AN ORGANIZED HEALTH CARE EDUCATION/TRAINING PROGRAM

## 2021-09-16 RX ORDER — LISINOPRIL 20 MG/1
TABLET ORAL DAILY
COMMUNITY
End: 2022-01-24

## 2021-09-17 ENCOUNTER — DOCUMENTATION ONLY (OUTPATIENT)
Dept: ONCOLOGY | Age: 66
End: 2021-09-17

## 2021-09-17 ENCOUNTER — TELEPHONE (OUTPATIENT)
Dept: ONCOLOGY | Age: 66
End: 2021-09-17

## 2021-09-17 NOTE — TELEPHONE ENCOUNTER
Patient contacted via telephone, reviewed his low glucose of 30. Patient reports he is feeling fine at times  has had low glucose in the past.  He reports his most recent blood glucose levels normal after checking at home. He does have type 1 diabetes and frequently does manage hypoglycemia independently of medical practitioners. I advised the patient that should he have symptoms of dizziness, confusion, hypoglycemia, he should present to the emergency department for treatment if he is not able to control it at home as consequence of hypoglycemia seizure disorder. At this time, patient is in normal state of health.

## 2021-09-17 NOTE — PROGRESS NOTES
-Patient called back to follow-up on my calls.  -I let him know that his glucose was critically low yesterday but noted that it was a late reporting.  -He told me he has an insulin pump.  -I let him know that I would recommend that his endocrinologist know about the low level who would be managing his insulin.  -I let him know that severe hypoglycemia can lead to seizures and can in some instances can be fatal.  -I thanked him for calling us back and let him know that Dr. Yuan Wu was aware of the low level. He thanked me for letting him know and said that Dr. Yuan Wu did reach out to him.     Bethann Burkitt, MD  Hematology/Medical Oncology Provider  [facility]  P: [phone]    CC:  Dr. Puneet Antoine

## 2021-09-17 NOTE — PROGRESS NOTES
Lab notified me at about 2am that patient with critical lab value of glucose at 30 but lab draw was from 9/16/21 morning when he saw Dr. Gopal Robledo. I tried calling patient numerous times between 2am to 2:30am but there was no answer. I left a brief voicemail notifying that I was trying to call him to discuss his glucose levels. Ideally, the patient would have rectified the low glucose reading through eating and checking his glucose readings throughout the rest of yesterday. I left patient a second voicemail asking him to call [facility]  to call us back. However, I wanted to follow through and contact his emergency contact:    I tried calling patient's primary decision maker, Mayo Hernandez at listed number numerous times between 2am to 2:30am   However, I only received the following message: \"Mailbox is full and cannot accept any messages at this time.'    I even tried to call his \"ex-spouse\" Gretchen Grandchild listed as an emergency contact for this emergency. I called the [facility]  to see if they would have any other contact numbers for patient but they did not have access to the outpatient numbers reportedly. While it may be a consideration to call 9-1-1 for a welfare check on patient, I do not believe that this is warranted since this critical lab was from yesterday morning and more than likely Mr. Anette Jolly would have presented with signs/symptoms of severe hypoglycemia since that time if it were not corrected. I am told by [facility] lab that they were behind in processing their labs for 9/16/21 and this is when his blood was processed early this morning. Therefore, I do not see that this is a justified use of calling 9-1-1. I have made my best effort to try to connect with patient. I will send my note to Dr. Gopal Robledo to make his office aware to follow-up with patient since future hypoglycemia can still occur for patient.  However, it would be a significant hypothetical situation that the patient would still be hypoglycemic. Surely, his insulin may need adjusted to avoid any hypoglycemia.     Doreen Dave MD  Hematology/Medical Oncology Provider  [facility]  P: [phone]    CC:  Nathan Nelson MD  [address] [facility]

## 2021-09-20 ENCOUNTER — TELEPHONE (OUTPATIENT)
Dept: ONCOLOGY | Age: 66
End: 2021-09-20

## 2021-09-20 NOTE — TELEPHONE ENCOUNTER
Per Chely's request (via Anita Nunez), called patient to schedule appointment to see Dr. Mckenna Alvarez. No answer at patients contact number. Left voicemail for patient to call us back.

## 2021-09-22 ENCOUNTER — OFFICE VISIT (OUTPATIENT)
Dept: CARDIOLOGY CLINIC | Age: 66
End: 2021-09-22
Payer: MEDICARE

## 2021-09-22 VITALS
SYSTOLIC BLOOD PRESSURE: 112 MMHG | WEIGHT: 149 LBS | RESPIRATION RATE: 18 BRPM | BODY MASS INDEX: 20.18 KG/M2 | HEIGHT: 72 IN | OXYGEN SATURATION: 98 % | DIASTOLIC BLOOD PRESSURE: 68 MMHG | HEART RATE: 55 BPM

## 2021-09-22 DIAGNOSIS — I10 ESSENTIAL HYPERTENSION: ICD-10-CM

## 2021-09-22 DIAGNOSIS — E78.00 HYPERCHOLESTEROLEMIA: ICD-10-CM

## 2021-09-22 DIAGNOSIS — I33.0 ACUTE BACTERIAL ENDOCARDITIS: Primary | ICD-10-CM

## 2021-09-22 PROCEDURE — G0463 HOSPITAL OUTPT CLINIC VISIT: HCPCS | Performed by: INTERNAL MEDICINE

## 2021-09-22 PROCEDURE — 99203 OFFICE O/P NEW LOW 30 MIN: CPT | Performed by: INTERNAL MEDICINE

## 2021-09-22 PROCEDURE — 1101F PT FALLS ASSESS-DOCD LE1/YR: CPT | Performed by: INTERNAL MEDICINE

## 2021-09-22 PROCEDURE — G8752 SYS BP LESS 140: HCPCS | Performed by: INTERNAL MEDICINE

## 2021-09-22 PROCEDURE — G8420 CALC BMI NORM PARAMETERS: HCPCS | Performed by: INTERNAL MEDICINE

## 2021-09-22 PROCEDURE — 93010 ELECTROCARDIOGRAM REPORT: CPT | Performed by: INTERNAL MEDICINE

## 2021-09-22 PROCEDURE — 93005 ELECTROCARDIOGRAM TRACING: CPT | Performed by: INTERNAL MEDICINE

## 2021-09-22 PROCEDURE — G8536 NO DOC ELDER MAL SCRN: HCPCS | Performed by: INTERNAL MEDICINE

## 2021-09-22 PROCEDURE — 3017F COLORECTAL CA SCREEN DOC REV: CPT | Performed by: INTERNAL MEDICINE

## 2021-09-22 PROCEDURE — G8510 SCR DEP NEG, NO PLAN REQD: HCPCS | Performed by: INTERNAL MEDICINE

## 2021-09-22 PROCEDURE — G8754 DIAS BP LESS 90: HCPCS | Performed by: INTERNAL MEDICINE

## 2021-09-22 PROCEDURE — G8427 DOCREV CUR MEDS BY ELIG CLIN: HCPCS | Performed by: INTERNAL MEDICINE

## 2021-09-22 NOTE — PROGRESS NOTES
Raffaele Campos, P-BC    Subjective/HPI:     Julio Diaz is a 72 y.o. male is here to establish care. He has a PMHx of T1DM, HTN and HLD. Admitted to [address] [place] [address] in 7/2021 for sepsis 2/2 acute infective endocarditis of the MV. Felt related to recent dental procedure. Echocardiogram did confirm vegetation of the posterior leaflet of the mitral valve. He was seen by ID. PICC line was placed and recommended antibiotics for 4 weeks, with plans to repeat echo afterwards. He is doing well. Had PICC line removed a few weeks ago. Concerned about vegetation of mitral valve and further risk of dental procedures. Current Outpatient Medications on File Prior to Visit   Medication Sig Dispense Refill    lisinopriL (PRINIVIL, ZESTRIL) 20 mg tablet Take  by mouth daily. BID      ferrous sulfate (Iron) 325 mg (65 mg iron) tablet Take  by mouth two (2) times a day.  tamsulosin (Flomax) 0.4 mg capsule Take 0.4 mg by mouth nightly.  insulin lispro (HumaLOG U-100 Insulin) 100 unit/mL injection USE VIA INSULIN PUMP 60 mL 3    glucose blood VI test strips (Accu-Chek Elizabet Plus test strp) strip Use to check blood sugars 7 times per day. 7 boxes of 100 per box for 90d supply. Dx code E71.6855 Patient is an insulin dependant diabetic 700 Strip 3    lancets misc Use to check blood sugars 7 times per day. 7 boxes of 100 per box for 90d supply. Dx code D29.9468 Patient is an insulin dependant diabetic 7 Package 3    pravastatin (PRAVACHOL) 40 mg tablet TAKE 1 TABLET DAILY 90 Tab 3     insulin pump (PATIENT SUPPLIED) Misc by SubCUTAneous route as needed. NOVALOG INSULIN      multivitamin (ONE A DAY) tablet Take 1 Tab by mouth daily.  ascorbic acid (VITAMIN C) 500 mg tablet Take 500 mg by mouth daily.  [DISCONTINUED] cefTRIAXone (ROCEPHIN) 1 gram injection 2 g by IntraVENous route daily.  (Patient not taking: Reported on 9/16/2021)       No current facility-administered medications on file prior to visit. Review of Symptoms:    Review of Systems   Constitutional: Negative for chills, fever and weight loss. HENT: Negative for nosebleeds. Eyes: Negative for blurred vision and double vision. Respiratory: Negative for cough, shortness of breath and wheezing. Cardiovascular: Negative for chest pain, palpitations, orthopnea, leg swelling and PND. Skin: Negative for rash. Neurological: Negative for dizziness and loss of consciousness. Physical Exam:      General: Well developed, in no acute distress, cooperative and alert  Heart:  reg rate and rhythm; normal S1/S2; no murmurs, no gallops or rubs. Respiratory: Clear bilaterally x 4, no wheezing or rales  Extremities:  Normal cap refill, no cyanosis, atraumatic. No edema. Vascular: 2+ pulses symmetric in all extremities    Vitals:    09/22/21 0916   BP: 112/68   BP 1 Location: Left upper arm   BP Patient Position: Sitting   BP Cuff Size: Large adult   Pulse: (!) 55   Resp: 18   Height: 6' (1.829 m)   Weight: 149 lb (67.6 kg)   SpO2: 98%       ECG done today shows sinus bradycardia     Assessment:       ICD-10-CM ICD-9-CM    1. Acute bacterial endocarditis  I33.0 421.0 AMB POC EKG ROUTINE W/ 12 LEADS, INTER & REP      ECHO ADULT COMPLETE   2. Essential hypertension  I10 401.9    3. Hypercholesterolemia  E78.00 272.0         Plan:     1. Acute bacterial endocarditis  Echo done 7/2021 with preserved LVEF 50-55% with mild-mod MR with vegetation of the posterior MV leaflet  Has completed 4 weeks of antibiotics, completed 8/31/21  Repeat echocardiogram to assess MV vegegation. Discussed higher risk for endocarditis, recommend prophylactic antibiotics prior to dental procedures    2. Essential hypertension  BP controlled. Continue anti-hypertensive therapy and low sodium diet    3.  Hypercholesterolemia  Continue statin therapy and low fat, low cholesterol diet  Lipids managed by PCP     F/u with Dr. Patrick Garza after procedure    Claudy Cuello NP       [facility]             Patient seen, examined by me personally. Plan discussed as detailed. Agree with note as outlined by  NP with modifications as noted. My independent physical exam reveals : Physical Exam  Vitals and nursing note reviewed. Constitutional:       Appearance: Normal appearance. Cardiovascular:      Rate and Rhythm: Normal rate and regular rhythm. Heart sounds: Normal heart sounds. Pulmonary:      Breath sounds: Normal breath sounds. Musculoskeletal:         General: No swelling. Skin:     General: Skin is warm. Neurological:      Mental Status: He is alert and oriented to person, place, and time. Psychiatric:         Mood and Affect: Mood normal.          No additional findings noted. Agree with plan as outlined above with modifications as noted. If persistent or large vegetatin may need AMADO. Discussed prophylaxis before dental procedure.     Digna Clement MD

## 2021-09-22 NOTE — PROGRESS NOTES
1. Have you been to the ER, urgent care clinic since your last visit? Hospitalized since your last visit? Yes, 7/30/21, ED [facility], Bacteremia due to Streptococcus    2. Have you seen or consulted any other health care providers outside of the [address] [place] [address] since your last visit? Include any pap smears or colon screening.  No       Chief Complaint   Patient presents with    New Patient     Pt denies cardiac symptoms

## 2021-09-22 NOTE — LETTER
9/22/2021    Patient: Graham Javier   YOB: 1955   Date of Visit: 9/22/2021     Paris Velasco [facility] [id number]  Via In Basket    Dear Frances Yung MD,      Thank you for referring Mr. Anna Marie Mann to [address] [place] [address] for evaluation. My notes for this consultation are attached. If you have questions, please do not hesitate to call me. I look forward to following your patient along with you.       Sincerely,    Josafat Lock MD

## 2021-09-24 NOTE — PROGRESS NOTES
[facility] at [address] [facility], [address], [address] [facility] [address]  W: [phone] F: [phone]      Reason for Visit:   Mylene Bolton is a 72 y.o. male who is seen in consultation at the request of Dr. Coleen Boudreaux for evaluation of normocytic anemia normocytic anemia. Hematology / Oncology Treatment History:     Hematological/Oncological Diagnosis: Normocytic anemia    Date of Diagnosis: July 2021    Treatment course: Surveillance      History of Present Illness:     Very pleasant 77-year-old male with a relevant medical history most significant for diabetes mellitus type 1, hypertension, dyslipidemia, who presents for evaluation and treatment recent onset worsening normocytic anemia noted in July 2021. The patient's anemia trend is detailed below      No other associated thrombocytopenia or leukopenia associated with this isolated normocytic anemia. MCV was 94 on August 4, 2021. Clinically, the patient reports that he is doing well had a negative fecal occult in July 2021. He does report clinical symptoms of fatigue and a recent history of low-grade fevers. Otherwise, creatinine was\": 0.69. And calcium was normal at 8.5. Patient did have a recent infection that required antibiotics but completed in September 2021. In July, CRP and ESR both elevated as was ferritin at 528. Iron saturation was low at 7%. Patient reports that he had a colonoscopy but it was greater than 10 years ago. 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 This required long-term antibiotic therapy that completed only in early September 2021. Family history of malignancy with lung cancer though both of his parents were smokers. Maternal grandmother had bowel malignancy.     Social history notable only for nightly alcohol use with wine at night, reports 2-3 glasses per night.  History of tobacco use currently non-smoker    Positive ROS findings include: Fatigue, history of low-grade fevers, history of recent infection. Review of Systems: A complete review of systems was obtained, negative except as described above. Past Medical History:   Diagnosis Date    Anemia     Arthritis     COVID-19 vaccine series completed     PFIZER 3-18-21 AND 21    Diabetes ([facility] [address]     Essential hypertension 2017    Hypercholesterolemia 2017    Hypertension     Ileitis 2017    Long-term use of high-risk medication 2017    Microalbuminuria 2017    Type I diabetes mellitus with proliferative retinopathy ([facility] [address] 2017      Past Surgical History:   Procedure Laterality Date    HX LUMBAR LAMINECTOMY      HX ORTHOPAEDIC      RIGHT KNEE OPEN SURGERY    HX TONSILLECTOMY        Social History     Tobacco Use    Smoking status: Former Smoker     Packs/day: 1.00     Years: 5.00     Pack years: 5.00     Quit date: 1975     Years since quittin.7    Smokeless tobacco: Never Used   Substance Use Topics    Alcohol use: Yes     Alcohol/week: 3.0 standard drinks     Types: 3 Glasses of wine per week     Comment: 3 glasses of wine a night      Family History   Problem Relation Age of Onset    Cancer Mother         LUNG    Cancer Father         LUNG    No Known Problems Sister     Anesth Problems Neg Hx      Current Outpatient Medications   Medication Sig    lisinopriL (PRINIVIL, ZESTRIL) 20 mg tablet Take  by mouth daily. BID    ferrous sulfate (Iron) 325 mg (65 mg iron) tablet Take  by mouth two (2) times a day.  tamsulosin (Flomax) 0.4 mg capsule Take 0.4 mg by mouth nightly.  insulin lispro (HumaLOG U-100 Insulin) 100 unit/mL injection USE VIA INSULIN PUMP    glucose blood VI test strips (Accu-Chek Elizabet Plus test strp) strip Use to check blood sugars 7 times per day. 7 boxes of 100 per box for 90d supply.  Dx code W71.4162 Patient is an insulin dependant diabetic    lancets misc Use to check blood sugars 7 times per day. 7 boxes of 100 per box for 90d supply. Dx code O38.3737 Patient is an insulin dependant diabetic    pravastatin (PRAVACHOL) 40 mg tablet TAKE 1 TABLET DAILY     insulin pump (PATIENT SUPPLIED) Misc by SubCUTAneous route as needed. NOVALOG INSULIN    multivitamin (ONE A DAY) tablet Take 1 Tab by mouth daily.  ascorbic acid (VITAMIN C) 500 mg tablet Take 500 mg by mouth daily. No current facility-administered medications for this visit. Allergies   Allergen Reactions    Oxycodone-Acetaminophen Other (comments)     AGGRESIVE BEHAVIOR  Other reaction(s): Other (see comments)  AGGRESIVE BEHAVIOR            Physical Exam:     Visit Vitals  /64 (BP 1 Location: Left upper arm, BP Patient Position: Sitting)   Pulse 60   Temp 98.4 °F (36.9 °C) (Temporal)   Ht 6' (1.829 m)   Wt 150 lb 11.2 oz (68.4 kg)   SpO2 98%   BMI 20.44 kg/m²     ECOG PS: 0  General: No distress  Eyes: PERRLA, anicteric sclerae  HENT: Atraumatic with normal appearance of ears and nose; OP clear  Neck: Supple; no visualized JVD  Lymphatic: No cervical, or supraclavicular lymphadenopathy. Respiratory: CTAB, normal respiratory effort  CV: Normal rate, regular rhythm, no murmurs, no peripheral edema  GI: Soft, nontender, nondistended, no palpable masses, no hepatomegaly, no splenomegaly  MS: Normal gait and station. Digits without clubbing or cyanosis. Skin: No rashes, ecchymoses, or petechiae. Normal temperature, turgor, and texture. Neuro/Psych: Alert, oriented, appropriate affect, normal judgment/insight      Results:     Lab Results   Component Value Date/Time    WBC 6.4 09/16/2021 11:15 AM    HGB 13.5 09/16/2021 11:15 AM    HCT 41.0 09/16/2021 11:15 AM    PLATELET 247 12/31/9342 11:15 AM    MCV 97.2 09/16/2021 11:15 AM    ABS.  NEUTROPHILS 4.4 09/16/2021 11:15 AM    HGB (POC) 12.9 06/05/2018 03:44 PM    HCT (POC) 37.4 06/05/2018 03:44 PM Lab Results   Component Value Date/Time    Sodium 137 09/16/2021 11:15 AM    Potassium 4.4 09/16/2021 11:15 AM    Chloride 103 09/16/2021 11:15 AM    CO2 29 09/16/2021 11:15 AM    Glucose 30 (LL) 09/16/2021 11:15 AM    BUN 20 09/16/2021 11:15 AM    Creatinine 0.86 09/16/2021 11:15 AM    GFR est AA >60 09/16/2021 11:15 AM    GFR est non-AA >60 09/16/2021 11:15 AM    Calcium 9.5 09/16/2021 11:15 AM    Sodium,  07/30/2021 09:54 AM    Potassium, POC 4.4 07/30/2021 09:54 AM    Chloride,  07/30/2021 09:54 AM    Glucose (POC) 77 07/30/2021 12:25 PM    Glucose,  (H) 07/30/2021 09:54 AM    Creatinine, POC 1.0 07/30/2021 09:54 AM    Calcium, ionized (POC) 1.28 07/30/2021 09:54 AM     Lab Results   Component Value Date/Time    Bilirubin, total 0.6 09/16/2021 11:15 AM    ALT (SGPT) 35 09/16/2021 11:15 AM    Alk. phosphatase 75 09/16/2021 11:15 AM    Protein, total 7.3 09/16/2021 11:15 AM    Protein, total 7.5 09/16/2021 11:15 AM    Albumin 3.9 09/16/2021 11:15 AM    Globulin 3.6 09/16/2021 11:15 AM       Labs reviewed above, finding of hypoglycemia reviewed with patient. He was asymptomatic. Likely a consequence of his type 1 diabetes        Assessment and Recommendations:       # Normocytic Anemia     - The differential diagnosis for a normocytic anemia is broad, and includes blood loss, anemia of chronic disease, chronic renal insufficiency, iron deficiency, hypothyroidism, hemolysis, and bone marrow suppression. MDS, B12 deficiency, folate deficiency, and alcohol abuse can also lead to anemia, though it is generally macrocytic.   In this particular patient's case, I suspect that his recent infection may have caused some amount of bone marrow suppression with anemia of inflammation.    - I will obtain some additional labwork today to further investigate, including CBC with differential, peripheral smear review, reticulocyte count, iron profile, ferritin, B12, folate, copper, haptoglobin, LDH, infectious work-up including HIV, as well as gammopathy profile    Orders Placed This Encounter    CBC WITH AUTOMATED DIFF    IRON PROFILE    FERRITIN    VITAMIN B12    FOLATE    COPPER    HAPTOGLOBIN    PERIPHERAL SMEAR    LD    ERYTHROPOIETIN    RETICULOCYTE COUNT    METABOLIC PANEL, COMPREHENSIVE    HIV 1/2 AG/AB, 4TH GENERATION,W RFLX CONFIRM    SOTERO, DIRECT, W/REFLEX    GAMMOPATHY EVAL, SPEP/LINDA, IG QT/FLC    SED RATE (ESR)    C REACTIVE PROTEIN, QT       Plan for follow-up visit in about 2 weeks virtually to review lab work-up.     Signed By: Amado Yao MD      Attending Medical Oncologist   [facility]

## 2021-09-28 ENCOUNTER — TELEPHONE (OUTPATIENT)
Dept: ONCOLOGY | Age: 66
End: 2021-09-28

## 2021-09-28 ENCOUNTER — VIRTUAL VISIT (OUTPATIENT)
Dept: ONCOLOGY | Age: 66
End: 2021-09-28
Payer: MEDICARE

## 2021-09-28 DIAGNOSIS — D64.9 NORMOCYTIC ANEMIA: Primary | ICD-10-CM

## 2021-09-28 PROCEDURE — G8420 CALC BMI NORM PARAMETERS: HCPCS | Performed by: STUDENT IN AN ORGANIZED HEALTH CARE EDUCATION/TRAINING PROGRAM

## 2021-09-28 PROCEDURE — 99213 OFFICE O/P EST LOW 20 MIN: CPT | Performed by: STUDENT IN AN ORGANIZED HEALTH CARE EDUCATION/TRAINING PROGRAM

## 2021-09-28 PROCEDURE — G8536 NO DOC ELDER MAL SCRN: HCPCS | Performed by: STUDENT IN AN ORGANIZED HEALTH CARE EDUCATION/TRAINING PROGRAM

## 2021-09-28 PROCEDURE — G8432 DEP SCR NOT DOC, RNG: HCPCS | Performed by: STUDENT IN AN ORGANIZED HEALTH CARE EDUCATION/TRAINING PROGRAM

## 2021-09-28 PROCEDURE — G8427 DOCREV CUR MEDS BY ELIG CLIN: HCPCS | Performed by: STUDENT IN AN ORGANIZED HEALTH CARE EDUCATION/TRAINING PROGRAM

## 2021-09-28 PROCEDURE — 1101F PT FALLS ASSESS-DOCD LE1/YR: CPT | Performed by: STUDENT IN AN ORGANIZED HEALTH CARE EDUCATION/TRAINING PROGRAM

## 2021-09-28 PROCEDURE — G8756 NO BP MEASURE DOC: HCPCS | Performed by: STUDENT IN AN ORGANIZED HEALTH CARE EDUCATION/TRAINING PROGRAM

## 2021-09-28 PROCEDURE — 3017F COLORECTAL CA SCREEN DOC REV: CPT | Performed by: STUDENT IN AN ORGANIZED HEALTH CARE EDUCATION/TRAINING PROGRAM

## 2021-09-28 NOTE — PROGRESS NOTES
Josafat Mondragon is a 72 y.o. male pt seeing the provider virtually today for anemia and lab review f/u. Pt denies active bleeding. Chief Complaint   Patient presents with    Follow-up    Anemia     1. Have you been to the ER, urgent care clinic since your last visit? Hospitalized since your last visit? No    2. Have you seen or consulted any other health care providers outside of the [address] [place] [address] since your last visit? Include any pap smears or colon screening.  No

## 2021-09-28 NOTE — PROGRESS NOTES
[facility] [place] at [address]  W: [phone] F: [phone]      Reason for Visit:   Jose Escudero is a 72 y.o. male who is seen in consultation at the request of Dr. Shantel Vences for evaluation of normocytic anemia normocytic anemia. Subsequent encounter      Hematology / Oncology Treatment History:     Hematological/Oncological Diagnosis: Normocytic anemia    Date of Diagnosis: July 2021    Treatment course: Surveillance      History of Present Illness:     Very pleasant 61-year-old male with a relevant medical history most significant for diabetes mellitus type 1, hypertension, dyslipidemia, who presents for evaluation and treatment recent onset worsening normocytic anemia noted in July 2021. The patient's anemia trend is detailed below      No other associated thrombocytopenia or leukopenia associated with this isolated normocytic anemia. MCV was 94 on August 4, 2021. Clinically, the patient reports that he is doing well had a negative fecal occult in July 2021. He does report clinical symptoms of fatigue and a recent history of low-grade fevers. Otherwise, creatinine was\": 0.69. And calcium was normal at 8.5. Patient did have a recent infection that required antibiotics but completed in September 2021. In July, CRP and ESR both elevated as was ferritin at 528. Iron saturation was low at 7%. Patient reports that he had a colonoscopy but it was greater than 10 years ago. 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 This required long-term antibiotic therapy that completed only in early September 2021. Family history of malignancy with lung cancer though both of his parents were smokers. Maternal grandmother had bowel malignancy.     Social history notable only for nightly alcohol use with wine at night, reports 2-3 glasses per night. History of tobacco use currently non-smoker    Positive ROS findings include: Fatigue, history of low-grade fevers, history of recent infection. Review of Systems: A complete review of systems was obtained, negative except as described above. Interval History:     Flavio Garcia, who was evaluated through a synchronous (real-time) audio-video encounter, and/or his healthcare decision maker, is aware that it is a billable service, with coverage as determined by his insurance carrier. He provided verbal consent to proceed: Yes, and patient identification was verified. This visit was conducted pursuant to the emergency declaration under the [facility] [place], [address] [place] [address] authority and the [employer] Act. A caregiver was present when appropriate. Ability to conduct physical exam was limited. The patient was located in a state where the provider was credentialed to provide care. --Nancy Valerio MD on 9/28/2021 at 2:38 PM            Patient seen and examined virtually  using audiovisual conference technology. Clinically, patient is without any new clinical symptoms. He reports interval resolution of fatigue. Feels well and reports that he has healing from his previous infection. No other new clinical or constitutional symptoms. No interval hospitalizations.       Past Medical History:   Diagnosis Date    Anemia     Arthritis     COVID-19 vaccine series completed     PFIZER 3-18-21 AND 4-8-21    Diabetes ([facility] [address]     Essential hypertension 12/11/2017    Hypercholesterolemia 12/11/2017    Hypertension     Ileitis 12/11/2017    Long-term use of high-risk medication 12/11/2017    Microalbuminuria 12/11/2017    Type I diabetes mellitus with proliferative retinopathy ([address] 12/11/2017      Past Surgical History:   Procedure Laterality Date    HX LUMBAR LAMINECTOMY      HX ORTHOPAEDIC RIGHT KNEE OPEN SURGERY    HX TONSILLECTOMY        Social History     Tobacco Use    Smoking status: Former Smoker     Packs/day: 1.00     Years: 5.00     Pack years: 5.00     Quit date: 1975     Years since quittin.7    Smokeless tobacco: Never Used   Substance Use Topics    Alcohol use: Yes     Alcohol/week: 3.0 standard drinks     Types: 3 Glasses of wine per week     Comment: 3 glasses of wine a night      Family History   Problem Relation Age of Onset    Cancer Mother         LUNG    Cancer Father         LUNG    No Known Problems Sister     Anesth Problems Neg Hx      Current Outpatient Medications   Medication Sig    lisinopriL (PRINIVIL, ZESTRIL) 20 mg tablet Take  by mouth daily. BID    ferrous sulfate (Iron) 325 mg (65 mg iron) tablet Take  by mouth two (2) times a day.  tamsulosin (Flomax) 0.4 mg capsule Take 0.4 mg by mouth nightly.  insulin lispro (HumaLOG U-100 Insulin) 100 unit/mL injection USE VIA INSULIN PUMP    glucose blood VI test strips (Accu-Chek Elizabet Plus test strp) strip Use to check blood sugars 7 times per day. 7 boxes of 100 per box for 90d supply. Dx code B40.4102 Patient is an insulin dependant diabetic    lancets misc Use to check blood sugars 7 times per day. 7 boxes of 100 per box for 90d supply. Dx code K47.7405 Patient is an insulin dependant diabetic    pravastatin (PRAVACHOL) 40 mg tablet TAKE 1 TABLET DAILY     insulin pump (PATIENT SUPPLIED) Misc by SubCUTAneous route as needed. NOVALOG INSULIN    multivitamin (ONE A DAY) tablet Take 1 Tab by mouth daily.  ascorbic acid (VITAMIN C) 500 mg tablet Take 500 mg by mouth daily. No current facility-administered medications for this visit. Allergies   Allergen Reactions    Oxycodone-Acetaminophen Other (comments)     AGGRESIVE BEHAVIOR  Other reaction(s): Other (see comments)  AGGRESIVE BEHAVIOR            Physical Exam:     There were no vitals taken for this visit.   ECOG PS: 0  General: alert, cooperative, no distress   Mental  status: normal mood, behavior, speech, dress, motor activity, and thought processes, able to follow commands   HENT: NCAT   Neck: no visualized mass   Resp: no respiratory distress   Neuro: no gross deficits   Skin: no discoloration or lesions of concern on visible areas   Psychiatric: normal affect, consistent with stated mood, no evidence of hallucinations           Results:     Lab Results   Component Value Date/Time    WBC 6.4 09/16/2021 11:15 AM    HGB 13.5 09/16/2021 11:15 AM    HCT 41.0 09/16/2021 11:15 AM    PLATELET 303 82/20/7922 11:15 AM    MCV 97.2 09/16/2021 11:15 AM    ABS. NEUTROPHILS 4.4 09/16/2021 11:15 AM    HGB (POC) 12.9 06/05/2018 03:44 PM    HCT (POC) 37.4 06/05/2018 03:44 PM     Lab Results   Component Value Date/Time    Sodium 137 09/16/2021 11:15 AM    Potassium 4.4 09/16/2021 11:15 AM    Chloride 103 09/16/2021 11:15 AM    CO2 29 09/16/2021 11:15 AM    Glucose 30 (LL) 09/16/2021 11:15 AM    BUN 20 09/16/2021 11:15 AM    Creatinine 0.86 09/16/2021 11:15 AM    GFR est AA >60 09/16/2021 11:15 AM    GFR est non-AA >60 09/16/2021 11:15 AM    Calcium 9.5 09/16/2021 11:15 AM    Sodium,  07/30/2021 09:54 AM    Potassium, POC 4.4 07/30/2021 09:54 AM    Chloride,  07/30/2021 09:54 AM    Glucose (POC) 77 07/30/2021 12:25 PM    Glucose,  (H) 07/30/2021 09:54 AM    Creatinine, POC 1.0 07/30/2021 09:54 AM    Calcium, ionized (POC) 1.28 07/30/2021 09:54 AM     Lab Results   Component Value Date/Time    Bilirubin, total 0.6 09/16/2021 11:15 AM    ALT (SGPT) 35 09/16/2021 11:15 AM    Alk. phosphatase 75 09/16/2021 11:15 AM    Protein, total 7.3 09/16/2021 11:15 AM    Protein, total 7.5 09/16/2021 11:15 AM    Albumin 3.9 09/16/2021 11:15 AM    Globulin 3.6 09/16/2021 11:15 AM       Labs reviewed above, finding of hypoglycemia reviewed with patient. He was asymptomatic.   Likely a consequence of his type 1 diabetes        Assessment and Recommendations:       # Normocytic Anemia secondary to bone marrow suppression in the setting of bacteremia    -Work-up shows resolution of prior anemia now with hemoglobin 13.5 g/dL, hematocrit 41%, platelet count 743, white blood cell count normal at 6.4. Work-up shows that patient has a normal iron saturation 39%, ferritin now 2 97, B12 is normal at 134, folic acid is normal at 21.3, copper is normal, haptoglobin is normal at 50, LDH is normal at 198, erythropoietin is normal, reticulocyte count is normal, CMP shows hyperglycemia that was related to his type 1 diabetes, screen for infectious causes was negative with negative HIV screen. SOTERO panel was negative, gammopathy profile was negative, repeat ESR shows resolution of interval inflammation now with ESR of only 4, down from 50 in past. CRP is also improved to undetectable. - Overall clinical work-up shows that the patient likely had some amount of chronic bone marrow suppression as a consequence of infection. After resolution of infection, it appears that his blood counts have returned to normal. Clinically he is intact. - He is currently taking oral iron supplementation which he is tolerating very nicely. Patient asked to continue as tolerated. # Preoperative hematological clearance for orthopedic procedure  -The patient reports that he is planned to have a right ankle surgery in the near future. On review of labs, the patient will be a good candidate to proceed with orthopedic surgery of his right lower extremity. No contraindication to proceeding with surgical procedure from hematological standpoint      - We will plan for follow-up on an as-needed basis. The patient was asked to call with any new or worsening symptoms.       Signed By: Oma Poole MD      Attending Medical Oncologist   [facility]

## 2021-10-14 ENCOUNTER — ANCILLARY PROCEDURE (OUTPATIENT)
Dept: CARDIOLOGY CLINIC | Age: 66
End: 2021-10-14
Payer: MEDICARE

## 2021-10-14 VITALS
DIASTOLIC BLOOD PRESSURE: 68 MMHG | BODY MASS INDEX: 20.18 KG/M2 | SYSTOLIC BLOOD PRESSURE: 112 MMHG | HEIGHT: 72 IN | WEIGHT: 149 LBS

## 2021-10-14 DIAGNOSIS — I33.0 ACUTE BACTERIAL ENDOCARDITIS: ICD-10-CM

## 2021-10-14 PROCEDURE — 93306 TTE W/DOPPLER COMPLETE: CPT | Performed by: INTERNAL MEDICINE

## 2021-10-15 ENCOUNTER — TELEPHONE (OUTPATIENT)
Dept: CARDIOLOGY CLINIC | Age: 66
End: 2021-10-15

## 2021-10-15 LAB
ECHO AO ASC DIAM: 3.66 CM
ECHO AO ROOT DIAM: 3.41 CM
ECHO AV PEAK GRADIENT: 5.26 MMHG
ECHO AV PEAK VELOCITY: 114.63 CM/S
ECHO EST RA PRESSURE: 3 MMHG
ECHO LA AREA 4C: 19.38 CM2
ECHO LA MAJOR AXIS: 3.74 CM
ECHO LA MINOR AXIS: 1.99 CM
ECHO LA VOL 2C: 67.22 ML (ref 18–58)
ECHO LA VOL 4C: 54.16 ML (ref 18–58)
ECHO LA VOL BP: 67.56 ML (ref 18–58)
ECHO LA VOL/BSA BIPLANE: 35.94 ML/M2 (ref 16–28)
ECHO LA VOLUME INDEX A2C: 35.76 ML/M2 (ref 16–28)
ECHO LA VOLUME INDEX A4C: 28.81 ML/M2 (ref 16–28)
ECHO LV E' LATERAL VELOCITY: 16.87 CM/S
ECHO LV INTERNAL DIMENSION DIASTOLIC: 5.36 CM (ref 4.2–5.9)
ECHO LV INTERNAL DIMENSION SYSTOLIC: 3.11 CM
ECHO LV ISOVOLUMETRIC RELAXATION TIME (IVRT): 72.31 MS
ECHO LV IVSD: 0.94 CM (ref 0.6–1)
ECHO LV MASS 2D: 167.8 G (ref 88–224)
ECHO LV MASS INDEX 2D: 89.2 G/M2 (ref 49–115)
ECHO LV POSTERIOR WALL DIASTOLIC: 0.78 CM (ref 0.6–1)
ECHO LVOT PEAK GRADIENT: 3.7 MMHG
ECHO LVOT PEAK VELOCITY: 96.21 CM/S
ECHO MV "A" WAVE DURATION: 129.4 MS
ECHO MV A VELOCITY: 61.36 CM/S
ECHO MV E DECELERATION TIME (DT): 213.42 MS
ECHO MV E VELOCITY: 82.85 CM/S
ECHO MV E/A RATIO: 1.35
ECHO MV E/E' LATERAL: 4.91
ECHO RIGHT VENTRICULAR SYSTOLIC PRESSURE (RVSP): 35.2 MMHG
ECHO RV TAPSE: 3.23 CM (ref 1.5–2)
ECHO TV REGURGITANT MAX VELOCITY: 283.73 CM/S
ECHO TV REGURGITANT PEAK GRADIENT: 32.2 MMHG
LA VOL DISK BP: 61.27 ML (ref 18–58)

## 2021-10-15 PROCEDURE — 93306 TTE W/DOPPLER COMPLETE: CPT | Performed by: INTERNAL MEDICINE

## 2021-10-15 NOTE — PROGRESS NOTES
Echo showed normal pumping heart strength, moderate leakage across mitral valve. Previous Vegetation on the posterior leaflet of the mitral valve  NO LONGER evident.   Continue current care and see dr Linde Rubinstein in 3-4 mos  From sept

## 2021-10-15 NOTE — TELEPHONE ENCOUNTER
Spoke with patient. Verified with two patient identifiers. Advised pt per [facility] NP, Echo showed normal pumping heart strength, moderate leakage across mitral valve. Previous Vegetation on the posterior leaflet of the mitral valve  NO LONGER evident. Continue current care and see dr Gary Lunsford in 3-4 mos  From sept  Patient verbalized understanding.

## 2021-10-15 NOTE — TELEPHONE ENCOUNTER
----- Message from Kristin Shaw NP sent at 10/15/2021  3:54 PM EDT -----  Echo showed normal pumping heart strength, moderate leakage across mitral valve. Previous Vegetation on the posterior leaflet of the mitral valve  NO LONGER evident.   Continue current care and see dr Kermit Mclain in 3-4 mos  From sept

## 2021-10-21 RX ORDER — DICLOFENAC SODIUM 75 MG/1
75 TABLET, DELAYED RELEASE ORAL 2 TIMES DAILY
Qty: 60 TABLET | Refills: 0 | Status: SHIPPED | OUTPATIENT
Start: 2021-10-21 | End: 2021-11-15 | Stop reason: ALTCHOICE

## 2021-10-21 NOTE — TELEPHONE ENCOUNTER
Please send pended Rx to [facility]:  Requested Prescriptions     Pending Prescriptions Disp Refills    diclofenac EC (VOLTAREN) 75 mg EC tablet 60 Tablet 0     Sig: Take 1 Tablet by mouth two (2) times a day.

## 2021-10-21 NOTE — TELEPHONE ENCOUNTER
Patient states he has nerve pain in his arm,back and down his right leg. He states he has seen you for this before and wants to know if you can prescribe diclofenac or Naproxen or would you like to see him? Please advise.      [phone]

## 2021-11-02 ENCOUNTER — TELEPHONE (OUTPATIENT)
Dept: INTERNAL MEDICINE CLINIC | Age: 66
End: 2021-11-02

## 2021-11-02 RX ORDER — NAPROXEN 500 MG/1
500 TABLET ORAL 2 TIMES DAILY WITH MEALS
Qty: 60 TABLET | Refills: 0 | Status: SHIPPED | OUTPATIENT
Start: 2021-11-02 | End: 2021-12-02

## 2021-11-02 NOTE — TELEPHONE ENCOUNTER
Would recommend that the patient stop taking his diclofenac and we can prescribe naproxen 500 mg twice daily #60.   Would recommend Dr. Bonny Kapoor at [facility] orthopedics for a back evaluation

## 2021-11-02 NOTE — TELEPHONE ENCOUNTER
Called spoke to pt. Advised patient per Dr Norma Pal. To stop taking diclofenac. And start taking naproxen 500 mg. Which was sent to pharmacy. And to see  Dr Parish Briggs for back.

## 2021-11-02 NOTE — TELEPHONE ENCOUNTER
Patient states he still has nerve pain in his back,arm and down his leg. He states diclofenac is not working any more. He would like to know if you can prescribe an alternate medication for him. Also he would like to know who you recommend as a back specialist. Please advise.       [phone]

## 2021-11-11 ENCOUNTER — TELEPHONE (OUTPATIENT)
Dept: INTERNAL MEDICINE CLINIC | Age: 66
End: 2021-11-11

## 2021-11-11 NOTE — TELEPHONE ENCOUNTER
Patient states he called Dr Mi Simpson office about his nerve pain in his arm and they told him to call you and ask for a prescription for gabapentin. They have not seen him in a couple of years and are unable to prescribe it for him. He states diclofenac and naproxen are not working Please advise.      [phone]

## 2021-11-15 ENCOUNTER — OFFICE VISIT (OUTPATIENT)
Dept: INTERNAL MEDICINE CLINIC | Age: 66
End: 2021-11-15
Payer: MEDICARE

## 2021-11-15 VITALS
DIASTOLIC BLOOD PRESSURE: 64 MMHG | WEIGHT: 151.8 LBS | SYSTOLIC BLOOD PRESSURE: 120 MMHG | OXYGEN SATURATION: 98 % | HEIGHT: 72 IN | BODY MASS INDEX: 20.56 KG/M2 | RESPIRATION RATE: 20 BRPM | HEART RATE: 72 BPM

## 2021-11-15 DIAGNOSIS — Z23 NEEDS FLU SHOT: Primary | ICD-10-CM

## 2021-11-15 DIAGNOSIS — M54.12 CERVICAL RADICULOPATHY: ICD-10-CM

## 2021-11-15 PROCEDURE — G8754 DIAS BP LESS 90: HCPCS | Performed by: INTERNAL MEDICINE

## 2021-11-15 PROCEDURE — 1101F PT FALLS ASSESS-DOCD LE1/YR: CPT | Performed by: INTERNAL MEDICINE

## 2021-11-15 PROCEDURE — 3017F COLORECTAL CA SCREEN DOC REV: CPT | Performed by: INTERNAL MEDICINE

## 2021-11-15 PROCEDURE — G8420 CALC BMI NORM PARAMETERS: HCPCS | Performed by: INTERNAL MEDICINE

## 2021-11-15 PROCEDURE — G8752 SYS BP LESS 140: HCPCS | Performed by: INTERNAL MEDICINE

## 2021-11-15 PROCEDURE — 99213 OFFICE O/P EST LOW 20 MIN: CPT | Performed by: INTERNAL MEDICINE

## 2021-11-15 PROCEDURE — G0008 ADMIN INFLUENZA VIRUS VAC: HCPCS | Performed by: INTERNAL MEDICINE

## 2021-11-15 PROCEDURE — G8427 DOCREV CUR MEDS BY ELIG CLIN: HCPCS | Performed by: INTERNAL MEDICINE

## 2021-11-15 PROCEDURE — G8432 DEP SCR NOT DOC, RNG: HCPCS | Performed by: INTERNAL MEDICINE

## 2021-11-15 PROCEDURE — 90694 VACC AIIV4 NO PRSRV 0.5ML IM: CPT | Performed by: INTERNAL MEDICINE

## 2021-11-15 PROCEDURE — G8536 NO DOC ELDER MAL SCRN: HCPCS | Performed by: INTERNAL MEDICINE

## 2021-11-15 RX ORDER — GABAPENTIN 100 MG/1
100 CAPSULE ORAL 3 TIMES DAILY
Qty: 90 CAPSULE | Refills: 0 | Status: SHIPPED | OUTPATIENT
Start: 2021-11-15 | End: 2021-11-29 | Stop reason: SDUPTHER

## 2021-11-15 NOTE — PATIENT INSTRUCTIONS
Vaccine Information Statement    Influenza (Flu) Vaccine (Inactivated or Recombinant): What You Need to Know    Many vaccine information statements are available in Syriac and other languages. See www.immunize.org/vis. Hojas de información sobre vacunas están disponibles en español y en muchos otros idiomas. Visite www.immunize.org/vis. 1. Why get vaccinated? Influenza vaccine can prevent influenza (flu). Flu is a contagious disease that spreads around the [facility] every year, usually between October and May. Anyone can get the flu, but it is more dangerous for some people. Infants and young children, people 72 years and older, pregnant people, and people with certain health conditions or a weakened immune system are at greatest risk of flu complications. Pneumonia, bronchitis, sinus infections, and ear infections are examples of flu-related complications. If you have a medical condition, such as heart disease, cancer, or diabetes, flu can make it worse. Flu can cause fever and chills, sore throat, muscle aches, fatigue, cough, headache, and runny or stuffy nose. Some people may have vomiting and diarrhea, though this is more common in children than adults. In an average year, thousands of people in the [facility] die from flu, and many more are hospitalized. Flu vaccine prevents millions of illnesses and flu-related visits to the doctor each year. 2. Influenza vaccines     CDC recommends everyone 6 months and older get vaccinated every flu season. Children 6 months through 6years of age may need 2 doses during a single flu season. Everyone else needs only 1 dose each flu season. It takes about 2 weeks for protection to develop after vaccination. There are many flu viruses, and they are always changing. Each year a new flu vaccine is made to protect against the influenza viruses believed to be likely to cause disease in the upcoming flu season.  Even when the vaccine doesnt exactly match these viruses, it may still provide some protection. Influenza vaccine does not cause flu. Influenza vaccine may be given at the same time as other vaccines. 3. Talk with your health care provider    Tell your vaccination provider if the person getting the vaccine:   Has had an allergic reaction after a previous dose of influenza vaccine, or has any severe, life-threatening allergies    Has ever had Guillain-Barré Syndrome (also called GBS)    In some cases, your health care provider may decide to postpone influenza vaccination until a future visit. Influenza vaccine can be administered at any time during pregnancy. People who are or will be pregnant during influenza season should receive inactivated influenza vaccine. People with minor illnesses, such as a cold, may be vaccinated. People who are moderately or severely ill should usually wait until they recover before getting influenza vaccine. Your health care provider can give you more information. 4. Risks of a vaccine reaction     Soreness, redness, and swelling where the shot is given, fever, muscle aches, and headache can happen after influenza vaccination.  There may be a very small increased risk of Guillain-Barré Syndrome (GBS) after inactivated influenza vaccine (the flu shot). Neeta Mock children who get the flu shot along with pneumococcal vaccine (PCV13) and/or DTaP vaccine at the same time might be slightly more likely to have a seizure caused by fever. Tell your health care provider if a child who is getting flu vaccine has ever had a seizure. People sometimes faint after medical procedures, including vaccination. Tell your provider if you feel dizzy or have vision changes or ringing in the ears. As with any medicine, there is a very remote chance of a vaccine causing a severe allergic reaction, other serious injury, or death. 5. What if there is a serious problem?     An allergic reaction could occur after the vaccinated person leaves the clinic. If you see signs of a severe allergic reaction (hives, swelling of the face and throat, difficulty breathing, a fast heartbeat, dizziness, or weakness), call 9-1-1 and get the person to the nearest hospital.    For other signs that concern you, call your health care provider. Adverse reactions should be reported to the Vaccine Adverse Event Reporting System (VAERS). Your health care provider will usually file this report, or you can do it yourself. Visit the VAERS website at www.vaers. [facility].gov or call 4-187.993.1860. VAERS is only for reporting reactions, and VAERS staff members do not give medical advice. 6. The National Vaccine Injury Compensation Program    The [facility] Vaccine Injury Compensation Program (VICP) is a federal program that was created to compensate people who may have been injured by certain vaccines. Claims regarding alleged injury or death due to vaccination have a time limit for filing, which may be as short as two years. Visit the VICP website at www.[facility].gov/vaccinecompensation or call 8-143.337.7708 to learn about the program and about filing a claim. 7. How can I learn more?  Ask your health care provider.  Call your local or state health department.  Visit the website of the Food and Drug Administration (FDA) for vaccine package inserts and additional information at www.fda.gov/vaccines-blood-biologics/vaccines.  Contact the Centers for Disease Control and Prevention (CDC):  - Call 9-598.762.7775 (1-800-CDC-INFO) or  - Visit CDCs influenza website at www.cdc.gov/flu. Vaccine Information Statement   Inactivated Influenza Vaccine   8/6/2021  42 ANDREA Maurer [id number]   Department of Health and Human Services  Centers for Disease Control and Prevention    Office Use Only

## 2021-11-15 NOTE — PROGRESS NOTES
Estefany Huertas is a 77 y.o. male presenting for Arm Pain and Leg Pain  . 1. Have you been to the ER, urgent care clinic since your last visit? Hospitalized since your last visit? No    2. Have you seen or consulted any other health care providers outside of the [address] [place] [address] since your last visit? Include any pap smears or colon screening. No    Fall Risk Assessment, last 12 mths 9/22/2021   Able to walk? Yes   Fall in past 12 months? 0   Do you feel unsteady? 0   Are you worried about falling 0         Abuse Screening Questionnaire 9/22/2021   Do you ever feel afraid of your partner? N   Are you in a relationship with someone who physically or mentally threatens you? N   Is it safe for you to go home? Y       3 most recent PHQ Screens 9/22/2021   Little interest or pleasure in doing things Not at all   Feeling down, depressed, irritable, or hopeless Not at all   Total Score PHQ 2 0       There are no discontinued medications. After obtaining written consent and per orders of Dr. Lg Akhtar, injection of Fluad given by [address]. Order and injection/medication verified by Dr Lg Akhtar. Patient tolerated procedure well. VIS was given to them. No reactions noted.

## 2021-11-15 NOTE — PROGRESS NOTES
Subjective:     Nataliia De Souza presents to the office today with complaints of 2 months worth of right arm greater than right leg pain. Patient notes that the pain originates from his neck and has been unresponsive to naproxen and diclofenac. The patient does have a history of a lower lumbar herniated disc in 2015 which was operated on by Dr. Kristyn Estes. He may have been on gabapentin at that time. Patient notes that the pain has been worsening and is especially worse at night when he is trying to sleep. He notes no difficulty with walking or lower extremity weakness. He does have an appointment with Dr. Kristyn Estes on December 28th. He called them for something to help his pain until he could see them but this was declined and he was referred to our office to be placed on gabapentin. Past Medical History:   Diagnosis Date    Anemia     Arthritis     COVID-19 vaccine series completed     PFIZER 3-18-21 AND 4-8-21    Diabetes ([facility] [address]     Essential hypertension 12/11/2017    Hypercholesterolemia 12/11/2017    Hypertension     Ileitis 12/11/2017    Long-term use of high-risk medication 12/11/2017    Microalbuminuria 12/11/2017    Type I diabetes mellitus with proliferative retinopathy ([facility] [address] 12/11/2017     Past Surgical History:   Procedure Laterality Date    HX LUMBAR LAMINECTOMY      HX ORTHOPAEDIC      RIGHT KNEE OPEN SURGERY    HX TONSILLECTOMY       Allergies   Allergen Reactions    Oxycodone-Acetaminophen Other (comments)     AGGRESIVE BEHAVIOR  Other reaction(s): Other (see comments)  AGGRESIVE BEHAVIOR     Current Outpatient Medications   Medication Sig Dispense Refill    gabapentin (NEURONTIN) 100 mg capsule Take 1 Capsule by mouth three (3) times daily. Max Daily Amount: 300 mg. 90 Capsule 0    naproxen (NAPROSYN) 500 mg tablet Take 1 Tablet by mouth two (2) times daily (with meals) for 30 days. 60 Tablet 0    lisinopriL (PRINIVIL, ZESTRIL) 20 mg tablet Take  by mouth daily.  BID      ferrous sulfate (Iron) 325 mg (65 mg iron) tablet Take  by mouth two (2) times a day.  tamsulosin (Flomax) 0.4 mg capsule Take 0.4 mg by mouth nightly.  insulin lispro (HumaLOG U-100 Insulin) 100 unit/mL injection USE VIA INSULIN PUMP 60 mL 3    glucose blood VI test strips (Accu-Chek Elizabet Plus test strp) strip Use to check blood sugars 7 times per day. 7 boxes of 100 per box for 90d supply. Dx code K77.4332 Patient is an insulin dependant diabetic 700 Strip 3    lancets misc Use to check blood sugars 7 times per day. 7 boxes of 100 per box for 90d supply. Dx code W81.0784 Patient is an insulin dependant diabetic 7 Package 3    pravastatin (PRAVACHOL) 40 mg tablet TAKE 1 TABLET DAILY 90 Tab 3     insulin pump (PATIENT SUPPLIED) Misc by SubCUTAneous route as needed. NOVALOG INSULIN      multivitamin (ONE A DAY) tablet Take 1 Tab by mouth daily.  ascorbic acid (VITAMIN C) 500 mg tablet Take 500 mg by mouth daily. Social History     Socioeconomic History    Marital status: SINGLE   Tobacco Use    Smoking status: Former Smoker     Packs/day: 1.00     Years: 5.00     Pack years: 5.00     Quit date: 1975     Years since quittin.8    Smokeless tobacco: Never Used   Vaping Use    Vaping Use: Never used   Substance and Sexual Activity    Alcohol use:  Yes     Alcohol/week: 3.0 standard drinks     Types: 3 Glasses of wine per week     Comment: 3 glasses of wine a night    Drug use: No     Family History   Problem Relation Age of Onset    Cancer Mother         LUNG    Cancer Father         LUNG    No Known Problems Sister     Anesth Problems Neg Hx        Review of Systems:  GEN: no weight loss, weight gain, fatigue or night sweats  CV: no PND, orthopnea, or palpitations  Resp: no dyspnea on exertion, no cough  Abd: no nausea, vomiting or diarrhea  EXT: Positive for radicular pain right arm greater than right leg  Endocrine: no hair loss, excessive thirst or polyuria  Neurological ROS: no TIA or stroke symptoms  ROS otherwise negative      Objective:     Visit Vitals  /64 (BP 1 Location: Right upper arm, BP Patient Position: Sitting, BP Cuff Size: Adult)   Pulse 72   Resp 20   Ht 6' (1.829 m)   Wt 151 lb 12.8 oz (68.9 kg)   SpO2 98%   BMI 20.59 kg/m²     Body mass index is 20.59 kg/m². General:   alert, cooperative and no distress   Eyes: conjunctivae/sclerae clear. PERRL, EOM's intact   Mouth:  No oral lesions, no pharyngeal erythema, no exudates   Neck: Trachea midline, no thyromegaly, no bruits   Heart: S1 and S2 normal,no murmurs noted    Lungs: Clear to auscultation bilaterally, no increased work of breathing   Abdomen: Soft, nontender. Normal bowel sounds   Extremities: No edema or cyanosis   Neuro: ..alert, oriented x3,speech normal in context and clarity, cranial nerves II-XII intact,motor strength: full proximally and distally,gait: normal  reflexes: full and symmetric     Physical exam otherwise negative         Assessment/Plan:     Diagnoses and all orders for this visit:    Needs flu shot  -     FLU (FLUAD QUAD INFLUENZA VACCINE,QUAD,ADJUVANTED)    Cervical radiculopathy  -     gabapentin (NEURONTIN) 100 mg capsule; Take 1 Capsule by mouth three (3) times daily. Max Daily Amount: 300 mg., Normal, Disp-90 Capsule, R-0        Other instructions:   Patient's medications were reviewed and reconciled. Patient has a cervical radiculopathy and does have spine specialty evaluation scheduled for 1 month. We will place on gabapentin 100 mg 3 times daily for 1 month prior to increasing dosage. Controlled substance agreement was signed today. PDMP is reviewed and there are no controlled substances that have been prescribed in the last 2 years. An influenza vaccination is administered    Follow-up with Dr. Karla Palma as scheduled    Follow-up here as previously appointed    Follow-up and Dispositions    · Return for As previously scheduled.          Gerardo Gunn MD    Please note that this dictation was completed with [employer], the computer voice recognition software. Quite often unanticipated grammatical, syntax, homophones, and other interpretive errors are inadvertently transcribed by the computer software. Please disregard these errors. Please excuse any errors that have escaped final proofreading.

## 2021-11-15 NOTE — LETTER
CONTROLLED SUBSTANCE MEDICATION AGREEMENT     Patient Name: Minoo Nathan  Patient YOB: 1955     I understand, that controlled substance medications may be used to help better manage my symptoms and to improve my ability to function at home, work and in social settings. However, I also understand that these medications do have risks, which have been discussed with me, including possible development of physical or psychological dependence. I understand that successful treatment requires mutual trust and honesty between me and my provider. I understand and agree that following this Medication Agreement is necessary in continuing my provider-patient relationship and the success of my treatment plan. Explanation from my Provider: Benefits and Goals of Controlled Substance Medications: There are two potential goals for your treatment: (1) decreased pain and suffering (2) improved daily life functions. There are many possible treatments for your chronic condition(s). Alternatives such as physical therapy, yoga, massage, home daily exercise, meditation, relaxation techniques, injections, chiropractic manipulations, surgery, cognitive therapy, hypnosis and many medications that are not habit-forming may be used. Use of controlled substance medications may be helpful, but they are unlikely to resolve all symptoms or restore all function. Explanation from my Provider: Risks of Controlled Substance Medications:  Opioid pain medications: These medications can lead to problems such as addiction/dependence, sedation, lightheadedness/dizziness, memory issues, falls, constipation, nausea, or vomiting. They may also impair the ability to drive or operate machinery. Additionally, these medications may lower testosterone levels, leading to loss of bone strength, stamina and sex drive.   They may cause problems with breathing, sleep apnea and reduced coughing, which is especially dangerous for patients with lung disease. Overdose or dangerous interactions with alcohol and other medications may occur, leading to death. Hyperalgesia may develop, which means patients receiving opioids for the treatment of pain may become more sensitive to certain painful stimuli, and in some cases, experience pain from ordinarily non-painful stimuli. Women between the ages of 14-53 who could become pregnant should carefully weigh the risks and benefits of opioids with their physicians, as these medications increase the risk of pregnancy complications, including miscarriage,  delivery and stillbirth. It is also possible for babies to be born addicted to opioids. Opioid dependence withdrawal symptoms may include; feelings of uneasiness, increased pain, irritability, belly pain, diarrhea, sweats and goose-flesh. Benzodiazepines and non-benzodiazepine sleep medications: These medications can lead to problems such as addiction/dependence, sedation, fatigue, lightheadedness, dizziness, incoordination, falls, depression, hallucinations, and impaired judgment, memory and concentration. The ability to drive and operate machinery may also be affected. Abnormal sleep-related behaviors have been reported, including sleepwalking, driving, making telephone calls, eating, or having sex while not fully awake. These medications can suppress breathing and worsen sleep apnea, particularly when combined with alcohol or other sedating medications, potentially leading to death. Dependence withdrawal symptoms may include tremors, anxiety, hallucinations and seizures. Initials:_______  Stimulants:  Common adverse effects include addiction/dependence, increased blood  pressure and heart rate, decreased appetite, nausea, involuntary weight loss, insomnia,  irritability, and headaches.   These risks may increase when these medications are combined with other stimulants, such as caffeine pills or energy drinks, certain weight loss supplements and oral decongestants. Dependence withdrawal symptoms may include depressed mood, loss of interest, suicidal thoughts, anxiety, fatigue, appetite changes and agitation. Testosterone replacement therapy:  Potential side effects include increased risk of stroke and heart attack, blood clots, increased blood pressure, increased cholesterol, enlarged prostate, sleep apnea, irritability/aggression and other mood disorders, and decreased fertility. I agree and understand that I and my prescriber have the following rights and responsibilities regarding my treatment plan:     1. MY RIGHTS:  To be informed of my treatment and medication plan. To be an active participant in my health and wellbeing. 2. MY RESPONSIBILITY AND UNDERSTANDING FOR USE OF MEDICATIONS   I will take medications at the dose and frequency as directed. For my safety, I will not increase or change how I take my medications without the recommendation of my healthcare provider.  I will actively participate in any program recommended by my provider which may improve function, including social, physical, psychological programs.  I will not take my medications with alcohol or other drugs not prescribed to me. I understand that drinking alcohol with my medications increases the chances of side effects, including reduced breathing rate and could lead to personal injury when operating machinery.  I understand that if I have a history of substance use disorders, including alcohol or other illicit drugs, that I may be at increased risk of addiction to my medications.  I agree to notify my provider immediately if I should become pregnant so that my treatment plan can be adjusted.    I agree and understand that I shall only receive controlled substance medications from the prescriber that signed this agreement unless there is written agreement among other prescribers of controlled substances outlining the responsibility of the medications being prescribed.  I understand that the if the controlled medication is not helping to achieve goals, the dosage may be tapered and no longer prescribed. 3. MY RESPONSIBILITY FOR COMMUNICATION / PRESCRIPTION RENEWALS   I agree that all controlled substance medications that I take will be prescribed only by my provider. If another healthcare provider prescribes me medication in an emergency, I will notify my provider within seventy-two (72) hours.  I will arrange for refills at the prescribed interval ONLY during regular office hours. I will not ask for refills earlier than agreed, after-hours, on holidays or weekends. Refills may take up to 72 hours for processing and prescriptions to reach the pharmacy.  I will inform my other health care providers that I am taking these medications and of the existence of this [id number]. In the event of an emergency, I will provide the same information to the emergency department prescribers. Initials:_______  Jessica I will keep my provider updated on the pharmacy I am using for controlled medication prescription filling. 4. MY RESPONSIBILITY FOR PROTECTING MEDICATIONS   I will protect my prescriptions and medications. I understand that lost or misplaced prescriptions will not be replaced.  I will keep medications only for my own use and will not share them with others. I will keep all medications away from children.  I agree that if my medications are adjusted or discontinued, I will properly dispose of any remaining medications. I understand that I will be required to dispose of any remaining controlled medications as, directed by my prescriber, prior to being provided with any prescriptions for other controlled medications. Medication drop box locations can be found at: [email or web address]    5.  MY RESPONSIBILITY WITH ILLEGAL DRUGS    I will not use illegal or street drugs or another person's prescription medications not prescribed to me.  If there are identified addiction type symptoms, then referral to a program may be provided by my provider and I agree to follow through with this recommendation. 6. MY RESPONSIBILITY FOR COOPERATION WITH INVESTIGATIONS   I understand that my provider will comply with any applicable law and may discuss my use and/or possible misuse/abuse of controlled substances and alcohol, as appropriate, with any health care provider involved in my care, pharmacist, or legal authority.  I authorize my provider and pharmacy to cooperate fully with law enforcement agencies (as permitted by law) in the investigation of any possible misuse, sale, or other diversion of my controlled substances.  I agree to waive any applicable privilege or right of privacy or confidentiality with respect to these authorizations. 7. PROVIDERS RIGHT TO MONITOR FOR SAFETY: PRESCRIPTION MONITORING / DRUG TESTING   I consent to drug/toxicology screening and will submit to a drug screen upon my providers request to assure I am only taking the prescribed drugs for my safety monitoring. I understand that a drug screen is a laboratory test in which a sample of my urine, blood or saliva is checked to see what drugs I have been taking. This may entail an observed urine specimen, which means that a nurse or other health care provider may watch me provide urine, and I will cooperate if I am asked to provide an observed specimen.  I understand that my provider will check a copy of my State Prescription Monitoring Program () Report in order to safely prescribe medications.  Pill Counts: I consent to pill counts when requested. I may be asked to bring all my prescribed controlled substance medications, in their original bottles, to all of my scheduled appointments. In addition, my provider may ask me to come to the practice at any time for a random pill count. Initials:_______    8. TERMINATION OF THIS AGREEMENT  For my safety, my prescriber has the right to stop prescribing controlled substance medications and may end this agreement.  Conditions that may result in termination of this agreement:  a. I do not show any improvement in pain, or my activity has not improved. b. I develop rapid tolerance or loss of improvement, as described in my treatment plan.  c. I develop significant side effects from the medication. d. My behavior is not consistent with the responsibilities outlined above, thereby causing safety concerns to continue prescribing controlled substance medications. e. I fail to follow the terms of this agreement. f. Other:____________________________       UNDERSTANDING THIS MEDICATION AGREEMENT:    I have read the above and have had all my questions answered. For chronic disease management, I know that my symptoms can be managed with many types of treatments. A chronic medication trial may be part of my treatment, but I must be an active participant in my care. Medication therapy is only one part of my symptom management plan. In some cases, there may be limited scientific evidence to support the chronic use of certain medications to improve symptoms and daily function. Furthermore, in certain circumstances, there may be scientific information that suggests that the use of chronic controlled substances may worsen my symptoms and increase my risk of unintentional death directly related to this medication therapy. I know that if my provider feels my risk from controlled medications is greater than my benefit, I will have my controlled substance medication(s) compassionately lowered or removed altogether. I further agree to allow this office to contact my HIPAA contact if there are concerns about my safety and use of the controlled medications.    I have agreed to use the prescribed controlled substance medications to me as instructed by my provider and as stated in this Medication Agreement. My initial on each page and my signature below shows that I have read each page and I have had the opportunity to ask questions with answers provided by my provider.     Patient Name (Printed): _____________________________________  Patient Signature:  ______________________   Date: _____________    Prescriber Name (Printed): ___________________________________  Prescriber Signature: _____________________  Date: _____________

## 2021-11-18 ENCOUNTER — TELEPHONE (OUTPATIENT)
Dept: INTERNAL MEDICINE CLINIC | Age: 66
End: 2021-11-18

## 2021-11-18 NOTE — TELEPHONE ENCOUNTER
Patient wants to know if you can increase the dosage of his gabapentin. He states the pain gets worse at night. Should he take 2 at night? Please advise.     [phone]

## 2021-11-29 ENCOUNTER — TELEPHONE (OUTPATIENT)
Dept: INTERNAL MEDICINE CLINIC | Age: 66
End: 2021-11-29

## 2021-11-29 DIAGNOSIS — M54.12 CERVICAL RADICULOPATHY: ICD-10-CM

## 2021-11-29 RX ORDER — GABAPENTIN 300 MG/1
300 CAPSULE ORAL 3 TIMES DAILY
Qty: 90 CAPSULE | Refills: 1 | Status: SHIPPED | OUTPATIENT
Start: 2021-11-29 | End: 2021-12-13 | Stop reason: SDUPTHER

## 2021-11-29 NOTE — TELEPHONE ENCOUNTER
Patient states he still has nerve pain at night and would like a stronger prescription for gabapentin. Please advise.      [phone]

## 2021-11-29 NOTE — TELEPHONE ENCOUNTER
Patient advised- please send pended Rx to [facility]:  Requested Prescriptions     Pending Prescriptions Disp Refills    gabapentin (NEURONTIN) 300 mg capsule 90 Capsule 1     Sig: Take 1 Capsule by mouth three (3) times daily. Max Daily Amount: 900 mg.

## 2021-12-13 ENCOUNTER — TELEPHONE (OUTPATIENT)
Dept: INTERNAL MEDICINE CLINIC | Age: 66
End: 2021-12-13

## 2021-12-13 DIAGNOSIS — M54.12 CERVICAL RADICULOPATHY: ICD-10-CM

## 2021-12-13 RX ORDER — GABAPENTIN 300 MG/1
600 CAPSULE ORAL 3 TIMES DAILY
Qty: 180 CAPSULE | Refills: 0 | Status: SHIPPED | OUTPATIENT
Start: 2021-12-13 | End: 2021-12-23 | Stop reason: SDUPTHER

## 2021-12-13 NOTE — TELEPHONE ENCOUNTER
Patient states he still has nerve pain in his arm and down his leg. He is taking gabapentin 300 mg and states he takes an extra 100 mg a night before bed. He states he wakes up around 3 am with severe pain. Can you prescribe a stronger dose or an alternative to this. He states it mostly hurts when he lies down. Please advise.      [phone]

## 2021-12-13 NOTE — TELEPHONE ENCOUNTER
Patient advised of recommendation and given contact information to [facility]. Please send pended Rx to pharmacy:  Requested Prescriptions     Pending Prescriptions Disp Refills    gabapentin (NEURONTIN) 300 mg capsule 180 Capsule 0     Sig: Take 2 Capsules by mouth three (3) times daily. Max Daily Amount: 1,800 mg.

## 2021-12-15 ENCOUNTER — TELEPHONE (OUTPATIENT)
Dept: INTERNAL MEDICINE CLINIC | Age: 66
End: 2021-12-15

## 2021-12-15 NOTE — TELEPHONE ENCOUNTER
Patient states his current dose of gabapentin is 300 mg 3 times daily. He states he has brain fog and it bothers him during the day but he is okay with it during the night because he is sleeping. What do you recommend? Also he will need a need prescription called in. Please advise.      [phone]

## 2021-12-23 DIAGNOSIS — M54.12 CERVICAL RADICULOPATHY: ICD-10-CM

## 2021-12-23 RX ORDER — GABAPENTIN 300 MG/1
600 CAPSULE ORAL 3 TIMES DAILY
Qty: 180 CAPSULE | Refills: 0 | Status: SHIPPED | OUTPATIENT
Start: 2021-12-23 | End: 2022-02-25

## 2021-12-23 NOTE — TELEPHONE ENCOUNTER
Requested Prescriptions     Pending Prescriptions Disp Refills    gabapentin (NEURONTIN) 300 mg capsule 180 Capsule 2     Sig: Take 2 Capsules by mouth three (3) times daily. Max Daily Amount: 1,800 mg. Last Refill: 12/13/21  Next Appointment:5/16/22    Transmission failed to Pharmacy. Please refill.

## 2021-12-28 ENCOUNTER — OFFICE VISIT (OUTPATIENT)
Dept: ORTHOPEDIC SURGERY | Age: 66
End: 2021-12-28
Payer: MEDICARE

## 2021-12-28 VITALS — HEIGHT: 72 IN | BODY MASS INDEX: 20.99 KG/M2 | WEIGHT: 155 LBS

## 2021-12-28 DIAGNOSIS — M50.90 CERVICAL DISC DISEASE: ICD-10-CM

## 2021-12-28 DIAGNOSIS — M54.50 LOW BACK PAIN, UNSPECIFIED BACK PAIN LATERALITY, UNSPECIFIED CHRONICITY, UNSPECIFIED WHETHER SCIATICA PRESENT: ICD-10-CM

## 2021-12-28 DIAGNOSIS — M54.2 NECK PAIN: Primary | ICD-10-CM

## 2021-12-28 PROCEDURE — G8536 NO DOC ELDER MAL SCRN: HCPCS | Performed by: ORTHOPAEDIC SURGERY

## 2021-12-28 PROCEDURE — G8427 DOCREV CUR MEDS BY ELIG CLIN: HCPCS | Performed by: ORTHOPAEDIC SURGERY

## 2021-12-28 PROCEDURE — 1101F PT FALLS ASSESS-DOCD LE1/YR: CPT | Performed by: ORTHOPAEDIC SURGERY

## 2021-12-28 PROCEDURE — 3017F COLORECTAL CA SCREEN DOC REV: CPT | Performed by: ORTHOPAEDIC SURGERY

## 2021-12-28 PROCEDURE — G8432 DEP SCR NOT DOC, RNG: HCPCS | Performed by: ORTHOPAEDIC SURGERY

## 2021-12-28 PROCEDURE — G8420 CALC BMI NORM PARAMETERS: HCPCS | Performed by: ORTHOPAEDIC SURGERY

## 2021-12-28 PROCEDURE — G8756 NO BP MEASURE DOC: HCPCS | Performed by: ORTHOPAEDIC SURGERY

## 2021-12-28 PROCEDURE — 99214 OFFICE O/P EST MOD 30 MIN: CPT | Performed by: ORTHOPAEDIC SURGERY

## 2021-12-28 NOTE — PROGRESS NOTES
Edel Todd (: 1955) is a 77 y.o. male, patient, here for evaluation of the following chief complaint(s):  Neck Pain (right arm pain and numbness) and Back Pain (right leg pain and numbness)       ASSESSMENT/PLAN:    Below is the assessment and plan developed based on review of pertinent history, physical exam, labs, studies, and medications. At this point he has mainly a right-sided C7 radiculopathy. It has not improved despite conservative treatment. His symptoms are controllable with Neurontin but he still having a lot of pain. He has severe spondylosis C5-6 and C6-7. I would like to get an MRI of his cervical spine as he was candidate for injection therapy. We will recheck with him after the MRI    1. Neck pain  -     XR SPINE LUMB MIN 4 V; Future  -     XR SPINE CERV 4 OR 5 V; Future  2. Low back pain, unspecified back pain laterality, unspecified chronicity, unspecified whether sciatica present  -     XR SPINE LUMB MIN 4 V; Future  3. Cervical disc disease      No follow-ups on file. SUBJECTIVE/OBJECTIVE:  Edel Todd (: 1955) is a 77 y.o. male. No flowsheet data found. He comes in today for follow-up of ongoing neck pain and right arm pain. There is numbness and tingling in the right hand as well as pain. This developed in the last 6 months. He has had no trauma. He denies any bowel bladder difficulties. He does note some weakness. The pain is worsening despite oral Neurontin that has been started by his primary care physician. He also has some mechanical lower back pain and occasionally some right thigh discomfort but that is not his main complaint today. Imaging:          XR Results (most recent):  Results from Appointment encounter on 21    XR SPINE CERV 4 OR 5 V    Narrative  AP and lateral flexion-extension of the cervical spine demonstrates moderate spondylosis C5-6 and C6-7. No instability is noted. Degenerative narrowing is noted.   No fractures or lytic lesions. MRI Results (most recent):  Results from [place] encounter on 12/30/14    MRI LUMB SPINE W WO CONT    Narrative  **Final Report**      ICD Codes / Adm. Diagnosis: 722.52  724.2 / Degeneration of lumbar or lumb  Lumbago  Examination:  MR Wing Murillo CON  - [id number] - Dec 30 2014  5:04PM  Accession No:  [id number]  Reason:  SCIATICA      REPORT:  MRI OF THE LUMBAR SPINE WITH AND WITHOUT CONTRAST: 12/30/2014 5:04 PM    INDICATION:  SCIATICA    TECHNIQUE: Multiplanar and multisequence MRI was obtained of the lumbar  spine before and after the administration of 7.5 cc IV contrast (Gadavist). COMPARISON: None. FINDINGS:  Lumbar alignment is normal. There is mild fatty marrow replacement, and  endplate degenerative changes at L4-L5. Vertebral body heights are  preserved. The lumbar cord is normal in caliber and signal intensity. The  conus medullaris terminates at the T12-L1 level. The paraspinal soft tissues  are normal. No abnormal enhancement. T12-L1: No disc herniation, significant spinal canal stenosis, or neural  foraminal narrowing. L1-L2: Minimal disc bulge and facet osteoarthritis do not cause significant  canal or neuroforaminal stenosis. L2-L3: Facet osteoarthritis does not cause significant canal or neural  foraminal stenosis. L3-L4: No disc herniation, significant spinal canal stenosis, or neural  foraminal narrowing. L4-L5: Moderate disc bulge and facet osteoarthritis cause mild canal  stenosis and right neuroforaminal stenosis. L5-S1: Left lateral disc bulge causes mild canal and left lateral recess  stenosis. No neuroforaminal stenosis. Impression  : Lower lumbar degenerative disc disease as described above.           Signing/Reading Doctor: Ingrid Rivera ([id number])  Approved: Ingrid Rivera ([id number])  Dec 31 2014 10:42AM         Allergies   Allergen Reactions    Oxycodone-Acetaminophen Other (comments)     AGGRESIVE BEHAVIOR  Other reaction(s): Other (see comments)  AGGRESIVE BEHAVIOR       Current Outpatient Medications   Medication Sig    gabapentin (NEURONTIN) 300 mg capsule Take 2 Capsules by mouth three (3) times daily. Max Daily Amount: 1,800 mg.    lisinopriL (PRINIVIL, ZESTRIL) 20 mg tablet Take  by mouth daily. BID    ferrous sulfate (Iron) 325 mg (65 mg iron) tablet Take  by mouth two (2) times a day.  tamsulosin (Flomax) 0.4 mg capsule Take 0.4 mg by mouth nightly.  insulin lispro (HumaLOG U-100 Insulin) 100 unit/mL injection USE VIA INSULIN PUMP    glucose blood VI test strips (Accu-Chek Elizabet Plus test strp) strip Use to check blood sugars 7 times per day. 7 boxes of 100 per box for 90d supply. Dx code P88.2188 Patient is an insulin dependant diabetic    lancets misc Use to check blood sugars 7 times per day. 7 boxes of 100 per box for 90d supply. Dx code N99.9543 Patient is an insulin dependant diabetic    pravastatin (PRAVACHOL) 40 mg tablet TAKE 1 TABLET DAILY     insulin pump (PATIENT SUPPLIED) Misc by SubCUTAneous route as needed. NOVALOG INSULIN    multivitamin (ONE A DAY) tablet Take 1 Tab by mouth daily.  ascorbic acid (VITAMIN C) 500 mg tablet Take 500 mg by mouth daily. No current facility-administered medications for this visit.        Past Medical History:   Diagnosis Date    Anemia     Arthritis     COVID-19 vaccine series completed     PFIZER 3-18-21 AND 4-8-21    Diabetes ([facility] [address]     Essential hypertension 12/11/2017    Hypercholesterolemia 12/11/2017    Hypertension     Ileitis 12/11/2017    Long-term use of high-risk medication 12/11/2017    Microalbuminuria 12/11/2017    Type I diabetes mellitus with proliferative retinopathy ([address] 12/11/2017        Past Surgical History:   Procedure Laterality Date    HX LUMBAR LAMINECTOMY      HX ORTHOPAEDIC      RIGHT KNEE OPEN SURGERY    HX TONSILLECTOMY         Family History   Problem Relation Age of Onset    Cancer Mother         LUNG  Cancer Father         LUNG    No Known Problems Sister     Anesth Problems Neg Hx         Social History     Tobacco Use    Smoking status: Former Smoker     Packs/day: 1.00     Years: 5.00     Pack years: 5.00     Quit date: 1975     Years since quittin.0    Smokeless tobacco: Never Used   Vaping Use    Vaping Use: Never used   Substance Use Topics    Alcohol use: Yes     Alcohol/week: 3.0 standard drinks     Types: 3 Glasses of wine per week     Comment: 3 glasses of wine a night    Drug use: No        Review of Systems   Musculoskeletal: Positive for myalgias, neck pain and neck stiffness. All other systems reviewed and are negative. Vitals:  Ht 6' (1.829 m)   Wt 155 lb (70.3 kg)   BMI 21.02 kg/m²    Body mass index is 21.02 kg/m². Ortho Exam       Alert and [place]  x 3    Normal gait and station; normal posture    No assistive devices today. Lumbar spine:  Examination of the lumbar spine demonstrates no tenderness on palpation, no pain, no swelling or edema, with normal lumbar range of motion. Thoracic spine: Examination of the thoracic spine demonstrates no tenderness on palpation, no pain, no swelling or edema. Normal sensation and range of motion. Cervical spine:     Examination of the cervical spine demonstrates on inspection: No abnormal cutaneous markings. No shoulder asymmetry. No masses.     On palpation: Tenderness on palpation of the right superior shoulder and posterior scapular region    Range of motion: Slow range of motion is noted with some decreased forward flexion    Motor examination:  Right deltoid 5/5,  left deltoid 5/5, right bicep 5/5, left bicep 5/5, right wrist extensor 5/5/, left wrist extensor 5/5, right triceps 5/5, left triceps 5/5, right intrinsics 5/5, left intrinsics    Sensory examination: Reveals decreased sensation along C7 dermatome    Reflexes: Left bicep 2/2, left bicep 2/2, right triceps 2/2, left triceps 2/2    Functional testing: Spurling's exam is positive on the right; upper limb tension test is positive on the right    Watson's signs negative bilaterally. An electronic signature was used to authenticate this note.   -- Sravna Real MD

## 2021-12-28 NOTE — PATIENT INSTRUCTIONS
Neck: Exercises  Introduction  Here are some examples of exercises for you to try. The exercises may be suggested for a condition or for rehabilitation. Start each exercise slowly. Ease off the exercises if you start to have pain. You will be told when to start these exercises and which ones will work best for you. How to do the exercises  Neck stretch    1. This stretch works best if you keep your shoulder down as you lean away from it. To help you remember to do this, start by relaxing your shoulders and lightly holding on to your thighs or your chair. 2. Tilt your head toward your shoulder and hold for 15 to 30 seconds. Let the weight of your head stretch your muscles. 3. If you would like a little added stretch, use your hand to gently and steadily pull your head toward your shoulder. For example, keeping your right shoulder down, lean your head to the left. 4. Repeat 2 to 4 times toward each shoulder. Diagonal neck stretch    1. Turn your head slightly toward the direction you will be stretching, and tilt your head diagonally toward your chest and hold for 15 to 30 seconds. 2. If you would like a little added stretch, use your hand to gently and steadily pull your head forward on the diagonal.  3. Repeat 2 to 4 times toward each side. Dorsal glide stretch    The dorsal glide stretches the back of the neck. If you feel pain, do not glide so far back. Some people find this exercise easier to do while lying on their backs with an ice pack on the neck. 1. Sit or stand tall and look straight ahead. 2. Slowly tuck your chin as you glide your head backward over your body  3. Hold for a count of 6, and then relax for up to 10 seconds. 4. Repeat 8 to 12 times. Chest and shoulder stretch    1. Sit or stand tall and glide your head backward as in the dorsal glide stretch. 2. Raise both arms so that your hands are next to your ears.   3. Take a deep breath, and as you breathe out, lower your elbows down and behind your back. You will feel your shoulder blades slide down and together, and at the same time you will feel a stretch across your chest and the front of your shoulders. 4. Hold for about 6 seconds, and then relax for up to 10 seconds. 5. Repeat 8 to 12 times. Strengthening: Hands on head    1. Move your head backward, forward, and side to side against gentle pressure from your hands, holding each position for about 6 seconds. 2. Repeat 8 to 12 times. Follow-up care is a key part of your treatment and safety. Be sure to make and go to all appointments, and call your doctor if you are having problems. It's also a good idea to know your test results and keep a list of the medicines you take. Where can you learn more? Go to http://www.roe.com/  Enter P975 in the search box to learn more about \"Neck: Exercises. \"  Current as of: July 1, 2021               Content Version: 13.0  © 2006-2021 [employer]. Care instructions adapted under license by [employer] (which disclaims liability or warranty for this information). If you have questions about a medical condition or this instruction, always ask your healthcare professional. [address] any warranty or liability for your use of this information.

## 2022-01-06 DIAGNOSIS — M48.061 SPINAL STENOSIS OF LUMBAR REGION WITHOUT NEUROGENIC CLAUDICATION: Primary | ICD-10-CM

## 2022-01-06 RX ORDER — PREGABALIN 300 MG/1
300 CAPSULE ORAL DAILY
Qty: 30 CAPSULE | Refills: 0 | Status: SHIPPED | OUTPATIENT
Start: 2022-01-06 | End: 2022-02-25

## 2022-01-12 ENCOUNTER — TELEPHONE (OUTPATIENT)
Dept: ORTHOPEDIC SURGERY | Age: 67
End: 2022-01-12

## 2022-01-12 DIAGNOSIS — M50.90 CERVICAL DISC DISEASE: Primary | ICD-10-CM

## 2022-01-12 DIAGNOSIS — M54.12 CERVICAL RADICULOPATHY: ICD-10-CM

## 2022-01-12 NOTE — TELEPHONE ENCOUNTER
Reviewed results with Patient, sent referral to [facility]          ----- Message from Stephanie Song MD sent at 1/11/2022 12:19 PM EST -----  I sent a message. Up to patient. If he wants injections its right C5-6 and C6-7. Or he can come to talk surgery. ----- Message -----  From: iKshan Lopez RN  Sent: 1/11/2022   8:58 AM EST  To: Stephanie Song MD    Cervical results are in would you like to see him or sign up for DEVIKA?  ----- Message -----  From: Symone Gutierrez MD  Sent: 1/11/2022   5:56 AM EST  To: Iglesia Walker RN    He can come back and review with me. Unless he wants DEVIKA.

## 2022-01-28 DIAGNOSIS — M51.36 LUMBAR DEGENERATIVE DISC DISEASE: Primary | ICD-10-CM

## 2022-01-28 RX ORDER — PREGABALIN 150 MG/1
150 CAPSULE ORAL 3 TIMES DAILY
Qty: 90 CAPSULE | Refills: 1 | Status: SHIPPED | OUTPATIENT
Start: 2022-01-28 | End: 2022-02-21 | Stop reason: SDUPTHER

## 2022-02-03 ENCOUNTER — HOSPITAL ENCOUNTER (OUTPATIENT)
Dept: PREADMISSION TESTING | Age: 67
Discharge: HOME | End: 2022-02-03
Payer: MEDICARE

## 2022-02-03 PROCEDURE — U0005 INFEC AGEN DETEC AMPLI PROBE: HCPCS

## 2022-02-04 LAB
SARS-COV-2, XPLCVT: NOT DETECTED
SOURCE, COVRS: NORMAL

## 2022-02-07 ENCOUNTER — HOSPITAL ENCOUNTER (OUTPATIENT)
Age: 67
Setting detail: OUTPATIENT SURGERY
Discharge: HOME | End: 2022-02-07
Attending: INTERNAL MEDICINE | Admitting: INTERNAL MEDICINE
Payer: MEDICARE

## 2022-02-07 ENCOUNTER — ANESTHESIA (OUTPATIENT)
Dept: ENDOSCOPY | Age: 67
End: 2022-02-07
Payer: MEDICARE

## 2022-02-07 ENCOUNTER — ANESTHESIA EVENT (OUTPATIENT)
Dept: ENDOSCOPY | Age: 67
End: 2022-02-07
Payer: MEDICARE

## 2022-02-07 VITALS
RESPIRATION RATE: 15 BRPM | HEART RATE: 78 BPM | DIASTOLIC BLOOD PRESSURE: 54 MMHG | HEIGHT: 72 IN | BODY MASS INDEX: 20.88 KG/M2 | WEIGHT: 154.2 LBS | SYSTOLIC BLOOD PRESSURE: 144 MMHG | OXYGEN SATURATION: 100 % | TEMPERATURE: 97.8 F

## 2022-02-07 LAB
GLUCOSE BLD STRIP.AUTO-MCNC: 120 MG/DL (ref 65–117)
SERVICE CMNT-IMP: ABNORMAL

## 2022-02-07 PROCEDURE — 77030013992 HC SNR POLYP ENDOSC BSC -B: Performed by: INTERNAL MEDICINE

## 2022-02-07 PROCEDURE — 74011000250 HC RX REV CODE- 250: Performed by: ANESTHESIOLOGY

## 2022-02-07 PROCEDURE — 88305 TISSUE EXAM BY PATHOLOGIST: CPT

## 2022-02-07 PROCEDURE — 76060000031 HC ANESTHESIA FIRST 0.5 HR: Performed by: INTERNAL MEDICINE

## 2022-02-07 PROCEDURE — 76040000019: Performed by: INTERNAL MEDICINE

## 2022-02-07 PROCEDURE — 74011250636 HC RX REV CODE- 250/636: Performed by: INTERNAL MEDICINE

## 2022-02-07 PROCEDURE — 2709999900 HC NON-CHARGEABLE SUPPLY: Performed by: INTERNAL MEDICINE

## 2022-02-07 PROCEDURE — 82962 GLUCOSE BLOOD TEST: CPT

## 2022-02-07 PROCEDURE — 74011250636 HC RX REV CODE- 250/636: Performed by: ANESTHESIOLOGY

## 2022-02-07 RX ORDER — FLUMAZENIL 0.1 MG/ML
0.2 INJECTION INTRAVENOUS
Status: DISCONTINUED | OUTPATIENT
Start: 2022-02-07 | End: 2022-02-07 | Stop reason: HOSPADM

## 2022-02-07 RX ORDER — SODIUM CHLORIDE 9 MG/ML
25 INJECTION, SOLUTION INTRAVENOUS CONTINUOUS
Status: DISCONTINUED | OUTPATIENT
Start: 2022-02-07 | End: 2022-02-07 | Stop reason: HOSPADM

## 2022-02-07 RX ORDER — ATROPINE SULFATE 0.1 MG/ML
0.5 INJECTION INTRAVENOUS
Status: DISCONTINUED | OUTPATIENT
Start: 2022-02-07 | End: 2022-02-07 | Stop reason: HOSPADM

## 2022-02-07 RX ORDER — NALOXONE HYDROCHLORIDE 0.4 MG/ML
0.4 INJECTION, SOLUTION INTRAMUSCULAR; INTRAVENOUS; SUBCUTANEOUS
Status: DISCONTINUED | OUTPATIENT
Start: 2022-02-07 | End: 2022-02-07 | Stop reason: HOSPADM

## 2022-02-07 RX ORDER — PROPOFOL 10 MG/ML
INJECTION, EMULSION INTRAVENOUS AS NEEDED
Status: DISCONTINUED | OUTPATIENT
Start: 2022-02-07 | End: 2022-02-07 | Stop reason: HOSPADM

## 2022-02-07 RX ORDER — SODIUM CHLORIDE 0.9 % (FLUSH) 0.9 %
5-40 SYRINGE (ML) INJECTION EVERY 8 HOURS
Status: DISCONTINUED | OUTPATIENT
Start: 2022-02-07 | End: 2022-02-07 | Stop reason: HOSPADM

## 2022-02-07 RX ORDER — SODIUM CHLORIDE 0.9 % (FLUSH) 0.9 %
5-40 SYRINGE (ML) INJECTION AS NEEDED
Status: DISCONTINUED | OUTPATIENT
Start: 2022-02-07 | End: 2022-02-07 | Stop reason: HOSPADM

## 2022-02-07 RX ORDER — EPHEDRINE SULFATE/0.9% NACL/PF 50 MG/5 ML
SYRINGE (ML) INTRAVENOUS AS NEEDED
Status: DISCONTINUED | OUTPATIENT
Start: 2022-02-07 | End: 2022-02-07 | Stop reason: HOSPADM

## 2022-02-07 RX ORDER — DEXTROMETHORPHAN/PSEUDOEPHED 2.5-7.5/.8
1.2 DROPS ORAL
Status: DISCONTINUED | OUTPATIENT
Start: 2022-02-07 | End: 2022-02-07 | Stop reason: HOSPADM

## 2022-02-07 RX ORDER — LIDOCAINE HYDROCHLORIDE 20 MG/ML
INJECTION, SOLUTION EPIDURAL; INFILTRATION; INTRACAUDAL; PERINEURAL AS NEEDED
Status: DISCONTINUED | OUTPATIENT
Start: 2022-02-07 | End: 2022-02-07 | Stop reason: HOSPADM

## 2022-02-07 RX ORDER — EPINEPHRINE 0.1 MG/ML
1 INJECTION INTRACARDIAC; INTRAVENOUS
Status: DISCONTINUED | OUTPATIENT
Start: 2022-02-07 | End: 2022-02-07 | Stop reason: HOSPADM

## 2022-02-07 RX ADMIN — PROPOFOL 280 MG: 10 INJECTION, EMULSION INTRAVENOUS at 10:04

## 2022-02-07 RX ADMIN — LIDOCAINE HYDROCHLORIDE 40 MG: 20 INJECTION, SOLUTION EPIDURAL; INFILTRATION; INTRACAUDAL; PERINEURAL at 09:43

## 2022-02-07 RX ADMIN — SODIUM CHLORIDE 25 ML/HR: 9 INJECTION, SOLUTION INTRAVENOUS at 09:19

## 2022-02-07 RX ADMIN — Medication 10 MG: at 09:54

## 2022-02-07 NOTE — PROCEDURES
NAME:  Joel Rudd   :   1955   MRN:   [id number]     Date/Time:  2022 9:42 AM    Colonoscopy Operative Report    Procedure Type:  Colonoscopy with polypectomy (cold snare)     Indications: CRC screening  Pre-operative Diagnosis: see indication above  Post-operative Diagnosis:  See findings below  :  Miguelina Petit MD  Referring Provider: Tal Kerr MD    Exam:  Airway: clear, no airway problems anticipated  Heart: RRR, without gallops or rubs  Lungs: clear bilaterally without wheezes, crackles, or rhonchi  Abdomen: soft, nontender, nondistended, bowel sounds present  Mental Status: awake, alert and oriented to person, place and time    Sedation:  MAC anesthesia Propofol  Procedure Details:  After informed consent was obtained with all risks and benefits of procedure explained and preoperative exam completed, the patient was taken to the endoscopy suite and placed in the left lateral decubitus position. Upon sequential sedation as per above, a digital rectal exam was performed demonstrating internal and external hemorrhoids. The Olympus videocolonoscope  was inserted in the rectum and carefully advanced to the cecum, identified by the ileocecal valve and appendiceal orifice. The quality of preparation was good. The colonoscope was slowly withdrawn with careful evaluation between folds. Retroflexion in the rectum was completed demonstrating internal and external hemorrhoids. Findings:   ANUS: Anal exam reveals no masses but hemorrhoids, sphincter tone is normal.   RECTUM: Rectal exam reveals no masses but hemorrhoids. SIGMOID COLON: Diverticulosis, but otherwise normal.  DESCENDING COLON: Diverticulosis, but otherwise normal.  TRANSVERSE COLON:   -- sessile. 0.3 cm polyp, removed with cold snare. --  Mild diverticulosis, but otherwise normal.  ASCENDING COLON: The mucosa is normal with good vascular pattern and without ulcers, diverticula, and polyps.    CECUM: The appendiceal orifice appears normal. The ileocecal valve appears normal.   TERMINAL ILEUM: The terminal ileum was not entered. Specimen Removed:  Transverse colon polyp  Complications:  None. EBL:     None. Impression:  -- single colon polyp, removed as above  -- internal and external hemorrhoids  -- moderate left-sided diverticulosis    Recommendations:   -- await pathology  -- high fiber diet  -- repeat colonoscopy in 5 years    Discharge Disposition:  Home in the company of a  when able to ambulate.       Cory Mccloud MD

## 2022-02-07 NOTE — PROGRESS NOTES
Endoscope was pre-cleaned at the bedside immediately following procedure by MARTI Ramon For medications administered by anesthesia, see anesthesia chart.

## 2022-02-07 NOTE — DISCHARGE INSTRUCTIONS
[facility]  [id number]  1955    COLON DISCHARGE INSTRUCTIONS  Discomfort:  Redness at IV site- apply warm compress to area; if redness or soreness persist- contact your physician  There may be a slight amount of blood passed from the rectum  Gaseous discomfort- walking, belching will help relieve any discomfort  You may not operate a vehicle for 12 hours  You may not engage in an occupation involving machinery or appliances for rest of today  You may not drink alcoholic beverages for at least 12 hours  Avoid making any critical decisions for at least 24 hour  DIET:   High fiber diet. - however -  remember your colon is empty and a heavy meal will produce gas. Avoid these foods:  vegetables, fried / greasy foods, carbonated drinks for today  MEDICATION:  (See attached)     ACTIVITY:  You may not resume your normal daily activities until tomorrow AM; it is recommended that you spend the remainder of the day resting -  avoid any strenuous activity. CALL M.D. ANY SIGN OF:   Increasing pain, nausea, vomiting  Abdominal distension (swelling)  New increased bleeding (oral or rectal)  Fever (chills)  Pain in chest area  Bloody discharge from nose or mouth  Shortness of breath      IMPRESSION:  -- one very small polyp we found today, and we removed it!!  -- diverticulosis, which is when small out-pouchings in the inner lining of the colon form, little stretched out pockets. This is very common, and a diet high in fiber with the addition of a daily fiber supplement (like 2 teaspoons of metamucil or psyllium husk fiber powder mixed in water) can help treat this! -- we saw some hemorrhoids, which are very common, and these are swollen veins at the anal canal or end of the rectum, which can bulge with constipation and straining or frequent bowel movements. Sometimes they cause bleeding, burning, pressure, or even pain.  A diet high in fiber helps, but using a daily fiber supplement (like 2 teaspoons of psyllium husk powder or metamucil) can help treat these.     Follow-up Instructions:   Call Dr. Purvi Howell for the results of procedure / biopsy in 7-10 days  Telephone # [phone]  Repeat colonoscopy in 5 years    Ludy Marcelo MD

## 2022-02-07 NOTE — PROGRESS NOTES
Anny Carson  1955  [id number]    Situation:  Verbal report received from: Tricia Uribe RN  Procedure: Procedure(s):  COLONOSCOPY  ENDOSCOPIC POLYPECTOMY    Background:    Preoperative diagnosis: screening  Postoperative diagnosis: Polyp, diverticulosis, hemorrhoids    :  Dr. Jacobo Dyson  Assistant(s): Endoscopy Technician-1: Miguel A Rose  Endoscopy RN-1: Yong Ribeiro RN    Specimens:   ID Type Source Tests Collected by Time Destination   1 : Transverse colon polyp Preservative Colon, Transverse  Danya Kendall MD 2/7/2022 3103 Pathology     H. Pylori  no    Assessment:    Anesthesia gave intra-procedure sedation and medications, see anesthesia flow sheet yes    Intravenous fluids: NS@ KVO     Vital signs stable yes    Abdominal assessment: round and soft yes    Recommendation:  Discharge patient per MD order Carlos.   Family or Friend Son Phani Speak  Permission to share finding with family or friend yes

## 2022-02-07 NOTE — ANESTHESIA PREPROCEDURE EVALUATION
Relevant Problems   RESPIRATORY SYSTEM   (+) History of COVID-19      CARDIOVASCULAR   (+) Essential hypertension      ENDOCRINE   (+) Acute idiopathic gout of right foot   (+) Type I diabetes mellitus with proliferative retinopathy (HCC)      HEMATOLOGY   (+) Anemia with chronic illness       Anesthetic History   No history of anesthetic complications            Review of Systems / Medical History  Patient summary reviewed, nursing notes reviewed and pertinent labs reviewed    Pulmonary          Smoker      Comments: Former Smoker - 5 pack years   Neuro/Psych             Comments: Cervical radiculopathy  Neck pain  Low back pain  Cervical disc disease   Cardiovascular    Hypertension          Hyperlipidemia    Exercise tolerance: >4 METS     GI/Hepatic/Renal                Endo/Other    Diabetes: type 1    Arthritis     Other Findings   Comments: proliferative retinopathy           Physical Exam    Airway  Mallampati: II    Neck ROM: normal range of motion   Mouth opening: Normal     Cardiovascular  Regular rate and rhythm,  S1 and S2 normal,  no murmur, click, rub, or gallop             Dental  No notable dental hx       Pulmonary  Breath sounds clear to auscultation               Abdominal  GI exam deferred       Other Findings            Anesthetic Plan    ASA: 2            Induction: Intravenous  Anesthetic plan and risks discussed with: Patient

## 2022-02-07 NOTE — H&P
Gastroenterology Outpatient History and Physical    Patient: Rocío Watkins    Physician: Marvin Roberson MD    Chief Complaint: CRC screening  History of Present Illness: 78 yo M here for CRC screening. History:  Past Medical History:   Diagnosis Date    Anemia     Arthritis     COVID-19 vaccine series completed     PFIZER 3-18-21 AND 21    Diabetes ([facility] [address]     Essential hypertension 2017    Hypercholesterolemia 2017    Hypertension     Ileitis 2017    Long-term use of high-risk medication 2017    Microalbuminuria 2017    Type I diabetes mellitus with proliferative retinopathy ([facility] [address] 2017      Past Surgical History:   Procedure Laterality Date    HX LUMBAR LAMINECTOMY      HX ORTHOPAEDIC      RIGHT KNEE OPEN SURGERY    HX TONSILLECTOMY        Social History     Socioeconomic History    Marital status: SINGLE   Tobacco Use    Smoking status: Former Smoker     Packs/day: 1.00     Years: 5.00     Pack years: 5.00     Quit date: 1975     Years since quittin.1    Smokeless tobacco: Never Used   Vaping Use    Vaping Use: Never used   Substance and Sexual Activity    Alcohol use:  Yes     Alcohol/week: 3.0 standard drinks     Types: 3 Glasses of wine per week     Comment: 3 glasses of wine a night    Drug use: No      Family History   Problem Relation Age of Onset    Cancer Mother         LUNG    Cancer Father         LUNG    No Known Problems Sister     Anesth Problems Neg Hx       Patient Active Problem List   Diagnosis Code    Type I diabetes mellitus with proliferative retinopathy ([facility] [address] E10.3599    Microalbuminuria R80.9    Ileitis K52.9    Essential hypertension I10    Hypercholesterolemia E78.00    Long-term use of high-risk medication Z79.899    History of COVID-19 Z86.16    Acute idiopathic gout of right foot M10.071    Postviral fatigue 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 Authorizing Provider   pregabalin (Lyrica) 150 mg capsule Take 1 Capsule by mouth three (3) times daily. Max Daily Amount: 450 mg. 1/28/22  Yes Jerold Hodgkin, MD   lisinopriL (PRINIVIL, ZESTRIL) 20 mg tablet TAKE 1 TABLET TWICE A DAY 1/24/22  Yes Tal Kerr MD   insulin lispro (HumaLOG U-100 Insulin) 100 unit/mL injection USE VIA INSULIN PUMP 5/13/21  Yes Tal Kerr MD    insulin pump (PATIENT SUPPLIED) Misc by SubCUTAneous route as needed. NOVALOG INSULIN   Yes Provider, Historical   multivitamin (ONE A DAY) tablet Take 1 Tab by mouth daily. Yes Provider, Historical   ascorbic acid (VITAMIN C) 500 mg tablet Take 500 mg by mouth daily. Yes Provider, Historical   pravastatin (PRAVACHOL) 40 mg tablet TAKE 1 TABLET DAILY 1/6/22   Tal Kerr MD   pregabalin (LYRICA) 300 mg capsule Take 1 Capsule by mouth daily. Max Daily Amount: 300 mg. 1/6/22   Jerold Hodgkin, MD   gabapentin (NEURONTIN) 300 mg capsule Take 2 Capsules by mouth three (3) times daily. Max Daily Amount: 1,800 mg. 12/23/21   Tal Kerr MD   tamsulosin (Flomax) 0.4 mg capsule Take 0.4 mg by mouth nightly. Provider, Historical   glucose blood VI test strips (Accu-Chek Elizabet Plus test strp) strip Use to check blood sugars 7 times per day. 7 boxes of 100 per box for 90d supply. Dx code A84.8979 Patient is an insulin dependant diabetic 1/16/21   Tal Kerr MD   lancets misc Use to check blood sugars 7 times per day. 7 boxes of 100 per box for 90d supply.  Dx code R50.9352 Patient is an insulin dependant diabetic 1/16/21   Lorenz Bloch, MD     Physical Exam:   Vital Signs: Blood pressure (!) 148/72, pulse 78, resp. rate 16, height 6' (1.829 m), weight 69.9 kg (154 lb 3.2 oz), SpO2 98 %.   General: well developed, well nourished   HEENT: unremarkable   Heart: regular rhythm no mumur    Lungs: clear   Abdominal:  benign   Neurological: unremarkable   Extremities: no edema     Findings/Diagnosis: CRC screening  Plan of Care/Planned Procedure: Colonoscopy with conscious/deep sedation    Signed:  Rene Lockett MD 2/7/2022

## 2022-02-07 NOTE — ANESTHESIA POSTPROCEDURE EVALUATION
Procedure(s):  COLONOSCOPY  ENDOSCOPIC POLYPECTOMY. total IV anesthesia    Anesthesia Post Evaluation        Patient location during evaluation: PACU  Note status: Adequate. Level of consciousness: responsive to verbal stimuli and sleepy but conscious  Pain management: satisfactory to patient  Airway patency: patent  Anesthetic complications: no  Cardiovascular status: acceptable  Respiratory status: acceptable  Hydration status: acceptable  Comments: +Post-Anesthesia Evaluation and Assessment    Patient: Monisha Tohrnton MRN: [id number]  SSN: [id number]   YOB: 1955  Age: 77 y.o. Sex: male      Cardiovascular Function/Vital Signs    BP (!) 144/54   Pulse 78   Temp 36.6 °C (97.8 °F)   Resp 15   Ht 6' (1.829 m)   Wt 69.9 kg (154 lb 3.2 oz)   SpO2 100%   BMI 20.91 kg/m²     Patient is status post Procedure(s):  COLONOSCOPY  ENDOSCOPIC POLYPECTOMY. Nausea/Vomiting: Controlled. Postoperative hydration reviewed and adequate. Pain:  Pain Scale 1: Numeric (0 - 10) (02/07/22 1020)  Pain Intensity 1: 0 (02/07/22 1020)   Managed. Neurological Status: At baseline. Mental Status and Level of Consciousness: Arousable. Pulmonary Status:   O2 Device: None (Room air) (02/07/22 1012)   Adequate oxygenation and airway patent. Complications related to anesthesia: None    Post-anesthesia assessment completed. No concerns. Signed By: aKit Soriano MD    2/7/2022  Post anesthesia nausea and vomiting:  controlled      INITIAL Post-op Vital signs:   Vitals Value Taken Time   /52 02/07/22 1033   Temp 36.6 °C (97.8 °F) 02/07/22 1012   Pulse 78 02/07/22 1033   Resp 14 02/07/22 1033   SpO2 99 % 02/07/22 1033   Vitals shown include unvalidated device data.

## 2022-02-08 ENCOUNTER — OFFICE VISIT (OUTPATIENT)
Dept: ORTHOPEDIC SURGERY | Age: 67
End: 2022-02-08
Payer: MEDICARE

## 2022-02-08 VITALS — HEIGHT: 72 IN | BODY MASS INDEX: 20.99 KG/M2 | WEIGHT: 155 LBS

## 2022-02-08 DIAGNOSIS — M48.02 CERVICAL STENOSIS OF SPINAL CANAL: ICD-10-CM

## 2022-02-08 DIAGNOSIS — M50.90 CERVICAL DISC DISEASE: Primary | ICD-10-CM

## 2022-02-08 PROCEDURE — 3017F COLORECTAL CA SCREEN DOC REV: CPT | Performed by: ORTHOPAEDIC SURGERY

## 2022-02-08 PROCEDURE — 99214 OFFICE O/P EST MOD 30 MIN: CPT | Performed by: ORTHOPAEDIC SURGERY

## 2022-02-08 PROCEDURE — 1101F PT FALLS ASSESS-DOCD LE1/YR: CPT | Performed by: ORTHOPAEDIC SURGERY

## 2022-02-08 PROCEDURE — G8420 CALC BMI NORM PARAMETERS: HCPCS | Performed by: ORTHOPAEDIC SURGERY

## 2022-02-08 PROCEDURE — G8427 DOCREV CUR MEDS BY ELIG CLIN: HCPCS | Performed by: ORTHOPAEDIC SURGERY

## 2022-02-08 PROCEDURE — G8756 NO BP MEASURE DOC: HCPCS | Performed by: ORTHOPAEDIC SURGERY

## 2022-02-08 PROCEDURE — G8536 NO DOC ELDER MAL SCRN: HCPCS | Performed by: ORTHOPAEDIC SURGERY

## 2022-02-08 PROCEDURE — G8432 DEP SCR NOT DOC, RNG: HCPCS | Performed by: ORTHOPAEDIC SURGERY

## 2022-02-08 NOTE — PATIENT INSTRUCTIONS
Cervical Spinal Fusion: Before Your Surgery  What is cervical spinal fusion? Cervical spinal fusion is surgery that joins two or more of the vertebrae in your neck. When these bones are joined together, it's called fusion. After the joints are fused, they can no longer move. During the surgery, the doctor uses bone to make a \"bridge\" between your vertebrae. This bridge may be strengthened with metal plates and screws. In most cases, the doctor uses bone from another part of your body or bone that has been donated to a bone bank. But sometimes artificial bone is used. To do the surgery, the doctor makes a cut in either the front or the back of your neck. The cut is called an incision. It leaves a scar that fades with time. After surgery, you will stay in the hospital for a few days. Your neck will feel stiff or sore. You will get medicine to help with pain. Most people can go back to work after 4 to 6 weeks. But it may take a few months to get back to your usual activities. Follow-up care is a key part of your treatment and safety. Be sure to make and go to all appointments, and call your doctor if you are having problems. It's also a good idea to know your test results and keep a list of the medicines you take. How do you prepare for surgery? Surgery can be stressful. This information will help you understand what you can expect. And it will help you safely prepare for surgery. Preparing for surgery    · Be sure you have someone to take you home. Anesthesia and pain medicine will make it unsafe for you to drive or get home on your own.     · Understand exactly what surgery is planned, along with the risks, benefits, and other options.     · If you take aspirin or some other blood thinner, ask your doctor if you should stop taking it before your surgery. Make sure that you understand exactly what your doctor wants you to do.  These medicines increase the risk of bleeding.     · Tell your doctor ALL the medicines, vitamins, supplements, and herbal remedies you take. Some may increase the risk of problems during your surgery. Your doctor will tell you if you should stop taking any of them before the surgery and how soon to do it.     · Make sure your doctor and the hospital have a copy of your advance directive. If you don't have one, you may want to prepare one. It lets others know your health care wishes. It's a good thing to have before any type of surgery or procedure. What happens on the day of surgery? · Follow the instructions exactly about when to stop eating and drinking. If you don't, your surgery may be canceled. If your doctor told you to take your medicines on the day of surgery, take them with only a sip of water.     · Take a bath or shower before you come in for your surgery. Do not apply lotions, perfumes, deodorants, or nail polish.     · Do not shave the surgical site yourself.     · Take off all jewelry and piercings. And take out contact lenses, if you wear them. At the hospital or surgery center   · Bring a picture ID.     · The area for surgery is often marked to make sure there are no errors.     · You will be kept comfortable and safe by your anesthesia provider. You will be asleep during the surgery.     · The surgery usually takes 2 to 4 hours. If more than two vertebrae are being fused together, it will take longer.     · When you wake up, you will be lying on your back. You will have a soft or hard collar around your neck. This will protect and support your neck. It will also keep you from turning your head.     · You may have a small plastic tube coming out of your incision. This is to drain fluids. It's usually taken out in 1 or 2 days. When should you call your doctor?    · You have questions or concerns.     · You do not understand how to prepare for your surgery.     · You become ill before surgery (such as fever, flu, or a cold).     · You need to reschedule or have changed your mind about having the surgery. Where can you learn more? Go to http://www.gray.com/  Enter N543 in the search box to learn more about \"Cervical Spinal Fusion: Before Your Surgery. \"  Current as of: July 1, 2021               Content Version: 13.0  © 6312-6293 [employer], [employer]. Care instructions adapted under license by for; to (do) Centers (which disclaims liability or warranty for this information). If you have questions about a medical condition or this instruction, always ask your healthcare professional. [address] any warranty or liability for your use of this information.

## 2022-02-08 NOTE — PROGRESS NOTES
Swapna Schaefer (: 1955) is a 77 y.o. male, patient, here for evaluation of the following chief complaint(s):  Neck Pain       ASSESSMENT/PLAN:    Below is the assessment and plan developed based on review of pertinent history, physical exam, labs, studies, and medications. At this point he has mainly a right-sided C6 and C7 radiculopathy. The pain is worse with lying flat at night. Injections helped great for about 3 to 4 days each. We discussed treatment options. He is next and candidate for an ACDF at C5-6 and C6-7. Risk and benefits were discussed with him. The risks and benefits were discussed at length with the patient and the patient has elected to proceed. Indications for surgery include failed conservative treatment. Alternative treatments, risks and the perioperative course were discussed with the patient. All questions were answered. The risks and benefits of the procedure were explained. Benefits include definitive diagnosis, relief of pain, elimination of deformity and improved function. Risks of surgery including bleeding, infection, weakness, numbness, CSF leak, failure to improve symptoms, exacerbation of medical co-morbidities and even death were discussed with the patient. 1. Cervical disc disease  2. Cervical stenosis of spinal canal      No follow-ups on file. SUBJECTIVE/OBJECTIVE:  Swapna Schaefer (: 1955) is a 77 y.o. male. No flowsheet data found. He comes in today for follow-up of ongoing neck pain and right arm pain. There is numbness and tingling in the right hand as well as pain. This developed in the last 6 months. He has had no trauma. He denies any bowel bladder difficulties. He does note some weakness. The pain is worsening despite oral Neurontin that has been started by his primary care physician.   He also has some mechanical lower back pain and occasionally some right thigh discomfort but that is not his main complaint today.    Since his last visit he has had two rounds of injections I gave him great relief for about 6 days. The pain is worse at night in particular when he is laying flat. He denies any new symptoms. The pain is affecting his activities of daily living    Imaging:          XR Results (most recent):  Results from Appointment encounter on 12/28/21    XR SPINE CERV 4 OR 5 V    Narrative  AP and lateral flexion-extension of the cervical spine demonstrates moderate spondylosis C5-6 and C6-7. No instability is noted. Degenerative narrowing is noted. No fractures or lytic lesions. MRI Results (most recent):  Results from Appointment encounter on 01/10/22    MRI CERV SPINE WO CONT    Narrative  EXAM:  MRI CERV SPINE WO CONT    INDICATION:   Neck pain. COMPARISON: Cervical spine radiograph 12/28/2021    TECHNIQUE: Multiplanar multisequence acquisition without contrast of the  cervical spine. CONTRAST: None. FINDINGS:  There is normal alignment of the cervical spine. Vertebral body heights are  maintained without evidence of acute fracture. There is degenerative endplate  marrow edema at C5-C6 (Modic type I). Marrow signal is otherwise heterogeneous,  but within normal limits. Multilevel degenerative disc disease as detailed  below, most advanced at C5-C6 and C6-C7. The cervical cord is normal in size and  signal. Region of the foramen magnum is unremarkable. Visualized soft tissues  are unremarkable. C2-C3: No significant disc herniation, spinal canal or neural foraminal  stenosis. C3-C4: Diffuse disc osteophyte complex with bilateral uncovertebral spurring. No  significant spinal canal stenosis. Mild bilateral neural foraminal stenosis. C4-C5: Diffuse disc osteophyte complex. Mild left facet arthropathy. No  significant spinal canal or foraminal stenosis. C5-C6: Diffuse disc osteophyte complex with bilateral uncovertebral spurring,  right worse than left. Mild spinal canal stenosis.  Moderate to severe right and  mild left neural foraminal stenosis. C6-C7: Diffuse disc osteophyte complex with bilateral uncovertebral spurring. Mild spinal canal stenosis. Moderate bilateral neural foraminal stenosis. C7-T1: Small left paracentral disc protrusion. Mild bilateral facet arthropathy. No significant spinal canal or neural foraminal stenosis. Impression  1. Mild spinal canal stenosis and moderate to severe right neural foraminal  stenosis at C5-C6. 2. Mild spinal canal stenosis and moderate bilateral neural foraminal stenosis  at C6-C7. 3. Remaining degenerative changes as detailed above. Allergies   Allergen Reactions    Oxycodone-Acetaminophen Other (comments)     AGGRESIVE BEHAVIOR  Other reaction(s): Other (see comments)  AGGRESIVE BEHAVIOR       Current Outpatient Medications   Medication Sig    lisinopriL (PRINIVIL, ZESTRIL) 20 mg tablet TAKE 1 TABLET TWICE A DAY    pravastatin (PRAVACHOL) 40 mg tablet TAKE 1 TABLET DAILY    pregabalin (LYRICA) 300 mg capsule Take 1 Capsule by mouth daily. Max Daily Amount: 300 mg.  tamsulosin (Flomax) 0.4 mg capsule Take 0.4 mg by mouth nightly.  insulin lispro (HumaLOG U-100 Insulin) 100 unit/mL injection USE VIA INSULIN PUMP    glucose blood VI test strips (Accu-Chek Elizabet Plus test strp) strip Use to check blood sugars 7 times per day. 7 boxes of 100 per box for 90d supply. Dx code A40.2203 Patient is an insulin dependant diabetic    lancets misc Use to check blood sugars 7 times per day. 7 boxes of 100 per box for 90d supply. Dx code A97.8700 Patient is an insulin dependant diabetic     insulin pump (PATIENT SUPPLIED) Misc by SubCUTAneous route as needed. NOVALOG INSULIN    multivitamin (ONE A DAY) tablet Take 1 Tab by mouth daily.  ascorbic acid (VITAMIN C) 500 mg tablet Take 500 mg by mouth daily.  pregabalin (Lyrica) 150 mg capsule Take 1 Capsule by mouth three (3) times daily. Max Daily Amount: 450 mg.  (Patient not taking: Reported on 2022)    gabapentin (NEURONTIN) 300 mg capsule Take 2 Capsules by mouth three (3) times daily. Max Daily Amount: 1,800 mg. (Patient not taking: Reported on 2022)     No current facility-administered medications for this visit. Past Medical History:   Diagnosis Date    Anemia     Arthritis     COVID-19 vaccine series completed     PFIZER 3-18-21 AND 21    Diabetes ([facility] [address]     Essential hypertension 2017    Hypercholesterolemia 2017    Hypertension     Ileitis 2017    Long-term use of high-risk medication 2017    Microalbuminuria 2017    Type I diabetes mellitus with proliferative retinopathy ([facility] [address] 2017        Past Surgical History:   Procedure Laterality Date    COLONOSCOPY N/A 2022    COLONOSCOPY performed by Sandra Wahl MD at [address]  2022         HX LUMBAR LAMINECTOMY      HX ORTHOPAEDIC      RIGHT KNEE OPEN SURGERY    HX TONSILLECTOMY         Family History   Problem Relation Age of Onset    Cancer Mother         LUNG    Cancer Father         LUNG    No Known Problems Sister     Anesth Problems Neg Hx         Social History     Tobacco Use    Smoking status: Former Smoker     Packs/day: 1.00     Years: 5.00     Pack years: 5.00     Quit date: 1975     Years since quittin.1    Smokeless tobacco: Never Used   Vaping Use    Vaping Use: Never used   Substance Use Topics    Alcohol use: Yes     Alcohol/week: 3.0 standard drinks     Types: 3 Glasses of wine per week     Comment: 3 glasses of wine a night    Drug use: No        Review of Systems   Musculoskeletal: Positive for myalgias, neck pain and neck stiffness. All other systems reviewed and are negative. Vitals:  Ht 6' (1.829 m)   Wt 155 lb (70.3 kg)   BMI 21.02 kg/m²    Body mass index is 21.02 kg/m².     Ortho Exam       Alert and [place]  x 3    Normal gait and station; normal posture    No assistive devices today. Lumbar spine:  Examination of the lumbar spine demonstrates no tenderness on palpation, no pain, no swelling or edema, with normal lumbar range of motion. Thoracic spine: Examination of the thoracic spine demonstrates no tenderness on palpation, no pain, no swelling or edema. Normal sensation and range of motion. Cervical spine:     Examination of the cervical spine demonstrates on inspection: No abnormal cutaneous markings. No shoulder asymmetry. No masses. On palpation: Tenderness on palpation of the right superior shoulder and posterior scapular region    Range of motion: Slow range of motion is noted with some decreased forward flexion    Motor examination:  Right deltoid 5/5,  left deltoid 5/5, right bicep 5/5, left bicep 5/5, right wrist extensor 3/5, left wrist extensor 5/5, right triceps 5/5, left triceps 5/5, right intrinsics 5/5, left intrinsics    Sensory examination: Reveals decreased sensation along C6 and C7 dermatome    Reflexes: Left bicep 2/2, left bicep 2/2, right triceps 1/2, left triceps 2/2    Functional testing: Spurling's exam is positive on the right; upper limb tension test is positive on the right    Watson's signs negative bilaterally. An electronic signature was used to authenticate this note.   -- Neftali Craft MD

## 2022-02-08 NOTE — LETTER
2/8/2022    Patient: Kirsten Hensley   YOB: 1955   Date of Visit: 2/8/2022     Julian Soto [address] [place] [address] Dr [phone] [id number]  Via In Basket    Dear Julian Soto MD,      Thank you for referring Mr. Jes Hess to [facility] for evaluation. My notes for this consultation are attached. If you have questions, please do not hesitate to call me. I look forward to following your patient along with you.       Sincerely,    Tomasa Pal MD

## 2022-02-10 DIAGNOSIS — M54.2 NECK PAIN: ICD-10-CM

## 2022-02-10 DIAGNOSIS — M50.90 CERVICAL DISC DISEASE: Primary | ICD-10-CM

## 2022-02-10 DIAGNOSIS — M48.02 CERVICAL STENOSIS OF SPINAL CANAL: ICD-10-CM

## 2022-02-11 NOTE — PROGRESS NOTES
Swapna Woodardaps calls to report pain in his right arm that radiates down his arm. He states the 300 mg at bedtime is not aiding in his pain and he is hoping for something to help him sleep better since the pain is waking him up due to the burning pain.  Any Suggestions

## 2022-02-16 ENCOUNTER — TELEPHONE (OUTPATIENT)
Dept: ORTHOPEDIC SURGERY | Age: 67
End: 2022-02-16

## 2022-02-16 NOTE — TELEPHONE ENCOUNTER
Bethany Galvin would like an increase in the Lyrica or add another medication. He is scheduled for surgery in the next month or so. Currently on Lyrica 150 3 x a day. Please advise.  Symptoms are the same

## 2022-02-17 RX ORDER — CYCLOBENZAPRINE HCL 10 MG
10 TABLET ORAL
Qty: 40 TABLET | Refills: 0 | Status: SHIPPED | OUTPATIENT
Start: 2022-02-17 | End: 2022-02-28 | Stop reason: ALTCHOICE

## 2022-02-21 DIAGNOSIS — M51.36 LUMBAR DEGENERATIVE DISC DISEASE: ICD-10-CM

## 2022-02-21 RX ORDER — PREGABALIN 150 MG/1
150 CAPSULE ORAL 4 TIMES DAILY
Qty: 90 CAPSULE | Refills: 1 | Status: SHIPPED | OUTPATIENT
Start: 2022-02-21 | End: 2022-02-25

## 2022-02-23 DIAGNOSIS — M50.90 CERVICAL DISC DISEASE: ICD-10-CM

## 2022-02-23 DIAGNOSIS — M54.12 CERVICAL RADICULOPATHY: Primary | ICD-10-CM

## 2022-02-23 RX ORDER — PREGABALIN 300 MG/1
300 CAPSULE ORAL 2 TIMES DAILY
Qty: 60 CAPSULE | Refills: 0 | Status: SHIPPED | OUTPATIENT
Start: 2022-02-23 | End: 2022-02-28 | Stop reason: ALTCHOICE

## 2022-02-25 ENCOUNTER — OFFICE VISIT (OUTPATIENT)
Dept: CARDIOLOGY CLINIC | Age: 67
End: 2022-02-25
Payer: MEDICARE

## 2022-02-25 VITALS
WEIGHT: 157.2 LBS | OXYGEN SATURATION: 99 % | DIASTOLIC BLOOD PRESSURE: 62 MMHG | BODY MASS INDEX: 21.29 KG/M2 | HEART RATE: 66 BPM | SYSTOLIC BLOOD PRESSURE: 98 MMHG | HEIGHT: 72 IN | RESPIRATION RATE: 18 BRPM

## 2022-02-25 DIAGNOSIS — I34.0 NONRHEUMATIC MITRAL VALVE REGURGITATION: ICD-10-CM

## 2022-02-25 DIAGNOSIS — I34.1 MITRAL VALVE PROLAPSE: ICD-10-CM

## 2022-02-25 DIAGNOSIS — E78.00 HYPERCHOLESTEROLEMIA: ICD-10-CM

## 2022-02-25 DIAGNOSIS — Z86.79 HISTORY OF BACTERIAL ENDOCARDITIS: Primary | ICD-10-CM

## 2022-02-25 DIAGNOSIS — I10 ESSENTIAL HYPERTENSION: ICD-10-CM

## 2022-02-25 PROCEDURE — 1101F PT FALLS ASSESS-DOCD LE1/YR: CPT | Performed by: NURSE PRACTITIONER

## 2022-02-25 PROCEDURE — 93005 ELECTROCARDIOGRAM TRACING: CPT | Performed by: NURSE PRACTITIONER

## 2022-02-25 PROCEDURE — 99214 OFFICE O/P EST MOD 30 MIN: CPT | Performed by: NURSE PRACTITIONER

## 2022-02-25 PROCEDURE — G8432 DEP SCR NOT DOC, RNG: HCPCS | Performed by: NURSE PRACTITIONER

## 2022-02-25 PROCEDURE — G8420 CALC BMI NORM PARAMETERS: HCPCS | Performed by: NURSE PRACTITIONER

## 2022-02-25 PROCEDURE — G8427 DOCREV CUR MEDS BY ELIG CLIN: HCPCS | Performed by: NURSE PRACTITIONER

## 2022-02-25 PROCEDURE — 93010 ELECTROCARDIOGRAM REPORT: CPT | Performed by: NURSE PRACTITIONER

## 2022-02-25 PROCEDURE — G8754 DIAS BP LESS 90: HCPCS | Performed by: NURSE PRACTITIONER

## 2022-02-25 PROCEDURE — G0463 HOSPITAL OUTPT CLINIC VISIT: HCPCS | Performed by: NURSE PRACTITIONER

## 2022-02-25 PROCEDURE — 3017F COLORECTAL CA SCREEN DOC REV: CPT | Performed by: NURSE PRACTITIONER

## 2022-02-25 PROCEDURE — G8752 SYS BP LESS 140: HCPCS | Performed by: NURSE PRACTITIONER

## 2022-02-25 PROCEDURE — G8536 NO DOC ELDER MAL SCRN: HCPCS | Performed by: NURSE PRACTITIONER

## 2022-02-25 NOTE — PROGRESS NOTES
Chief Complaint   Patient presents with    Results     3-4 months follow up - had a repeat echo on 10/14/21- denies cardiac sx      1. Have you been to the ER, urgent care clinic since your last visit? Hospitalized since your last visit? no    2. Have you seen or consulted any other health care providers outside of the [address] [place] [address] since your last visit? Include any pap smears or colon screening.   Yes for steroid inj in C spine - [facility]

## 2022-02-25 NOTE — PROGRESS NOTES
Andrae Wu, FN-BC    Subjective/HPI:     Joel Rudd is a 77 y.o. male is here for routine f/u. He has a PMHx of endocarditis of MV, T1DM, HTN and HLD. Feels well. Denies complaints of chest pains, dizziness, orthopnea, PND or edema. Denies shortness of breath or palpitation symptoms. Has some light-headedness on occasion after starting Lyrica, but this has helped a lot with his right arm neuropathy, so prefers to deal with it. Current Outpatient Medications on File Prior to Visit   Medication Sig Dispense Refill    pregabalin (LYRICA) 300 mg capsule Take 1 Capsule by mouth two (2) times a day. Max Daily Amount: 600 mg. 60 Capsule 0    cyclobenzaprine (FLEXERIL) 10 mg tablet Take 1 Tablet by mouth three (3) times daily as needed for Muscle Spasm(s). 40 Tablet 0    lisinopriL (PRINIVIL, ZESTRIL) 20 mg tablet TAKE 1 TABLET TWICE A  Tablet 0    pravastatin (PRAVACHOL) 40 mg tablet TAKE 1 TABLET DAILY 90 Tablet 0    insulin lispro (HumaLOG U-100 Insulin) 100 unit/mL injection USE VIA INSULIN PUMP 60 mL 3    glucose blood VI test strips (Accu-Chek Elizabet Plus test strp) strip Use to check blood sugars 7 times per day. 7 boxes of 100 per box for 90d supply. Dx code A00.2770 Patient is an insulin dependant diabetic 700 Strip 3    lancets misc Use to check blood sugars 7 times per day. 7 boxes of 100 per box for 90d supply. Dx code O53.1753 Patient is an insulin dependant diabetic 7 Package 3     insulin pump (PATIENT SUPPLIED) Misc by SubCUTAneous route as needed. NOVALOG INSULIN      multivitamin (ONE A DAY) tablet Take 1 Tab by mouth daily.  ascorbic acid (VITAMIN C) 500 mg tablet Take 500 mg by mouth daily.  [DISCONTINUED] pregabalin (Lyrica) 150 mg capsule Take 1 Capsule by mouth four (4) times daily. Max Daily Amount: 600 mg.  (Patient not taking: Reported on 2/25/2022) 90 Capsule 1    [DISCONTINUED] pregabalin (LYRICA) 300 mg capsule Take 1 Capsule by mouth daily. Max Daily Amount: 300 mg. (Patient not taking: Reported on 2/25/2022) 30 Capsule 0    [DISCONTINUED] gabapentin (NEURONTIN) 300 mg capsule Take 2 Capsules by mouth three (3) times daily. Max Daily Amount: 1,800 mg. (Patient not taking: Reported on 2/8/2022) 180 Capsule 0    [DISCONTINUED] tamsulosin (Flomax) 0.4 mg capsule Take 0.4 mg by mouth nightly. (Patient not taking: Reported on 2/25/2022)       No current facility-administered medications on file prior to visit. Review of Symptoms:    Review of Systems   Constitutional: Negative for chills, fever and weight loss. HENT: Negative for nosebleeds. Eyes: Negative for blurred vision and double vision. Respiratory: Negative for cough, shortness of breath and wheezing. Cardiovascular: Negative for chest pain, palpitations, orthopnea, leg swelling and PND. Skin: Negative for rash. Neurological: Negative for dizziness and loss of consciousness. Physical Exam:      General: Well developed, in no acute distress, cooperative and alert  Heart:  reg rate and rhythm; normal O9/I9; 3/6 holosystolic murmur over the apex, no gallops or rubs. Respiratory: Clear bilaterally x 4, no wheezing or rales  Extremities:  Normal cap refill, no cyanosis, atraumatic. No edema. Vascular: 2+ pulses symmetric in all extremities    Vitals:    02/25/22 1505   BP: 98/62   BP 1 Location: Left arm   BP Patient Position: Sitting   BP Cuff Size: Large adult   Pulse: 66   Resp: 18   Height: 6' (1.829 m)   Weight: 157 lb 3.2 oz (71.3 kg)   SpO2: 99%       ECG done today shows sinus rhythm     Assessment:       ICD-10-CM ICD-9-CM    1. History of bacterial endocarditis  Z86.79 V12.59 AMB POC EKG ROUTINE W/ 12 LEADS, INTER & REP   2. Essential hypertension  I10 401.9 AMB POC EKG ROUTINE W/ 12 LEADS, INTER & REP   3. Hypercholesterolemia  E78.00 272.0    4. Mitral valve prolapse  I34.1 424.0 ECHO ADULT COMPLETE   5.  Nonrheumatic mitral valve regurgitation  I34.0 424.0 ECHO ADULT COMPLETE        Plan:     1. History of bacterial endocarditis  Echo done 7/2021 with preserved LVEF 50-55% with mild-mod MR with vegetation of the posterior MV leaflet  Echo done 10/2021 with preserved LVEF 55-60% with mild MVP and mod MR, without evidence of vegetation of the MV  Continue present management  Discussed need for prophylactic antibiotics prior to dental treatment  Repeat echo in 6 months     2. Essential hypertension  BP controlled. Continue anti-hypertensive therapy and low sodium diet     3.  Hypercholesterolemia  Continue statin therapy and low fat, low cholesterol diet  Lipids managed by PCP     F/u with Dr. Barby Nix in 6 months    Shirley Ledesma NP

## 2022-02-28 ENCOUNTER — OFFICE VISIT (OUTPATIENT)
Dept: INTERNAL MEDICINE CLINIC | Age: 67
End: 2022-02-28
Payer: MEDICARE

## 2022-02-28 VITALS
SYSTOLIC BLOOD PRESSURE: 120 MMHG | TEMPERATURE: 98 F | HEART RATE: 70 BPM | RESPIRATION RATE: 20 BRPM | BODY MASS INDEX: 21.26 KG/M2 | DIASTOLIC BLOOD PRESSURE: 76 MMHG | OXYGEN SATURATION: 100 % | WEIGHT: 157 LBS | HEIGHT: 72 IN

## 2022-02-28 DIAGNOSIS — M50.90 CERVICAL DISC DISEASE: ICD-10-CM

## 2022-02-28 DIAGNOSIS — I10 ESSENTIAL HYPERTENSION: ICD-10-CM

## 2022-02-28 DIAGNOSIS — M54.12 CERVICAL RADICULOPATHY: ICD-10-CM

## 2022-02-28 DIAGNOSIS — M48.02 CERVICAL STENOSIS OF SPINAL CANAL: ICD-10-CM

## 2022-02-28 DIAGNOSIS — E10.3559 TYPE 1 DIABETES MELLITUS WITH STABLE PROLIFERATIVE RETINOPATHY, UNSPECIFIED LATERALITY (HCC): ICD-10-CM

## 2022-02-28 DIAGNOSIS — Z01.818 PREOPERATIVE CLEARANCE: Primary | ICD-10-CM

## 2022-02-28 DIAGNOSIS — E78.00 HYPERCHOLESTEROLEMIA: ICD-10-CM

## 2022-02-28 PROBLEM — I33.0 ACUTE BACTERIAL ENDOCARDITIS: Status: RESOLVED | Noted: 2021-08-09 | Resolved: 2022-02-28

## 2022-02-28 PROCEDURE — 2022F DILAT RTA XM EVC RTNOPTHY: CPT | Performed by: INTERNAL MEDICINE

## 2022-02-28 PROCEDURE — 3046F HEMOGLOBIN A1C LEVEL >9.0%: CPT | Performed by: INTERNAL MEDICINE

## 2022-02-28 PROCEDURE — 3017F COLORECTAL CA SCREEN DOC REV: CPT | Performed by: INTERNAL MEDICINE

## 2022-02-28 PROCEDURE — G8420 CALC BMI NORM PARAMETERS: HCPCS | Performed by: INTERNAL MEDICINE

## 2022-02-28 PROCEDURE — G8432 DEP SCR NOT DOC, RNG: HCPCS | Performed by: INTERNAL MEDICINE

## 2022-02-28 PROCEDURE — G8427 DOCREV CUR MEDS BY ELIG CLIN: HCPCS | Performed by: INTERNAL MEDICINE

## 2022-02-28 PROCEDURE — 99214 OFFICE O/P EST MOD 30 MIN: CPT | Performed by: INTERNAL MEDICINE

## 2022-02-28 PROCEDURE — G8536 NO DOC ELDER MAL SCRN: HCPCS | Performed by: INTERNAL MEDICINE

## 2022-02-28 PROCEDURE — G8754 DIAS BP LESS 90: HCPCS | Performed by: INTERNAL MEDICINE

## 2022-02-28 PROCEDURE — G8752 SYS BP LESS 140: HCPCS | Performed by: INTERNAL MEDICINE

## 2022-02-28 PROCEDURE — 1101F PT FALLS ASSESS-DOCD LE1/YR: CPT | Performed by: INTERNAL MEDICINE

## 2022-02-28 RX ORDER — GABAPENTIN 300 MG/1
300 CAPSULE ORAL 2 TIMES DAILY
COMMUNITY
End: 2022-02-28 | Stop reason: ALTCHOICE

## 2022-02-28 RX ORDER — PREGABALIN 300 MG/1
300 CAPSULE ORAL 2 TIMES DAILY
Status: ON HOLD | COMMUNITY
End: 2022-03-17 | Stop reason: SDUPTHER

## 2022-02-28 NOTE — PROGRESS NOTES
Rocío Watkins is a 77 y.o. male presenting for Pre-op Exam  .     1. Have you been to the ER, urgent care clinic since your last visit? Hospitalized since your last visit? No    2. Have you seen or consulted any other health care providers outside of the [address] [place] [address] since your last visit? Include any pap smears or colon screening. No    Fall Risk Assessment, last 12 mths 2/25/2022   Able to walk? Yes   Fall in past 12 months? 0   Do you feel unsteady? 0   Are you worried about falling 0         Abuse Screening Questionnaire 2/25/2022   Do you ever feel afraid of your partner? N   Are you in a relationship with someone who physically or mentally threatens you? N   Is it safe for you to go home?  Y       3 most recent PHQ Screens 2/25/2022   Little interest or pleasure in doing things Not at all   Feeling down, depressed, irritable, or hopeless Not at all   Total Score PHQ 2 0       Medications Discontinued During This Encounter   Medication Reason    ascorbic acid (VITAMIN C) 500 mg tablet Therapy Completed

## 2022-02-28 NOTE — PROGRESS NOTES
History:   Mr. Freida Nascimento is a 80-year-old male referred to our office today by Dr. Holli Verma in medical consultation for preoperative medical clearance prior to having C5-6, C6-7 anterior discectomy with fusion on March 16th at [facility].    The patient developed right-sided neck pain with radiculopathy around Christmas of 2021. The patient was treated with oral steroids, muscle relaxers prior to being referred to orthopedics and subsequently patient received injections of cortisone twice with only short-term positive benefit. The patient had no improvement with Neurontin and then was transitioned to Lyrica which is given him mild relief of discomfort. MRI of the neck has revealed mild spinal stenosis and moderate to severe right neural foraminal stenosis at C5-6 and mild neural canal stenosis and moderate bilateral neural foraminal stenosis at C6-7. Because of his failure to respond to conservative management he is now having the surgery as planned. Patient's medical history is pertinent for controlled hypertension and hypercholesterolemia. He does have a history of bacterial endocarditis and did see his cardiologist last week and was cleared for surgery. In addition he is a type I diabetic on an insulin pump now currently followed by Dr. Anna Whitley who has recommended that his pump be reduced to half his normal dose during the perioperative period. The patient denies any hypoglycemia. He has had no chest pain, shortness of breath, palpitations or syncope. Allergies include oxycodone. He denies latex allergy or Band-Aid adhesive sensitivity. He has never had problems with anesthesia. The review of systems is otherwise negative as noted.     Latex Allergy:NO    History of anesthesia reaction: NO:     History of PE/DVT:NO:       Past Medical History:   Diagnosis Date    Acute bacterial endocarditis 8/9/2021    Anemia     Arthritis     COVID-19 vaccine series completed     [facility]  3-18-21 AND 4-8-21    Diabetes ([facility]     Essential hypertension 2017    Hypercholesterolemia 2017    Hypertension     Ileitis 2017    Long-term use of high-risk medication 2017    Microalbuminuria 2017    Type I diabetes mellitus with proliferative retinopathy ([facility] 2017    Valvular heart disease      Past Surgical History:   Procedure Laterality Date    COLONOSCOPY N/A 2022    COLONOSCOPY performed by Jc Blanton MD at [address] [place] [address]  2022         HX LUMBAR LAMINECTOMY      HX ORTHOPAEDIC      RIGHT KNEE OPEN SURGERY    HX TONSILLECTOMY       Allergies   Allergen Reactions    Oxycodone-Acetaminophen Other (comments)     AGGRESIVE BEHAVIOR  Other reaction(s): Other (see comments)  AGGRESIVE BEHAVIOR     Current Outpatient Medications   Medication Sig Dispense Refill    pregabalin (LYRICA) 300 mg capsule Take 300 mg by mouth two (2) times a day.  lisinopriL (PRINIVIL, ZESTRIL) 20 mg tablet TAKE 1 TABLET TWICE A  Tablet 0    pravastatin (PRAVACHOL) 40 mg tablet TAKE 1 TABLET DAILY 90 Tablet 0    insulin lispro (HumaLOG U-100 Insulin) 100 unit/mL injection USE VIA INSULIN PUMP 60 mL 3    glucose blood VI test strips (Accu-Chek Elizabet Plus test strp) strip Use to check blood sugars 7 times per day. 7 boxes of 100 per box for 90d supply. Dx code B91.6232 Patient is an insulin dependant diabetic 700 Strip 3    lancets misc Use to check blood sugars 7 times per day. 7 boxes of 100 per box for 90d supply. Dx code I21.8000 Patient is an insulin dependant diabetic 7 Package 3     insulin pump (PATIENT SUPPLIED) Misc by SubCUTAneous route as needed.  NOVALOG INSULIN       Social History     Socioeconomic History    Marital status: SINGLE   Tobacco Use    Smoking status: Former Smoker     Packs/day: 1.00     Years: 5.00     Pack years: 5.00     Quit date: 1975     Years since quittin.1    Smokeless tobacco: Never Used Vaping Use    Vaping Use: Never used   Substance and Sexual Activity    Alcohol use: Yes     Alcohol/week: 3.0 standard drinks     Types: 3 Glasses of wine per week     Comment: 3 glasses of wine a night    Drug use: No     Family History   Problem Relation Age of Onset    Cancer Mother         LUNG    Cancer Father         LUNG    No Known Problems Sister     Anesth Problems Neg Hx        Reviewed PmHx, RxHx, FmHx, SocHx, AllgHx and updated and dated in the chart. Review of Systems  Constitutional: negative for fevers, chills, anorexia and weight loss  Eyes:   negative for visual disturbance and irritation  ENT:   negative for tinnitus,sore throat,nasal congestion,ear pain,hoarseness  Respiratory:  negative for cough, hemoptysis, dyspnea,wheezing  CV:   negative for chest pain, palpitations, lower extremity edema  GI:   negative for nausea, vomiting, diarrhea, abdominal pain,melena  Endo:               negative for polyuria,polydipsia,polyphagia,heat intolerance  Genitourinary: negative for frequency, dysuria and hematuria  Integumentary: negative for rash and pruritus  Hematologic:  negative for easy bruising and gum/nose bleeding  Musculoskel: Is a day for chronic neck pain with right-sided radiculopathy  Neurological:  negative for headaches, dizziness, vertigo, memory problems and gait   Behavl/Psych: negative for feelings of anxiety, depression, mood changes      Objective:     Vitals:    02/28/22 1403   BP: 120/76   Pulse: 70   Resp: 20   Temp: 98 °F (36.7 °C)   TempSrc: Oral   SpO2: 100%   Weight: 157 lb (71.2 kg)   Height: 6' (1.829 m)     Body mass index is 21.29 kg/m².     Physical Examination: General appearance - alert, well appearing, and in no distress and oriented to person, place, and time  Mental status - alert, oriented to person, place, and time, normal mood, behavior, speech, dress, motor activity, and thought processes  Eyes - pupils equal and reactive, extraocular eye movements intact, sclera anicteric, Lids without erythema or swelling. No discharge eye redness or discharge noted  Ears - bilateral TM's and external ear canals normal, right ear normal, left ear normal  Mouth - mucous membranes moist, pharynx normal without lesions and tongue normal  Neck - supple, no significant adenopathy  Lymphatics - no palpable lymphadenopathy  Chest - clear to auscultation, no wheezes, rales or rhonchi,   Heart - normal rate, regular rhythm, normal S1, S2, no murmurs, rubs, clicks or gallops  Abdomen - soft, nontender, nondistended, no masses or organomegaly  Back exam - full range of motion, no tenderness, palpable spasm or pain on motion  Neurological - alert, oriented, normal speech, no focal findings or movement disorder noted, neck supple without rigidity, cranial nerves II through XII intact, DTR's normal and symmetric, motor and sensory grossly normal bilaterally  Musculoskeletal - no joint tenderness, deformity or swelling  Extremities - peripheral pulses normal, no pedal edema, no clubbing or cyanosis  Skin - normal coloration and turgor, no rashes, no suspicious skin lesions noted  Physical exam otherwise negative    Assessment/ Plan:   Diagnoses and all orders for this visit:    Preoperative clearance    Cervical disc disease    Cervical stenosis of spinal canal    Cervical radiculopathy    Type 1 diabetes mellitus with stable proliferative retinopathy, unspecified laterality ([address]    Hypercholesterolemia    Essential hypertension        Other instructions: The patient's medications were reviewed and reconciled. No change in his current medical regimen will be made. Recommendations have been made by his endocrinologist reduction of his pump to half the normal insulin given during the perioperative period would recommended perioperative blood sugar checks in order to avoid hypoglycemia.     Patient has been seen by cardiology and cleared for surgery    Patient is medically stable, at low cardiac risk, and cleared for the surgery as scheduled for March 16th pending review of all preoperative labs, etc.    Follow-up here as previously appointed    Follow-up and Dispositions    · Return for As previously scheduled. I have discussed the diagnosis with the patient and the intended plan as seen in the above orders. The patient has received an after-visit summary and questions were answered concerning future plans. Pt conveyed understanding of plan. Megan Cabral MD    Please note that this dictation was completed with [id number], the computer voice recognition software. Quite often unanticipated grammatical, syntax, homophones, and other interpretive errors are inadvertently transcribed by the computer software. Please disregard these errors. Please excuse any errors that have escaped final proofreading.

## 2022-02-28 NOTE — PATIENT INSTRUCTIONS
DASH Diet: Care Instructions  Your Care Instructions     The DASH diet is an eating plan that can help lower your blood pressure. DASH stands for Dietary Approaches to Stop Hypertension. Hypertension is high blood pressure. The DASH diet focuses on eating foods that are high in calcium, potassium, and magnesium. These nutrients can lower blood pressure. The foods that are highest in these nutrients are fruits, vegetables, low-fat dairy products, nuts, seeds, and legumes. But taking calcium, potassium, and magnesium supplements instead of eating foods that are high in those nutrients does not have the same effect. The DASH diet also includes whole grains, fish, and poultry. The DASH diet is one of several lifestyle changes your doctor may recommend to lower your high blood pressure. Your doctor may also want you to decrease the amount of sodium in your diet. Lowering sodium while following the DASH diet can lower blood pressure even further than just the DASH diet alone. Follow-up care is a key part of your treatment and safety. Be sure to make and go to all appointments, and call your doctor if you are having problems. It's also a good idea to know your test results and keep a list of the medicines you take. How can you care for yourself at home? Following the DASH diet  · Eat 4 to 5 servings of fruit each day. A serving is 1 medium-sized piece of fruit, ½ cup chopped or canned fruit, 1/4 cup dried fruit, or 4 ounces (½ cup) of fruit juice. Choose fruit more often than fruit juice. · Eat 4 to 5 servings of vegetables each day. A serving is 1 cup of lettuce or raw leafy vegetables, ½ cup of chopped or cooked vegetables, or 4 ounces (½ cup) of vegetable juice. Choose vegetables more often than vegetable juice. · Get 2 to 3 servings of low-fat and fat-free dairy each day. A serving is 8 ounces of milk, 1 cup of yogurt, or 1 ½ ounces of cheese. · Eat 6 to 8 servings of grains each day.  A serving is 1 slice of bread, 1 ounce of dry cereal, or ½ cup of cooked rice, pasta, or cooked cereal. Try to choose whole-grain products as much as possible. · Limit lean meat, poultry, and fish to 2 servings each day. A serving is 3 ounces, about the size of a deck of cards. · Eat 4 to 5 servings of nuts, seeds, and legumes (cooked dried beans, lentils, and split peas) each week. A serving is 1/3 cup of nuts, 2 tablespoons of seeds, or ½ cup of cooked beans or peas. · Limit fats and oils to 2 to 3 servings each day. A serving is 1 teaspoon of vegetable oil or 2 tablespoons of salad dressing. · Limit sweets and added sugars to 5 servings or less a week. A serving is 1 tablespoon jelly or jam, ½ cup sorbet, or 1 cup of lemonade. · Eat less than 2,300 milligrams (mg) of sodium a day. If you limit your sodium to 1,500 mg a day, you can lower your blood pressure even more. · Be aware that all of these are the suggested number of servings for people who eat 1,800 to 2,000 calories a day. Your recommended number of servings may be different if you need more or fewer calories. Tips for success  · Start small. Do not try to make dramatic changes to your diet all at once. You might feel that you are missing out on your favorite foods and then be more likely to not follow the plan. Make small changes, and stick with them. Once those changes become habit, add a few more changes. · Try some of the following:  ? Make it a goal to eat a fruit or vegetable at every meal and at snacks. This will make it easy to get the recommended amount of fruits and vegetables each day. ? Try yogurt topped with fruit and nuts for a snack or healthy dessert. ? Add lettuce, tomato, cucumber, and onion to sandwiches. ? Combine a ready-made pizza crust with low-fat mozzarella cheese and lots of vegetable toppings. Try using tomatoes, squash, spinach, broccoli, carrots, cauliflower, and onions. ?  Have a variety of cut-up vegetables with a low-fat dip as an appetizer instead of chips and dip. ? Sprinkle sunflower seeds or chopped almonds over salads. Or try adding chopped walnuts or almonds to cooked vegetables. ? Try some vegetarian meals using beans and peas. Add garbanzo or kidney beans to salads. Make burritos and tacos with mashed martinez beans or black beans. Where can you learn more? Go to http://www.roe.com/  Enter H967 in the search box to learn more about \"DASH Diet: Care Instructions. \"  Current as of: April 29, 2021               Content Version: 13.0  © 0182-9087 [employer]. Care instructions adapted under license by [employer] (which disclaims liability or warranty for this information). If you have questions about a medical condition or this instruction, always ask your healthcare professional. [address] any warranty or liability for your use of this information.

## 2022-03-01 ENCOUNTER — HOSPITAL ENCOUNTER (OUTPATIENT)
Dept: PREADMISSION TESTING | Age: 67
Discharge: HOME | End: 2022-03-01
Payer: MEDICARE

## 2022-03-01 VITALS
WEIGHT: 152.34 LBS | SYSTOLIC BLOOD PRESSURE: 128 MMHG | HEART RATE: 61 BPM | DIASTOLIC BLOOD PRESSURE: 78 MMHG | HEIGHT: 72 IN | TEMPERATURE: 97.7 F | BODY MASS INDEX: 20.63 KG/M2

## 2022-03-01 LAB
ABO + RH BLD: NORMAL
ANION GAP SERPL CALC-SCNC: 5 MMOL/L (ref 5–15)
APPEARANCE UR: CLEAR
BACTERIA URNS QL MICRO: NEGATIVE /HPF
BILIRUB UR QL: NEGATIVE
BLOOD GROUP ANTIBODIES SERPL: NORMAL
BUN SERPL-MCNC: 21 MG/DL (ref 6–20)
BUN/CREAT SERPL: 21 (ref 12–20)
CALCIUM SERPL-MCNC: 9.1 MG/DL (ref 8.5–10.1)
CHLORIDE SERPL-SCNC: 105 MMOL/L (ref 97–108)
CO2 SERPL-SCNC: 26 MMOL/L (ref 21–32)
COLOR UR: NORMAL
CREAT SERPL-MCNC: 1 MG/DL (ref 0.7–1.3)
EPITH CASTS URNS QL MICRO: NORMAL /LPF
ERYTHROCYTE [DISTWIDTH] IN BLOOD BY AUTOMATED COUNT: 12.4 % (ref 11.5–14.5)
EST. AVERAGE GLUCOSE BLD GHB EST-MCNC: 97 MG/DL
GLUCOSE SERPL-MCNC: 87 MG/DL (ref 65–100)
GLUCOSE UR STRIP.AUTO-MCNC: NEGATIVE MG/DL
HBA1C MFR BLD: 5 % (ref 4–5.6)
HCT VFR BLD AUTO: 41.5 % (ref 36.6–50.3)
HGB BLD-MCNC: 13.6 G/DL (ref 12.1–17)
HGB UR QL STRIP: NEGATIVE
HYALINE CASTS URNS QL MICRO: NORMAL /LPF (ref 0–5)
INR PPP: 1 (ref 0.9–1.1)
KETONES UR QL STRIP.AUTO: NEGATIVE MG/DL
LEUKOCYTE ESTERASE UR QL STRIP.AUTO: NEGATIVE
MCH RBC QN AUTO: 32.9 PG (ref 26–34)
MCHC RBC AUTO-ENTMCNC: 32.8 G/DL (ref 30–36.5)
MCV RBC AUTO: 100.5 FL (ref 80–99)
NITRITE UR QL STRIP.AUTO: NEGATIVE
NRBC # BLD: 0 K/UL (ref 0–0.01)
NRBC BLD-RTO: 0 PER 100 WBC
PH UR STRIP: 8 [PH] (ref 5–8)
PLATELET # BLD AUTO: 189 K/UL (ref 150–400)
PMV BLD AUTO: 10.8 FL (ref 8.9–12.9)
POTASSIUM SERPL-SCNC: 4 MMOL/L (ref 3.5–5.1)
PROT UR STRIP-MCNC: NEGATIVE MG/DL
PROTHROMBIN TIME: 10.8 SEC (ref 9–11.1)
RBC # BLD AUTO: 4.13 M/UL (ref 4.1–5.7)
RBC #/AREA URNS HPF: NORMAL /HPF (ref 0–5)
SODIUM SERPL-SCNC: 136 MMOL/L (ref 136–145)
SP GR UR REFRACTOMETRY: 1.01 (ref 1–1.03)
SPECIMEN EXP DATE BLD: NORMAL
UA: UC IF INDICATED,UAUC: NORMAL
UROBILINOGEN UR QL STRIP.AUTO: 1 EU/DL (ref 0.2–1)
WBC # BLD AUTO: 5.7 K/UL (ref 4.1–11.1)
WBC URNS QL MICRO: NORMAL /HPF (ref 0–4)

## 2022-03-01 PROCEDURE — 86900 BLOOD TYPING SEROLOGIC ABO: CPT

## 2022-03-01 PROCEDURE — 36415 COLL VENOUS BLD VENIPUNCTURE: CPT

## 2022-03-01 PROCEDURE — 85027 COMPLETE CBC AUTOMATED: CPT

## 2022-03-01 PROCEDURE — 83036 HEMOGLOBIN GLYCOSYLATED A1C: CPT

## 2022-03-01 PROCEDURE — 81001 URINALYSIS AUTO W/SCOPE: CPT

## 2022-03-01 PROCEDURE — 85610 PROTHROMBIN TIME: CPT

## 2022-03-01 PROCEDURE — 80048 BASIC METABOLIC PNL TOTAL CA: CPT

## 2022-03-01 RX ORDER — BISMUTH SUBSALICYLATE 262 MG
1 TABLET,CHEWABLE ORAL DAILY
COMMUNITY

## 2022-03-01 NOTE — PERIOP NOTES
[address]  ORTHOPAEDIC    Surgery Date:   03/16/2022    [facility] staff will call you between 4 PM- 8 PM the day before surgery with your arrival time. If your surgery is on a Monday, we will call you the preceding Friday. Please call [phone] after 8 PM if you did not receive your arrival time. 1. Please report to [facility] Patient Access/Admitting on the 1st floor. Bring your insurance card, photo identification, and any copayment (if applicable). 2. If you are going home the same day of your surgery, you must have a responsible adult to drive you home. You need to have a responsible adult to stay with you the first 24 hours after surgery and you should not drive a car for 24 hours following your surgery. 3. Do NOT eat any solid foods after midnight the night before surgery including candy, mints or gum. You may drink clear liquids from midnight until 1 hour prior to arrival time. You may drink up to 12 ounces at one time every 4 hours. 4. Do NOT drink alcohol or smoke 24 hours before surgery. STOP smoking for 14 days prior as it helps with breathing and healing after surgery. 5. If your arrival time is 3pm or later, you may eat a light breakfast before 8am (toast, bagel-no butter, black coffee, plain tea, fruit juice-no pulp) Please note special instructions, if applicable, below for medications. 6. If you are being admitted to the hospital,please leave personal belongings/luggage in your car until you have an assigned hospital room number. 7. Please wear comfortable clothes. Wear your glasses instead of contacts. We ask that all money, jewelry and valuables be left at home. Wear no make up, particularly mascara, the day of surgery. 8.  All body piercings, rings, and jewelry need to be removed and left at home. Please remove any nail polish or artificial nails from your fingernails. Please wear your hair loose or down.  Please no pony-tails, buns, or any metal hair accessories. If you shower the morning of surgery, please do not apply any lotions or powders afterwards. You may wear deodorant. Do not shave any body area within 24 hours of your surgery. 9. Please follow all instructions to avoid any potential surgical cancellation. 10. Should your physical condition change, (i.e. fever, cold, flu, etc.) please notify your surgeon as soon as possible. 11. It is important to be on time. If a situation occurs where you may be delayed, please call:  [phone] / [phone] on the day of surgery. 12. The Preadmission Testing staff can be reached at [phone]. 13. Special instructions: NONE    Current Outpatient Medications   Medication Sig    multivitamin (ONE A DAY) tablet Take 1 Tablet by mouth daily.  ascorbic acid (VITAMIN C PO) Take  by mouth daily.  pregabalin (LYRICA) 300 mg capsule Take 300 mg by mouth two (2) times a day.  lisinopriL (PRINIVIL, ZESTRIL) 20 mg tablet TAKE 1 TABLET TWICE A DAY (Patient taking differently: two (2) times a day.)    pravastatin (PRAVACHOL) 40 mg tablet TAKE 1 TABLET DAILY (Patient taking differently: Every morning. TAKE 1 TABLET DAILY)    insulin lispro (HumaLOG U-100 Insulin) 100 unit/mL injection USE VIA INSULIN PUMP    glucose blood VI test strips (Accu-Chek Elizabet Plus test strp) strip Use to check blood sugars 7 times per day. 7 boxes of 100 per box for 90d supply. Dx code P16.2617 Patient is an insulin dependant diabetic    lancets misc Use to check blood sugars 7 times per day. 7 boxes of 100 per box for 90d supply. Dx code E10.4842 Patient is an insulin dependant diabetic     insulin pump (PATIENT SUPPLIED) Misc by SubCUTAneous route as needed. NOVALOG INSULIN     No current facility-administered medications for this encounter. 1. YOU MUST ONLY TAKE THESE MEDICATIONS THE MORNING OF SURGERY WITH A SIP OF WATER: PREGABALIN, PRAVACHOL,   2.  MEDICATIONS TO TAKE THE MORNING OF SURGERY ONLY IF NEEDED: NONE  3. HOLD these prescription medications BEFORE Surgery: NONE  4. Ask your surgeon/prescribing physician about when/if to STOP taking these medications: ENDOCRINOLOGIST-INSULIN  5. Stop any non-steroidal anti-inflammatory drugs (i.e. Ibuprofen, Naproxen, Advil, Aleve) 3 days before surgery. You may take Tylenol. STOP all vitamins and herbal supplements 1 week prior to  surgery. 6. If you are currently taking Plavix, Coumadin, or any other blood-thinning/anticoagulant medication contact your prescribing physician for instructions. Preventing Infections Before and After - Your Surgery    IMPORTANT INSTRUCTIONS    You play an important role in your health and preparation for surgery. To reduce the germs on your skin you will need to shower with CHG soap (Chorhexidine gluconate 4%) two times before surgery. CHG soap (Hibiclens, Hex-A-Clens or store brand)   CHG soap will be provided at your Preadmission Testing (PAT) appointment.  If you do not have a PAT appointment before surgery, you may arrange to  CHG soap from our office or purchase CHG soap at a pharmacy, grocery or department store.  You need to purchase TWO 4 ounce bottles to use for your 2 showers. Steps to follow:  1. Wash your hair with your normal shampoo and your body with regular soap and rinse well to remove shampoo and soap from your skin. 2. Wet a clean washcloth and turn off the shower. 3. Put CHG soap on washcloth and apply to your entire body from the neck down. Do not use on your head, face or private parts(genitals). Do not use CHG soap on open sores, wounds or areas of skin irritation. 4. Wash you body gently for 5 minutes. Do not wash your skin too hard. This soap does not create lather. Pay special attention to your underarms and from your belly button to your feet. 5. Turn the shower back on and rinse well to get CHG soap off your body. 6. Pat your skin dry with a clean, dry towel.  Do not apply lotions or moisturizer. 7. Put on clean clothes and sleep on fresh bed sheets and do not allow pets to sleep with you. Shower with CHG soap 2 times before your surgery   The evening before your surgery   The morning of your surgery      Tips to help prevent infections after your surgery:  1. Protect your surgical wound from germs:  ? Hand washing is the most important thing you and your caregivers can do to prevent infections. ? Keep your bandage clean and dry! ? Do not touch your surgical wound. 2. Use clean, freshly washed towels and washcloths every time you shower; do not share bath linens with others. 3. Until your surgical wound is healed, wear clothing and sleep on bed linens each day that are clean and freshly washed. 4. Do not allow pets to sleep in your bed with you or touch your surgical wound. 5. Do not smoke - smoking delays wound healing. This may be a good time to stop smoking. 6. If you have diabetes, it is important for you to manage your blood sugar levels properly before your surgery as well as after your surgery. Poorly managed blood sugar levels slow down wound healing and prevent you from healing completely. Prevention of Infection  Testing for Staphylococcus aureus on your skin before surgery    Staphylococcus aureus (staph) is a common bacteria that is found on the body. It normally does not cause infection on healthy skin. Before surgery, you will be tested to see if you have staph by swabbing the inside of your nose. When you have an incision with surgery, the goal is to protect that incision from infection. Removal of the staph bacteria before surgery can decrease the risk of a surgical site infection. If your nose swab is positive for staph you will be called. Your treatment will include 2 steps:   Prescription for Mupirocin ointment to be used in each nostril twice a day for 5 days.  Showering with Chlorhexidine (CHG) liquid soap for 5 days prior to surgery.     How to use Mupirocin ointment in your nose  1.  the prescription from your pharmacy. You will receive a large tube of ointment which will be big enough for all of your treatments. You will apply this ointment to each nostril 2 times a day for 5 days. 2. Wash your hands with  gel or soap and water for 20 seconds before using ointment. 3. Place a pea-sized amount of ointment on a cotton Q-tip. 4. Apply ointment just inside of each nostril with the Q-tip. Do not push Q-tip or ointment deep inside you nose. 5. Press your nostrils together and massage for a few seconds. 6. Wash your hands with  gel or soap and water after you are finished. 7. Do not get ointment near your eyes. If it gets into your eyes, rinse them with cool water. 8. If you need to use nasal spray, clean the tip of the bottle with alcohol before use and do not use both at the same time. 9. If you are scheduled for COVID testing during the 5 days, do NOT apply morning dose until after the COVID test has been performed. How to use Chlorhexidine (CHG) 4% liquid soap  1. Purchase an 8 ounce bottle of CHG liquid soap (Chlorhexidine 4%, Hibiclens, Hex-A-Clens or store brand) at a pharmacy or grocery store. 2. Wash your hair with your normal shampoo and your body with regular soap and rinse well to remove shampoo and soap from your skin. 3. Wet a clean washcloth and turn off the shower. 4. Put CHG soap on washcloth and apply to your entire body from the neck down. Do not use on your head, face or private parts(genitals). Do not use CHG soap on open sores, wounds or areas of skin irritation. 5. Wash your body gently for 5 minutes. Do not wash your skin too hard. This soap does not create lather. Pay special attention to your underarms and from your belly button to your feet. 6. Turn the shower back on and rinse well to get CHG soap off your body. 7. Pat your skin dry with a clean, dry towel.  Do not apply lotions or moisturizer. 8. Put on clean clothes and sleep on fresh bed sheets the night before surgery. Do not allow pets to sleep with you. Eating and Drinking Before Surgery     You may eat a regular dinner at the usual time on the day before your surgery.  Do NOT eat any solid foods after midnight unless your arrival time at the hospital is 3pm or later.  You may drink clear liquids only from 12 midnight until 1 hours prior to your arrival time at the hospital on the day of your surgery. Do NOT drink alcohol.  Clear liquids include:  o Water  o Fruit juices without pulp( i.e. apple juice)  o Carbonated beverages  o Black coffee (no cream/milk)  o Tea (no cream/milk)  o Gatorade   You may drink up to 12-16 ounces at one time every 4 hours between the hours of midnight and 1 hour before your arrival time at the hospital. Example- if your arrival time at the hospital is 6am, you may drink 12-16 ounces of clear liquids no later than 5am.   If your arrival time at the hospital is 3pm or later, you may eat a light breakfast before 8am.   A light breakfast includes:  o Toast or bagel (no butter)  o Black coffee (no cream/milk)  o Tea (no cream/milk)  o Fruit juices without pulp ( i.e. apple juice)  o Do NOT eat meat, eggs, vegetables or fruit   If you have any questions, please contact your surgeon's office. Good Jehovah's witness   Instructions for Pre-Surgery COVID-19 Testing     Across our ministry we have established standard guidelines to ensure the health and safety of our patients, residents and associates as we resume elective services for patients. All patients presenting for surgery are required to have a COVID-19 test result within 96 hours of their scheduled surgery. [employer] is providing this test free of charge to the patient.    Instructions for COVID-19 Testing:     Patients will receive a call from Pre-Admission Testing 4-5 days prior to surgery to schedule a date and time to come to the [facility] [address] for their COVID-19 test   Patients are advised to self-quarantine after testing until their scheduled surgery   Once on site, patients will be registered and receive COVID test in their vehicle   If a patient is scheduled for normal Pre Admission Testing 96 hours from date of surgery, the patient will still have their COVID test done at the [address] [place] [address] located at [address] [place] [address] Positive results will be shared with the surgeon and anesthesiologist and may result in cancellation of the elective procedure    Testing Hours and Location:   Address:  [address] [place] [address] Admission 11 [facility] in the Discharge Lot on [address] [place] [address] (Map Attached)  Erlin Brooks [address], [address]   Hours: Monday- Friday 7a-3p    PAT Phone Number: [phone]            Patient Information Regarding COVID Restrictions    Patients are advised to self-quarantine after COVID testing up to the day of the scheduled procedure. Day of Procedure     Please park in the parking deck or any designated visitor parking lot.  Enter the facility through the Main Entrance of the hospital.   A temperature check and appropriate symptom/exposure screening will be done prior to entry to the facility.  On the day of surgery, please provide the cell phone number of the person who will be waiting for you to the Patient Access representative at the time of registration.  Please wear a mask on the day of your procedure.  We are now allowing one designated visitor per stay. Pediatric patients may have 2 designated visitors. This one person may come in with you on the day of your procedure.  No visitors under the age of 13.  The designated visitor must also wear a mask.    Once your procedure and the immediate recovery period is completed, a nurse in the recovery area will contact your designated visitor to inform them of your room number or to otherwise review other pertinent information regarding your care.  Social distancing practices are to be adhered to in waiting areas and the cafeteria. The patient was contacted in person. He verbalized understanding of all instructions does not  need reinforcement.

## 2022-03-02 LAB
BACTERIA SPEC CULT: NORMAL
BACTERIA SPEC CULT: NORMAL
SERVICE CMNT-IMP: NORMAL

## 2022-03-02 NOTE — PERIOP NOTES
PAT Nurse Practitioner   Pre-Operative Chart Review/Assessment:-ORTHOPEDIC/NEUROSURGICAL SPINE                Patient Name:  Francia Cortes                                                           Age:   77 y.o.    :  1955     Today's Date:  3/3/2022     Date of PAT:   3/1/2022      Date of Surgery:    3/16/2022      Procedure(s):  C5-C7 ACDF FAST TRACK     Surgeon:   Dr. Mickey Sauer                       PLAN:       1)  PCP: Dr. Candi Aviles        2)  Cardiac Clearance: Pt followed by Dr. Dewitt. LOV 22. Clearance obtained. OV note, ECHO and EKG in CC. 3)  Diabetic Treatment Consult: Not indicated. A1c-5.0       4)  Sleep Apnea evaluation:  Not indicated.  RICKY Score 3.       5)  Treatment for MRSA/Staph Aureus: Neg       6)  Additional Concerns: Former smoker, EtOH:3 glasses of wine daily, POUR, HTN, IDDM, hx of anemia(hgb-13.6 on PAT labs), hx of endocarditis              Vital Signs:         Vitals:    22 1550   BP: 128/78   Pulse: 61   Temp: 97.7 °F (36.5 °C)   Weight: 69.1 kg (152 lb 5.4 oz)   Height: 6' (1.829 m)            ____________________________________________  PAST MEDICAL HISTORY  Past Medical History:   Diagnosis Date    Acute bacterial endocarditis 2021    Adverse effect of anesthesia     DIFFICULTY URINATING AFTER SURGERY, HAD TO BE CATHED    Anemia     Arthritis     COVID-19 vaccine series completed     PFIZER 3-18-21 AND 21    Essential hypertension 2017    Hypercholesterolemia 2017    Ileitis 2017    Long-term use of high-risk medication 2017    Microalbuminuria 2017    Type I diabetes mellitus with proliferative retinopathy ([address] 2017    INSULIN    Valvular heart disease       ____________________________________________  PAST SURGICAL HISTORY  Past Surgical History:   Procedure Laterality Date    COLONOSCOPY N/A 2022    COLONOSCOPY performed by S[facility]osh [facility] at [facility] ENDOSCOPY    COLONOSCOPY,REMV TASHA,SNARE  2022         HX LUMBAR LAMINECTOMY      HX ORTHOPAEDIC      RIGHT KNEE OPEN SURGERY    HX TONSILLECTOMY       ____________________________________________  HOME MEDICATIONS    Current Outpatient Medications   Medication Sig    multivitamin (ONE A DAY) tablet Take 1 Tablet by mouth daily.  ascorbic acid (VITAMIN C PO) Take  by mouth daily.  pregabalin (LYRICA) 300 mg capsule Take 300 mg by mouth two (2) times a day.  lisinopriL (PRINIVIL, ZESTRIL) 20 mg tablet TAKE 1 TABLET TWICE A DAY (Patient taking differently: two (2) times a day.)    pravastatin (PRAVACHOL) 40 mg tablet TAKE 1 TABLET DAILY (Patient taking differently: Every morning. TAKE 1 TABLET DAILY)    insulin lispro (HumaLOG U-100 Insulin) 100 unit/mL injection USE VIA INSULIN PUMP    glucose blood VI test strips (Accu-Chek Elizabet Plus test strp) strip Use to check blood sugars 7 times per day. 7 boxes of 100 per box for 90d supply. Dx code N98.3027 Patient is an insulin dependant diabetic    lancets misc Use to check blood sugars 7 times per day. 7 boxes of 100 per box for 90d supply. Dx code V89.2275 Patient is an insulin dependant diabetic     insulin pump (PATIENT SUPPLIED) Misc by SubCUTAneous route as needed. NOVALOG INSULIN     No current facility-administered medications for this encounter.      ____________________________________________  ALLERGIES  Allergies   Allergen Reactions    Oxycodone-Acetaminophen Other (comments)     AGGRESIVE BEHAVIOR  Other reaction(s): Other (see comments)  AGGRESIVE BEHAVIOR      ____________________________________________  SOCIAL HISTORY  Social History     Tobacco Use    Smoking status: Former Smoker     Packs/day: 1.00     Years: 5.00     Pack years: 5.00     Quit date: 1975     Years since quittin.1    Smokeless tobacco: Never Used   Substance Use Topics    Alcohol use:  Yes     Alcohol/week: 3.0 standard drinks     Types: 3 Glasses of wine per week     Comment: 3 glasses of wine a night      ____________________________________________   Internal Administration   First Dose COVID-19, Pfizer Purple top, DILUTE for use, 12+ yrs, 30mcg/0.3mL dose  03/18/2021   Second Dose COVID-19, Pfizer Purple top, DILUTE for use, 12+ yrs, 30mcg/0.3mL dose  04/08/2021      Last COVID Lab   SARS-CoV-2 ( )   Date Value   03/11/2022 Not detected        ____________________________________________    Labs:     Hospital Outpatient Visit on 03/01/2022   Component Date Value Ref Range Status    Sodium 03/01/2022 136  136 - 145 mmol/L Final    Potassium 03/01/2022 4.0  3.5 - 5.1 mmol/L Final    Chloride 03/01/2022 105  97 - 108 mmol/L Final    CO2 03/01/2022 26  21 - 32 mmol/L Final    Anion gap 03/01/2022 5  5 - 15 mmol/L Final    Glucose 03/01/2022 87  65 - 100 mg/dL Final    BUN 03/01/2022 21* 6 - 20 MG/DL Final    Creatinine 03/01/2022 1.00  0.70 - 1.30 MG/DL Final    BUN/Creatinine ratio 03/01/2022 21* 12 - 20   Final    GFR est AA 03/01/2022 >60  >60 ml/min/1.73m2 Final    GFR est non-AA 03/01/2022 >60  >60 ml/min/1.73m2 Final    Estimated GFR is calculated using the IDMS-traceable Modification of Diet in Renal Disease (MDRD) Study equation, reported for both  Americans (GFRAA) and non- Americans (GFRNA), and normalized to 1.73m2 body surface area. The physician must decide which value applies to the patient.     Calcium 03/01/2022 9.1  8.5 - 10.1 MG/DL Final    WBC 03/01/2022 5.7  4.1 - 11.1 K/uL Final    RBC 03/01/2022 4.13  4.10 - 5.70 M/uL Final    HGB 03/01/2022 13.6  12.1 - 17.0 g/dL Final    HCT 03/01/2022 41.5  36.6 - 50.3 % Final    MCV 03/01/2022 100.5* 80.0 - 99.0 FL Final    MCH 03/01/2022 32.9  26.0 - 34.0 PG Final    MCHC 03/01/2022 32.8  30.0 - 36.5 g/dL Final    RDW 03/01/2022 12.4  11.5 - 14.5 % Final    PLATELET 14/13/4526 063  150 - 400 K/uL Final    MPV 03/01/2022 10.8  8.9 - 12.9 FL Final    NRBC 03/01/2022 0.0  0  WBC Final    ABSOLUTE NRBC 03/01/2022 0.00  0.00 - 0.01 K/uL Final    Crossmatch Expiration 03/01/2022 03/15/2022,2359   Final    ABO/Rh(D) 03/01/2022 A NEGATIVE   Final    Antibody screen 03/01/2022 NEG   Final    INR 03/01/2022 1.0  0.9 - 1.1   Final    A single therapeutic range for Vit K antagonists may not be optimal for all indications - see June, 2008 issue of Chest, American College of Chest Physicians Evidence-Based Clinical Practice Guidelines, 8th Edition.  Prothrombin time 03/01/2022 10.8  9.0 - 11.1 sec Final    Color 03/01/2022 YELLOW/STRAW    Final    Color Reference Range: Straw, Yellow or Dark Yellow    Appearance 03/01/2022 CLEAR  CLEAR   Final    Specific gravity 03/01/2022 1.013  1.003 - 1.030   Final    pH (UA) 03/01/2022 8.0  5.0 - 8.0   Final    Protein 03/01/2022 Negative  NEG mg/dL Final    Glucose 03/01/2022 Negative  NEG mg/dL Final    Ketone 03/01/2022 Negative  NEG mg/dL Final    Bilirubin 03/01/2022 Negative  NEG   Final    Blood 03/01/2022 Negative  NEG   Final    Urobilinogen 03/01/2022 1.0  0.2 - 1.0 EU/dL Final    Nitrites 03/01/2022 Negative  NEG   Final    Leukocyte Esterase 03/01/2022 Negative  NEG   Final    UA:UC IF INDICATED 03/01/2022 CULTURE NOT INDICATED BY UA RESULT  CNI   Final    WBC 03/01/2022 0-4  0 - 4 /hpf Final    RBC 03/01/2022 0-5  0 - 5 /hpf Final    Epithelial cells 03/01/2022 FEW  FEW /lpf Final    Epithelial cell category consists of squamous cells and /or transitional urothelial cells. Renal tubular cells, if present, are separately identified as such.     Bacteria 03/01/2022 Negative  NEG /hpf Final    Hyaline cast 03/01/2022 0-2  0 - 5 /lpf Final    Hemoglobin A1c 03/01/2022 5.0  4.0 - 5.6 % Final    Comment: NEW METHOD  PLEASE NOTE NEW REFERENCE RANGE  (NOTE)  HbA1C Interpretive Ranges  <5.7              Normal  5.7 - 6.4         Consider Prediabetes  >6.5              Consider Diabetes      Est. average glucose 03/01/2022 97  mg/dL Final    Special Requests: 03/01/2022 NO SPECIAL REQUESTS    Final    Culture result: 03/01/2022 MRSA NOT PRESENT    Final       Skin:     Denies open wounds, cuts, sores, rashes or other areas of concern in PAT assessment.         Dougie Mask, NP

## 2022-03-09 NOTE — H&P
Amado Ochoa (: 1955) is a 77 y.o. male, patient, here for evaluation of the following chief complaint(s):  Neck Pain        ASSESSMENT/PLAN:     Below is the assessment and plan developed based on review of pertinent history, physical exam, labs, studies, and medications.     At this point he has mainly a right-sided C6 and C7 radiculopathy. The pain is worse with lying flat at night. Injections helped great for about 3 to 4 days each. We discussed treatment options. He is next and candidate for an ACDF at C5-6 and C6-7. Risk and benefits were discussed with him.     The risks and benefits were discussed at length with the patient and the patient has elected to proceed. Indications for surgery include failed conservative treatment. Alternative treatments, risks and the perioperative course were discussed with the patient. All questions were answered. The risks and benefits of the procedure were explained. Benefits include definitive diagnosis, relief of pain, elimination of deformity and improved function. Risks of surgery including bleeding, infection, weakness, numbness, CSF leak, failure to improve symptoms, exacerbation of medical co-morbidities and even death were discussed with the patient.         1. Cervical disc disease  2. Cervical stenosis of spinal canal        No follow-ups on file.        SUBJECTIVE/OBJECTIVE:  Amado Ochoa (: 1955) is a 77 y.o. male.     No flowsheet data found. He comes in today for follow-up of ongoing neck pain and right arm pain. There is numbness and tingling in the right hand as well as pain. This developed in the last 6 months. He has had no trauma. He denies any bowel bladder difficulties. He does note some weakness. The pain is worsening despite oral Neurontin that has been started by his primary care physician.   He also has some mechanical lower back pain and occasionally some right thigh discomfort but that is not his main complaint today.     Since his last visit he has had two rounds of injections I gave him great relief for about 6 days. The pain is worse at night in particular when he is laying flat. He denies any new symptoms. The pain is affecting his activities of daily living     Imaging:              XR Results (most recent):  Results from Appointment encounter on 12/28/21     XR SPINE CERV 4 OR 5 V     Narrative  AP and lateral flexion-extension of the cervical spine demonstrates moderate spondylosis C5-6 and C6-7. No instability is noted. Degenerative narrowing is noted. No fractures or lytic lesions.        MRI Results (most recent):  Results from Appointment encounter on 01/10/22     MRI CERV SPINE WO CONT     Narrative  EXAM:  MRI CERV SPINE WO CONT     INDICATION:   Neck pain.     COMPARISON: Cervical spine radiograph 12/28/2021     TECHNIQUE: Multiplanar multisequence acquisition without contrast of the  cervical spine.     CONTRAST: None.     FINDINGS:  There is normal alignment of the cervical spine. Vertebral body heights are  maintained without evidence of acute fracture. There is degenerative endplate  marrow edema at C5-C6 (Modic type I). Marrow signal is otherwise heterogeneous,  but within normal limits. Multilevel degenerative disc disease as detailed  below, most advanced at C5-C6 and C6-C7. The cervical cord is normal in size and  signal. Region of the foramen magnum is unremarkable. Visualized soft tissues  are unremarkable.     C2-C3: No significant disc herniation, spinal canal or neural foraminal  stenosis.     C3-C4: Diffuse disc osteophyte complex with bilateral uncovertebral spurring. No  significant spinal canal stenosis. Mild bilateral neural foraminal stenosis.     C4-C5: Diffuse disc osteophyte complex. Mild left facet arthropathy. No  significant spinal canal or foraminal stenosis.     C5-C6: Diffuse disc osteophyte complex with bilateral uncovertebral spurring,  right worse than left.  Mild spinal canal stenosis. Moderate to severe right and  mild left neural foraminal stenosis.     C6-C7: Diffuse disc osteophyte complex with bilateral uncovertebral spurring. Mild spinal canal stenosis. Moderate bilateral neural foraminal stenosis.     C7-T1: Small left paracentral disc protrusion. Mild bilateral facet arthropathy. No significant spinal canal or neural foraminal stenosis.     Impression  1. Mild spinal canal stenosis and moderate to severe right neural foraminal  stenosis at C5-C6. 2. Mild spinal canal stenosis and moderate bilateral neural foraminal stenosis  at C6-C7. 3. Remaining degenerative changes as detailed above.                 Allergies   Allergen Reactions    Oxycodone-Acetaminophen Other (comments)       AGGRESIVE BEHAVIOR  Other reaction(s): Other (see comments)  AGGRESIVE BEHAVIOR         Current Outpatient Medications   Medication Sig    lisinopriL (PRINIVIL, ZESTRIL) 20 mg tablet TAKE 1 TABLET TWICE A DAY    pravastatin (PRAVACHOL) 40 mg tablet TAKE 1 TABLET DAILY    pregabalin (LYRICA) 300 mg capsule Take 1 Capsule by mouth daily. Max Daily Amount: 300 mg.  tamsulosin (Flomax) 0.4 mg capsule Take 0.4 mg by mouth nightly.  insulin lispro (HumaLOG U-100 Insulin) 100 unit/mL injection USE VIA INSULIN PUMP    glucose blood VI test strips (Accu-Chek Elizabet Plus test strp) strip Use to check blood sugars 7 times per day. 7 boxes of 100 per box for 90d supply. Dx code T34.8789 Patient is an insulin dependant diabetic    lancets misc Use to check blood sugars 7 times per day. 7 boxes of 100 per box for 90d supply. Dx code W72.2348 Patient is an insulin dependant diabetic     insulin pump (PATIENT SUPPLIED) Misc by SubCUTAneous route as needed. NOVALOG INSULIN    multivitamin (ONE A DAY) tablet Take 1 Tab by mouth daily.  ascorbic acid (VITAMIN C) 500 mg tablet Take 500 mg by mouth daily.  pregabalin (Lyrica) 150 mg capsule Take 1 Capsule by mouth three (3) times daily.  Max Daily Amount: 450 mg. (Patient not taking: Reported on 2022)    gabapentin (NEURONTIN) 300 mg capsule Take 2 Capsules by mouth three (3) times daily. Max Daily Amount: 1,800 mg. (Patient not taking: Reported on 2022)      No current facility-administered medications for this visit.              Past Medical History:   Diagnosis Date    Anemia      Arthritis      COVID-19 vaccine series completed       PFIZER 3-18-21 AND 21    Diabetes [facility])      Essential hypertension 2017    Hypercholesterolemia 2017    Hypertension      Ileitis 2017    Long-term use of high-risk medication 2017    Microalbuminuria 2017    Type I diabetes mellitus with proliferative retinopathy ([facility] [address] 2017               Past Surgical History:   Procedure Laterality Date    COLONOSCOPY N/A 2022     COLONOSCOPY performed by Nicanor Starkey MD at hospitals ENDOSCOPY    COLONOSCOPY,KAYLEEN CORDOVA,SNARE   2022          HX LUMBAR LAMINECTOMY        HX ORTHOPAEDIC         RIGHT KNEE OPEN SURGERY    HX TONSILLECTOMY                   Family History   Problem Relation Age of Onset    Cancer Mother           LUNG    Cancer Father           LUNG    No Known Problems Sister      Anesth Problems Neg Hx           Social History            Tobacco Use    Smoking status: Former Smoker       Packs/day: 1.00       Years: 5.00       Pack years: 5.00       Quit date: 1975       Years since quittin.3    Smokeless tobacco: Never Used   Vaping Use    Vaping Use: Never used   Substance Use Topics    Alcohol use: Yes       Alcohol/week: 3.0 standard drinks       Types: 3 Glasses of wine per week       Comment: 3 glasses of wine a night    Drug use: No         Review of Systems   Musculoskeletal: Positive for myalgias, neck pain and neck stiffness.    All other systems reviewed and are negative.           Vitals:  Ht 6' (1.829 m)   Wt 155 lb (70.3 kg)   BMI 21.02 kg/m²    Body mass index is 21.02 kg/m².     Ortho Exam         Alert and Pinckard  x 3     Normal gait and station; normal posture     No assistive devices today.     Lumbar spine:  Examination of the lumbar spine demonstrates no tenderness on palpation, no pain, no swelling or edema, with normal lumbar range of motion.     Thoracic spine: Examination of the thoracic spine demonstrates no tenderness on palpation, no pain, no swelling or edema. Normal sensation and range of motion. Cervical spine:      Examination of the cervical spine demonstrates on inspection: No abnormal cutaneous markings. No shoulder asymmetry. No masses.     On palpation: Tenderness on palpation of the right superior shoulder and posterior scapular region     Range of motion: Slow range of motion is noted with some decreased forward flexion     Motor examination:  Right deltoid 5/5,  left deltoid 5/5, right bicep 5/5, left bicep 5/5, right wrist extensor 3/5, left wrist extensor 5/5, right triceps 5/5, left triceps 5/5, right intrinsics 5/5, left intrinsics     Sensory examination: Reveals decreased sensation along C6 and C7 dermatome     Reflexes: Left bicep 2/2, left bicep 2/2, right triceps 1/2, left triceps 2/2     Functional testing: Spurling's exam is positive on the right; upper limb tension test is positive on the right     Watson's signs negative bilaterally.               An electronic signature was used to authenticate this note.   -- Julianne Osman MD

## 2022-03-10 ENCOUNTER — TELEPHONE (OUTPATIENT)
Dept: ORTHOPEDIC SURGERY | Age: 67
End: 2022-03-10

## 2022-03-10 NOTE — TELEPHONE ENCOUNTER
Bethany Galvin, called to states he is taking his gabapentin 3 times a day and not the twice a day. He inquired about taking the gabapentin prior to surgery.

## 2022-03-11 ENCOUNTER — TRANSCRIBE ORDER (OUTPATIENT)
Dept: REGISTRATION | Age: 67
End: 2022-03-11

## 2022-03-11 ENCOUNTER — HOSPITAL ENCOUNTER (OUTPATIENT)
Dept: PREADMISSION TESTING | Age: 67
Discharge: HOME | End: 2022-03-11
Attending: ORTHOPAEDIC SURGERY
Payer: MEDICARE

## 2022-03-11 DIAGNOSIS — U07.1 COVID-19: Primary | ICD-10-CM

## 2022-03-11 DIAGNOSIS — U07.1 COVID-19: ICD-10-CM

## 2022-03-11 PROCEDURE — U0005 INFEC AGEN DETEC AMPLI PROBE: HCPCS

## 2022-03-12 LAB
SARS-COV-2, XPLCVT: NOT DETECTED
SOURCE, COVRS: NORMAL

## 2022-03-15 ENCOUNTER — ANESTHESIA EVENT (OUTPATIENT)
Dept: SURGERY | Age: 67
End: 2022-03-15
Payer: MEDICARE

## 2022-03-16 ENCOUNTER — APPOINTMENT (OUTPATIENT)
Dept: GENERAL RADIOLOGY | Age: 67
End: 2022-03-16
Attending: ORTHOPAEDIC SURGERY
Payer: MEDICARE

## 2022-03-16 ENCOUNTER — HOSPITAL ENCOUNTER (OUTPATIENT)
Age: 67
Discharge: HOME | End: 2022-03-17
Attending: ORTHOPAEDIC SURGERY | Admitting: ORTHOPAEDIC SURGERY
Payer: MEDICARE

## 2022-03-16 ENCOUNTER — ANESTHESIA (OUTPATIENT)
Dept: SURGERY | Age: 67
End: 2022-03-16
Payer: MEDICARE

## 2022-03-16 DIAGNOSIS — Z98.1 S/P CERVICAL SPINAL FUSION: Primary | ICD-10-CM

## 2022-03-16 DIAGNOSIS — M48.02 CERVICAL STENOSIS OF SPINE: ICD-10-CM

## 2022-03-16 DIAGNOSIS — E10.65 HYPERGLYCEMIA DUE TO TYPE 1 DIABETES MELLITUS (HCC): ICD-10-CM

## 2022-03-16 LAB
ABO + RH BLD: NORMAL
BLOOD GROUP ANTIBODIES SERPL: NORMAL
GLUCOSE BLD STRIP.AUTO-MCNC: 154 MG/DL (ref 65–117)
GLUCOSE BLD STRIP.AUTO-MCNC: 167 MG/DL (ref 65–117)
GLUCOSE BLD STRIP.AUTO-MCNC: 186 MG/DL (ref 65–117)
GLUCOSE BLD STRIP.AUTO-MCNC: 209 MG/DL (ref 65–117)
GLUCOSE BLD STRIP.AUTO-MCNC: 230 MG/DL (ref 65–117)
SERVICE CMNT-IMP: ABNORMAL
SPECIMEN EXP DATE BLD: NORMAL

## 2022-03-16 PROCEDURE — 77030012893

## 2022-03-16 PROCEDURE — 77030035236 HC SUT PDS STRATFX BARB J&J -B: Performed by: ORTHOPAEDIC SURGERY

## 2022-03-16 PROCEDURE — 74011250636 HC RX REV CODE- 250/636: Performed by: NURSE ANESTHETIST, CERTIFIED REGISTERED

## 2022-03-16 PROCEDURE — 77030040922 HC BLNKT HYPOTHRM STRY -A

## 2022-03-16 PROCEDURE — 82962 GLUCOSE BLOOD TEST: CPT

## 2022-03-16 PROCEDURE — C1889 IMPLANT/INSERT DEVICE, NOC: HCPCS | Performed by: ORTHOPAEDIC SURGERY

## 2022-03-16 PROCEDURE — C1713 ANCHOR/SCREW BN/BN,TIS/BN: HCPCS | Performed by: ORTHOPAEDIC SURGERY

## 2022-03-16 PROCEDURE — P9045 ALBUMIN (HUMAN), 5%, 250 ML: HCPCS | Performed by: ANESTHESIOLOGY

## 2022-03-16 PROCEDURE — 74011250636 HC RX REV CODE- 250/636: Performed by: STUDENT IN AN ORGANIZED HEALTH CARE EDUCATION/TRAINING PROGRAM

## 2022-03-16 PROCEDURE — 74011250636 HC RX REV CODE- 250/636: Performed by: PHYSICIAN ASSISTANT

## 2022-03-16 PROCEDURE — 77030040506 HC DRN WND MDII -A: Performed by: ORTHOPAEDIC SURGERY

## 2022-03-16 PROCEDURE — 22845 INSERT SPINE FIXATION DEVICE: CPT | Performed by: STUDENT IN AN ORGANIZED HEALTH CARE EDUCATION/TRAINING PROGRAM

## 2022-03-16 PROCEDURE — 22853 INSJ BIOMECHANICAL DEVICE: CPT | Performed by: STUDENT IN AN ORGANIZED HEALTH CARE EDUCATION/TRAINING PROGRAM

## 2022-03-16 PROCEDURE — 36415 COLL VENOUS BLD VENIPUNCTURE: CPT

## 2022-03-16 PROCEDURE — 77030033138 HC SUT PGA STRATFX J&J -B: Performed by: ORTHOPAEDIC SURGERY

## 2022-03-16 PROCEDURE — 22551 ARTHRD ANT NTRBDY CERVICAL: CPT | Performed by: ORTHOPAEDIC SURGERY

## 2022-03-16 PROCEDURE — 86900 BLOOD TYPING SEROLOGIC ABO: CPT

## 2022-03-16 PROCEDURE — 2709999900 HC NON-CHARGEABLE SUPPLY: Performed by: ORTHOPAEDIC SURGERY

## 2022-03-16 PROCEDURE — 74011250636 HC RX REV CODE- 250/636: Performed by: ANESTHESIOLOGY

## 2022-03-16 PROCEDURE — C1776 JOINT DEVICE (IMPLANTABLE): HCPCS | Performed by: ORTHOPAEDIC SURGERY

## 2022-03-16 PROCEDURE — 76210000017 HC OR PH I REC 1.5 TO 2 HR: Performed by: ORTHOPAEDIC SURGERY

## 2022-03-16 PROCEDURE — 22552 ARTHRD ANT NTRBD CERVICAL EA: CPT | Performed by: ORTHOPAEDIC SURGERY

## 2022-03-16 PROCEDURE — 76060000036 HC ANESTHESIA 2.5 TO 3 HR: Performed by: ORTHOPAEDIC SURGERY

## 2022-03-16 PROCEDURE — 74011000250 HC RX REV CODE- 250: Performed by: STUDENT IN AN ORGANIZED HEALTH CARE EDUCATION/TRAINING PROGRAM

## 2022-03-16 PROCEDURE — P9045 ALBUMIN (HUMAN), 5%, 250 ML: HCPCS | Performed by: NURSE ANESTHETIST, CERTIFIED REGISTERED

## 2022-03-16 PROCEDURE — 74011000250 HC RX REV CODE- 250: Performed by: NURSE ANESTHETIST, CERTIFIED REGISTERED

## 2022-03-16 PROCEDURE — 22853 INSJ BIOMECHANICAL DEVICE: CPT | Performed by: ORTHOPAEDIC SURGERY

## 2022-03-16 PROCEDURE — 74011000250 HC RX REV CODE- 250: Performed by: PHYSICIAN ASSISTANT

## 2022-03-16 PROCEDURE — 77030031139 HC SUT VCRL2 J&J -A: Performed by: ORTHOPAEDIC SURGERY

## 2022-03-16 PROCEDURE — 99233 SBSQ HOSP IP/OBS HIGH 50: CPT | Performed by: CLINICAL NURSE SPECIALIST

## 2022-03-16 PROCEDURE — 77030004391 HC BUR FLUT MEDT -C: Performed by: ORTHOPAEDIC SURGERY

## 2022-03-16 PROCEDURE — 22552 ARTHRD ANT NTRBD CERVICAL EA: CPT | Performed by: STUDENT IN AN ORGANIZED HEALTH CARE EDUCATION/TRAINING PROGRAM

## 2022-03-16 PROCEDURE — 77030003832 HC BIT DRL ATLNTS MEDT -B: Performed by: ORTHOPAEDIC SURGERY

## 2022-03-16 PROCEDURE — 74011250637 HC RX REV CODE- 250/637: Performed by: STUDENT IN AN ORGANIZED HEALTH CARE EDUCATION/TRAINING PROGRAM

## 2022-03-16 PROCEDURE — 77030038600 HC TU BPLR IRR DISP STRY -B: Performed by: ORTHOPAEDIC SURGERY

## 2022-03-16 PROCEDURE — 77030014650 HC SEAL MTRX FLOSEL BAXT -C: Performed by: ORTHOPAEDIC SURGERY

## 2022-03-16 PROCEDURE — 22551 ARTHRD ANT NTRBDY CERVICAL: CPT | Performed by: STUDENT IN AN ORGANIZED HEALTH CARE EDUCATION/TRAINING PROGRAM

## 2022-03-16 PROCEDURE — 22845 INSERT SPINE FIXATION DEVICE: CPT | Performed by: ORTHOPAEDIC SURGERY

## 2022-03-16 PROCEDURE — 77030008477 HC STYL SATN SLP COVD -A: Performed by: ANESTHESIOLOGY

## 2022-03-16 PROCEDURE — 74011000250 HC RX REV CODE- 250: Performed by: ANESTHESIOLOGY

## 2022-03-16 PROCEDURE — 74011000250 HC RX REV CODE- 250: Performed by: ORTHOPAEDIC SURGERY

## 2022-03-16 PROCEDURE — 77030008684 HC TU ET CUF COVD -B: Performed by: ANESTHESIOLOGY

## 2022-03-16 PROCEDURE — 76010000172 HC OR TIME 2.5 TO 3 HR INTENSV-TIER 1: Performed by: ORTHOPAEDIC SURGERY

## 2022-03-16 PROCEDURE — 77030029099 HC BN WAX SSPC -A: Performed by: ORTHOPAEDIC SURGERY

## 2022-03-16 DEVICE — I-FACTOR PUTTY, 1.0CC SYRINGE
Type: IMPLANTABLE DEVICE | Site: SPINE CERVICAL | Status: FUNCTIONAL
Brand: I-FACTOR PEPTIDE ENHANCED BONE GRAFT

## 2022-03-16 DEVICE — CAGE 5030864 ANATOMIC PTC 16X14X8MM
Type: IMPLANTABLE DEVICE | Site: SPINE CERVICAL | Status: FUNCTIONAL
Brand: ANATOMIC PEEK PTC CERVICAL FUSION SYSTEM

## 2022-03-16 DEVICE — GRAFT BNE SUB SM CANC FRZN MORSELIZED W/ VIABLE CELL: Type: IMPLANTABLE DEVICE | Site: SPINE CERVICAL | Status: FUNCTIONAL

## 2022-03-16 DEVICE — GRAFT HUM TISS 3X4 CM WND COVERING AMNIO MEMBRN VERSASHIELD: Type: IMPLANTABLE DEVICE | Site: SPINE CERVICAL | Status: FUNCTIONAL

## 2022-03-16 DEVICE — SCREW 7713517 ZEVO VAR SD 3.5MM X 17MM
Type: IMPLANTABLE DEVICE | Site: SPINE CERVICAL | Status: FUNCTIONAL
Brand: ZEVO™ ANTERIOR CERVICAL PLATE SYSTEM

## 2022-03-16 RX ORDER — ALBUMIN HUMAN 50 G/1000ML
SOLUTION INTRAVENOUS AS NEEDED
Status: DISCONTINUED | OUTPATIENT
Start: 2022-03-16 | End: 2022-03-16 | Stop reason: HOSPADM

## 2022-03-16 RX ORDER — OXYCODONE AND ACETAMINOPHEN 5; 325 MG/1; MG/1
1 TABLET ORAL AS NEEDED
Status: DISCONTINUED | OUTPATIENT
Start: 2022-03-16 | End: 2022-03-16 | Stop reason: HOSPADM

## 2022-03-16 RX ORDER — LISINOPRIL 10 MG/1
20 TABLET ORAL 2 TIMES DAILY
Status: DISCONTINUED | OUTPATIENT
Start: 2022-03-16 | End: 2022-03-17 | Stop reason: HOSPADM

## 2022-03-16 RX ORDER — SODIUM CHLORIDE 0.9 % (FLUSH) 0.9 %
5-40 SYRINGE (ML) INJECTION AS NEEDED
Status: DISCONTINUED | OUTPATIENT
Start: 2022-03-16 | End: 2022-03-16 | Stop reason: HOSPADM

## 2022-03-16 RX ORDER — SODIUM CHLORIDE 0.9 % (FLUSH) 0.9 %
5-40 SYRINGE (ML) INJECTION EVERY 8 HOURS
Status: DISCONTINUED | OUTPATIENT
Start: 2022-03-16 | End: 2022-03-16 | Stop reason: HOSPADM

## 2022-03-16 RX ORDER — HYDROMORPHONE HYDROCHLORIDE 2 MG/1
2 TABLET ORAL
Status: DISCONTINUED | OUTPATIENT
Start: 2022-03-16 | End: 2022-03-17 | Stop reason: HOSPADM

## 2022-03-16 RX ORDER — SODIUM CHLORIDE 0.9 % (FLUSH) 0.9 %
5-40 SYRINGE (ML) INJECTION AS NEEDED
Status: DISCONTINUED | OUTPATIENT
Start: 2022-03-16 | End: 2022-03-17 | Stop reason: HOSPADM

## 2022-03-16 RX ORDER — GLYCOPYRROLATE 0.2 MG/ML
INJECTION INTRAMUSCULAR; INTRAVENOUS AS NEEDED
Status: DISCONTINUED | OUTPATIENT
Start: 2022-03-16 | End: 2022-03-16 | Stop reason: HOSPADM

## 2022-03-16 RX ORDER — ACETAMINOPHEN 325 MG/1
650 TABLET ORAL ONCE
Status: DISCONTINUED | OUTPATIENT
Start: 2022-03-16 | End: 2022-03-16 | Stop reason: HOSPADM

## 2022-03-16 RX ORDER — ONDANSETRON 2 MG/ML
4 INJECTION INTRAMUSCULAR; INTRAVENOUS AS NEEDED
Status: DISCONTINUED | OUTPATIENT
Start: 2022-03-16 | End: 2022-03-16 | Stop reason: HOSPADM

## 2022-03-16 RX ORDER — LIDOCAINE HYDROCHLORIDE 10 MG/ML
0.1 INJECTION, SOLUTION EPIDURAL; INFILTRATION; INTRACAUDAL; PERINEURAL AS NEEDED
Status: DISCONTINUED | OUTPATIENT
Start: 2022-03-16 | End: 2022-03-16 | Stop reason: HOSPADM

## 2022-03-16 RX ORDER — SODIUM CHLORIDE, SODIUM LACTATE, POTASSIUM CHLORIDE, CALCIUM CHLORIDE 600; 310; 30; 20 MG/100ML; MG/100ML; MG/100ML; MG/100ML
125 INJECTION, SOLUTION INTRAVENOUS CONTINUOUS
Status: DISCONTINUED | OUTPATIENT
Start: 2022-03-16 | End: 2022-03-16 | Stop reason: HOSPADM

## 2022-03-16 RX ORDER — SODIUM CHLORIDE, SODIUM LACTATE, POTASSIUM CHLORIDE, CALCIUM CHLORIDE 600; 310; 30; 20 MG/100ML; MG/100ML; MG/100ML; MG/100ML
INJECTION, SOLUTION INTRAVENOUS
Status: DISCONTINUED | OUTPATIENT
Start: 2022-03-16 | End: 2022-03-16 | Stop reason: HOSPADM

## 2022-03-16 RX ORDER — EPHEDRINE SULFATE/0.9% NACL/PF 50 MG/5 ML
SYRINGE (ML) INTRAVENOUS AS NEEDED
Status: DISCONTINUED | OUTPATIENT
Start: 2022-03-16 | End: 2022-03-16 | Stop reason: HOSPADM

## 2022-03-16 RX ORDER — INSULIN LISPRO 100 [IU]/ML
INJECTION, SOLUTION INTRAVENOUS; SUBCUTANEOUS
Status: DISCONTINUED | OUTPATIENT
Start: 2022-03-16 | End: 2022-03-16 | Stop reason: ALTCHOICE

## 2022-03-16 RX ORDER — PRAVASTATIN SODIUM 20 MG/1
40 TABLET ORAL DAILY
Status: DISCONTINUED | OUTPATIENT
Start: 2022-03-17 | End: 2022-03-17 | Stop reason: HOSPADM

## 2022-03-16 RX ORDER — EPHEDRINE SULFATE/0.9% NACL/PF 50 MG/5 ML
SYRINGE (ML) INTRAVENOUS
Status: DISPENSED
Start: 2022-03-16 | End: 2022-03-16

## 2022-03-16 RX ORDER — NEOSTIGMINE METHYLSULFATE 1 MG/ML
INJECTION, SOLUTION INTRAVENOUS AS NEEDED
Status: DISCONTINUED | OUTPATIENT
Start: 2022-03-16 | End: 2022-03-16 | Stop reason: HOSPADM

## 2022-03-16 RX ORDER — THERA TABS 400 MCG
1 TAB ORAL DAILY
Status: DISCONTINUED | OUTPATIENT
Start: 2022-03-17 | End: 2022-03-17 | Stop reason: HOSPADM

## 2022-03-16 RX ORDER — ONDANSETRON 2 MG/ML
INJECTION INTRAMUSCULAR; INTRAVENOUS AS NEEDED
Status: DISCONTINUED | OUTPATIENT
Start: 2022-03-16 | End: 2022-03-16 | Stop reason: HOSPADM

## 2022-03-16 RX ORDER — SODIUM CHLORIDE 0.9 % (FLUSH) 0.9 %
5-40 SYRINGE (ML) INJECTION EVERY 8 HOURS
Status: DISCONTINUED | OUTPATIENT
Start: 2022-03-16 | End: 2022-03-17 | Stop reason: HOSPADM

## 2022-03-16 RX ORDER — ASCORBIC ACID 500 MG
500 TABLET ORAL DAILY
Status: DISCONTINUED | OUTPATIENT
Start: 2022-03-17 | End: 2022-03-17 | Stop reason: HOSPADM

## 2022-03-16 RX ORDER — ALBUMIN HUMAN 50 G/1000ML
12.5 SOLUTION INTRAVENOUS ONCE
Status: COMPLETED | OUTPATIENT
Start: 2022-03-16 | End: 2022-03-16

## 2022-03-16 RX ORDER — ONDANSETRON 2 MG/ML
4 INJECTION INTRAMUSCULAR; INTRAVENOUS
Status: DISPENSED | OUTPATIENT
Start: 2022-03-16 | End: 2022-03-17

## 2022-03-16 RX ORDER — SODIUM CHLORIDE 9 MG/ML
25 INJECTION, SOLUTION INTRAVENOUS CONTINUOUS
Status: DISCONTINUED | OUTPATIENT
Start: 2022-03-16 | End: 2022-03-16 | Stop reason: HOSPADM

## 2022-03-16 RX ORDER — FENTANYL CITRATE 50 UG/ML
25 INJECTION, SOLUTION INTRAMUSCULAR; INTRAVENOUS
Status: DISCONTINUED | OUTPATIENT
Start: 2022-03-16 | End: 2022-03-16 | Stop reason: HOSPADM

## 2022-03-16 RX ORDER — CYCLOBENZAPRINE HCL 10 MG
10 TABLET ORAL
Status: DISCONTINUED | OUTPATIENT
Start: 2022-03-16 | End: 2022-03-17 | Stop reason: HOSPADM

## 2022-03-16 RX ORDER — MIDAZOLAM HYDROCHLORIDE 1 MG/ML
INJECTION, SOLUTION INTRAMUSCULAR; INTRAVENOUS AS NEEDED
Status: DISCONTINUED | OUTPATIENT
Start: 2022-03-16 | End: 2022-03-16 | Stop reason: HOSPADM

## 2022-03-16 RX ORDER — HYDROMORPHONE HYDROCHLORIDE 1 MG/ML
0.2 INJECTION, SOLUTION INTRAMUSCULAR; INTRAVENOUS; SUBCUTANEOUS
Status: DISCONTINUED | OUTPATIENT
Start: 2022-03-16 | End: 2022-03-16 | Stop reason: HOSPADM

## 2022-03-16 RX ORDER — HYDROMORPHONE HYDROCHLORIDE 1 MG/ML
0.5 INJECTION, SOLUTION INTRAMUSCULAR; INTRAVENOUS; SUBCUTANEOUS
Status: ACTIVE | OUTPATIENT
Start: 2022-03-16 | End: 2022-03-17

## 2022-03-16 RX ORDER — NALOXONE HYDROCHLORIDE 0.4 MG/ML
0.4 INJECTION, SOLUTION INTRAMUSCULAR; INTRAVENOUS; SUBCUTANEOUS AS NEEDED
Status: DISCONTINUED | OUTPATIENT
Start: 2022-03-16 | End: 2022-03-17 | Stop reason: HOSPADM

## 2022-03-16 RX ORDER — ACETAMINOPHEN 500 MG
1000 TABLET ORAL EVERY 6 HOURS
Status: DISCONTINUED | OUTPATIENT
Start: 2022-03-16 | End: 2022-03-17 | Stop reason: HOSPADM

## 2022-03-16 RX ORDER — FENTANYL CITRATE 50 UG/ML
INJECTION, SOLUTION INTRAMUSCULAR; INTRAVENOUS AS NEEDED
Status: DISCONTINUED | OUTPATIENT
Start: 2022-03-16 | End: 2022-03-16 | Stop reason: HOSPADM

## 2022-03-16 RX ORDER — HYDROMORPHONE HYDROCHLORIDE 2 MG/ML
INJECTION, SOLUTION INTRAMUSCULAR; INTRAVENOUS; SUBCUTANEOUS AS NEEDED
Status: DISCONTINUED | OUTPATIENT
Start: 2022-03-16 | End: 2022-03-16 | Stop reason: HOSPADM

## 2022-03-16 RX ORDER — DIPHENHYDRAMINE HYDROCHLORIDE 50 MG/ML
12.5 INJECTION, SOLUTION INTRAMUSCULAR; INTRAVENOUS AS NEEDED
Status: DISCONTINUED | OUTPATIENT
Start: 2022-03-16 | End: 2022-03-16 | Stop reason: HOSPADM

## 2022-03-16 RX ORDER — FENTANYL CITRATE 50 UG/ML
50 INJECTION, SOLUTION INTRAMUSCULAR; INTRAVENOUS AS NEEDED
Status: DISCONTINUED | OUTPATIENT
Start: 2022-03-16 | End: 2022-03-16 | Stop reason: HOSPADM

## 2022-03-16 RX ORDER — MIDAZOLAM HYDROCHLORIDE 1 MG/ML
1 INJECTION, SOLUTION INTRAMUSCULAR; INTRAVENOUS AS NEEDED
Status: DISCONTINUED | OUTPATIENT
Start: 2022-03-16 | End: 2022-03-16 | Stop reason: HOSPADM

## 2022-03-16 RX ORDER — MIDAZOLAM HYDROCHLORIDE 1 MG/ML
0.5 INJECTION, SOLUTION INTRAMUSCULAR; INTRAVENOUS
Status: DISCONTINUED | OUTPATIENT
Start: 2022-03-16 | End: 2022-03-16 | Stop reason: HOSPADM

## 2022-03-16 RX ORDER — SUCCINYLCHOLINE CHLORIDE 20 MG/ML
INJECTION INTRAMUSCULAR; INTRAVENOUS AS NEEDED
Status: DISCONTINUED | OUTPATIENT
Start: 2022-03-16 | End: 2022-03-16 | Stop reason: HOSPADM

## 2022-03-16 RX ORDER — MAGNESIUM SULFATE 100 %
4 CRYSTALS MISCELLANEOUS AS NEEDED
Status: DISCONTINUED | OUTPATIENT
Start: 2022-03-16 | End: 2022-03-17 | Stop reason: HOSPADM

## 2022-03-16 RX ORDER — EPHEDRINE SULFATE/0.9% NACL/PF 50 MG/5 ML
10 SYRINGE (ML) INTRAVENOUS ONCE
Status: COMPLETED | OUTPATIENT
Start: 2022-03-16 | End: 2022-03-16

## 2022-03-16 RX ORDER — LIDOCAINE HYDROCHLORIDE 20 MG/ML
INJECTION, SOLUTION EPIDURAL; INFILTRATION; INTRACAUDAL; PERINEURAL AS NEEDED
Status: DISCONTINUED | OUTPATIENT
Start: 2022-03-16 | End: 2022-03-16 | Stop reason: HOSPADM

## 2022-03-16 RX ORDER — POLYETHYLENE GLYCOL 3350 17 G/17G
17 POWDER, FOR SOLUTION ORAL DAILY
Status: DISCONTINUED | OUTPATIENT
Start: 2022-03-17 | End: 2022-03-17 | Stop reason: HOSPADM

## 2022-03-16 RX ORDER — MORPHINE SULFATE 2 MG/ML
2 INJECTION, SOLUTION INTRAMUSCULAR; INTRAVENOUS
Status: DISCONTINUED | OUTPATIENT
Start: 2022-03-16 | End: 2022-03-16 | Stop reason: HOSPADM

## 2022-03-16 RX ORDER — HYDROMORPHONE HYDROCHLORIDE 2 MG/1
4 TABLET ORAL
Status: DISCONTINUED | OUTPATIENT
Start: 2022-03-16 | End: 2022-03-17 | Stop reason: HOSPADM

## 2022-03-16 RX ORDER — PREGABALIN 75 MG/1
300 CAPSULE ORAL 2 TIMES DAILY
Status: DISCONTINUED | OUTPATIENT
Start: 2022-03-16 | End: 2022-03-17 | Stop reason: HOSPADM

## 2022-03-16 RX ORDER — AMOXICILLIN 250 MG
1 CAPSULE ORAL 2 TIMES DAILY
Status: DISCONTINUED | OUTPATIENT
Start: 2022-03-16 | End: 2022-03-17 | Stop reason: HOSPADM

## 2022-03-16 RX ORDER — SODIUM CHLORIDE 9 MG/ML
125 INJECTION, SOLUTION INTRAVENOUS CONTINUOUS
Status: DISPENSED | OUTPATIENT
Start: 2022-03-16 | End: 2022-03-17

## 2022-03-16 RX ORDER — ROCURONIUM BROMIDE 10 MG/ML
INJECTION, SOLUTION INTRAVENOUS AS NEEDED
Status: DISCONTINUED | OUTPATIENT
Start: 2022-03-16 | End: 2022-03-16 | Stop reason: HOSPADM

## 2022-03-16 RX ORDER — FACIAL-BODY WIPES
10 EACH TOPICAL DAILY PRN
Status: DISCONTINUED | OUTPATIENT
Start: 2022-03-18 | End: 2022-03-17 | Stop reason: HOSPADM

## 2022-03-16 RX ORDER — PHENYLEPHRINE HCL IN 0.9% NACL 0.4MG/10ML
SYRINGE (ML) INTRAVENOUS AS NEEDED
Status: DISCONTINUED | OUTPATIENT
Start: 2022-03-16 | End: 2022-03-16 | Stop reason: HOSPADM

## 2022-03-16 RX ORDER — PROPOFOL 10 MG/ML
INJECTION, EMULSION INTRAVENOUS AS NEEDED
Status: DISCONTINUED | OUTPATIENT
Start: 2022-03-16 | End: 2022-03-16 | Stop reason: HOSPADM

## 2022-03-16 RX ADMIN — ALBUMIN (HUMAN) 250 ML: 12.5 INJECTION, SOLUTION INTRAVENOUS at 09:18

## 2022-03-16 RX ADMIN — ALBUMIN (HUMAN) 12.5 G: 12.5 INJECTION, SOLUTION INTRAVENOUS at 11:41

## 2022-03-16 RX ADMIN — Medication 20 MCG/MIN: at 08:05

## 2022-03-16 RX ADMIN — CEFAZOLIN SODIUM 2 G: 1 INJECTION, POWDER, FOR SOLUTION INTRAMUSCULAR; INTRAVENOUS at 16:38

## 2022-03-16 RX ADMIN — SODIUM CHLORIDE 125 ML/HR: 900 INJECTION, SOLUTION INTRAVENOUS at 11:00

## 2022-03-16 RX ADMIN — ONDANSETRON HYDROCHLORIDE 4 MG: 2 INJECTION, SOLUTION INTRAMUSCULAR; INTRAVENOUS at 07:53

## 2022-03-16 RX ADMIN — HYDROMORPHONE HYDROCHLORIDE 0.5 MG: 2 INJECTION, SOLUTION INTRAMUSCULAR; INTRAVENOUS; SUBCUTANEOUS at 08:48

## 2022-03-16 RX ADMIN — SUCCINYLCHOLINE CHLORIDE 120 MG: 20 INJECTION, SOLUTION INTRAMUSCULAR; INTRAVENOUS at 07:45

## 2022-03-16 RX ADMIN — SODIUM CHLORIDE, POTASSIUM CHLORIDE, SODIUM LACTATE AND CALCIUM CHLORIDE: 600; 310; 30; 20 INJECTION, SOLUTION INTRAVENOUS at 07:38

## 2022-03-16 RX ADMIN — CEFAZOLIN SODIUM 2 G: 1 INJECTION, POWDER, FOR SOLUTION INTRAMUSCULAR; INTRAVENOUS at 23:36

## 2022-03-16 RX ADMIN — WATER 2 G: 1 INJECTION INTRAMUSCULAR; INTRAVENOUS; SUBCUTANEOUS at 08:15

## 2022-03-16 RX ADMIN — PROPOFOL 50 MCG/KG/MIN: 10 INJECTION, EMULSION INTRAVENOUS at 07:50

## 2022-03-16 RX ADMIN — ONDANSETRON HYDROCHLORIDE 4 MG: 2 INJECTION, SOLUTION INTRAMUSCULAR; INTRAVENOUS at 10:01

## 2022-03-16 RX ADMIN — GLYCOPYRROLATE 0.4 MG: 0.2 INJECTION, SOLUTION INTRAMUSCULAR; INTRAVENOUS at 10:02

## 2022-03-16 RX ADMIN — SODIUM CHLORIDE, PRESERVATIVE FREE 10 ML: 5 INJECTION INTRAVENOUS at 23:43

## 2022-03-16 RX ADMIN — SODIUM CHLORIDE, POTASSIUM CHLORIDE, SODIUM LACTATE AND CALCIUM CHLORIDE 125 ML/HR: 600; 310; 30; 20 INJECTION, SOLUTION INTRAVENOUS at 07:30

## 2022-03-16 RX ADMIN — Medication 10 MG: at 11:05

## 2022-03-16 RX ADMIN — ONDANSETRON HYDROCHLORIDE 4 MG: 2 SOLUTION INTRAMUSCULAR; INTRAVENOUS at 16:53

## 2022-03-16 RX ADMIN — LIDOCAINE HYDROCHLORIDE 80 MG: 20 INJECTION, SOLUTION EPIDURAL; INFILTRATION; INTRACAUDAL; PERINEURAL at 07:45

## 2022-03-16 RX ADMIN — MIDAZOLAM 2 MG: 1 INJECTION INTRAMUSCULAR; INTRAVENOUS at 07:37

## 2022-03-16 RX ADMIN — SODIUM CHLORIDE, PRESERVATIVE FREE 10 ML: 5 INJECTION INTRAVENOUS at 15:00

## 2022-03-16 RX ADMIN — Medication 100 MCG: at 08:07

## 2022-03-16 RX ADMIN — Medication 80 MCG: at 07:47

## 2022-03-16 RX ADMIN — Medication 10 MG: at 09:18

## 2022-03-16 RX ADMIN — ACETAMINOPHEN 1000 MG: 500 TABLET ORAL at 23:36

## 2022-03-16 RX ADMIN — ROCURONIUM BROMIDE 15 MG: 10 SOLUTION INTRAVENOUS at 08:20

## 2022-03-16 RX ADMIN — FENTANYL CITRATE 50 MCG: 50 INJECTION, SOLUTION INTRAMUSCULAR; INTRAVENOUS at 07:45

## 2022-03-16 RX ADMIN — ROCURONIUM BROMIDE 20 MG: 10 SOLUTION INTRAVENOUS at 09:38

## 2022-03-16 RX ADMIN — PROPOFOL 150 MG: 10 INJECTION, EMULSION INTRAVENOUS at 07:45

## 2022-03-16 RX ADMIN — Medication 100 MCG: at 08:06

## 2022-03-16 RX ADMIN — NEOSTIGMINE METHYLSULFATE 2 MG: 1 INJECTION, SOLUTION INTRAVENOUS at 10:02

## 2022-03-16 RX ADMIN — FENTANYL CITRATE 50 MCG: 50 INJECTION, SOLUTION INTRAMUSCULAR; INTRAVENOUS at 08:01

## 2022-03-16 RX ADMIN — Medication 10 MG: at 08:18

## 2022-03-16 RX ADMIN — Medication 80 MCG: at 07:57

## 2022-03-16 RX ADMIN — Medication 1 SPRAY: at 20:09

## 2022-03-16 RX ADMIN — PREGABALIN 300 MG: 75 CAPSULE ORAL at 19:18

## 2022-03-16 RX ADMIN — SENNOSIDES AND DOCUSATE SODIUM 1 TABLET: 50; 8.6 TABLET ORAL at 19:18

## 2022-03-16 RX ADMIN — LISINOPRIL 20 MG: 10 TABLET ORAL at 19:18

## 2022-03-16 NOTE — PROGRESS NOTES
Patient assessed for readiness to ambulate. Vital Signs  Level of Consciousness: Alert (0)  Temp: 98.4 °F (36.9 °C)  Temp Source: Oral  Pulse (Heart Rate): 71  Heart Rate Source: Monitor  Resp Rate: 14  BP: 138/75  MAP (Calculated): 96  BP 1 Location: Left upper arm  BP 1 Method: Automatic  BP Patient Position: At rest  MEWS Score: 0. Patient ambulated with assistance of 1 nurses. Patient ambulated with gait belt and walker. Patient walked to [place]. Patient returned safely to bed.

## 2022-03-16 NOTE — PERIOP NOTES
Spoke with Dr. Alissa Salinas (anesthesia) regarding patients blood glucose of 230. No new orders at this time.

## 2022-03-16 NOTE — BRIEF OP NOTE
Brief Postoperative Note    Patient: Catherine Braun  YOB: 1955  MRN: [id number]    Date of Procedure: 3/16/2022     Pre-Op Diagnosis: CERVICAL DISC DISEASE, NECK PAIN, CERVICAL STENOSIS OF SPINAL CANAL    Post-Op Diagnosis: Same as preoperative diagnosis. Procedure(s):  C5-6, C6-7 ANTERIOR CERVICAL DISCECTOMY WITH FUSION (FASTRACK)    Surgeon(s):  Rick Chase MD    Surgical Assistant: Physician Assistant: EMMA Mosher    Anesthesia: General     Estimated Blood Loss (mL): 723 mL    Complications: None    Specimens: * No specimens in log *     Implants:   Implant Name Type Inv. Item Serial No.  Lot No. LRB No. Used Action   GRAFT BNE SUB SM CANC FRZN MORSELIZED W/ VIABLE CELL - [id number]  GRAFT BNE SUB SM CANC FRZN MORSELIZED W/ VIABLE CELL [id number] [employer] N/A N/A 1 Implanted   GRAFT HUM TISS 3X4 CM WND COVERING AMNIO MEMBRN VERSASHIELD - [id number]  GRAFT HUM TISS 3X4 CM WND COVERING AMNIO MEMBRN VERSASHIELD [id number] [employer]WD N/A N/A 1 Implanted   IFactor   N/A CERAP[employer] BONE REPAIR [id number] N/A 1 Implanted   CAGE SPNL [id number] CERV PEEK INTBDY FUS [place] PTC - SN/A  CAGE SPNL [id number] CERV PEEK INTBDY FUS [place] PTC N/A [employer] SOFAM[employer] DANEK_WD 54MB N/A 1 Implanted   CAGE SPNL [id number] CERV PEEK INTBDY FUS [place] PTC - SN/A  CAGE SPNL [id number] CERV PEEK INTBDY FUS [place] PTC N/A [employer] SOFAMOR DANEK_WD 95LV N/A 1 Implanted   INSERT TIB SZ 6 [id number] CRV FIX BEAR PFC SIG - SN/A  INSERT TIB SZ 6 [id number] CRV FIX BEAR PFC SIG N/A [employer]_WD N/A N/A 1 Implanted   SCREW SPNL L17MM DIA3. 5MM ANT CERV TI SELF DRL SILVER ANG - SN/A Screw SCREW SPNL L17MM DIA3. 5MM ANT CERV TI SELF DRL SILVER ANG N/A [employer] SOFAMOR DANEK_WD N/A N/A 6 Implanted       Drains:   Frank-Cordero Drain 03/16/22 Anterior; Left Neck (Active)       Findings: DDD    Electronically Signed by EMMA Ricardo on 3/16/2022 at 10:10 AM

## 2022-03-16 NOTE — PERIOP NOTES
TRANSFER - OUT REPORT:    Verbal report given to [facility] on Grayson Eric  being transferred to [address] for routine post - op       Report consisted of patients Situation, Background, Assessment and   Recommendations(SBAR). Time Pre op antibiotic given:0815  Anesthesia Stop time 1024  Amezquita Present on Transfer to floor:no  Order for Amezquita on Chart:no  Discharge Prescriptions with Chart:no    Information from the following report(s) SBAR, Procedure Summary, Intake/Output and MAR was reviewed with the receiving nurse. Opportunity for questions and clarification was provided. Is the patient on 02? NO       L/Min n/a       Other n/a    Is the patient on a monitor? NO    Is the nurse transporting with the patient? NO    Surgical Waiting Area notified of patient's transfer from PACU? YES, spoke with son      The following personal items collected during your admission accompanied patient upon transfer:   Dental Appliance: Dental Appliances: None  Vision: Visual Aid: Glasses  Hearing Aid:    Jewelry: Jewelry: None  Clothing: Clothing: At bedside (belongings returned to pt in pacu)  Other Valuables: Other Valuables:  Other (comment) (INSULIN PUMP RIGHT HIP)  Valuables sent to safe:

## 2022-03-16 NOTE — PROGRESS NOTES
Spine Surgery Progress Note    Admit Date: 3/16/2022   LOS: 0 days      Daily Progress Note: 3/16/2022    POD:Day of Surgery    S/P: Procedure(s):  C5-6, C6-7 ANTERIOR CERVICAL DISCECTOMY WITH FUSION     Visit Vitals  /70 (BP 1 Location: Left upper arm, BP Patient Position: At rest)   Pulse 70   Temp 97.6 °F (36.4 °C)   Resp 14   Ht 6' (1.829 m)   Wt 69.1 kg (152 lb 5.4 oz)   SpO2 99%   BMI 20.66 kg/m²      Lab Results   Component Value Date/Time    HGB 13.6 03/01/2022 03:44 PM    INR 1.0 03/01/2022 03:44 PM       Patient doing well overall. No nausea or vomiting. Drowsy. Pain well controlled on current medications. No complaints. Swallowing without issue. Has not voided yet. Denies nausea, vomiting, fever, chills, chest pain, dyspnea, headache. Calves soft/NTTP Bilaterally. Moving LE well. Neurocirculatory exam WNL. Motor and sensation intact. Incision OK; no drainage. Dressing clean and dry. Drain intact. Plan:  -Pain control  -Insulin pump  -Bladder checks  -ADAT  -Monitor drain output  -Bowel regimen  -Cont home meds  -Daily dressing changes to incision    Discharge home tomorrow AM pending drain output. All questions were answered. Follow up in Dr. Nabil Oleary office in 2 weeks, sooner if needed.     EMMA Jacobson

## 2022-03-16 NOTE — ROUTINE PROCESS
Patient: Monika Gonzalez MRN: [id number]  SSN: [id number]   YOB: 1955  Age: 77 y.o. Sex: male     Patient is status post Procedure(s):  C5-6, C6-7 ANTERIOR CERVICAL DISCECTOMY WITH FUSION ([address] [place] [address]). Surgeon(s) and Role: Scott Richards MD - Primary    Local/Dose/Irrigation:  See eMAR                Peripheral IV 03/16/22 Left Forearm (Active)       Subcutaneous Insulin Infusion Device (Active)          Frank-Cordero Drain 03/16/22 Anterior; Left Neck (Active)      Airway - Endotracheal Tube 03/16/22 Oral (Active)                   Dressing/Packing:  Incision 03/16/22 Neck-Dressing/Treatment: Steri-strips; Non-adherent;Tape/Soft cloth adhesive tape (03/16/22 1002)    Splint/Cast:     Other: Cervical collar

## 2022-03-16 NOTE — ANESTHESIA PREPROCEDURE EVALUATION
Relevant Problems   RESPIRATORY SYSTEM   (+) History of COVID-19      CARDIOVASCULAR   (+) Essential hypertension      ENDOCRINE   (+) Acute idiopathic gout of right foot   (+) Type I diabetes mellitus with proliferative retinopathy (HCC)      HEMATOLOGY   (+) Anemia with chronic illness       Anesthetic History   No history of anesthetic complications            Review of Systems / Medical History  Patient summary reviewed, nursing notes reviewed and pertinent labs reviewed    Pulmonary  Within defined limits                 Neuro/Psych   Within defined limits           Cardiovascular    Hypertension: well controlled              Exercise tolerance: >4 METS  Comments: 2021 Bact endocard tx'd; echo 10/21: nl EF, mild-mod MR   GI/Hepatic/Renal  Within defined limits              Endo/Other  Within defined limits  Diabetes: well controlled, type 1    Arthritis     Other Findings   Comments:            Physical Exam    Airway  Mallampati: II  TM Distance: > 6 cm  Neck ROM: normal range of motion   Mouth opening: Normal     Cardiovascular  Regular rate and rhythm,  S1 and S2 normal,  no murmur, click, rub, or gallop             Dental  No notable dental hx       Pulmonary  Breath sounds clear to auscultation               Abdominal  GI exam deferred       Other Findings            Anesthetic Plan    ASA: 2  Anesthesia type: general          Induction: Intravenous  Anesthetic plan and risks discussed with: Patient

## 2022-03-16 NOTE — DIABETES MGMT
[address] [facility]    CLINICAL NURSE SPECIALIST CONSULT     Initial Presentation   Lissy Moreland is a 77 y.o. male admitted 3/16/22 for surgical management of cervical spine stenosis. HX:   Past Medical History:   Diagnosis Date    Acute bacterial endocarditis 8/9/2021    Adverse effect of anesthesia     DIFFICULTY URINATING AFTER SURGERY, HAD TO BE CATHED    Anemia     Arthritis     COVID-19 vaccine series completed     PFIZER 3-18-21 AND 4-8-21    Essential hypertension 12/11/2017    Hypercholesterolemia 12/11/2017    Ileitis 12/11/2017    Long-term use of high-risk medication 12/11/2017    Microalbuminuria 12/11/2017    Type I diabetes mellitus with proliferative retinopathy ([facility] [address] 12/11/2017    INSULIN    Valvular heart disease         INITIAL DX:   Cervical stenosis of spine [M48.02]     Current Treatment     TX: C5-6, C6-7 anterior cervical discectomy with fusion     Consulted by Day Velazquez NP for advanced diabetes nursing assessment and care for:   [x] Inpatient management strategy    Hospital Course   Clinical progress has been uncomplicated. Diabetes History   Type 1 Diabetes    Ambulatory blood glucose management provided by endocrinologist, Stephanie Brown MD.    Diabetes-related Medical History  Neurological complications  Peripheral neuropathy, Hypoglycemia unawareness  Microvascular disease  Retinopathy  Macrovascular disease      Diabetes Medication History  Key Antihyperglycemic Medications             insulin lispro (HumaLOG U-100 Insulin) 100 unit/mL injection (Taking) USE VIA INSULIN PUMP     insulin pump (PATIENT SUPPLIED) Misc by SubCUTAneous route as needed.  NOVALOG INSULIN         Medtronic 670 insulin pump without sensing capability:              Basal               Mid-8am          0.4 units/hr                                     8am-9am         0.375 units/hr                                      9am-Noon        0.35 units/hr     Noon to MN 0.325 units/hr                                                 Bolus ratio       1:15              Correction       1:50              Target BG       100    Diabetes self-management practices:   Eating pattern  Would not discuss with me  Physical activity pattern  Limited s/t back pain  Monitoring pattern  Testing BG 7 times daily  Fasting BG 40s-120s. Does have more than 3 BG readings below 60 in a week. Daytime BG up to 130s, rarely over 150s  Doesn't feel low until BG in the 30s  Taking medications pattern  [x] Consistent administration  [x] Affordable  Social determinants of health impacting diabetes self-management practices   Concerned that you need to know more about how to stay healthy with diabetes    Overall evaluation:    [x] Achieving A1c target with drug therapy & self-care practices    Subjective   I don't feel low until my BG is below 30s.      Objective   Physical exam  General Normal weight male in no acute distress/ill-appearing. Conversant and cooperative but still groggy  Neuro  Alert, oriented   Vital Signs   Visit Vitals  BP (!) 96/43   Pulse 74   Temp 97.5 °F (36.4 °C)   Resp 11   Ht 6' (1.829 m)   Wt 69.1 kg (152 lb 5.4 oz)   SpO2 100%   BMI 20.66 kg/m²     Skin  Warm and dry. No acanthosis noted along neckline. No lipohypertrophy or lipoatrophy noted at injection sites. Neck collar   Heart   Regular rate and rhythm. No murmurs, rubs or gallops  Lungs  Clear to auscultation without rales or rhonchi  Extremities No foot wounds    . Laboratory  No results for input(s): GLU, AGAP, TRIGL, WBC, CREA, GFRNA, AST, ALT in the last 72 hours.     No lab exists for component: A1C      Blood glucose pattern      Significant diabetes-related events over the past 24-72 hours  A1C 5%  On insulin pump    Assessment and Plan   Nursing Diagnosis Risk for unstable blood glucose pattern   Nursing Intervention Domain 3264 Decision-making Support   Nursing Interventions Examined current inpatient diabetes/blood glucose control   Explored factors facilitating and impeding inpatient management  Explored corrective strategies with patient and responsible inpatient provider   Informed patient of rational for insulin strategy while hospitalized     Evaluation   Simone Parry is a 77year old gentleman, with Type 1 Diabetes on Medtronic 670 insulin pump therapy, who was admitted with cervical stenosis now s/p C5-6 and C6-7 anterior cervical discectomy with fusion. Admitting A1C 5% and . He did use a temporary basal rate during the OR and home basal rate settings resumed. He is awake and oriented to be able to independently continue insulin pump therapy. Recommendations   1. Patient is alert, oriented and able to independently manage insulin pump    2. Check POC glucose ACHS with hospital glucometer. 3. Patient will bolus off insulin pump ACHS for correctional insulin and with all carbohydrate intake     4. No additional SQ insulin will be given while on insulin pump    5. Please add order for lispro PRN in [facility] for patient to fill insulin pump reservoir. 6. Nursing: in flowsheets- add LDA for continuous subcutaneous insulin infusion. Please assess site Q6 hrs. There is a column for RN to enter the amount of insulin bolused with each administration    If patient is not able to independently use insulin pump, please order:  Basal: 9 units Lantus once daily. Give the first dose then turn off insulin pump 2 hrs later  Bolus: 1 unit for every 15 grams CHO at meals  Correction: Normal sensitivity ACHS   Billing Code(s)   [x] 06634    Before making these care recommendations, I personally reviewed the hospitalization record, including notes, laboratory & diagnostic data and current medications, and examined the patient at the bedside (circumstances permitting) before making care recommendations.  More than fifty (50) percent of the time was spent in patient counseling and/or care coordination.   Total minutes: 48      Jen Petert, CNS  Diabetes Clinical Nurse Specialist  [facility]  Access via TxtFeedback

## 2022-03-16 NOTE — PERIOP NOTES
Spoke with Xenia Canela regarding pt insuline and BG checks.  Floor needs to check BG and let the pt know what it is so he can adjust his own insulin pump

## 2022-03-16 NOTE — PERIOP NOTES
PATIENT INTERVIEWED IN PREOP. NAME BAND VISIBLE AND CORRECT PER PATIENT. PATIENT HAS UNDERSTANDING OF PROCEDURE AND SURGICAL SITE. EDUCATIONAL NEEDS MET.

## 2022-03-16 NOTE — OP NOTES
[address]   OPERATIVE REPORT    Name:  Orquidea Nunes  MR#:  [id number]  :  1955  ACCOUNT #:  [de-identified]  DATE OF SERVICE:  2022    PREOPERATIVE DIAGNOSIS:  Cervical spondylosis and cervical stenosis at C5-6 and C6-7. POSTOPERATIVE DIAGNOSIS:  Cervical spondylosis and cervical stenosis at C5-6 and C6-7. PROCEDURES PERFORMED:  1. Anterior cervical discectomy for decompression C5-6 and C6-7.  2.  Anterior interbody fusion C5-6 and C6-7.  3.  Anterior plate fixation C5, C6, and C7 with bilateral screw fixation. SURGEON:  Kiersten Marcelo MD    ASSISTANT:  Agnes Hernandez PA-C    ANESTHESIA:  General.    COMPLICATIONS:  None. SPECIMENS REMOVED:  None. IMPLANTS:  Instrumentation includes Medtronic Zevo anterior cervical plating system and Medtronic anatomic PTC interbody grafting system. ESTIMATED BLOOD LOSS:  Minimal.    DRAINS:  One #7 flat JASON drain. INDICATIONS:  This is a pleasant gentleman, 77years old, with neck pain and right arm pain. Cervical spondylosis at C5-6 and C6-7 with severe foraminal stenosis at those levels, right greater than left with ongoing right-sided radicular symptoms. He had failed conservative treatment and understood the risks and benefits and limitations and elected to proceed. PROCEDURE:  The patient was identified, brought to the operative suite, underwent general anesthesia without difficulty. Placed in supine position with 10-pound traction across the head with chin strap. Neck in the neutral position. Shoulder was taped down. Preoperative neuromonitoring was placed, baselines obtained and these remained stable throughout the surgical procedure. After prepping and draping, time-out was confirmed. A transverse incision was made at the level of cricoid cartilage. Dissection was carried down to platysma. This was split longitudinally. Blunt dissection medial to the sternocleidomastoid down to deep cervical fascia. At which time, we then carefully dissected down to the deep cervical fascia and then with a lateral fluoroscopy image we identified the C5-6 and C6-7 levels. We then elevated the longus colli and the C6-7 level was identified first.  Deep radiolucent retractors were placed. Anterior osteophytes were removed with rongeur followed by an annulotomy followed by diskectomy and removal of disk with pituitary rongeurs, curved curettes, and regan. Posterior vertebral osteophytes were taken down with a Midas bur under loupe magnification. This allowed access to the posterior longitudinal ligament and reminder of the posterior disk material.  This was resected and removed for further central canal decompression. Posterior longitudinal ligament was elevated with a small nerve hook and resected with #1 and #2 Kerrison. Hemostasis with bipolar cautery. We then did bilateral foraminotomies particularly on the right hand side undercutting the uncinate process with #1 and #2 Kerrison until a nerve hook could be passed in the foramen without any impingement. This was done bilaterally. After successful decompression centrally and bilaterally at the C6-7 level, we did a similar technique at C5-6 followed by diskectomy followed by removal of disk material with pituitary rongeurs, curved curettes, and regan. At which time, the posterior longitudinal ligament was elevated and resected for central canal decompression. Bilateral foraminotomies were performed for decompression, particularly on the right hand side. After satisfactory decompression at both levels, hemostasis with FloSeal with bipolar cautery. We then trialed with the Curazy TCP interbody grafting system. We used a 16 x 14 mm footprint, 8 mm grafts. This provided good anterior column reconstruction as well as restoration of foraminal height. We then rasped the endplates to lightly bleeding bone.   We then packed each graft with Yolanda allograft and i-FACTOR. We impacted this in place with 2 mm countersinking and weight was removed off the head. We then contoured a 39 mm Zevo anterior cervical plate to the anterior cervical spine, temporary fixation with a threaded pin followed by 3.5 x 17 mm screws at C5, C6, and C7 bilaterally. Locking mechanisms engaged. X-rays demonstrated stable fixation. Wounds were thoroughly irrigated. The patient had #7 flat JASON drain placed, VersaShield for anti-adhesion and dysphagia. FloSeal for hemostasis. A 3-0 Monocryl on the skin. Soft collar was placed. The patient returned to PACU in stable condition. The physician assistant was present during the entire operative procedure and assisted in all critical elements of the surgery. No surgical assist or resident was available.      Josh Whipple MD      [id number]/[id number]  D:  03/16/2022 10:03  T:  03/16/2022 15:38  JOB #:  [id number]

## 2022-03-16 NOTE — ANESTHESIA POSTPROCEDURE EVALUATION
Post-Anesthesia Evaluation and Assessment    Patient: Cm Ren MRN: [id number]  SSN: [id number]    YOB: 1955  Age: 77 y.o. Sex: male      I have evaluated the patient and they are stable and ready for discharge from the PACU. Cardiovascular Function/Vital Signs  Visit Vitals  /75 (BP 1 Location: Left upper arm, BP Patient Position: At rest)   Pulse 71   Temp 36.9 °C (98.4 °F)   Resp 14   Ht 6' (1.829 m)   Wt 69.1 kg (152 lb 5.4 oz)   SpO2 97%   BMI 20.66 kg/m²       Patient is status post General anesthesia for Procedure(s):  C5-6, C6-7 ANTERIOR CERVICAL DISCECTOMY WITH FUSION (FASTRACK). Nausea/Vomiting: None    Postoperative hydration reviewed and adequate. Pain:  Pain Scale 1: Numeric (0 - 10) (03/16/22 1200)  Pain Intensity 1: 0 (03/16/22 1200)   Managed    Neurological Status:   Neuro (WDL): Within Defined Limits (03/16/22 1200)  Neuro  Neurologic State: Sleeping (03/16/22 1200)  LUE Motor Response: Purposeful (03/16/22 1200)  LLE Motor Response: Purposeful (03/16/22 1200)  RUE Motor Response: Purposeful (03/16/22 1200)  RLE Motor Response: Purposeful (03/16/22 1200)   At baseline    Mental Status, Level of Consciousness: Alert and  oriented to person, place, and time    Pulmonary Status:   O2 Device: None (Room air) (03/16/22 1200)   Adequate oxygenation and airway patent    Complications related to anesthesia: None    Post-anesthesia assessment completed. No concerns    Signed By: Stanton Davis MD     March 16, 2022              Post-Anesthesia Evaluation and Assessment    Patient: Cm Ren MRN: [id number]  SSN: [id number]    YOB: 1955  Age: 77 y.o. Sex: male      I have evaluated the patient and they are stable and ready for discharge from the PACU.      Cardiovascular Function/Vital Signs  Visit Vitals  /75 (BP 1 Location: Left upper arm, BP Patient Position: At rest)   Pulse 71   Temp 36.9 °C (98.4 °F)   Resp 14   Ht 6' (1.829 m) Wt 69.1 kg (152 lb 5.4 oz)   SpO2 97%   BMI 20.66 kg/m²       Patient is status post General anesthesia for Procedure(s):  C5-6, C6-7 ANTERIOR CERVICAL DISCECTOMY [facility] Nausea/Vomiting: None    Postoperative hydration reviewed and adequate. Pain:  Pain Scale 1: Numeric (0 - 10) (03/16/22 1200)  Pain Intensity 1: 0 (03/16/22 1200)   Managed    Neurological Status:   Neuro (WDL): Within Defined Limits (03/16/22 1200)  Neuro  Neurologic State: Sleeping (03/16/22 1200)  LUE Motor Response: Purposeful (03/16/22 1200)  LLE Motor Response: Purposeful (03/16/22 1200)  RUE Motor Response: Purposeful (03/16/22 1200)  RLE Motor Response: Purposeful (03/16/22 1200)   At baseline    Mental Status, Level of Consciousness: Alert and  oriented to person, place, and time    Pulmonary Status:   O2 Device: None (Room air) (03/16/22 1200)   Adequate oxygenation and airway patent    Complications related to anesthesia: None    Post-anesthesia assessment completed. No concerns    Signed By: Darliss Schlatter, MD     March 16, 2022              Procedure(s):  C5-6, C6-7 ANTERIOR CERVICAL DISCECTOMY WITH FUSION ([facility]). general    <BSHSIANPOST>    INITIAL Post-op Vital signs:   Vitals Value Taken Time   /68 03/16/22 1415   Temp 36.3 °C (97.4 °F) 03/16/22 1315   Pulse 84 03/16/22 1421   Resp 23 03/16/22 1421   SpO2 100 % 03/16/22 1420   Vitals shown include unvalidated device data.

## 2022-03-16 NOTE — DISCHARGE INSTRUCTIONS
After Hospital Care Plan:  Discharge Instructions Cervical (Neck) Spine Surgery Dr. Heena Meyer    Patient Name: Catherine Braun    Date of procedure: 3/16/2022  Date of discharge: 3/16/2022    Procedure: Procedure(s):  C5-6, C6-7 ANTERIOR CERVICAL DISCECTOMY WITH FUSION Laura Owusu  PCP: Keara Aviles MD    Follow up appointments   -follow up with Dr. Heena Meyer in 2 weeks. Call [phone] to make an appointment as soon as you get home from the hospital.    When to call your Orthopaedic Surgeon:  -Difficulty swallowing that is worse than when you left the hospital.  -Signs of infection-if your incision is red; continues to have drainage; drainage has a foul odor or if you have a persistent fever over 101 degrees for 24 hours  -nausea or vomiting, severe headache  -changes in sensation in your arms or legs (numbness, tingling, loss of color)  -increased weakness-greater than before your surgery  -severe pain or pain not relieved by medications  -Signs of a blood clot in your leg-calf pain, tenderness, redness, swelling of lower leg    When to call your Primary Care Physician:  -Concerns about medical conditions such as diabetes, high blood pressure, asthma, congestive heart failure  -Call if blood sugars are elevated, persistent headache or dizziness, coughing or congestion, constipation or diarrhea, burning with urination, abnormal heart rate    When to call 911 and go to the nearest emergency room:  -acute onset of chest pain, shortness of breath, difficulty breathing    Activity  - You are going home a well person, be as active as possible. Your only exercise should be walking. Start with short frequent walks and increase your walking distance each day.  -Limit the amount of time you sit to 20-30 minute intervals. Sitting for prolonged periods of time will be uncomfortable for you following surgery.   - Do NOT lift anything over 5 pounds  -From now on, even when lifting light weight, bend with your knees and not your back.  -Do NOT do any neck exercises until you have been instructed by your doctor  -When you are in bed, you may lay on your back or on either side. Do NOT lie on your stomach    Cervical Collar (Aspen Collar)  -You are required to wear your cervical collar at all times; except when showering. You may remove the collar long enough to change the pads when needed and to change your dressing each day. -Do not bend or twist when your collar is off. It is best to have someone assist you when changing the pads and your dressing to prevent you from bending your neck. - Clean the pads on your neck collar every day by hand washing with a mild soap and water. Pat them dry with a towel and lay out to air dry. Do not use heat to dry the pads. Driving  -You may not drive or return to work until instructed by your physician. However, you may ride in the car for short periods of time. Incision Care  Your incision has been closed with absorbable sutures and steri-strips. This will assist with healing. Steri-strips are to remain on your incision for 2 weeks. A dry dressing (ABD and tape) will be placed over it and should be changed daily, for at least the first several days after your surgery. If you have no incisional drainage, you may leave the incision open to air if you wish, still leaving the steri-strips in place. Please make sure to wash your hands prior to touching your dressing. You may take brief showers but do not run the water directly onto the wound. After your shower, blot your incision dry with a soft towel and replace the dry dressing. Do not allow the tape to come in contact with the steri-strips. Do not rub or apply any lotions or ointments to your incision site. Do not soak or scrub your wound. Your steri-strips will be removed during your two week follow-up appointment.  If you experience drainage leaking from underneath the steri-strips or if it peels off before 2 weeks, please contact your orthopedic surgeons office. Showering  -You may shower in approximately 2 days after your surgery.    -Leave the dressing on during your shower. Do NOT allow the water to run directly onto your dressing. Once you get out of the shower, put on a dry dressing.  -Reminder- Make sure you put clean pads on your collar after your shower.    -Do not take a tub bath. Preventing blood clots  -You have been given T.E.D. stockings to wear. Continue to wear these for 7 days after your discharge. Put them on in the morning and take them off at night.    -They are used to increase your circulation and prevent blood clots from forming in your legs  -T. E.D. stockings can be machine washed, temperature not to exceed 160° F (71°C) and machine dried for 15 to 20 minutes, temperature not to exceed 250° F (121°C). Pain management  -Take pain medication as prescribed; decrease the amount you use as your pain lessens  -Do not wait until you are in extreme pain to take your medication.  -Avoid alcoholic beverages while taking pain medication    Pain Medication Safety  DO:  -Read the Medication Guide   -Take your medicine exactly as prescribed   -Store your medicine away from children and in a safe place   -Flush unused medicine down the toilet   -Call your healthcare provider for medical advice about side effects. You may report side effects to FDA at 3-465-FDA-6640.   -Please be aware that many medications contain Tylenol. We do not want you to over medicate so please read the information below as a guide. Do not take more than 4 Grams of Tylenol in a 24 hour period.   (There are 1000 milligrams in one Gram)                                                                                                                                                                                                    Percocet contains 325 mg of Tylenol per tablet (do not take more than 12 tablets in 24 hours)  Lortab contains 500 mg of Tylenol per tablet (do not take more than 8 tablets in 24 hours)  Norco contains 325 mg of Tylenol per tablet (do not take more than 12 tablets in 24 hours). DO NOT:  -Do not give your medicine to others   -Do not take medicine unless it was prescribed for you   -Do not stop taking your medicine without talking to your healthcare provider   -Do not break, chew, crush, dissolve, or inject your medicine. If you cannot  swallow your medicine whole, talk to your healthcare provider.  -Do not drink alcohol while taking this medicine  -Do not take anti-inflammatory medications or aspirin unless instructed by your     Physician. Diet  -resume usual diet; drink plenty of fluids; eat foods high in fiber  -It is important to have regular bowel movements. Pain medications may cause constipation. You may want to take a stool softener (such as Senokot-S or Colace) to prevent constipation. If constipation occurs, take a laxative (such as Dulcolax tablets, Milk of Magnesia, or a suppository). Laxatives should only be used if the above preventable measures have failed and you still have not had a bowel movement after three days.

## 2022-03-17 VITALS
OXYGEN SATURATION: 97 % | BODY MASS INDEX: 20.63 KG/M2 | HEART RATE: 70 BPM | WEIGHT: 152.34 LBS | RESPIRATION RATE: 16 BRPM | TEMPERATURE: 98.2 F | DIASTOLIC BLOOD PRESSURE: 68 MMHG | SYSTOLIC BLOOD PRESSURE: 118 MMHG | HEIGHT: 72 IN

## 2022-03-17 LAB
GLUCOSE BLD STRIP.AUTO-MCNC: 135 MG/DL (ref 65–117)
SERVICE CMNT-IMP: ABNORMAL

## 2022-03-17 PROCEDURE — 99233 SBSQ HOSP IP/OBS HIGH 50: CPT | Performed by: CLINICAL NURSE SPECIALIST

## 2022-03-17 PROCEDURE — 77030012893

## 2022-03-17 PROCEDURE — 97161 PT EVAL LOW COMPLEX 20 MIN: CPT

## 2022-03-17 PROCEDURE — 82962 GLUCOSE BLOOD TEST: CPT

## 2022-03-17 PROCEDURE — 74011250637 HC RX REV CODE- 250/637: Performed by: STUDENT IN AN ORGANIZED HEALTH CARE EDUCATION/TRAINING PROGRAM

## 2022-03-17 PROCEDURE — 74011000250 HC RX REV CODE- 250: Performed by: STUDENT IN AN ORGANIZED HEALTH CARE EDUCATION/TRAINING PROGRAM

## 2022-03-17 RX ORDER — TRAMADOL HYDROCHLORIDE 50 MG/1
50 TABLET ORAL
Qty: 30 TABLET | Refills: 0 | Status: SHIPPED | OUTPATIENT
Start: 2022-03-17 | End: 2022-03-31

## 2022-03-17 RX ORDER — PREGABALIN 300 MG/1
300 CAPSULE ORAL 2 TIMES DAILY
Qty: 28 CAPSULE | Refills: 0 | Status: SHIPPED | OUTPATIENT
Start: 2022-03-17 | End: 2022-03-31

## 2022-03-17 RX ADMIN — ACETAMINOPHEN 1000 MG: 500 TABLET ORAL at 06:50

## 2022-03-17 RX ADMIN — LISINOPRIL 20 MG: 10 TABLET ORAL at 09:36

## 2022-03-17 RX ADMIN — PRAVASTATIN SODIUM 40 MG: 20 TABLET ORAL at 09:36

## 2022-03-17 RX ADMIN — PREGABALIN 300 MG: 75 CAPSULE ORAL at 09:35

## 2022-03-17 RX ADMIN — THERA TABS 1 TABLET: TAB at 09:36

## 2022-03-17 RX ADMIN — ACETAMINOPHEN 1000 MG: 500 TABLET ORAL at 12:07

## 2022-03-17 RX ADMIN — OXYCODONE HYDROCHLORIDE AND ACETAMINOPHEN 500 MG: 500 TABLET ORAL at 09:36

## 2022-03-17 RX ADMIN — SENNOSIDES AND DOCUSATE SODIUM 1 TABLET: 50; 8.6 TABLET ORAL at 09:36

## 2022-03-17 RX ADMIN — SODIUM CHLORIDE, PRESERVATIVE FREE 10 ML: 5 INJECTION INTRAVENOUS at 06:00

## 2022-03-17 NOTE — PROGRESS NOTES
Spine Surgery Progress Note    Admit Date: 3/16/2022   LOS: 0 days      Daily Progress Note: 3/17/2022    POD:1 Day Post-Op    S/P: Procedure(s):  C5-6, C6-7 ANTERIOR CERVICAL DISCECTOMY WITH FUSION (FASTRACK)    Visit Vitals  /68 (BP 1 Location: Left upper arm, BP Patient Position: At rest)   Pulse 70   Temp 98.2 °F (36.8 °C)   Resp 16   Ht 6' (1.829 m)   Wt 69.1 kg (152 lb 5.4 oz)   SpO2 97%   BMI 20.66 kg/m²      Lab Results   Component Value Date/Time    HGB 13.6 03/01/2022 03:44 PM    INR 1.0 03/01/2022 03:44 PM       Patient doing well overall. No nausea or vomiting. Pain well controlled on current medications. No complaints. Has cleared PT. Ambulating without issue. Swallowing without issue. Voiding without issue. Denies nausea, vomiting, fever, chills, chest pain, dyspnea, headache. Calves soft/NTTP Bilaterally. Moving LE well. Neurocirculatory exam WNL. Motor and sensation intact. Incision OK; no drainage. Dressing clean and dry. Plan:  -Pain control  -PT - in soft collar  -ADAT (diabetic on pump)  -Bowel regimen  -Cont home meds  -Daily dressing changes to incision    Discharge to home today. All questions were answered. Follow up in Dr. Judi Matt office in 2 weeks, sooner if needed.     Gates Crigler, PA

## 2022-03-17 NOTE — DIABETES MGMT
[facility] [facility]    CLINICAL NURSE SPECIALIST CONSULT     Initial Presentation   Jg Vasquez is a 77 y.o. male admitted 3/16/22 for surgical management of cervical spine stenosis. HX:   Past Medical History:   Diagnosis Date    Acute bacterial endocarditis 8/9/2021    Adverse effect of anesthesia     DIFFICULTY URINATING AFTER SURGERY, HAD TO BE CATHED    Anemia     Arthritis     COVID-19 vaccine series completed     PFIZER 3-18-21 AND 4-8-21    Essential hypertension 12/11/2017    Hypercholesterolemia 12/11/2017    Ileitis 12/11/2017    Long-term use of high-risk medication 12/11/2017    Microalbuminuria 12/11/2017    Type I diabetes mellitus with proliferative retinopathy ([facility] [address] 12/11/2017    INSULIN    Valvular heart disease         INITIAL DX:   Cervical stenosis of spine [M48.02]     Current Treatment     TX: C5-6, C6-7 anterior cervical discectomy with fusion     Consulted by Nohelia Austin NP for advanced diabetes nursing assessment and care for:   [x] Inpatient management strategy    Hospital Course   Clinical progress has been uncomplicated. Diabetes History   Type 1 Diabetes    Ambulatory blood glucose management provided by endocrinologist, Tahmina Kyle MD.    Diabetes-related Medical History  Neurological complications  Peripheral neuropathy, Hypoglycemia unawareness  Microvascular disease  Retinopathy  Macrovascular disease      Diabetes Medication History  Key Antihyperglycemic Medications             insulin lispro (HumaLOG U-100 Insulin) 100 unit/mL injection (Taking) USE VIA INSULIN PUMP     insulin pump (PATIENT SUPPLIED) Misc by SubCUTAneous route as needed.  NOVALOG INSULIN         Medtronic 670 insulin pump without sensing capability:              Basal               Mid-8am          0.4 units/hr                                     8am-9am         0.375 units/hr                                      9am-Noon        0.35 units/hr     Noon to MN 0.325 units/hr                                                 Bolus ratio       1:15              Correction       1:50              Target BG       100    Diabetes self-management practices:   Eating pattern  Would not discuss with me  Physical activity pattern  Limited s/t back pain  Monitoring pattern  Testing BG 7 times daily  Fasting BG 40s-120s. Does have more than 3 BG readings below 60 in a week. Daytime BG up to 130s, rarely over 150s  Doesn't feel low until BG in the 30s  Taking medications pattern  [x] Consistent administration  [x] Affordable  Social determinants of health impacting diabetes self-management practices   Concerned that you need to know more about how to stay healthy with diabetes    Overall evaluation:    [x] Achieving A1c target with drug therapy & self-care practices    Subjective   I slept hard.      Objective   Physical exam  General Normal weight male in no acute distress/ill-appearing. Conversant and cooperative but still groggy  Neuro  Alert, oriented   Vital Signs   Visit Vitals  /68 (BP 1 Location: Left upper arm, BP Patient Position: At rest)   Pulse 70   Temp 98.2 °F (36.8 °C)   Resp 16   Ht 6' (1.829 m)   Wt 69.1 kg (152 lb 5.4 oz)   SpO2 97%   BMI 20.66 kg/m²     Skin  Warm and dry. No acanthosis noted along neckline. No lipohypertrophy or lipoatrophy noted at injection sites. Neck collar   Heart   Regular rate and rhythm. No murmurs, rubs or gallops  Lungs  Clear to auscultation without rales or rhonchi  Extremities No foot wounds    . Laboratory  No results for input(s): GLU, AGAP, TRIGL, WBC, CREA, GFRNA, AST, ALT in the last 72 hours.     No lab exists for component: A1C      Blood glucose pattern      Significant diabetes-related events over the past 24-72 hours  A1C 5%  On insulin pump  Last BG check was 1350 and BG was 209  Fasting   Post-op BG on his glucometer: 155-224    Insulin self-dosing yesterday:  3/16: 1129a: 2.6 units, 153p 1.5 units, 558p: 0.5 units, 850p: 5.3 units  3/17: 4a: 2.4 units, 921a: 1.3 units      Assessment and Plan   Nursing Diagnosis Risk for unstable blood glucose pattern   Nursing Intervention Domain 5258 Decision-making Support   Nursing Interventions Examined current inpatient diabetes/blood glucose control   Explored factors facilitating and impeding inpatient management  Explored corrective strategies with patient and responsible inpatient provider   Informed patient of rational for insulin strategy while hospitalized     Evaluation   Asher Cao is a 77year old gentleman, with Type 1 Diabetes on Medtronic 670 insulin pump therapy, who was admitted with cervical stenosis now s/p C5-6 and C6-7 anterior cervical discectomy with fusion. Admitting A1C 5% and . He did use a temporary basal rate during the OR and home basal rate settings resumed. He is awake and oriented to be able to independently continue insulin pump therapy. Recommendations   1. Patient is alert, oriented and able to independently manage insulin pump    2. Check POC glucose ACHS with hospital glucometer. 3. Patient will bolus off insulin pump ACHS for correctional insulin and with all carbohydrate intake     4. No additional SQ insulin will be given while on insulin pump    5. Please add order for lispro PRN in [facility] for patient to fill insulin pump reservoir. 6. Nursing: in flowsheets- add LDA for continuous subcutaneous insulin infusion. Please assess site Q6 hrs. There is a column for RN to enter the amount of insulin bolused with each administration    If patient is not able to independently use insulin pump, please order:  Basal: 9 units Lantus once daily.   Give the first dose then turn off insulin pump 2 hrs later  Bolus: 1 unit for every 15 grams CHO at meals  Correction: Normal sensitivity ACHS   Billing Code(s)   [x] 78454    Before making these care recommendations, I personally reviewed the hospitalization record, including notes, laboratory & diagnostic data and current medications, and examined the patient at the bedside (circumstances permitting) before making care recommendations. More than fifty (50) percent of the time was spent in patient counseling and/or care coordination.   Total minutes: 28      Jorge Pickering CNS  Diabetes Clinical Nurse Specialist  [facility]  Access via [employer]

## 2022-03-17 NOTE — PROGRESS NOTES
Orders received, chart reviewed and patient evaluated by physical therapy. Recommend:  No skilled physical therapy/ follow up rehabilitation needs identified at this time. Full evaluation to follow.

## 2022-03-17 NOTE — PROGRESS NOTES
PHYSICAL THERAPY EVALUATION/DISCHARGE  Patient: Laney Miguel (66 y.o. male)  Date: 3/17/2022  Primary Diagnosis: Cervical stenosis of spine [M48.02]  Procedure(s) (LRB):  C5-6, C6-7 ANTERIOR CERVICAL DISCECTOMY WITH FUSION (FASTRACK) (N/A) 1 Day Post-Op   Precautions:    (c-spine) No bending, no lifting greater than 5 lbs, no twisting, log-roll technique, repositioning every 20-30 min except when sleeping, brace when OOB        ASSESSMENT  Based on the objective data described below, the patient is presently modified independent with ambulation and stairs. Pt reports son will be able to assist as needed. Pt has no concerns for home or stairs. Ortho PA aware that patient is cleared by therapy. Functional Outcome Measure: The patient scored 100/100 on the Barthel outcome measure which is indicative of patient is independent. .      Other factors to consider for discharge: c-spine     Further skilled acute physical therapy is not indicated at this time. PLAN :  Recommendation for discharge: (in order for the patient to meet his/her long term goals)  No skilled physical therapy/ follow up rehabilitation needs identified at this time. This discharge recommendation:  Has been made in collaboration with the attending provider and/or case management    IF patient discharges home will need the following DME: none       SUBJECTIVE:   Patient stated I can do that.     OBJECTIVE DATA SUMMARY:   HISTORY:    Past Medical History:   Diagnosis Date    Acute bacterial endocarditis 8/9/2021    Adverse effect of anesthesia     DIFFICULTY URINATING AFTER SURGERY, HAD TO BE CATHED    Anemia     Arthritis     COVID-19 vaccine series completed     PFIZER 3-18-21 AND 4-8-21    Essential hypertension 12/11/2017    Hypercholesterolemia 12/11/2017    Ileitis 12/11/2017    Long-term use of high-risk medication 12/11/2017    Microalbuminuria 12/11/2017    Type I diabetes mellitus with proliferative retinopathy ([facility] [address] 2017    INSULIN    Valvular heart disease      Past Surgical History:   Procedure Laterality Date    COLONOSCOPY N/A 2022    COLONOSCOPY performed by Yasmin Orellana MD at [facility]  2022         HX LUMBAR LAMINECTOMY  2015    HX ORTHOPAEDIC  2010    RIGHT KNEE OPEN SURGERY    HX TONSILLECTOMY         Prior level of function: independent  Personal factors and/or comorbidities impacting plan of care: son will be home    [address]: Private residence  # Steps to Enter: 5  Rails to Enter: Yes  Living Alone: No  Support Systems: Child(bisi)  Patient Expects to be Discharged to[de-identified] Home  Current DME Used/Available at Home: None    EXAMINATION/PRESENTATION/DECISION MAKING:   Critical Behavior:  Neurologic State: Alert           Hearing:     Skin:  intact  Edema: none  Range Of Motion:  AROM: Within functional limits           PROM: Within functional limits           Strength:    Strength: Within functional limits                    Tone & Sensation:                                  Coordination:  Coordination: Within functional limits  Vision:      Functional Mobility:  Bed Mobility:              Transfers:  Sit to Stand: Independent  Stand to Sit: Independent                       Balance:   Sitting: Intact  Standing: Intact; Without support  Ambulation/Gait Training:  Distance (ft): 100 Feet (ft) (x2)     Ambulation - Level of Assistance: Independent                 Base of Support: Narrowed     Stairs:  Number of Stairs Trained: 4  Stairs - Level of Assistance: Independent   Rail Use: Right       Functional Measure:  Barthel Index:    Bathin  Bladder: 10  Bowels: 10  Groomin  Dressing: 10  Feeding: 10  Mobility: 15  Stairs: 10  Toilet Use: 10  Transfer (Bed to Chair and Back): 15  Total: 100/100       The Barthel ADL Index: Guidelines  1.  The index should be used as a record of what a patient does, not as a record of what a patient could do.  2. The main aim is to establish degree of independence from any help, physical or verbal, however minor and for whatever reason. 3. The need for supervision renders the patient not independent. 4. A patient's performance should be established using the best available evidence. Asking the patient, friends/relatives and nurses are the usual sources, but direct observation and common sense are also important. However direct testing is not needed. 5. Usually the patient's performance over the preceding 24-48 hours is important, but occasionally longer periods will be relevant. 6. Middle categories imply that the patient supplies over 50 per cent of the effort. 7. Use of aids to be independent is allowed. Score Interpretation (from [address] [facility] [address])    Independent   60-79 Minimally independent   40-59 Partially dependent   20-39 Very dependent   <20 Totally dependent     -Imtiaz Mathew., Barthel, MIKAELA. (1965). Functional evaluation: the Barthel Index. [facility] ([address] (1997). The Barthel activities of daily living index: self-reporting versus actual performance in the old (> or = 75 years). Journal [address] [address]), [address] [facility], [facility], [facility]., [facility]. (1999). Measuring the change in disability after inpatient rehabilitation; comparison of the responsiveness of the Barthel Index and Functional [place] Measure. Journal of Neurology, Neurosurgery, and Psychiatry, 66(4), 141-549. SALVADOR Ayala, EFREN Grace.TREV, & Stefanie Amado M.A. (2004) Assessment of post-stroke quality of life in cost-effectiveness studies: The usefulness of the Barthel Index and the EuroQoL-5D.  Quality of Life Research, 15, 153-41            Physical Therapy Evaluation Charge Determination   History Examination Presentation Decision-Making   LOW Complexity : Zero comorbidities / personal factors that will impact the outcome / POC LOW Complexity : 1-2 Standardized tests and measures addressing body structure, function, activity limitation and / or participation in recreation  LOW Complexity : Stable, uncomplicated  Other outcome measures barthel  LOW       Based on the above components, the patient evaluation is determined to be of the following complexity level: LOW       Activity Tolerance:   Good      After treatment patient left in no apparent distress:   Sitting in chair    COMMUNICATION/EDUCATION:   The patients plan of care was discussed with: Occupational therapist, Registered nurse and ortho PA. Fall prevention education was provided and the patient/caregiver indicated understanding. and Patient/family have participated as able in goal setting and plan of care.     Thank you for this referral.  Ale Hogue, PT   Time Calculation: 10 mins

## 2022-03-17 NOTE — PROGRESS NOTES
Orthopedic Spine Progress Note  Post Op day: 1 Day Post-Op    2022 9:54 AM   Admit Date: 3/16/2022  Procedure: Procedure(s):  C5-6, C6-7 ANTERIOR CERVICAL DISCECTOMY WITH FUSION (FASTRACK)    Subjective:     Bettye Castorena has complaints of some right arm soreness today. Pain is well controlled. + flatus    Tolerating diet. No N/V. Pain Control:   Pain Assessment  Pain Scale 1: Numeric (0 - 10)  Pain Intensity 1: 4    Objective:          Physical Exam:  General:  Alert and oriented. No acute distress. Heart:  Respirations unlabored. Abdomen:   Extremities: Soft, non-tender. No evidence of cyanosis. Pulses palpable in both upper and lower extremities. Neurologic:  Musculoskeletal:  No new motor deficits. Neurovascular exam within normal limits. Sensation stable. Motor: unchanged C5-T1 and L2-S1. Guanaco's sign negative in bilateral lower extremities. Calves soft, nontender upon palpation and with passive twitch. Moves both upper and lower extremities. Incision: clean, dry, and intact. No significant erythema or swelling. No active drainage noted. Vital Signs:    Blood pressure 118/68, pulse 70, temperature 98.2 °F (36.8 °C), resp. rate 16, height 6' (1.829 m), weight 152 lb 5.4 oz (69.1 kg), SpO2 97 %. Temp (24hrs), Av.8 °F (36.6 °C), Min:97.4 °F (36.3 °C), Max:98.4 °F (36.9 °C)      LAB:    No results for input(s): HCT, HGB, PLT, HCTEXT, HGBEXT, PLTEXT in the last 72 hours.   Lab Results   Component Value Date/Time    Sodium 136 2022 03:44 PM    Potassium 4.0 2022 03:44 PM    Chloride 105 2022 03:44 PM    CO2 26 2022 03:44 PM    Glucose 87 2022 03:44 PM    BUN 21 (H) 2022 03:44 PM    Creatinine 1.00 2022 03:44 PM    Calcium 9.1 2022 03:44 PM       Intake/Output: 0701 - 03/17 1900  In: -   Out: 50 [Drains:50]  03/15 1901 -  0700  In: 7956 [I.V.:2450]  Out: 350     PT/OT:   Gait:                    Assessment: Patient is 1 Day Post-Op s/p Procedure(s):  C5-6, C6-7 ANTERIOR CERVICAL DISCECTOMY WITH FUSION (FASTRACK)    Plan:     1. Continue PT/OT  2. Continue established methods of pain control  3. VTE Prophylaxes - TEDS &/or SCDs   4. Advance diet  5.  DC drain when < 30 cc per 8 hour shift   6.   Discharge to home pending PT clearance, likely today       Signed By: EMMA Landa

## 2022-03-17 NOTE — PROGRESS NOTES
Pt was instructed to not administer insulin via his pump until nurse could take blood sugar with hospital POC equipment, but the patient had already administered 0.5 units of insulin after checking his own blood sugar with his equipment.

## 2022-03-18 PROBLEM — Z79.899 LONG-TERM USE OF HIGH-RISK MEDICATION: Status: ACTIVE | Noted: 2017-12-11

## 2022-03-18 PROBLEM — R78.81 BACTEREMIA DUE TO STREPTOCOCCUS: Status: ACTIVE | Noted: 2021-07-30

## 2022-03-18 PROBLEM — G93.31 POSTVIRAL FATIGUE SYNDROME: Status: ACTIVE | Noted: 2021-07-27

## 2022-03-18 PROBLEM — B95.5 BACTEREMIA DUE TO STREPTOCOCCUS: Status: ACTIVE | Noted: 2021-07-30

## 2022-03-18 PROBLEM — Z86.16 HISTORY OF COVID-19: Status: ACTIVE | Noted: 2021-06-25

## 2022-03-19 PROBLEM — M10.071 ACUTE IDIOPATHIC GOUT OF RIGHT FOOT: Status: ACTIVE | Noted: 2021-06-25

## 2022-03-19 PROBLEM — K52.9 ILEITIS: Status: ACTIVE | Noted: 2017-12-11

## 2022-03-19 PROBLEM — D63.8 ANEMIA WITH CHRONIC ILLNESS: Status: ACTIVE | Noted: 2021-07-27

## 2022-03-19 PROBLEM — M48.02 CERVICAL STENOSIS OF SPINAL CANAL: Status: ACTIVE | Noted: 2022-02-08

## 2022-03-19 PROBLEM — R80.9 MICROALBUMINURIA: Status: ACTIVE | Noted: 2017-12-11

## 2022-03-19 PROBLEM — M54.50 LOW BACK PAIN: Status: ACTIVE | Noted: 2021-12-28

## 2022-03-20 PROBLEM — E78.00 HYPERCHOLESTEROLEMIA: Status: ACTIVE | Noted: 2017-12-11

## 2022-03-20 PROBLEM — M54.12 CERVICAL RADICULOPATHY: Status: ACTIVE | Noted: 2021-11-15

## 2022-03-20 PROBLEM — M50.90 CERVICAL DISC DISEASE: Status: ACTIVE | Noted: 2021-12-28

## 2022-03-20 PROBLEM — M54.2 NECK PAIN: Status: ACTIVE | Noted: 2021-12-28

## 2022-03-20 PROBLEM — E10.3599: Status: ACTIVE | Noted: 2017-12-11

## 2022-03-20 PROBLEM — I10 ESSENTIAL HYPERTENSION: Status: ACTIVE | Noted: 2017-12-11

## 2022-03-24 PROBLEM — M48.02 CERVICAL STENOSIS OF SPINE: Status: ACTIVE | Noted: 2022-03-16

## 2022-03-25 ENCOUNTER — TELEPHONE (OUTPATIENT)
Dept: INTERNAL MEDICINE CLINIC | Age: 67
End: 2022-03-25

## 2022-03-25 NOTE — TELEPHONE ENCOUNTER
Patient notified of instructions and will give us a call back on Monday to let us know how he is doing.

## 2022-03-25 NOTE — TELEPHONE ENCOUNTER
Patient states he has a rash all over and it is very itchy. He doesn't know where it came from. He said he was in the hospital last week and maybe it came from there. He is putting cortisone 10 on it and it is not helping. What do you recommed? Please advise.     RENNY [phone]

## 2022-03-29 ENCOUNTER — OFFICE VISIT (OUTPATIENT)
Dept: ORTHOPEDIC SURGERY | Age: 67
End: 2022-03-29

## 2022-03-29 VITALS — WEIGHT: 152 LBS | HEIGHT: 72 IN | BODY MASS INDEX: 20.59 KG/M2

## 2022-03-29 DIAGNOSIS — Z98.1 S/P CERVICAL SPINAL FUSION: Primary | ICD-10-CM

## 2022-03-29 PROCEDURE — 99024 POSTOP FOLLOW-UP VISIT: CPT | Performed by: PHYSICIAN ASSISTANT

## 2022-03-29 NOTE — PROGRESS NOTES
1. Have you been to the ER, urgent care clinic since your last visit? Hospitalized since your last visit? No    2. Have you seen or consulted any other health care providers outside of the [address] [place] [address] since your last visit? Include any pap smears or colon screening.  No    Chief Complaint   Patient presents with    Surgical Follow-up     Sx 3/16/2022 C5/7 ACDF (PO#1)

## 2022-03-31 ENCOUNTER — TRANSCRIBE ORDER (OUTPATIENT)
Dept: SCHEDULING | Age: 67
End: 2022-03-31

## 2022-03-31 DIAGNOSIS — M81.0 OSTEOPOROSIS: Primary | ICD-10-CM

## 2022-03-31 NOTE — PROGRESS NOTES
Yemi Osborne (: 1955) is a 77 y.o. male patient here for evaluation of the following chief complaint(s):  Surgical Follow-up (Sx 3/16/2022 C5/7 ACDF (PO#1))         ASSESSMENT/PLAN:  Below is the assessment and plan developed based on review of pertinent history, physical exam, labs, studies, and medications. 1. S/P cervical spinal fusion  -     XR SPINE CERV PA LAT ODONT 3 V MAX; Future      The patient's radiologic findings have been reviewed with him in detail today. He is doing quite well at this point is postoperative recovery. He will continue wearing his soft collar, continue limit of cervical motion lifting. He may begin to wean out of his collar within the next couple of weeks, but understands that he should continue to limit his cervical motion lifting. We will see him back for his next postoperative follow-up visit in 4 weeks. Return in about 4 weeks (around 2022) for Post op follow up. SUBJECTIVE/OBJECTIVE:  Yemi Osborne (: 1955) is a 77 y.o. male who presents today for the following:  Chief Complaint   Patient presents with    Surgical Follow-up     Sx 3/16/2022 C5/7 ACDF (PO#1)        HPI   Mr. Ammy Albrecht returns today for routine postoperative follow-up visit. He is approximately 2 weeks out from his recent cervical fusion. He states that he is doing quite well overall. He has no residual arm symptoms other than an occasional intermittent right upper extremity fleeting numbness and tingling, and notes that his postoperative pain has been very well managed. He is not currently using any pain medication. He has had some difficulty with swallowing, but feels that this is slowly beginning to improve. He denies incisional difficulties.     IMAGING:  XR Results (most recent):  Results from Appointment encounter on 22    XR SPINE CERV PA LAT ODONT 3 V MAX    Narrative  AP and lateral films of the cervical spine reveal stable anterior cervical fusion from C5-C7 with stable instrumentation. MRI Results (most recent):  Results from Appointment encounter on 01/10/22    MRI CERV SPINE WO CONT    Narrative  EXAM:  MRI CERV SPINE WO CONT    INDICATION:   Neck pain. COMPARISON: Cervical spine radiograph 12/28/2021    TECHNIQUE: Multiplanar multisequence acquisition without contrast of the  cervical spine. CONTRAST: None. FINDINGS:  There is normal alignment of the cervical spine. Vertebral body heights are  maintained without evidence of acute fracture. There is degenerative endplate  marrow edema at C5-C6 (Modic type I). Marrow signal is otherwise heterogeneous,  but within normal limits. Multilevel degenerative disc disease as detailed  below, most advanced at C5-C6 and C6-C7. The cervical cord is normal in size and  signal. Region of the foramen magnum is unremarkable. Visualized soft tissues  are unremarkable. C2-C3: No significant disc herniation, spinal canal or neural foraminal  stenosis. C3-C4: Diffuse disc osteophyte complex with bilateral uncovertebral spurring. No  significant spinal canal stenosis. Mild bilateral neural foraminal stenosis. C4-C5: Diffuse disc osteophyte complex. Mild left facet arthropathy. No  significant spinal canal or foraminal stenosis. C5-C6: Diffuse disc osteophyte complex with bilateral uncovertebral spurring,  right worse than left. Mild spinal canal stenosis. Moderate to severe right and  mild left neural foraminal stenosis. C6-C7: Diffuse disc osteophyte complex with bilateral uncovertebral spurring. Mild spinal canal stenosis. Moderate bilateral neural foraminal stenosis. C7-T1: Small left paracentral disc protrusion. Mild bilateral facet arthropathy. No significant spinal canal or neural foraminal stenosis. Impression  1. Mild spinal canal stenosis and moderate to severe right neural foraminal  stenosis at C5-C6.   2. Mild spinal canal stenosis and moderate bilateral neural foraminal stenosis  at C6-C7.  3. Remaining degenerative changes as detailed above. Allergies   Allergen Reactions    Oxycodone-Acetaminophen Other (comments)     AGGRESIVE BEHAVIOR  Other reaction(s): Other (see comments)  AGGRESIVE BEHAVIOR       Current Outpatient Medications   Medication Sig    pravastatin (PRAVACHOL) 40 mg tablet TAKE 1 TABLET DAILY    traMADoL (ULTRAM) 50 mg tablet Take 1 Tablet by mouth every six (6) hours as needed for Pain for up to 14 days. Max Daily Amount: 200 mg.  pregabalin (LYRICA) 300 mg capsule Take 1 Capsule by mouth two (2) times a day for 14 days. Max Daily Amount: 600 mg.    multivitamin (ONE A DAY) tablet Take 1 Tablet by mouth daily.  ascorbic acid (VITAMIN C PO) Take  by mouth daily.  lisinopriL (PRINIVIL, ZESTRIL) 20 mg tablet TAKE 1 TABLET TWICE A DAY (Patient taking differently: two (2) times a day.)    insulin lispro (HumaLOG U-100 Insulin) 100 unit/mL injection USE VIA INSULIN PUMP    glucose blood VI test strips (Accu-Chek Elizabet Plus test strp) strip Use to check blood sugars 7 times per day. 7 boxes of 100 per box for 90d supply. Dx code O88.8499 Patient is an insulin dependant diabetic    lancets misc Use to check blood sugars 7 times per day. 7 boxes of 100 per box for 90d supply. Dx code M68.8050 Patient is an insulin dependant diabetic     insulin pump (PATIENT SUPPLIED) Misc by SubCUTAneous route as needed. NOVALOG INSULIN     No current facility-administered medications for this visit.        Past Medical History:   Diagnosis Date    Acute bacterial endocarditis 8/9/2021    Adverse effect of anesthesia     DIFFICULTY URINATING AFTER SURGERY, HAD TO BE CATHED    Anemia     Arthritis     COVID-19 vaccine series completed     PFIZER 3-18-21 AND 4-8-21    Essential hypertension 12/11/2017    Hypercholesterolemia 12/11/2017    Ileitis 12/11/2017    Long-term use of high-risk medication 12/11/2017    Microalbuminuria 12/11/2017    Type I diabetes mellitus with proliferative retinopathy ([facility] [address] 2017    INSULIN    Valvular heart disease         Past Surgical History:   Procedure Laterality Date    COLONOSCOPY N/A 2022    COLONOSCOPY performed by Chai Pratt MD at [address] [place] [address]  2022         HX LUMBAR LAMINECTOMY      HX ORTHOPAEDIC  2010    RIGHT KNEE OPEN SURGERY    HX TONSILLECTOMY         Family History   Problem Relation Age of Onset    Cancer Mother         LUNG    Cancer Father         LUNG    No Known Problems Sister     Anesth Problems Neg Hx         Social History     Tobacco Use    Smoking status: Former Smoker     Packs/day: 1.00     Years: 5.00     Pack years: 5.00     Quit date: 1975     Years since quittin.2    Smokeless tobacco: Never Used   Substance Use Topics    Alcohol use: Yes     Alcohol/week: 3.0 standard drinks     Types: 3 Glasses of wine per week     Comment: 3 glasses of wine a night        Review of Systems   Constitutional: Negative. Respiratory: Negative. Cardiovascular: Negative. Gastrointestinal: Negative. Endocrine: Negative. Genitourinary: Negative. Musculoskeletal: Positive for neck pain. Skin: Negative. Allergic/Immunologic: Negative. Neurological: Positive for numbness. Negative for weakness. Hematological: Negative. Psychiatric/Behavioral: Negative. All other systems reviewed and are negative. No flowsheet data found. Vitals:  Ht 6' (1.829 m)   Wt 152 lb (68.9 kg)   BMI 20.61 kg/m²    Body mass index is 20.61 kg/m². Physical Exam    Integumentary  Assessment of Surgical Incision - healing and consistent with normal anticipated wound healing. Neurologic  Overall Assessment of Muscle Strength and Tone reveals  Upper Extremities - Right Deltoid - 5/5. Left Deltoid - 5/5. Right Bicep - 5/5. Left Bicep - 5/5. Right Tricep - 5/5. Left Tricep - 5/5. Right Wrist Extensors - 5/5. Left Wrist Extensors - 5/5.  Right Wrist Flexors - 5/5. Left Wrist Flexors - 5/5. Right Intrinsics - 5/5. Left Intrinsics - 5/5. General Assessment of Reflexes  Right Hand - Watson's sign is negative in the right hand. Left Hand - Watson's sign is negative in the left hand. Reflexes (Dermatomes)  2/2 Normal - Left Bicep (C5-6), Left Tricep (C7-8), Left Brachioradialis (C5-6), Right Bicep (C5-6), Right Tricep (C7-8) and Right Brachioradialis (C5-6). Musculoskeletal  Global Assessment  Examination of related systems reveals - well-developed, well-nourished, in no acute distress, alert and oriented x 3 and normal coordination. Gait and Station - normal gait and station and normal posture. Spine/Ribs/Pelvis  Cervical Spine - Evaluation of related systems reveals - no lymphadenopathy and neurovascularly intact bilaterally. Inspection and Palpation - Tenderness - moderate and localized. Assessment of pain reveals the following findings: - Location - cervical area. Dr. Chen Liang was available for immediate consult during this encounter. An electronic signature was used to authenticate this note.   -- EMMA Casper

## 2022-04-07 DIAGNOSIS — Z98.1 S/P CERVICAL SPINAL FUSION: ICD-10-CM

## 2022-04-07 DIAGNOSIS — M51.36 LUMBAR DEGENERATIVE DISC DISEASE: Primary | ICD-10-CM

## 2022-04-07 RX ORDER — PREGABALIN 300 MG/1
300 CAPSULE ORAL 2 TIMES DAILY
Qty: 60 CAPSULE | Refills: 0 | Status: SHIPPED | OUTPATIENT
Start: 2022-04-07 | End: 2022-04-26 | Stop reason: ALTCHOICE

## 2022-04-12 ENCOUNTER — OFFICE VISIT (OUTPATIENT)
Dept: ORTHOPEDIC SURGERY | Age: 67
End: 2022-04-12
Payer: MEDICARE

## 2022-04-12 VITALS — BODY MASS INDEX: 20.59 KG/M2 | WEIGHT: 152 LBS | HEIGHT: 72 IN

## 2022-04-12 DIAGNOSIS — Z98.1 S/P CERVICAL SPINAL FUSION: Primary | ICD-10-CM

## 2022-04-12 PROCEDURE — 99024 POSTOP FOLLOW-UP VISIT: CPT | Performed by: PHYSICIAN ASSISTANT

## 2022-04-12 RX ORDER — CYCLOBENZAPRINE HCL 10 MG
10 TABLET ORAL
Qty: 30 TABLET | Refills: 0 | Status: SHIPPED | OUTPATIENT
Start: 2022-04-12 | End: 2022-09-06

## 2022-04-12 NOTE — PROGRESS NOTES
1. Have you been to the ER, urgent care clinic since your last visit? Hospitalized since your last visit? No    2. Have you seen or consulted any other health care providers outside of the [address] [place] [address] since your last visit? Include any pap smears or colon screening.  No    Chief Complaint   Patient presents with    Neck Pain    Surgical Follow-up     Sx 3/16/22 C5/7 ACDF

## 2022-04-15 ENCOUNTER — TELEPHONE (OUTPATIENT)
Dept: ORTHOPEDIC SURGERY | Age: 67
End: 2022-04-15

## 2022-04-15 RX ORDER — METHOCARBAMOL 750 MG/1
750 TABLET, FILM COATED ORAL
Qty: 40 TABLET | Refills: 1 | Status: SHIPPED | OUTPATIENT
Start: 2022-04-15 | End: 2022-09-06

## 2022-04-15 NOTE — TELEPHONE ENCOUNTER
We will try a different muscle relaxer. Sammie called patient and discussed with him that he is to DC flexeril once he starts a new muscle relaxer. Patient verbalized understanding.

## 2022-04-15 NOTE — TELEPHONE ENCOUNTER
Andree Mayo is calling with the complaints of of pain in the right arm, down the shoulder into the right forearm. He reports the pain is both nerve and muscle but over and over again he clarify's it is the muscle pain he is concerned with. He was seen in the office 3 days ago with May Dyson. Plan of care is not completed yet. He is asking for something different as the flexeril is not assisting in his pain. He is also NOT on the Lyrica as prescribed multiple times he states this gave him \" fog\" and he resumed some old gabapentin he had in the home 300 mg 3 x a day. Reports he has plenty of this medication. Germain Barry RN, BSN  [facility]istered [facility] [phone]  Direct Line [phone]      Per di muscle relaxer changed to Robaxin.  Patient notified to stop his flexeril and begin the robaxin

## 2022-04-18 NOTE — PROGRESS NOTES
Enrique Hurtado (: 1955) is a 77 y.o. male patient here for evaluation of the following chief complaint(s):  Neck Pain and Surgical Follow-up (Sx 3/16/22 C5/7 ACDF)         ASSESSMENT/PLAN:  Below is the assessment and plan developed based on review of pertinent history, physical exam, labs, studies, and medications. 1. S/P cervical spinal fusion      Mr. Titus Sanders has been reassured at his increasing pain today that this can be a normal postoperative phenomenon. We have spent a lengthy time discussing his medication management. He understands that he should not use both gabapentin and Lyrica, and must pick 1 medication to use moving forward. I have also discussed the potential option for a Medrol Dosepak, however he would like to hold off on this, in order to avoid elevating his glucose. I have recommended that he try a muscle relaxant and resume tramadol and Tylenol as needed. He will continue wearing his collar, and continue limit his cervical motion lifting. We will see him back for his previously scheduled routine postoperative follow-up visit in 2 weeks. He may call back sooner with any persistent or worsening symptoms. Return in about 2 weeks (around 2022) for Post op follow up. SUBJECTIVE/OBJECTIVE:  Enrique Hurtado (: 1955) is a 77 y.o. male who presents today for the following:  Chief Complaint   Patient presents with    Neck Pain    Surgical Follow-up     Sx 3/16/22 C5/7 ACDF        HPI   Mr. Titus Sanders returns today for a postoperative follow-up visit. He is approximately 4 weeks out from his recent cervical fusion. He states that approximately 1 week ago, he began with increasing right-sided posterior neck pain and right shoulder pain. Since his last office visit, he has been using gabapentin 600 mg in the morning and Lyrica 300 mg at bedtime. He is not using tramadol from his surgery. No other medications for pain relief.     IMAGING:  XR Results (most recent):  Results from Appointment encounter on 03/29/22    XR SPINE CERV PA LAT ODONT 3 V MAX    Narrative  AP and lateral films of the cervical spine reveal stable anterior cervical fusion from C5-C7 with stable instrumentation. MRI Results (most recent): Allergies   Allergen Reactions    Oxycodone-Acetaminophen Other (comments)     AGGRESIVE BEHAVIOR  Other reaction(s): Other (see comments)  AGGRESIVE BEHAVIOR       Current Outpatient Medications   Medication Sig    cyclobenzaprine (FLEXERIL) 10 mg tablet Take 1 Tablet by mouth two (2) times daily as needed for Muscle Spasm(s).  pregabalin (LYRICA) 300 mg capsule Take 1 Capsule by mouth two (2) times a day. Max Daily Amount: 600 mg.    lancets (Accu-Chek Softclix Lancets) misc USE ONE LANCET TO TEST 7 TIMES DAILY    glucose blood VI test strips (Accu-Chek Elizabet Plus test strp) strip USE ONE STRIP TO TEST SEVEN TIMES A DAY    pravastatin (PRAVACHOL) 40 mg tablet TAKE 1 TABLET DAILY    multivitamin (ONE A DAY) tablet Take 1 Tablet by mouth daily.  ascorbic acid (VITAMIN C PO) Take  by mouth daily.  lisinopriL (PRINIVIL, ZESTRIL) 20 mg tablet TAKE 1 TABLET TWICE A DAY (Patient taking differently: two (2) times a day.)    insulin lispro (HumaLOG U-100 Insulin) 100 unit/mL injection USE VIA INSULIN PUMP     insulin pump (PATIENT SUPPLIED) Misc by SubCUTAneous route as needed. NOVALOG INSULIN    methocarbamoL (Robaxin-750) 750 mg tablet Take 1 Tablet by mouth three (3) times daily as needed for Muscle Spasm(s). No current facility-administered medications for this visit.        Past Medical History:   Diagnosis Date    Acute bacterial endocarditis 8/9/2021    Adverse effect of anesthesia     DIFFICULTY URINATING AFTER SURGERY, HAD TO BE CATHED    Anemia     Arthritis     COVID-19 vaccine series completed     PFIZER 3-18-21 AND 4-8-21    Essential hypertension 12/11/2017    Hypercholesterolemia 12/11/2017    Ileitis 12/11/2017  Long-term use of high-risk medication 2017    Microalbuminuria 2017    Type I diabetes mellitus with proliferative retinopathy ([facility] [address] 2017    INSULIN    Valvular heart disease         Past Surgical History:   Procedure Laterality Date    COLONOSCOPY N/A 2022    COLONOSCOPY performed by Amelia Ge MD at [address] [place] [address]  2022         HX LUMBAR LAMINECTOMY      HX ORTHOPAEDIC  2010    RIGHT KNEE OPEN SURGERY    HX TONSILLECTOMY         Family History   Problem Relation Age of Onset    Cancer Mother         LUNG    Cancer Father         LUNG    No Known Problems Sister     Anesth Problems Neg Hx         Social History     Tobacco Use    Smoking status: Former Smoker     Packs/day: 1.00     Years: 5.00     Pack years: 5.00     Quit date: 1975     Years since quittin.3    Smokeless tobacco: Never Used   Substance Use Topics    Alcohol use: Yes     Alcohol/week: 3.0 standard drinks     Types: 3 Glasses of wine per week     Comment: 3 glasses of wine a night        Review of Systems   Constitutional: Negative. Respiratory: Negative. Cardiovascular: Negative. Gastrointestinal: Negative. Endocrine: Negative. Genitourinary: Negative. Musculoskeletal: Positive for arthralgias and neck pain. Skin: Negative. Allergic/Immunologic: Negative. Hematological: Negative. Psychiatric/Behavioral: Negative. All other systems reviewed and are negative. No flowsheet data found. Vitals:  Ht 6' (1.829 m)   Wt 152 lb (68.9 kg)   BMI 20.61 kg/m²    Body mass index is 20.61 kg/m². Physical Exam    Integumentary  Assessment of Surgical Incision - healing and consistent with normal anticipated wound healing. Neurologic  Overall Assessment of Muscle Strength and Tone reveals  Upper Extremities - Right Deltoid - 5/5. Left Deltoid - 5/5. Right Bicep - 5/5. Left Bicep - 5/5. Right Tricep - 5/5.  Left Tricep - 5/5. Right Wrist Extensors - 5/5. Left Wrist Extensors - 5/5. Right Wrist Flexors - 5/5. Left Wrist Flexors - 5/5. Right Intrinsics - 5/5. Left Intrinsics - 5/5. General Assessment of Reflexes  Right Hand - Watson's sign is negative in the right hand. Left Hand - Watson's sign is negative in the left hand. Reflexes (Dermatomes)  2/2 Normal - Left Bicep (C5-6), Left Tricep (C7-8), Left Brachioradialis (C5-6), Right Bicep (C5-6), Right Tricep (C7-8) and Right Brachioradialis (C5-6). Musculoskeletal  Global Assessment  Examination of related systems reveals - well-developed, well-nourished, in no acute distress, alert and oriented x 3 and normal coordination. Gait and Station - normal gait and station and normal posture. Spine/Ribs/Pelvis  Cervical Spine - Evaluation of related systems reveals - no lymphadenopathy and neurovascularly intact bilaterally. Inspection and Palpation - Tenderness - moderate and localized. Assessment of pain reveals the following findings: - Location - cervical area. Dr. Jorge L Lutz was available for immediate consult during this encounter. An electronic signature was used to authenticate this note.   -- EMMA Aguirre

## 2022-04-26 DIAGNOSIS — M54.12 CERVICAL RADICULOPATHY: ICD-10-CM

## 2022-04-26 DIAGNOSIS — Z98.1 S/P CERVICAL SPINAL FUSION: ICD-10-CM

## 2022-04-26 RX ORDER — GABAPENTIN 300 MG/1
600 CAPSULE ORAL 3 TIMES DAILY
Qty: 180 CAPSULE | Refills: 0 | Status: SHIPPED | OUTPATIENT
Start: 2022-04-26 | End: 2022-09-06

## 2022-04-26 NOTE — TELEPHONE ENCOUNTER
Requested Prescriptions     Pending Prescriptions Disp Refills    gabapentin (NEURONTIN) 300 mg capsule 180 Capsule 0     Sig: Take 2 Capsules by mouth three (3) times daily. Max Daily Amount: 1,800 mg.        Last Refill: 12/23/21  Last Appointment:2/28/22

## 2022-04-28 ENCOUNTER — OFFICE VISIT (OUTPATIENT)
Dept: ORTHOPEDIC SURGERY | Age: 67
End: 2022-04-28
Payer: MEDICARE

## 2022-04-28 VITALS — HEIGHT: 72 IN | WEIGHT: 147 LBS | BODY MASS INDEX: 19.91 KG/M2

## 2022-04-28 DIAGNOSIS — F11.99 OPIOID USE, UNSPECIFIED WITH UNSPECIFIED OPIOID-INDUCED DISORDER (HCC): ICD-10-CM

## 2022-04-28 DIAGNOSIS — Z98.1 S/P CERVICAL SPINAL FUSION: Primary | ICD-10-CM

## 2022-04-28 PROCEDURE — 99024 POSTOP FOLLOW-UP VISIT: CPT | Performed by: ORTHOPAEDIC SURGERY

## 2022-04-28 RX ORDER — TRAMADOL HYDROCHLORIDE 50 MG/1
50 TABLET ORAL
Qty: 40 TABLET | Refills: 0 | Status: SHIPPED | OUTPATIENT
Start: 2022-04-28 | End: 2022-05-28

## 2022-04-28 NOTE — PROGRESS NOTES
Jose Velez (: 1955) is a 77 y.o. male patient here for evaluation of the following chief complaint(s):  Surgical Follow-up         ASSESSMENT/PLAN:  Below is the assessment and plan developed based on review of pertinent history, physical exam, labs, studies, and medications. 1. S/P cervical spinal fusion  -     XR SPINE CERV PA LAT ODONT 3 V MAX; Future  -     REFERRAL TO PHYSICAL THERAPY      Mr. Tayler Cisse has been reassured at his increasing pain today that this can be a normal postoperative phenomenon. We have spent a lengthy time discussing his medication management. He is discussing a lot of pain issues but he is not having a lot of limitations on his range of motion in the office today. Motor testing does not demonstrate any increased deficits. I am going to start some physical therapy for his neck. I would also like to have him see one of the shoulder specialist.  We will continue to work with his medications.  will be checked          No follow-ups on file. SUBJECTIVE/OBJECTIVE:  Jose Velez (: 1955) is a 77 y.o. male who presents today for the following:  Chief Complaint   Patient presents with    Surgical Follow-up        At this point he is about 6 weeks out. He is complaining of a lot of similar type symptoms again of neck pain and shoulder pain but now seems to be more localized in the shoulder with range of motion. No real other changes noted. He is tried the Neurontin and Lyrica without significant provement. Surgical Follow-up           IMAGING:  XR Results (most recent):  Results from Appointment encounter on 22    XR SPINE CERV PA LAT ODONT 3 V MAX    Narrative  AP and lateral films of the cervical spine reveal stable anterior cervical fusion from C5-C7 with stable instrumentation. MRI Results (most recent): Allergies   Allergen Reactions    Oxycodone-Acetaminophen Other (comments)     AGGRESIVE BEHAVIOR  Other reaction(s):  Other (see comments)  AGGRESIVE BEHAVIOR       Current Outpatient Medications   Medication Sig    gabapentin (NEURONTIN) 300 mg capsule Take 2 Capsules by mouth three (3) times daily. Max Daily Amount: 1,800 mg.    lancets (Accu-Chek Softclix Lancets) misc USE ONE LANCET TO TEST 7 TIMES DAILY    glucose blood VI test strips (Accu-Chek Elizabet Plus test strp) strip USE ONE STRIP TO TEST SEVEN TIMES A DAY    pravastatin (PRAVACHOL) 40 mg tablet TAKE 1 TABLET DAILY    multivitamin (ONE A DAY) tablet Take 1 Tablet by mouth daily.  ascorbic acid (VITAMIN C PO) Take  by mouth daily.  lisinopriL (PRINIVIL, ZESTRIL) 20 mg tablet TAKE 1 TABLET TWICE A DAY (Patient taking differently: two (2) times a day.)    insulin lispro (HumaLOG U-100 Insulin) 100 unit/mL injection USE VIA INSULIN PUMP     insulin pump (PATIENT SUPPLIED) Misc by SubCUTAneous route as needed. NOVALOG INSULIN    methocarbamoL (Robaxin-750) 750 mg tablet Take 1 Tablet by mouth three (3) times daily as needed for Muscle Spasm(s). (Patient not taking: Reported on 4/28/2022)    cyclobenzaprine (FLEXERIL) 10 mg tablet Take 1 Tablet by mouth two (2) times daily as needed for Muscle Spasm(s). (Patient not taking: Reported on 4/28/2022)     No current facility-administered medications for this visit.        Past Medical History:   Diagnosis Date    Acute bacterial endocarditis 8/9/2021    Adverse effect of anesthesia     DIFFICULTY URINATING AFTER SURGERY, HAD TO BE CATHED    Anemia     Arthritis     COVID-19 vaccine series completed     PFIZER 3-18-21 AND 4-8-21    Essential hypertension 12/11/2017    Hypercholesterolemia 12/11/2017    Ileitis 12/11/2017    Long-term use of high-risk medication 12/11/2017    Microalbuminuria 12/11/2017    Type I diabetes mellitus with proliferative retinopathy ([facility] [address] 12/11/2017    INSULIN    Valvular heart disease         Past Surgical History:   Procedure Laterality Date    COLONOSCOPY N/A 2/7/2022 COLONOSCOPY performed by Juanita Ding MD at [address] [place] [address]  2022         HX LUMBAR LAMINECTOMY      HX ORTHOPAEDIC  2010    RIGHT KNEE OPEN SURGERY    HX TONSILLECTOMY         Family History   Problem Relation Age of Onset    Cancer Mother         LUNG    Cancer Father         LUNG    No Known Problems Sister     Anesth Problems Neg Hx         Social History     Tobacco Use    Smoking status: Former Smoker     Packs/day: 1.00     Years: 5.00     Pack years: 5.00     Quit date: 1975     Years since quittin.3    Smokeless tobacco: Never Used   Substance Use Topics    Alcohol use: Yes     Alcohol/week: 3.0 standard drinks     Types: 3 Glasses of wine per week     Comment: 3 glasses of wine a night        Review of Systems   Constitutional: Negative. Respiratory: Negative. Cardiovascular: Negative. Gastrointestinal: Negative. Endocrine: Negative. Genitourinary: Negative. Musculoskeletal: Positive for arthralgias and neck pain. Skin: Negative. Allergic/Immunologic: Negative. Hematological: Negative. Psychiatric/Behavioral: Negative. All other systems reviewed and are negative. No flowsheet data found. Vitals:  Ht 6' (1.829 m)   Wt 147 lb (66.7 kg)   BMI 19.94 kg/m²    Body mass index is 19.94 kg/m². Physical Exam    Integumentary  Assessment of Surgical Incision - healing and consistent with normal anticipated wound healing. Neurologic  Overall Assessment of Muscle Strength and Tone reveals  Upper Extremities - Right Deltoid - 5/5. Left Deltoid - 5/5. Right Bicep - 5/5. Left Bicep - 5/5. Right Tricep - 5/5. Left Tricep - 5/5. Right Wrist Extensors - 5/5. Left Wrist Extensors - 5/5. Right Wrist Flexors - 5/5. Left Wrist Flexors - 5/5. Right Intrinsics - 5/5. Left Intrinsics - 5/5. General Assessment of Reflexes  Right Hand - Watson's sign is negative in the right hand.  Left Hand - Watson's sign is negative in the left hand. Reflexes (Dermatomes)  2/2 Normal - Left Bicep (C5-6), Left Tricep (C7-8), Left Brachioradialis (C5-6), Right Bicep (C5-6), Right Tricep (C7-8) and Right Brachioradialis (C5-6). Musculoskeletal  Global Assessment  Examination of related systems reveals - well-developed, well-nourished, in no acute distress, alert and oriented x 3 and normal coordination. Gait and Station - normal gait and station and normal posture. Spine/Ribs/Pelvis  Cervical Spine - Evaluation of related systems reveals - no lymphadenopathy and neurovascularly intact bilaterally. Inspection and Palpation - Tenderness - moderate and localized. Assessment of pain reveals the following findings: - Location - cervical area. An electronic signature was used to authenticate this note.   -- MD Willian

## 2022-04-28 NOTE — PATIENT INSTRUCTIONS
Neck: Exercises  Introduction  Here are some examples of exercises for you to try. The exercises may be suggested for a condition or for rehabilitation. Start each exercise slowly. Ease off the exercises if you start to have pain. You will be told when to start these exercises and which ones will work best for you. How to do the exercises  Neck stretch    1. This stretch works best if you keep your shoulder down as you lean away from it. To help you remember to do this, start by relaxing your shoulders and lightly holding on to your thighs or your chair. 2. Tilt your head toward your shoulder and hold for 15 to 30 seconds. Let the weight of your head stretch your muscles. 3. If you would like a little added stretch, use your hand to gently and steadily pull your head toward your shoulder. For example, keeping your right shoulder down, lean your head to the left. 4. Repeat 2 to 4 times toward each shoulder. Diagonal neck stretch    1. Turn your head slightly toward the direction you will be stretching, and tilt your head diagonally toward your chest and hold for 15 to 30 seconds. 2. If you would like a little added stretch, use your hand to gently and steadily pull your head forward on the diagonal.  3. Repeat 2 to 4 times toward each side. Dorsal glide stretch    The dorsal glide stretches the back of the neck. If you feel pain, do not glide so far back. Some people find this exercise easier to do while lying on their backs with an ice pack on the neck. 1. Sit or stand tall and look straight ahead. 2. Slowly tuck your chin as you glide your head backward over your body  3. Hold for a count of 6, and then relax for up to 10 seconds. 4. Repeat 8 to 12 times. Chest and shoulder stretch    1. Sit or stand tall and glide your head backward as in the dorsal glide stretch. 2. Raise both arms so that your hands are next to your ears.   3. Take a deep breath, and as you breathe out, lower your elbows down and behind your back. You will feel your shoulder blades slide down and together, and at the same time you will feel a stretch across your chest and the front of your shoulders. 4. Hold for about 6 seconds, and then relax for up to 10 seconds. 5. Repeat 8 to 12 times. Strengthening: Hands on head    1. Move your head backward, forward, and side to side against gentle pressure from your hands, holding each position for about 6 seconds. 2. Repeat 8 to 12 times. Follow-up care is a key part of your treatment and safety. Be sure to make and go to all appointments, and call your doctor if you are having problems. It's also a good idea to know your test results and keep a list of the medicines you take. Where can you learn more? Go to http://www.roe.com/  Enter P975 in the search box to learn more about \"Neck: Exercises. \"  Current as of: July 1, 2021               Content Version: 13.2  © 2006-2022 [employer]. Care instructions adapted under license by [employer] (which disclaims liability or warranty for this information). If you have questions about a medical condition or this instruction, always ask your healthcare professional. [address] any warranty or liability for your use of this information.

## 2022-04-29 RX ORDER — LISINOPRIL 20 MG/1
20 TABLET ORAL 2 TIMES DAILY
Qty: 180 TABLET | Refills: 0 | Status: SHIPPED | OUTPATIENT
Start: 2022-04-29 | End: 2022-05-04 | Stop reason: SDUPTHER

## 2022-05-02 ENCOUNTER — TELEPHONE (OUTPATIENT)
Dept: ORTHOPEDIC SURGERY | Age: 67
End: 2022-05-02

## 2022-05-02 NOTE — TELEPHONE ENCOUNTER
Brain Landau about his pain level. It sounds the patient has stopped the med he was on each time a new medication was added. He is advised he should be on the ultram, robaxin and gabapetin all together not stopping on and starting another.       Have a wonderful day,     Tomi Andrew RN, BSN  Registered Nurse to [place] [phone]  Direct Line [phone]

## 2022-05-04 ENCOUNTER — OFFICE VISIT (OUTPATIENT)
Dept: ORTHOPEDIC SURGERY | Age: 67
End: 2022-05-04
Payer: MEDICARE

## 2022-05-04 VITALS — BODY MASS INDEX: 19.91 KG/M2 | HEIGHT: 72 IN | WEIGHT: 147 LBS

## 2022-05-04 DIAGNOSIS — M25.511 RIGHT SHOULDER PAIN, UNSPECIFIED CHRONICITY: ICD-10-CM

## 2022-05-04 DIAGNOSIS — D16.01: ICD-10-CM

## 2022-05-04 DIAGNOSIS — M75.111 NONTRAUMATIC INCOMPLETE TEAR OF RIGHT ROTATOR CUFF: Primary | ICD-10-CM

## 2022-05-04 PROCEDURE — G8756 NO BP MEASURE DOC: HCPCS | Performed by: ORTHOPAEDIC SURGERY

## 2022-05-04 PROCEDURE — 99214 OFFICE O/P EST MOD 30 MIN: CPT | Performed by: ORTHOPAEDIC SURGERY

## 2022-05-04 PROCEDURE — 1101F PT FALLS ASSESS-DOCD LE1/YR: CPT | Performed by: ORTHOPAEDIC SURGERY

## 2022-05-04 PROCEDURE — G8420 CALC BMI NORM PARAMETERS: HCPCS | Performed by: ORTHOPAEDIC SURGERY

## 2022-05-04 PROCEDURE — G8427 DOCREV CUR MEDS BY ELIG CLIN: HCPCS | Performed by: ORTHOPAEDIC SURGERY

## 2022-05-04 PROCEDURE — G8536 NO DOC ELDER MAL SCRN: HCPCS | Performed by: ORTHOPAEDIC SURGERY

## 2022-05-04 PROCEDURE — 3017F COLORECTAL CA SCREEN DOC REV: CPT | Performed by: ORTHOPAEDIC SURGERY

## 2022-05-04 PROCEDURE — G8432 DEP SCR NOT DOC, RNG: HCPCS | Performed by: ORTHOPAEDIC SURGERY

## 2022-05-04 RX ORDER — MELOXICAM 15 MG/1
15 TABLET ORAL DAILY
Qty: 30 TABLET | Refills: 3 | Status: SHIPPED | OUTPATIENT
Start: 2022-05-04 | End: 2022-09-06

## 2022-05-04 RX ORDER — LISINOPRIL 20 MG/1
20 TABLET ORAL 2 TIMES DAILY
Qty: 180 TABLET | Refills: 3 | Status: SHIPPED | OUTPATIENT
Start: 2022-05-04 | End: 2022-10-17 | Stop reason: SDUPTHER

## 2022-05-04 NOTE — TELEPHONE ENCOUNTER
Last Refill: 4/29/22 Ling incorrect  Last Visit: 2/28/2022 Dr. Jayashree Jones  Next Visit: 5/16/22 Dr. Brinaa Huber    Requested Prescriptions     Pending Prescriptions Disp Refills    lisinopriL (PRINIVIL, ZESTRIL) 20 mg tablet 180 Tablet 0     Sig: Take 1 Tablet by mouth two (2) times a day.

## 2022-05-04 NOTE — Clinical Note
5/4/2022    Patient: Dania Paez   YOB: 1955   Date of Visit: Geneva Leija MD  Via     Dear Angelic Hutchinson MD,      Thank you for referring Mr. Radha Powell to [facility] for evaluation. My notes for this consultation are attached. If you have questions, please do not hesitate to call me. I look forward to following your patient along with you.       Sincerely,    Bob Street, DO

## 2022-05-04 NOTE — PROGRESS NOTES
Michael Fabian (: 1955) is a 77 y.o. male, established patient, here for evaluation of the following chief complaint(s):  Shoulder Pain (right)       ASSESSMENT/PLAN:  Below is the assessment and plan developed based on review of pertinent history, physical exam, labs, studies, and medications. Based on his x-ray findings along with his pain at night I do have concerns for possible tumor of the right shoulder. He also has signs of rotator cuff tear. We will obtain an MRI of the right shoulder with contrast.  This will help in further evaluation and treatment planning of possible rotator cuff tear. It will also help us to evaluate the possibility of aggressive tumor. I will see him back after this MRI is performed. We discussed trying an anti-inflammatory in the meantime. He will call us if anti-inflammatories not working and we could try a slightly stronger pain medication than tramadol. 1. Right shoulder pain, unspecified chronicity  -     XR SHOULDER RT AP/LAT MIN 2 V; Future  2. Nontraumatic incomplete tear of right rotator cuff  3. 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 SUBJECTIVE/OBJECTIVE:  Michael Fabian (: 1955) is a 77 y.o. male. He notes aching pain in the right shoulder ongoing for a number of months. He has tried ice, activity modifications, and pain control with minimal relief. Pain awakens him at night. He notes occasional clicking and also some weakness in the shoulder. He had a previous cervical spine surgery and his radiculopathy symptoms have improved. Allergies   Allergen Reactions    Oxycodone-Acetaminophen Other (comments)     AGGRESIVE BEHAVIOR  Other reaction(s): Other (see comments)  AGGRESIVE BEHAVIOR       Current Outpatient Medications   Medication Sig    lisinopriL (PRINIVIL, ZESTRIL) 20 mg tablet Take 1 Tablet by mouth two (2) times a day.     traMADoL (ULTRAM) 50 mg tablet Take 1 Tablet by mouth every six (6) hours as needed for Pain for up to 30 days. Max Daily Amount: 200 mg. Indications: pain    gabapentin (NEURONTIN) 300 mg capsule Take 2 Capsules by mouth three (3) times daily. Max Daily Amount: 1,800 mg.    methocarbamoL (Robaxin-750) 750 mg tablet Take 1 Tablet by mouth three (3) times daily as needed for Muscle Spasm(s).  lancets (Accu-Chek Softclix Lancets) misc USE ONE LANCET TO TEST 7 TIMES DAILY    glucose blood VI test strips (Accu-Chek Elizabet Plus test strp) strip USE ONE STRIP TO TEST SEVEN TIMES A DAY    pravastatin (PRAVACHOL) 40 mg tablet TAKE 1 TABLET DAILY    multivitamin (ONE A DAY) tablet Take 1 Tablet by mouth daily.  ascorbic acid (VITAMIN C PO) Take  by mouth daily.  insulin lispro (HumaLOG U-100 Insulin) 100 unit/mL injection USE VIA INSULIN PUMP     insulin pump (PATIENT SUPPLIED) Misc by SubCUTAneous route as needed. NOVALOG INSULIN    cyclobenzaprine (FLEXERIL) 10 mg tablet Take 1 Tablet by mouth two (2) times daily as needed for Muscle Spasm(s). (Patient not taking: Reported on 2022)     No current facility-administered medications for this visit. Social History     Socioeconomic History    Marital status: SINGLE     Spouse name: Not on file    Number of children: Not on file    Years of education: Not on file    Highest education level: Not on file   Occupational History    Not on file   Tobacco Use    Smoking status: Former Smoker     Packs/day: 1.00     Years: 5.00     Pack years: 5.00     Quit date: 1975     Years since quittin.3    Smokeless tobacco: Never Used   Vaping Use    Vaping Use: Never used   Substance and Sexual Activity    Alcohol use:  Yes     Alcohol/week: 3.0 standard drinks     Types: 3 Glasses of wine per week     Comment: 3 glasses of wine a night    Drug use: No    Sexual activity: Yes     Partners: Female   Other Topics Concern    Not on file   Social History Narrative    Not on file Social Determinants of Health     Financial Resource Strain:     Difficulty of Paying Living Expenses: Not on file   Food Insecurity:     Worried About Running Out of Food in the Last Year: Not on file    Alea of Food in the Last Year: Not on file   Transportation Needs:     Lack of Transportation (Medical): Not on file    Lack of Transportation (Non-Medical):  Not on file   Physical Activity:     Days of Exercise per Week: Not on file    Minutes of Exercise per Session: Not on file   Stress:     Feeling of Stress : Not on file   Social Connections:     Frequency of Communication with Friends and Family: Not on file    Frequency of Social Gatherings with Friends and Family: Not on file    Attends Muslim Services: Not on file    Active Member of [address] [place] [address] or Organizations: Not on file    Attends Club or Organization Meetings: Not on file    Marital Status: Not on file   Intimate Partner Violence:     Fear of Current or Ex-Partner: Not on file    Emotionally Abused: Not on file    Physically Abused: Not on file    Sexually Abused: Not on file   Housing Stability:     Unable to Pay for Housing in the Last Year: Not on file    Number of [place] in the Last Year: Not on file    Unstable Housing in the Last Year: Not on file       Past Surgical History:   Procedure Laterality Date    COLONOSCOPY N/A 2/7/2022    COLONOSCOPY performed by Shadi Paris MD at [address] [place] [address]  2/7/2022         HX LUMBAR LAMINECTOMY  2015    HX ORTHOPAEDIC  2010    RIGHT KNEE OPEN SURGERY    HX TONSILLECTOMY         Family History   Problem Relation Age of Onset    Cancer Mother         LUNG    Cancer Father         LUNG    No Known Problems Sister     Anesth Problems Neg Hx                REVIEW OF SYSTEMS:  ROS     Positive for: Musculoskeletal    Last edited by Yancy Rankin on 5/4/2022  9:30 AM. (History)        Patient denies any recent fever, chills, nausea, vomiting, chest pain, or shortness of breath. Vitals:  Ht 6' (1.829 m)   Wt 147 lb (66.7 kg)   BMI 19.94 kg/m²    Body mass index is 19.94 kg/m². PHYSICAL EXAM:  General exam: Patient is awake, alert, and oriented x3. Well-appearing. No acute distress. Ambulates with a normal gait. Right shoulder: Neurovascular and sensory intact. There is tenderness to palpation at the anterior lateral shoulder. There is limited passive and active overhead range of motion. Pain is noted with impingement testing including Lynn exam.  Pain and weakness 4/5 is noted with rotator cuff strength testing including resisted abduction and resisted external rotation. Normal stability. There is some tenderness palpation at the [facility] joint and pain with crossarm exam.        IMAGING:    XR Results (most recent):  Results from Appointment encounter on 05/04/22    XR SHOULDER RT AP/LAT MIN 2 V    Narrative  X-rays of the right shoulder 3 views done today shows areas of subcortical lucency at the humeral neck. No signs of obvious acute fracture propagation. Mild glenohumeral joint space narrowing. Type II acromion. Results from Appointment encounter on 04/28/22    XR SPINE CERV PA LAT ODONT 3 V MAX    Narrative  AP and lateral of the cervical spine demonstrates a stable cervical fusion from C5-C7. No acute hardware difficulties      Results from Appointment encounter on 03/29/22    XR SPINE CERV PA LAT ODONT 3 V MAX    Narrative  AP and lateral films of the cervical spine reveal stable anterior cervical fusion from C5-C7 with stable instrumentation. Orders Placed This Encounter    XR SHOULDER RT AP/LAT MIN 2 V     3     Standing Status:   Future     Number of Occurrences:   1     Standing Expiration Date:   11/4/2022              An electronic signature was used to authenticate this note.   -- [place]ant [place] DO

## 2022-05-06 ENCOUNTER — OFFICE VISIT (OUTPATIENT)
Dept: ORTHOPEDIC SURGERY | Age: 67
End: 2022-05-06
Payer: MEDICARE

## 2022-05-06 DIAGNOSIS — G89.29 CHRONIC RIGHT SHOULDER PAIN: ICD-10-CM

## 2022-05-06 DIAGNOSIS — M54.12 RIGHT CERVICAL RADICULOPATHY: ICD-10-CM

## 2022-05-06 DIAGNOSIS — M25.511 CHRONIC RIGHT SHOULDER PAIN: ICD-10-CM

## 2022-05-06 DIAGNOSIS — Z98.1 STATUS POST CERVICAL SPINAL FUSION: ICD-10-CM

## 2022-05-06 DIAGNOSIS — M48.02 CERVICAL STENOSIS OF SPINAL CANAL: Primary | ICD-10-CM

## 2022-05-06 PROCEDURE — 97162 PT EVAL MOD COMPLEX 30 MIN: CPT | Performed by: PHYSICAL THERAPIST

## 2022-05-06 PROCEDURE — 97110 THERAPEUTIC EXERCISES: CPT | Performed by: PHYSICAL THERAPIST

## 2022-05-06 NOTE — PROGRESS NOTES
Patient Name: [place] Delcid  Date:2022  : 1955  [x]  Patient  Verified  Payor: Rebeca Montez / Plan: [place] & [place] / Product Type: Medicare /    Total Treatment Time (min): 45  1:1 Treatment Time ( [address] only): 45   Ino Minaya MD  1. Cervical stenosis of spinal canal  2. Status post cervical spinal fusion  3. Chronic right shoulder pain  4. Right cervical radiculopathy        Subjective:    Patient is a 77 y.o. male referred to physical therapy by Dr. Amber Shrestha post anterior cervical discectomy fusion from C5-7 performed on 2022. Patient reports prior to surgery he was having right upper extremity symptoms distally to his fingertips. He reports his radicular pain is significantly improved since surgery. He states beginning in April, however, he began developing right shoulder pain, which he is uncertain if it is cervical related or a separate issue. Has been referred to Dr. Denise Hurley who has sent him for an MRI of the right shoulder. Patient states his pain can vary anywhere from a 2 to a 10/10. He states his symptoms and surgical precautions do interfere with all ADL lifting. Sleep is still interrupted although he states this is more shoulder specific than the radicular complaint. He would like to return to recreational bike riding as well. He is retired. He is a type I diabetic. Based on Dr. Preston Dave office dictation there was some lucency noted at the proximal portion of his humerus on x-ray. Past medical history and medication list can otherwise be reviewed per the EHR. Objective: Incision is intact and healing well without current signs of infection. He does have a clear rounded shoulder posture. ROM:  Cervical    Ext: 20 degrees   Flex: 20 degrees   Right Rotation: 50 degrees   Left Rotation: 50 degrees  Right shoulder elevation 140 degrees ER cervical thoracic spine, IR to lumbar spine actively, passive 50 degrees.   Significant posterior capsule restrictions and inferior capsular restrictions noted with passive mobilization of the right shoulder. Left shoulder elevation 140 degrees, ER actively to the cervical thoracic spine, passive 85 degrees, IR actively to the thoracic lumbar junction, passive 80 degrees  Strength: 5/5 in the available range of motion although he does have complaints of pain with right shoulder abduction and ER. Achilles Tabitha testing is painful in the right shoulder. Empty can testing is painful but strong. Ex: 15 min  Treatment today to include instruction in a home exercise program as well as providing patient with written and visual handouts. Man:  min    NMR:  min    All questions were addressed. Assessment:    Patient presents with increased pain and decreased ROM, strength, and mobility consistent with cervical fusion from C5-7 performed on March 16, 2022 with resolved right upper extremity symptoms compared to preoperative status although he does have some new and possibly different right shoulder pain. Given findings on today's objective evaluation I do agree with plan for MRI of his right shoulder. Long Term Goals: 6 weeks. 1. Patient will report zero radiculopathy with home/community/recreational ADL activities. 2. Patient will report neck/shoulder pain to be consistently less than or equal to 1/10 with all home/community/recreational ADL activities. 3. Patient will demonstrate improved mobility of the right arm to within at least 95% or greater as compared to the contralateral side to assist with home/community/recreational ADL activity    Short Term Goals: 2 visits  1. Patient will demonstrate independence with a home exercise program.      Plan:  Plan of care: Physical therapy consisted of frequency of 1-2/week for the next 6 weeks.   Physical therapy will consist of therapeutic exercise, modalities, patient education, neuromuscular reeducation, manual therapy, therapeutic activity, dry needling, and instruction in home exercise program as appropriate. Eval  Ex: 15  Man:   NMR:     The referring physician has reviewed and approved this evaluation and plan of care as noted by the electronic signature attached to note.      Chad Bueno DPT, ATC

## 2022-05-09 ENCOUNTER — TELEPHONE (OUTPATIENT)
Dept: ORTHOPEDIC SURGERY | Age: 67
End: 2022-05-09

## 2022-05-09 DIAGNOSIS — Z98.1 STATUS POST CERVICAL SPINAL FUSION: Primary | ICD-10-CM

## 2022-05-09 RX ORDER — DICLOFENAC SODIUM 75 MG/1
75 TABLET, DELAYED RELEASE ORAL 2 TIMES DAILY
Qty: 60 TABLET | Refills: 0 | Status: SHIPPED | OUTPATIENT
Start: 2022-05-09

## 2022-05-12 ENCOUNTER — OFFICE VISIT (OUTPATIENT)
Dept: ORTHOPEDIC SURGERY | Age: 67
End: 2022-05-12
Payer: MEDICARE

## 2022-05-12 DIAGNOSIS — M48.02 CERVICAL STENOSIS OF SPINAL CANAL: ICD-10-CM

## 2022-05-12 DIAGNOSIS — Z98.1 STATUS POST CERVICAL SPINAL FUSION: Primary | ICD-10-CM

## 2022-05-12 DIAGNOSIS — M25.511 RIGHT SHOULDER PAIN, UNSPECIFIED CHRONICITY: ICD-10-CM

## 2022-05-12 DIAGNOSIS — M54.12 RIGHT CERVICAL RADICULOPATHY: ICD-10-CM

## 2022-05-12 PROBLEM — Z86.79 HISTORY OF ACUTE BACTERIAL ENDOCARDITIS: Status: ACTIVE | Noted: 2021-08-09

## 2022-05-12 PROBLEM — Z92.89 HISTORY OF ECHOCARDIOGRAM: Status: ACTIVE | Noted: 2021-10-01

## 2022-05-12 PROCEDURE — 97110 THERAPEUTIC EXERCISES: CPT

## 2022-05-12 PROCEDURE — 97140 MANUAL THERAPY 1/> REGIONS: CPT

## 2022-05-12 NOTE — PROGRESS NOTES
PT DAILY TREATMENT NOTE    Patient Name: Cyndee Elizabeth  Date:2022  : 1955  [x]  Patient  Verified  Payor: Ling Cristobal / Plan: [place] MEDICARE PART A & B / Product Type: Medicare /    Total Treatment Time (min): 40  Total Timed Codes (min): 25  1:1 Treatment Time ( [address] only): 25   Referring Provider: Adalid Duran MD    1. Status post cervical spinal fusion  2. Cervical stenosis of spinal canal  3. Right cervical radiculopathy  4. Right shoulder pain, unspecified chronicity      SUBJECTIVE  Subjective functional status/changes:   [] No changes reported    Patient reports he has been doing HEP with no problems or questions. States R shoulder pain is unchanged. OBJECTIVE/TREATMENT    Manual Therapy x 15 mins:   Manual soft tissue mobilization, myofascial release to bilateral cervical paraspinals, upper traps, levator scapula in combination with passive stretching to the same. Gentle cervical traction with OA flexion and suboccipital release. Passive range of motion for bilateral cervical side bend and rotation. LLPS for internal/external rotation with humeral head stabilization. Neuromuscular Re-education (NMR) x   minutes:  []  Kinesiotaping for   []  Neuromuscular reeducation to the VMO with use of [place] electrical stimulation in conjunction with active contraction and exercises. []  balance/proprioceptive exercises and activities in clinic as listed below. Therapeutic Exercise x 10 mins:   Strengthening/Endurance/ADL function/Neuromuscular reeducation activities/exercises supervised and completed as listed below.       EXERCISE X= completed on  2022   UBE 3' back   1/2 foam roll walk x   scap rows grn   RC walk org                                                                   Added/Changed Exercises:  []  Advanced to address: [] functional strength/ROM deficits [] balance/proprioceptive tasks  []  Modified: [] per subjective reports [] for patient time constraints [] for clinic time constraints    Modality:  []  E-Stim: type _ x _ min     []att   []unatt   []w/ice   []w/heat  []  Ultrasound: []cont   []pulse    _ W/cm2 x _  min   []1MHz   []3MHz  []  Ice pack: post      []  Hot pack: pre    []  Other:     Patient Education: [] Review HEP    [] Progressed/Changed HEP to include:  [] positioning   [] body mechanics   [] transfers   [] heat/ice application      ASSESSMENT    Subjectively feels good with cervical treatment. R GH posterior capsule is significantly restricted.     Progress towards goals / Updated goals:    PLAN  [x]  Upgrade activities as tolerated      [x]  Continue plan of care  []  Discharge due to:  [] Other:      Co-treatment by Adriana Cheema, [facility] 5/12/2022

## 2022-05-15 ENCOUNTER — HOSPITAL ENCOUNTER (OUTPATIENT)
Dept: MRI IMAGING | Age: 67
Discharge: HOME | End: 2022-05-15
Attending: ORTHOPAEDIC SURGERY
Payer: MEDICARE

## 2022-05-15 DIAGNOSIS — M75.111 NONTRAUMATIC INCOMPLETE TEAR OF RIGHT ROTATOR CUFF: ICD-10-CM

## 2022-05-15 DIAGNOSIS — D16.01: ICD-10-CM

## 2022-05-15 PROCEDURE — A9576 INJ PROHANCE MULTIPACK: HCPCS | Performed by: ORTHOPAEDIC SURGERY

## 2022-05-15 PROCEDURE — 73223 MRI JOINT UPR EXTR W/O&W/DYE: CPT

## 2022-05-15 PROCEDURE — 74011250636 HC RX REV CODE- 250/636: Performed by: ORTHOPAEDIC SURGERY

## 2022-05-15 RX ADMIN — GADOTERIDOL 15 ML: 279.3 INJECTION, SOLUTION INTRAVENOUS at 10:09

## 2022-05-18 ENCOUNTER — HOSPITAL ENCOUNTER (OUTPATIENT)
Dept: MAMMOGRAPHY | Age: 67
Discharge: HOME | End: 2022-05-18
Attending: SPECIALIST
Payer: MEDICARE

## 2022-05-18 DIAGNOSIS — M81.0 OSTEOPOROSIS: ICD-10-CM

## 2022-05-18 PROCEDURE — 77080 DXA BONE DENSITY AXIAL: CPT

## 2022-05-20 ENCOUNTER — OFFICE VISIT (OUTPATIENT)
Dept: ORTHOPEDIC SURGERY | Age: 67
End: 2022-05-20
Payer: MEDICARE

## 2022-05-20 VITALS — WEIGHT: 147 LBS | HEIGHT: 72 IN | BODY MASS INDEX: 19.91 KG/M2

## 2022-05-20 DIAGNOSIS — M25.511 RIGHT SHOULDER PAIN, UNSPECIFIED CHRONICITY: Primary | ICD-10-CM

## 2022-05-20 DIAGNOSIS — M75.41 IMPINGEMENT SYNDROME OF RIGHT SHOULDER: ICD-10-CM

## 2022-05-20 DIAGNOSIS — D49.9 TUMOR: ICD-10-CM

## 2022-05-20 PROCEDURE — 99213 OFFICE O/P EST LOW 20 MIN: CPT | Performed by: ORTHOPAEDIC SURGERY

## 2022-05-20 PROCEDURE — 1101F PT FALLS ASSESS-DOCD LE1/YR: CPT | Performed by: ORTHOPAEDIC SURGERY

## 2022-05-20 PROCEDURE — G8427 DOCREV CUR MEDS BY ELIG CLIN: HCPCS | Performed by: ORTHOPAEDIC SURGERY

## 2022-05-20 PROCEDURE — G8756 NO BP MEASURE DOC: HCPCS | Performed by: ORTHOPAEDIC SURGERY

## 2022-05-20 PROCEDURE — G8432 DEP SCR NOT DOC, RNG: HCPCS | Performed by: ORTHOPAEDIC SURGERY

## 2022-05-20 PROCEDURE — G8420 CALC BMI NORM PARAMETERS: HCPCS | Performed by: ORTHOPAEDIC SURGERY

## 2022-05-20 PROCEDURE — G8536 NO DOC ELDER MAL SCRN: HCPCS | Performed by: ORTHOPAEDIC SURGERY

## 2022-05-20 PROCEDURE — 3017F COLORECTAL CA SCREEN DOC REV: CPT | Performed by: ORTHOPAEDIC SURGERY

## 2022-05-20 NOTE — Clinical Note
5/20/2022    Patient: Kiera Okeefe   YOB: 1955   Date of Visit: 5/20/2022     Brittany Freeman MD  Via     Dear Brittany Freeman MD,      Thank you for referring Mr. Nilda Mccullough to [facility] for evaluation. My notes for this consultation are attached. If you have questions, please do not hesitate to call me. I look forward to following your patient along with you.       Sincerely,    Katarzyna Bradley, DO

## 2022-05-20 NOTE — PROGRESS NOTES
Kateryna Bell (: 1955) is a 77 y.o. male, established patient, here for evaluation of the following chief complaint(s):  Shoulder Pain       ASSESSMENT/PLAN:  Below is the assessment and plan developed based on review of pertinent history, physical exam, labs, studies, and medications. Findings were discussed with the patient today. He does have symptoms consistent with right shoulder impingement and his small partial rotator cuff tear. However, more concerning is the large tumor/lesion that was identified on MRI. We discussed follow-up with an orthopedic oncologist for further evaluation and work-up. We discussed that they will likely perform a biopsy procedure. I will plan to see him back as needed. I believe that this tumor is likely the main source of his shoulder pain. 1. Right shoulder pain, unspecified chronicity  2. Tumor  3. Impingement syndrome of right shoulder      Return for Follow-up with orthopedic oncologist.      SUBJECTIVE/OBJECTIVE:  Kateryna Bell (: 1955) is a 77 y.o. male. He notes continued shoulder pain. Pain occasionally keeps him awake at night. He describes pain with certain motions and limitation with motion because of the pain. Allergies   Allergen Reactions    Oxycodone-Acetaminophen Other (comments)     AGGRESIVE BEHAVIOR  Other reaction(s): Other (see comments)  AGGRESIVE BEHAVIOR       Current Outpatient Medications   Medication Sig    diclofenac EC (VOLTAREN) 75 mg EC tablet Take 1 Tablet by mouth two (2) times a day.  meloxicam (MOBIC) 15 mg tablet Take 1 Tablet by mouth daily.  lisinopriL (PRINIVIL, ZESTRIL) 20 mg tablet Take 1 Tablet by mouth two (2) times a day for 360 days.  traMADoL (ULTRAM) 50 mg tablet Take 1 Tablet by mouth every six (6) hours as needed for Pain for up to 30 days. Max Daily Amount: 200 mg. Indications: pain    gabapentin (NEURONTIN) 300 mg capsule Take 2 Capsules by mouth three (3) times daily.  Max Daily Amount: 1,800 mg.    methocarbamoL (Robaxin-750) 750 mg tablet Take 1 Tablet by mouth three (3) times daily as needed for Muscle Spasm(s).  cyclobenzaprine (FLEXERIL) 10 mg tablet Take 1 Tablet by mouth two (2) times daily as needed for Muscle Spasm(s). (Patient not taking: Reported on 2022)    lancets (Accu-Chek Softclix Lancets) misc USE ONE LANCET TO TEST 7 TIMES DAILY    glucose blood VI test strips (Accu-Chek Elizabet Plus test strp) strip USE ONE STRIP TO TEST SEVEN TIMES A DAY    pravastatin (PRAVACHOL) 40 mg tablet TAKE 1 TABLET DAILY    multivitamin (ONE A DAY) tablet Take 1 Tablet by mouth daily.  ascorbic acid (VITAMIN C PO) Take  by mouth daily.  insulin lispro (HumaLOG U-100 Insulin) 100 unit/mL injection USE VIA INSULIN PUMP     insulin pump (PATIENT SUPPLIED) Misc by SubCUTAneous route as needed. NOVALOG INSULIN     No current facility-administered medications for this visit. Social History     Socioeconomic History    Marital status: SINGLE     Spouse name: Not on file    Number of children: Not on file    Years of education: Not on file    Highest education level: Not on file   Occupational History    Not on file   Tobacco Use    Smoking status: Former Smoker     Packs/day: 1.00     Years: 5.00     Pack years: 5.00     Quit date: 1975     Years since quittin.4    Smokeless tobacco: Never Used   Vaping Use    Vaping Use: Never used   Substance and Sexual Activity    Alcohol use:  Yes     Alcohol/week: 3.0 standard drinks     Types: 3 Glasses of wine per week     Comment: 3 glasses of wine a night    Drug use: No    Sexual activity: Yes     Partners: Female   Other Topics Concern    Not on file   Social History Narrative    Not on file     Social Determinants of Health     Financial Resource Strain:     Difficulty of Paying Living Expenses: Not on file   Food Insecurity:     Worried About Running Out of Food in the Last Year: Not on file    Alea of Food in the Last Year: Not on file   Transportation Needs:     Lack of Transportation (Medical): Not on file    Lack of Transportation (Non-Medical): Not on file   Physical Activity:     Days of Exercise per Week: Not on file    Minutes of Exercise per Session: Not on file   Stress:     Feeling of Stress : Not on file   Social Connections:     Frequency of Communication with Friends and Family: Not on file    Frequency of Social Gatherings with Friends and Family: Not on file    Attends Oriental orthodox Services: Not on file    Active Member of [address] [place] [address] or Organizations: Not on file    Attends Club or Organization Meetings: Not on file    Marital Status: Not on file   Intimate Partner Violence:     Fear of Current or Ex-Partner: Not on file    Emotionally Abused: Not on file    Physically Abused: Not on file    Sexually Abused: Not on file   Housing Stability:     Unable to Pay for Housing in the Last Year: Not on file    Number of [place] in the Last Year: Not on file    Unstable Housing in the Last Year: Not on file       Past Surgical History:   Procedure Laterality Date    COLONOSCOPY N/A 2/7/2022    COLONOSCOPY performed by Joslyn Fonseca MD at [address] [place] [address]  2/7/2022         HX LUMBAR LAMINECTOMY  2015    HX ORTHOPAEDIC  2010    RIGHT KNEE OPEN SURGERY    HX TONSILLECTOMY         Family History   Problem Relation Age of Onset    Cancer Mother         LUNG    Cancer Father         LUNG    No Known Problems Sister     Anesth Problems Neg Hx                REVIEW OF SYSTEMS:  ROS     Positive for: Musculoskeletal    Last edited by Mejia Monaco on 5/20/2022 10:54 AM. (History)        Patient denies any recent fever, chills, nausea, vomiting, chest pain, or shortness of breath. Vitals:  Ht 6' (1.829 m)   Wt 147 lb (66.7 kg)   BMI 19.94 kg/m²    Body mass index is 19.94 kg/m².        PHYSICAL EXAM:  General exam: Patient is awake, alert, and oriented x3.  Well-appearing. No acute distress. Ambulates with a normal gait. Right shoulder: Neurovascular and sensory intact. There is tenderness to palpation at the anterior lateral shoulder. There is limited passive and active overhead range of motion. Pain is noted with impingement testing including Lynn exam.  Pain and weakness 4/5 is noted with rotator cuff strength testing including resisted abduction and resisted external rotation. Normal stability. There is some tenderness palpation at the [facility] joint and pain with crossarm exam.        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 CONTRAST: 15 mL ProHance. FINDINGS: A.C. joint: Moderate osteoarthritis. Thurl Mutton Anterior acromion process type:  2    Bone marrow: An approximately 4.2 cm x 4.1 cm x 4.0 cm lobulated, marrow  infiltrating, heterogeneously enhancing lesion proximal humeral metaphysis (with  associated anteromedial cortical breakthrough and 1.8 cm x 1.6 cm x 1.3 cm  extraosseous soft tissue component as seen on 9-22 and 11-18), concerning for  malignancy. Extensive surrounding perilesional edema and enhancement. Joint fluid: Moderate size glenohumeral joint effusion. Infraspinatus and  subscapularis mild tendinosis. Rotator cuff tendons: Low-grade interstitial tear at the anterior supraspinatus  footprint (series 4, images 708). Background mild supraspinatus tendinosis. .    Biceps tendon: Intact and located within the bicipital groove. Muscles: No edema or atrophy.     Glenoid labrum: Extensive SLAP tear.    Joint capsule: within normal limits. Glenohumeral articular cartilage: Diffuse chondral signal terminating thinning. Marginal osteophytes. Soft tissue mass: None. Impression  1. Large marrow-infiltrating lesion with cortical breakthrough and extraosseous  soft tissue component in the proximal humeral metaphysis, concerning for  malignancy. 2.  Low-grade interstitial tear of the anterior supraspinatus tendon. 3.  Moderate acromioclavicular and mild glenohumeral osteoarthritis. 4. SLAP tear. XR Results (most recent):  Results from Appointment encounter on 05/04/22    XR SHOULDER RT AP/LAT MIN 2 V    Narrative  X-rays of the right shoulder 3 views done today shows areas of subcortical lucency at the humeral neck. No signs of obvious acute fracture propagation. Mild glenohumeral joint space narrowing. Type II acromion. Results from Appointment encounter on 04/28/22    XR SPINE CERV PA LAT ODONT 3 V MAX    Narrative  AP and lateral of the cervical spine demonstrates a stable cervical fusion from C5-C7. No acute hardware difficulties      Results from Appointment encounter on 03/29/22    XR SPINE CERV PA LAT ODONT 3 V MAX    Narrative  AP and lateral films of the cervical spine reveal stable anterior cervical fusion from C5-C7 with stable instrumentation. No orders of the defined types were placed in this encounter. An electronic signature was used to authenticate this note.   -- Cherry Ramos DO

## 2022-05-23 ENCOUNTER — OFFICE VISIT (OUTPATIENT)
Dept: ORTHOPEDIC SURGERY | Age: 67
End: 2022-05-23
Payer: MEDICARE

## 2022-05-23 DIAGNOSIS — M48.02 CERVICAL STENOSIS OF SPINAL CANAL: ICD-10-CM

## 2022-05-23 DIAGNOSIS — M25.511 CHRONIC RIGHT SHOULDER PAIN: ICD-10-CM

## 2022-05-23 DIAGNOSIS — Z98.1 STATUS POST CERVICAL SPINAL FUSION: Primary | ICD-10-CM

## 2022-05-23 DIAGNOSIS — G89.29 CHRONIC RIGHT SHOULDER PAIN: ICD-10-CM

## 2022-05-23 DIAGNOSIS — M54.12 RIGHT CERVICAL RADICULOPATHY: ICD-10-CM

## 2022-05-23 PROCEDURE — 97140 MANUAL THERAPY 1/> REGIONS: CPT | Performed by: PHYSICAL THERAPIST

## 2022-05-23 PROCEDURE — 97110 THERAPEUTIC EXERCISES: CPT | Performed by: PHYSICAL THERAPIST

## 2022-05-23 NOTE — PROGRESS NOTES
PT DAILY TREATMENT NOTE    Patient Name: Steffi Ernst  Date:2022  : 1955  [x]  Patient  Verified  Payor: Mohti Quezada / Plan: [place] / Product Type: Medicare /    Total Treatment Time (min): 45  Total Timed Codes (min): 30  1:1 Treatment Time ( [address] only): 30   Referring Provider: Maris Hoover MD    1. Status post cervical spinal fusion  2. Cervical stenosis of spinal canal  3. Chronic right shoulder pain  4. Right cervical radiculopathy      SUBJECTIVE  Subjective functional status/changes:   [] No changes reported  Patient reports his neck feels good but shoulder still weak/painful. States MRI showed small partial RCT although there was also some question of large tumor in proximal humerus - patient awaiting phone call from orthopaedic oncologist regarding these findings. OBJECTIVE/TREATMENT    Manual Therapy x 15 mins:   Manual soft tissue mobilization, myofascial release to bilateral cervical paraspinals, upper traps, levator scapula in combination with passive stretching to the same. Gentle cervical traction with OA flexion and suboccipital release. Passive range of motion for bilateral cervical side bend and rotation. LLPS for internal/external rotation with humeral head stabilization. [address] mobilization to the posterior/inferior capsule in combination with PROM into all directions as tolerated. Neuromuscular Re-education (NMR) x   minutes:  []  Kinesiotaping for   []  Neuromuscular reeducation to the VMO with use of [place] electrical stimulation in conjunction with active contraction and exercises. []  balance/proprioceptive exercises and activities in clinic as listed below. Therapeutic Exercise x 15 mins:   Strengthening/Endurance/ADL function/Neuromuscular reeducation activities/exercises supervised and completed as listed below.       EXERCISE X= completed on  2022   UBE 3' back   1/2 foam roll walk x   scap rows grn   RC walk org   pulldowns org Bicep curl  5#                                                           Added/Changed Exercises:  []  Advanced to address: [] functional strength/ROM deficits [] balance/proprioceptive tasks  []  Modified: [] per subjective reports [] for patient time constraints [] for clinic time constraints    Modality:  []  E-Stim: type _ x _ min     []att   []unatt   []w/ice   []w/heat  []  Ultrasound: []cont   []pulse    _ W/cm2 x _  min   []1MHz   []3MHz  []  Ice pack: post    Declines      []  Hot pack: pre    []  Other:     Patient Education: [] Review HEP    [] Progressed/Changed HEP to include:  [] positioning   [] body mechanics   [] transfers   [] heat/ice application      ASSESSMENT    Still with clear posterior capsule restrictions into right shoulder IR. Subjective complaints of right shoulder pain. Otherwise states his neck is tolerating exercise progression here in clinic without setback.     PLAN  [x]  Upgrade activities as tolerated      [x]  Continue plan of care  []  Discharge due to:  [] Other:       José Miguel Cheung, PT, DPT 5/23/2022

## 2022-05-24 ENCOUNTER — TELEPHONE (OUTPATIENT)
Dept: ORTHOPEDIC SURGERY | Age: 67
End: 2022-05-24

## 2022-05-24 NOTE — TELEPHONE ENCOUNTER
Patient called stating he had not heard anything yet from [facility]. RN called Trupti Oswald (Dr. Kellie Abel office) and left message. Also faxed patient's last OV with Dr. Daphne Camargo and demographics to [phone] (confirmation received). RN returned call to patient, explained above actions and stated will F/U when hear from [facility]/Dr. Simon's office. Patient verbalized understanding.

## 2022-05-24 NOTE — TELEPHONE ENCOUNTER
RN spoke with Justin Coreas, now has fax from office, will call patient in ~2 days. Referred contact information for Justin Coreas to patient, with her permission. Patient stated he will call in 2 days if he hasn't heard. RN gave direct dial number neal patient had follow-up questions.

## 2022-05-26 ENCOUNTER — OFFICE VISIT (OUTPATIENT)
Dept: ORTHOPEDIC SURGERY | Age: 67
End: 2022-05-26
Payer: MEDICARE

## 2022-05-26 DIAGNOSIS — Z98.1 STATUS POST CERVICAL SPINAL FUSION: Primary | ICD-10-CM

## 2022-05-26 DIAGNOSIS — G89.29 CHRONIC RIGHT SHOULDER PAIN: ICD-10-CM

## 2022-05-26 DIAGNOSIS — M25.511 CHRONIC RIGHT SHOULDER PAIN: ICD-10-CM

## 2022-05-26 DIAGNOSIS — M48.02 CERVICAL STENOSIS OF SPINAL CANAL: ICD-10-CM

## 2022-05-26 PROCEDURE — 97140 MANUAL THERAPY 1/> REGIONS: CPT | Performed by: PHYSICAL THERAPIST

## 2022-05-26 PROCEDURE — 97110 THERAPEUTIC EXERCISES: CPT | Performed by: PHYSICAL THERAPIST

## 2022-05-26 NOTE — PROGRESS NOTES
PT DAILY TREATMENT NOTE    Patient Name: Kiera Sary  Date:2022  : 1955  [x]  Patient  Verified  Payor: Severiano Quinton / Plan: [place] MEDICARE PART A & B / Product Type: Medicare /    Total Treatment Time (min): 45  Total Timed Codes (min): 30  1:1 Treatment Time ([address] only): 30   Referring Provider: Gertrude Victor MD    1. Status post cervical spinal fusion  2. Cervical stenosis of spinal canal  3. Chronic right shoulder pain      SUBJECTIVE  Subjective functional status/changes:   [] No changes reported  Mild discomfort along the base of the cervical spine. Still with chronic right shoulder pain. Still awaiting phone call from orthopedic oncologist regarding his right shoulder MRI. OBJECTIVE/TREATMENT    Manual Therapy x 15 mins:   Manual soft tissue mobilization, myofascial release to bilateral cervical paraspinals, upper traps, levator scapula in combination with passive stretching to the same. Gentle cervical traction with OA flexion and suboccipital release. Passive range of motion for bilateral cervical side bend and rotation. LLPS for internal/external rotation with humeral head stabilization. [address] mobilization to the posterior/inferior capsule in combination with PROM into all directions as tolerated. Neuromuscular Re-education (NMR) x   minutes:  []  Kinesiotaping for   []  Neuromuscular reeducation to the VMO with use of [place] electrical stimulation in conjunction with active contraction and exercises. []  balance/proprioceptive exercises and activities in clinic as listed below. Therapeutic Exercise x 15 mins:   Strengthening/Endurance/ADL function/Neuromuscular reeducation activities/exercises supervised and completed as listed below.       EXERCISE X= completed on  2022   UE nustep y   1/2 foam roll walk x   scap rows grn   RC walk org   pulldowns org   Bicep curl  5#   BOW pushup y                                                       Added/Changed Exercises:  [] Advanced to address: [] functional strength/ROM deficits [] balance/proprioceptive tasks  []  Modified: [] per subjective reports [] for patient time constraints [] for clinic time constraints    Modality:  []  E-Stim: type _ x _ min     []att   []unatt   []w/ice   []w/heat  []  Ultrasound: []cont   []pulse    _ W/cm2 x _  min   []1MHz   []3MHz  []  Ice pack: post    Declines      []  Hot pack: pre    []  Other:     Patient Education: [] Review HEP    [] Progressed/Changed HEP to include:  [] positioning   [] body mechanics   [] transfers   [] heat/ice application      ASSESSMENT  Shoulder does fatigue with exercise. Cervical spine overall progressing well.      PLAN  [x]  Upgrade activities as tolerated      [x]  Continue plan of care  []  Discharge due to:  [] Other:       Elsie Hart, PT, DPT 5/26/2022

## 2022-05-31 ENCOUNTER — OFFICE VISIT (OUTPATIENT)
Dept: ORTHOPEDIC SURGERY | Age: 67
End: 2022-05-31
Payer: MEDICARE

## 2022-05-31 ENCOUNTER — TELEPHONE (OUTPATIENT)
Dept: INTERNAL MEDICINE CLINIC | Age: 67
End: 2022-05-31

## 2022-05-31 DIAGNOSIS — G89.29 CHRONIC RIGHT SHOULDER PAIN: ICD-10-CM

## 2022-05-31 DIAGNOSIS — M48.02 CERVICAL STENOSIS OF SPINAL CANAL: ICD-10-CM

## 2022-05-31 DIAGNOSIS — M54.12 RIGHT CERVICAL RADICULOPATHY: ICD-10-CM

## 2022-05-31 DIAGNOSIS — Z98.1 STATUS POST CERVICAL SPINAL FUSION: Primary | ICD-10-CM

## 2022-05-31 DIAGNOSIS — M25.511 CHRONIC RIGHT SHOULDER PAIN: ICD-10-CM

## 2022-05-31 PROCEDURE — 97140 MANUAL THERAPY 1/> REGIONS: CPT

## 2022-05-31 PROCEDURE — 97110 THERAPEUTIC EXERCISES: CPT

## 2022-05-31 NOTE — TELEPHONE ENCOUNTER
Returned patient's call and gave him the number for ENT to see if they could further evaluate him for the complaint.

## 2022-05-31 NOTE — PROGRESS NOTES
PT DAILY TREATMENT NOTE    Patient Name: Jose Velez  Date:2022  : 1955  [x]  Patient  Verified  Payor: Sonia Meng / Plan: [place] MEDICARE PART A & B / Product Type: Medicare /    Total Treatment Time (min): 45  Total Timed Codes (min): 30  1:1 Treatment Time ( [address] only): 30   Referring Provider: Nancy Beyer MD    1. Status post cervical spinal fusion  2. Cervical stenosis of spinal canal  3. Chronic right shoulder pain  4. Right cervical radiculopathy      SUBJECTIVE  Subjective functional status/changes:   [] No changes reported    Still with intermittent soreness at the base of the c-spine between the shoulder blades. OBJECTIVE/TREATMENT    Manual Therapy x 15 mins:   Manual soft tissue mobilization, myofascial release to bilateral cervical paraspinals, upper traps, levator scapula in combination with passive stretching to the same. Gentle cervical traction with OA flexion and suboccipital release. Passive range of motion for bilateral cervical side bend and rotation. LLPS for internal/external rotation with humeral head stabilization. [address] mobilization to the posterior/inferior capsule in combination with PROM into all directions as tolerated. Neuromuscular Re-education (NMR) x   minutes:  []  Kinesiotaping for   []  Neuromuscular reeducation to the VMO with use of [place] electrical stimulation in conjunction with active contraction and exercises. []  balance/proprioceptive exercises and activities in clinic as listed below. Therapeutic Exercise x 15 mins:   Strengthening/Endurance/ADL function/Neuromuscular reeducation activities/exercises supervised and completed as listed below.       EXERCISE X= completed on  2022   UE nustep y   / foam roll walk x   scap rows grn   RC walk org   pulldowns org   Bicep curl  5#   BOW pushup y                                                       Added/Changed Exercises:  []  Advanced to address: [] functional strength/ROM deficits [] balance/proprioceptive tasks  []  Modified: [] per subjective reports [] for patient time constraints [] for clinic time constraints    Modality:  []  E-Stim: type _ x _ min     []att   []unatt   []w/ice   []w/heat  []  Ultrasound: []cont   []pulse    _ W/cm2 x _  min   []1MHz   []3MHz  []  Ice pack: post    Declines      []  Hot pack: pre    []  Other:     Patient Education: [] Review HEP    [] Progressed/Changed HEP to include:  [] positioning   [] body mechanics   [] transfers   [] heat/ice application      ASSESSMENT    Mild stiffness with cervical mobility but otherwise c-spine progressing well. Shoulder pain/weakness persists. Still waiting on MRI.      PLAN  [x]  Upgrade activities as tolerated      [x]  Continue plan of care  []  Discharge due to:  [] Other:       Mary Jane Buckner, PTA 5/31/2022

## 2022-05-31 NOTE — TELEPHONE ENCOUNTER
Patient states he had covid a year ago. He has mouth and tongue numbness and unable to taste. He would like to know what you recommend. Please advise.      [phone]

## 2022-06-01 ENCOUNTER — PATIENT MESSAGE (OUTPATIENT)
Dept: ORTHOPEDIC SURGERY | Age: 67
End: 2022-06-01

## 2022-06-01 NOTE — PROGRESS NOTES
I have reviewed the notes, assessments, and/or procedures performed by Mary Jane Buckner PTA, I concur with her/his documentation of Flavio Garcia.

## 2022-06-02 ENCOUNTER — OFFICE VISIT (OUTPATIENT)
Dept: ORTHOPEDIC SURGERY | Age: 67
End: 2022-06-02
Payer: MEDICARE

## 2022-06-02 DIAGNOSIS — M25.511 CHRONIC RIGHT SHOULDER PAIN: ICD-10-CM

## 2022-06-02 DIAGNOSIS — Z98.1 STATUS POST CERVICAL SPINAL FUSION: Primary | ICD-10-CM

## 2022-06-02 DIAGNOSIS — M54.12 RIGHT CERVICAL RADICULOPATHY: ICD-10-CM

## 2022-06-02 DIAGNOSIS — M48.02 CERVICAL STENOSIS OF SPINAL CANAL: ICD-10-CM

## 2022-06-02 DIAGNOSIS — G89.29 CHRONIC RIGHT SHOULDER PAIN: ICD-10-CM

## 2022-06-02 PROCEDURE — 97140 MANUAL THERAPY 1/> REGIONS: CPT

## 2022-06-02 PROCEDURE — 97110 THERAPEUTIC EXERCISES: CPT

## 2022-06-02 NOTE — PROGRESS NOTES
PT DAILY TREATMENT NOTE    Patient Name: Tamy Xiong  Date:2022  : 1955  [x]  Patient  Verified  Payor: Justice Mead / Plan: [place] MEDICARE PART A & B / Product Type: Medicare /    Total Treatment Time (min): 45  Total Timed Codes (min): 30  1:1 Treatment Time ( [address] only): 30   Referring Provider: Nando Link MD    1. Status post cervical spinal fusion  2. Cervical stenosis of spinal canal  3. Chronic right shoulder pain  4. Right cervical radiculopathy      SUBJECTIVE  Subjective functional status/changes:   [] No changes reported    Patient reports cervical soreness is becoming more intermittent in nature. Primary pain is R shoulder for which he is getting an MRI next week. OBJECTIVE/TREATMENT    Manual Therapy x 15 mins:   Manual soft tissue mobilization, myofascial release to bilateral cervical paraspinals, upper traps, levator scapula in combination with passive stretching to the same. Gentle cervical traction with OA flexion and suboccipital release. Passive range of motion for bilateral cervical side bend and rotation. LLPS for internal/external rotation with humeral head stabilization. [address] mobilization to the posterior/inferior capsule in combination with PROM into all directions as tolerated. Neuromuscular Re-education (NMR) x   minutes:  []  Kinesiotaping for   []  Neuromuscular reeducation to the VMO with use of [place] electrical stimulation in conjunction with active contraction and exercises. []  balance/proprioceptive exercises and activities in clinic as listed below. Therapeutic Exercise x 15 mins:   Strengthening/Endurance/ADL function/Neuromuscular reeducation activities/exercises supervised and completed as listed below.       EXERCISE X= completed on  2022   UE nustep y    foam roll walk x   scap rows grn   RC walk org   pulldowns org   Bicep curl  5#   BOW pushup y                                                       Added/Changed Exercises:  [] Advanced to address: [] functional strength/ROM deficits [] balance/proprioceptive tasks  []  Modified: [] per subjective reports [] for patient time constraints [] for clinic time constraints    Modality:  []  E-Stim: type _ x _ min     []att   []unatt   []w/ice   []w/heat  []  Ultrasound: []cont   []pulse    _ W/cm2 x _  min   []1MHz   []3MHz  []  Ice pack: post    Declines      []  Hot pack: pre    []  Other:     Patient Education: [] Review HEP    [] Progressed/Changed HEP to include:  [] positioning   [] body mechanics   [] transfers   [] heat/ice application      ASSESSMENT    Cervical symptoms are consistently improving. R shoulder pain does not seem related to cervical radiculopathy- he is scheduled for shoulder MRI next week.      PLAN  [x]  Upgrade activities as tolerated      [x]  Continue plan of care  []  Discharge due to:  [] Other:       Monia Osler, PTA 6/2/2022

## 2022-06-02 NOTE — PROGRESS NOTES
I have reviewed the notes, assessments, and/or procedures performed by Dru Mckeon PTA, I concur with her/his documentation of Ezequiel Saenz.

## 2022-06-13 ENCOUNTER — TELEPHONE (OUTPATIENT)
Dept: ORTHOPEDIC SURGERY | Age: 67
End: 2022-06-13

## 2022-06-13 DIAGNOSIS — G89.29 CHRONIC RIGHT SHOULDER PAIN: Primary | ICD-10-CM

## 2022-06-13 DIAGNOSIS — M25.511 CHRONIC RIGHT SHOULDER PAIN: Primary | ICD-10-CM

## 2022-06-13 RX ORDER — DICLOFENAC SODIUM 75 MG/1
75 TABLET, DELAYED RELEASE ORAL 2 TIMES DAILY
Qty: 60 TABLET | Refills: 0 | Status: SHIPPED | OUTPATIENT
Start: 2022-06-13 | End: 2022-09-06

## 2022-06-13 NOTE — TELEPHONE ENCOUNTER
States he was seen by Dr Dilip Driver on 6/7/2022 he was supposed to be scheduled for a biopsy and has not been as of yet. i contacted [facility] isac jordan [phone] he states he is sending an urgent message to the office for the biopsy to be schedule.

## 2022-06-30 DIAGNOSIS — M54.12 RIGHT CERVICAL RADICULOPATHY: Primary | ICD-10-CM

## 2022-06-30 RX ORDER — MELOXICAM 15 MG/1
15 TABLET ORAL DAILY
Qty: 30 TABLET | Refills: 0 | Status: SHIPPED | OUTPATIENT
Start: 2022-06-30 | End: 2022-09-06

## 2022-07-18 DIAGNOSIS — M54.12 RIGHT CERVICAL RADICULOPATHY: Primary | ICD-10-CM

## 2022-07-18 RX ORDER — TRAMADOL HYDROCHLORIDE 50 MG/1
50 TABLET ORAL
Qty: 28 TABLET | Refills: 0 | Status: SHIPPED | OUTPATIENT
Start: 2022-07-18 | End: 2022-07-25

## 2022-07-19 DIAGNOSIS — M75.41 IMPINGEMENT SYNDROME OF RIGHT SHOULDER: Primary | ICD-10-CM

## 2022-07-19 NOTE — TELEPHONE ENCOUNTER
Pt states he has taken tramadol before and this does not help ok per Dr Solomon Shields to give him oxycodone.

## 2022-07-20 ENCOUNTER — TELEPHONE (OUTPATIENT)
Dept: ORTHOPEDIC SURGERY | Age: 67
End: 2022-07-20

## 2022-07-20 RX ORDER — OXYCODONE AND ACETAMINOPHEN 5; 325 MG/1; MG/1
1 TABLET ORAL
Qty: 28 TABLET | Refills: 0 | Status: SHIPPED | OUTPATIENT
Start: 2022-07-20 | End: 2022-07-27

## 2022-07-20 NOTE — TELEPHONE ENCOUNTER
Pt called in stated he can not take the Tramadol which is causing mental confusion and nausea. Pt states he has been given several different medications and wants to know if something new an be prescribed.

## 2022-07-25 RX ORDER — INSULIN LISPRO 100 [IU]/ML
INJECTION, SOLUTION INTRAVENOUS; SUBCUTANEOUS
Qty: 60 ML | Refills: 3
Start: 2022-07-25 | End: 2022-07-28 | Stop reason: SDUPTHER

## 2022-07-25 NOTE — TELEPHONE ENCOUNTER
PCP: Norbert Santamaria MD    Last appt: 2/28/2022  Future Appointments   Date Time Provider Carmel Vargas   9/6/2022  1:00 PM Jing Benítez MD [facility]       Requested Prescriptions     Pending Prescriptions Disp Refills    insulin lispro (HumaLOG U-100 Insulin) 100 unit/mL injection 60 mL 3     Sig: USE VIA INSULIN PUMP         Other Comments:

## 2022-07-27 DIAGNOSIS — E10.3559 TYPE 1 DIABETES MELLITUS WITH STABLE PROLIFERATIVE RETINOPATHY, UNSPECIFIED LATERALITY (HCC): ICD-10-CM

## 2022-07-27 RX ORDER — LANCETS
EACH MISCELLANEOUS
Qty: 700 LANCET | Refills: 0 | Status: SHIPPED | OUTPATIENT
Start: 2022-07-27

## 2022-07-27 NOTE — TELEPHONE ENCOUNTER
PCP: Lyle Vazquez MD    Last appt: 2/28/2022  Future Appointments   Date Time Provider Carmel Vargas   9/6/2022  1:00 PM Guanakito Cruz MD [facility]       Requested Prescriptions     Pending Prescriptions Disp Refills    lancets (Accu-Chek Softclix Lancets) misc 700 Lancet 0     Sig: USE ONE LANCET TO TEST 7 TIMES DAILY         Other Comments:

## 2022-07-28 RX ORDER — INSULIN LISPRO 100 [IU]/ML
INJECTION, SOLUTION INTRAVENOUS; SUBCUTANEOUS
Qty: 60 ML | Refills: 3
Start: 2022-07-28

## 2022-07-28 NOTE — TELEPHONE ENCOUNTER
PCP: Amy Huertas MD    Last appt: 2/28/2022  Future Appointments   Date Time Provider Carmel Vargas   9/6/2022  1:00 PM Celestino Rasmussen MD [facility]       Requested Prescriptions     Pending Prescriptions Disp Refills    insulin lispro (HumaLOG U-100 Insulin) 100 unit/mL injection 60 mL 3     Sig: USE VIA INSULIN PUMP         Other Comments:

## 2022-08-17 DIAGNOSIS — M54.12 RIGHT CERVICAL RADICULOPATHY: Primary | ICD-10-CM

## 2022-08-17 RX ORDER — DICLOFENAC SODIUM 75 MG/1
75 TABLET, DELAYED RELEASE ORAL 2 TIMES DAILY
Qty: 60 TABLET | Refills: 1 | Status: SHIPPED | OUTPATIENT
Start: 2022-08-17 | End: 2022-09-06

## 2022-08-29 NOTE — PROGRESS NOTES
Subjective:     Chief Complaint   Patient presents with    Establish Care     New to provider    Physical       Yadiel Leal is a 77 y.o. M. He has a past medical history of hypercholesterolemia and remote history of bacterial endocarditis. He also has a history of type 1 diabetes mellitus. I reviewed and updated the medical record. This patient was seen by his previous primary care provider most recently in late February. He was pending a potential anterior discectomy with fusion in the C5-C7 areas at the time and was there for  a preoperative medical clearance. He was noted to have well-controlled hypertension, and had seen his cardiologist and had been cleared for surgery from their standpoint. He was also being followed by endocrinology, and had received recommendations from them regarding his insulin pump. Physical examination at the time had been unremarkable, and he was advised to proceed with the surgery as scheduled without changes. Since that time, the patient has been seen mainly in follow-up in the orthopedics clinic following his cervical spinal fusion. Today, the patient comes in for Medicare wellness visit as well as establishment and follow-up on his chronic medical concerns. There have been significant clinical changes since his last visit. He says that he had undergone evaluation for what was felt to likely be a rotator cuff tear/tendinitis by orthopedic surgery in late April to early May. He had subsequently undergone plain film evaluation and then MRI evaluation for this issue. The MRI had indeed demonstrated some evidence of rotator cuff tendinitis but more concerningly had demonstrated evidence of a marrow infiltrating tumor, which was felt likely to be malignancy. Since that time, the patient has undergone extensive diagnostic evaluation at [address] [place] [address], the results of which are not fully available to me.   However, he now reports that he was diagnosed with lung cancer, with metastases to the right shoulder as well as the brain. With the metastasis to the brain, over the past several months he has developed right sided facial droop. This has been getting progressively worse since this time. He continues on immunotherapy with Christie Lambert, and while I do not have the records from his oncology team with me, for review, it sounds as if this is intended to be neoadjuvant therapy prior to reevaluation with numerous radiographic studies to determine the next leg of treatment, either radiation and steroids versus additional systemic chemotherapy or surgical resection. Today, the patient generally reports feeling about the same as usual.  He complains of urinary frequency, which has been present now for the past several weeks. He wonders if this could be a side effect of the Tagrisso. He denies having had any dysuria, hematuria, flank pain, or other genitourinary complaints. He also denies having had any fevers or chills. He notes that he continues to be followed by his endocrinologist for monitoring and adjustment of his insulin pump for his type 1 diabetes mellitus. He denies having had any significant hyper or hypoglycemic episodes over the past several weeks, although these sometimes very overall in terms of frequency and severity. He denies having had any weight loss or weight gain. He otherwise reports feeling about the same as usual.  He has pending follow-up with [facility] oncology as per usual in the next few weeks. He notes that his cardiologist had recently performed a repeat echocardiogram to further follow-up on his prior findings of mitral regurgitation, although once again the echocardiographic results are not available to me for review. His review of systems is otherwise negative. Routine Healthcare Maintenance issues are reviewed and discussed with the patient as noted below.  Orders to update gaps in healthcare maintenance were placed as noted below in the Assessment and Plan, where applicable. He is a former smoker, but quit long ago, and will require AAA ultrasound screening at a later point. Past Medical History:  Past Medical History:   Diagnosis Date    Arthritis     Essential hypertension 12/11/2017    Former smoker, stopped smoking in distant past     History of acute bacterial endocarditis 08/09/2021    History of anemia     History of echocardiogram 10/2021    LV: Estimated LVEF is 55 - 60%. Normal cavity size, wall thickness, systolic function (ejection fraction normal) and diastolic function. Wall motion: normal. LA: Mildly dilated left atrium. RA: Mildly dilated right atrium. MV: Mitral valve thickening. Mitral valve prolapse. Moderate mitral valve regurgitation is present. TV: Mild tricuspid valve regurgitation is present. 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 Cecilia Villarreal)       Past Surgical Histor:  Past Surgical History:   Procedure Laterality Date    COLONOSCOPY N/A 2/7/2022    COLONOSCOPY performed by Silvia Zamora MD at [address]  2/7/2022         HX LUMBAR LAMINECTOMY  2015    HX ORTHOPAEDIC  2010    RIGHT KNEE OPEN SURGERY    HX TONSILLECTOMY         Allergies: Allergies   Allergen Reactions    Oxycodone-Acetaminophen Other (comments)     AGGRESIVE BEHAVIOR  Other reaction(s):  Other (see comments)  AGGRESIVE BEHAVIOR       Medications:  Current Outpatient Medications Medication Sig Dispense Refill    osimertinib (TAGRISSO) 80 mg tablet Tagrisso 80 mg tablet   Take 1 tablet every day by oral route. insulin lispro (HumaLOG U-100 Insulin) 100 unit/mL injection USE VIA INSULIN PUMP 60 mL 3    lancets (Accu-Chek Softclix Lancets) misc USE ONE LANCET TO TEST 7 TIMES DAILY 700 Lancet 0    diclofenac EC (VOLTAREN) 75 mg EC tablet Take 1 Tablet by mouth two (2) times a day. 60 Tablet 0    lisinopriL (PRINIVIL, ZESTRIL) 20 mg tablet Take 1 Tablet by mouth two (2) times a day for 360 days. 180 Tablet 3    glucose blood VI test strips (Accu-Chek Elizabet Plus test strp) strip USE ONE STRIP TO TEST SEVEN TIMES A  Strip 0    pravastatin (PRAVACHOL) 40 mg tablet TAKE 1 TABLET DAILY 90 Tablet 1    multivitamin (ONE A DAY) tablet Take 1 Tablet by mouth daily. insulin pump (PATIENT SUPPLIED) Misc by SubCUTAneous route as needed. NOVALOG INSULIN         Social History:  Social History     Socioeconomic History    Marital status: SINGLE   Tobacco Use    Smoking status: Former     Packs/day: 1.00     Years: 5.00     Pack years: 5.00     Types: Cigarettes     Quit date: 1975     Years since quittin.7    Smokeless tobacco: Never   Vaping Use    Vaping Use: Never used   Substance and Sexual Activity    Alcohol use:  Yes     Alcohol/week: 3.0 standard drinks     Types: 3 Glasses of wine per week     Comment: 3 glasses of wine a night    Drug use: No    Sexual activity: Yes     Partners: Female       Family History:  Family History   Problem Relation Age of Onset    Cancer Mother         LUNG    Cancer Father         LUNG    No Known Problems Sister     Anesth Problems Neg Hx        Immunizations:  Immunization History   Administered Date(s) Administered    COVID-19, PFIZER PURPLE top, DILUTE for use, (age 15 y+), IM, 30mcg/0.3mL 2021, 2021, 10/22/2021, 2022    Influenza, FLUAD, (age 72 y+), Adjuvanted 2020, 11/15/2021        Healthcare Maintenance:  Health Maintenance   Topic Date Due    Pneumococcal 65+ years (1 - PCV) Never done    DTaP/Tdap/Td series (1 - Tdap) Never done    Shingrix Vaccine Age 49> (1 of 2) Never done    AAA Screening 73-67 YO Male Smoking Patients  Never done    MICROALBUMIN Q1  01/04/2022    Lipid Screen  06/25/2022    Medicare Yearly Exam  06/26/2022    Foot Exam Q1  07/30/2022    Flu Vaccine (1) 09/01/2022    A1C test (Diabetic or Prediabetic)  03/01/2023    Depression Screen  04/28/2023    Eye Exam Retinal or Dilated  09/15/2023    Colorectal Cancer Screening Combo  02/07/2032    Hepatitis C Screening  Completed    COVID-19 Vaccine  Completed        Review of Systems:  ROS:  Review of Systems   Constitutional: Negative. HENT: Negative. Eyes: Negative. Respiratory: Negative. Cardiovascular: Negative. Gastrointestinal: Negative. Genitourinary:  Positive for frequency. Musculoskeletal: Negative. Skin: Negative. Neurological:  Positive for focal weakness. Endo/Heme/Allergies: Negative. Psychiatric/Behavioral: Negative. ROS otherwise negative      Objective:     Vital Signs:  Visit Vitals  /68 (BP 1 Location: Right arm, BP Patient Position: Sitting, BP Cuff Size: Adult)   Pulse 63   Temp 98.5 °F (36.9 °C) (Oral)   Ht 6' (1.829 m)   Wt 145 lb 12.8 oz (66.1 kg)   SpO2 98%   BMI 19.77 kg/m²       BMI:  Body mass index is 19.77 kg/m². Physical Examination:  Physical Exam  Vitals reviewed. Constitutional:       Appearance: Normal appearance. HENT:      Head: Normocephalic and atraumatic. Nose: Nose normal.      Mouth/Throat:      Mouth: Mucous membranes are moist.   Eyes:      Extraocular Movements: Extraocular movements intact. Conjunctiva/sclera: Conjunctivae normal.      Pupils: Pupils are equal, round, and reactive to light. Cardiovascular:      Rate and Rhythm: Normal rate and regular rhythm. Pulses: Normal pulses.       Heart sounds: Murmur (3/6 holosystolic crescendo maximal at apex c/w MR) heard. No friction rub. No gallop. Pulmonary:      Effort: Pulmonary effort is normal. No respiratory distress. Breath sounds: Normal breath sounds. No wheezing, rhonchi or rales. Abdominal:      General: Bowel sounds are normal. There is no distension. Palpations: Abdomen is soft. There is no mass. Tenderness: no abdominal tenderness There is no guarding or rebound. Musculoskeletal:         General: No tenderness, deformity or signs of injury. Normal range of motion. Cervical back: Normal range of motion and neck supple. Right lower leg: No edema. Left lower leg: No edema. Skin:     General: Skin is warm and dry. Findings: No bruising, lesion or rash. Neurological:      Mental Status: He is alert and oriented to person, place, and time. Mental status is at baseline. Cranial Nerves: No cranial nerve deficit. Sensory: No sensory deficit. Motor: No weakness. Coordination: Coordination normal.      Gait: Gait normal.      Deep Tendon Reflexes: Reflexes normal.      Comments: Right sided facial droop / weakness   Psychiatric:         Mood and Affect: Mood normal.         Behavior: Behavior normal.        Physical exam otherwise negative    Diagnostic Testing:    Laboratory Studies:  Admission on 03/16/2022, Discharged on 03/17/2022   Component Date Value Ref Range Status    Crossmatch Expiration 03/16/2022 03/19/2022,2359   Final    ABO/Rh(D) 03/16/2022 A NEGATIVE   Final    Antibody screen 03/16/2022 NEG   Final    Glucose (POC) 03/16/2022 167 (A) 65 - 117 mg/dL Final    Comment: (NOTE)  The FDA has indicated that no capillary point of care blood glucose  monitoring systems are approved for use in \"critically ill\" patients,  however they have not defined this population.  The College of  American Pathologists has recommended that these devices should not  be used in cases such as severe hypotension, dehydration, shock, and  hyperglycemic-hyperosmolar state, amongst others. Venous or arterial  collection is the recommended specimen for testing these patients. Performed by 03/16/2022 Juliana Grant   Final    Glucose (POC) 03/16/2022 154 (A) 65 - 117 mg/dL Final    Comment: (NOTE)  The FDA has indicated that no capillary point of care blood glucose  monitoring systems are approved for use in \"critically ill\" patients,  however they have not defined this population. The College of  American Pathologists has recommended that these devices should not  be used in cases such as severe hypotension, dehydration, shock, and  hyperglycemic-hyperosmolar state, amongst others. Venous or arterial  collection is the recommended specimen for testing these patients. Performed by 03/16/2022 Geoff Mcintyre   Final    Glucose (POC) 03/16/2022 186 (A) 65 - 117 mg/dL Final    Comment: (NOTE)  The FDA has indicated that no capillary point of care blood glucose  monitoring systems are approved for use in \"critically ill\" patients,  however they have not defined this population. The College of  American Pathologists has recommended that these devices should not  be used in cases such as severe hypotension, dehydration, shock, and  hyperglycemic-hyperosmolar state, amongst others. Venous or arterial  collection is the recommended specimen for testing these patients. Performed by 03/16/2022 Geoff Mcintyre   Final    Glucose (POC) 03/16/2022 230 (A) 65 - 117 mg/dL Final    Comment: (NOTE)  The FDA has indicated that no capillary point of care blood glucose  monitoring systems are approved for use in \"critically ill\" patients,  however they have not defined this population. The College of  American Pathologists has recommended that these devices should not  be used in cases such as severe hypotension, dehydration, shock, and  hyperglycemic-hyperosmolar state, amongst others.   Venous or arterial  collection is the recommended specimen for testing these patients. Performed by 03/16/2022 [place]amp   Final    Glucose (POC) 03/16/2022 209 (A) 65 - 117 mg/dL Final    Comment: (NOTE)  The FDA has indicated that no capillary point of care blood glucose  monitoring systems are approved for use in \"critically ill\" patients,  however they have not defined this population. The College of  American Pathologists has recommended that these devices should not  be used in cases such as severe hypotension, dehydration, shock, and  hyperglycemic-hyperosmolar state, amongst others. Venous or arterial  collection is the recommended specimen for testing these patients. Performed by 03/16/2022 Dorene Loredo   Final    Glucose (POC) 03/17/2022 135 (A) 65 - 117 mg/dL Final    Comment: (NOTE)  The FDA has indicated that no capillary point of care blood glucose  monitoring systems are approved for use in \"critically ill\" patients,  however they have not defined this population. The College of  American Pathologists has recommended that these devices should not  be used in cases such as severe hypotension, dehydration, shock, and  hyperglycemic-hyperosmolar state, amongst others. Venous or arterial  collection is the recommended specimen for testing these patients. Performed by 03/17/2022 Kevyn Lanier   Final   [facility] Outpatient Visit on 03/11/2022   Component Date Value Ref Range Status    Specimen source 03/11/2022 Nasopharyngeal    Final    SARS-CoV-2 03/11/2022 Not detected  NOTD   Final    Comment:      The specimen is NEGATIVE for SARS-CoV-2, the novel coronavirus associated with COVID-19. A negative result does not rule out COVID-19.        Melvin SARS-CoV-2 for use on the Melvin 6800/8800 Systems is a real-time RT-PCR test intended for the qualitative detection of nucleic acids from SARS-CoV-2  in clinician-collected nasal, nasopharyngeal,and oropharyngeal swab specimens from individuals who meet COVID-19 clinical and/or epidemiological criteria. Melvin SARS-CoV-2 is for use only under Emergency Use Authorization (EUA) in laboratories certified under [address] (CLIA), 42 [place] 541C, that meet requirements to perform high or moderate complexity tests. An individual without symptoms of COVID-19 and who is not shedding SARS-CoV-2 virus would expect to have a negative (not detected) result in this assay. Fact sheet for Healthcare Providers: [employer].co.nz  Fact sheet for Patients: http://www.[employer]/                           99941/download       Methodology: RT-PCR     Hospital Outpatient Visit on 03/01/2022   Component Date Value Ref Range Status    Sodium 03/01/2022 136  136 - 145 mmol/L Final    Potassium 03/01/2022 4.0  3.5 - 5.1 mmol/L Final    Chloride 03/01/2022 105  97 - 108 mmol/L Final    CO2 03/01/2022 26  21 - 32 mmol/L Final    Anion gap 03/01/2022 5  5 - 15 mmol/L Final    Glucose 03/01/2022 87  65 - 100 mg/dL Final    BUN 03/01/2022 21 (A) 6 - 20 MG/DL Final    Creatinine 03/01/2022 1.00  0.70 - 1.30 MG/DL Final    BUN/Creatinine ratio 03/01/2022 21 (A) 12 - 20   Final    GFR est AA 03/01/2022 >60  >60 ml/min/1.73m2 Final    GFR est non-AA 03/01/2022 >60  >60 ml/min/1.73m2 Final    Estimated GFR is calculated using the IDMS-traceable Modification of Diet in Renal Disease (MDRD) Study equation, reported for both  Americans (GFRAA) and non- Americans (GFRNA), and normalized to 1.73m2 body surface area. The physician must decide which value applies to the patient.     Calcium 03/01/2022 9.1  8.5 - 10.1 MG/DL Final    WBC 03/01/2022 5.7  4.1 - 11.1 K/uL Final    RBC 03/01/2022 4.13  4.10 - 5.70 M/uL Final    HGB 03/01/2022 13.6  12.1 - 17.0 g/dL Final    HCT 03/01/2022 41.5  36.6 - 50.3 % Final    MCV 03/01/2022 100.5 (A) 80.0 - 99.0 FL Final    MCH 03/01/2022 32.9  26.0 - 34.0 PG Final    MCHC 03/01/2022 32.8  30.0 - 36.5 g/dL Final    RDW 03/01/2022 12.4  11.5 - 14.5 % Final    PLATELET 21/89/2684 612  150 - 400 K/uL Final    MPV 03/01/2022 10.8  8.9 - 12.9 FL Final    NRBC 03/01/2022 0.0  0  WBC Final    ABSOLUTE NRBC 03/01/2022 0.00  0.00 - 0.01 K/uL Final    Crossmatch Expiration 03/01/2022 03/15/2022,2359   Final    ABO/Rh(D) 03/01/2022 A NEGATIVE   Final    Antibody screen 03/01/2022 NEG   Final    INR 03/01/2022 1.0  0.9 - 1.1   Final    A single therapeutic range for Vit K antagonists may not be optimal for all indications - see June, 2008 issue of Chest, American College of Chest Physicians Evidence-Based Clinical Practice Guidelines, 8th Edition. Prothrombin time 03/01/2022 10.8  9.0 - 11.1 sec Final    Color 03/01/2022 YELLOW/STRAW    Final    Color Reference Range: Straw, Yellow or Dark Yellow    Appearance 03/01/2022 CLEAR  CLEAR   Final    Specific gravity 03/01/2022 1.013  1.003 - 1.030   Final    pH (UA) 03/01/2022 8.0  5.0 - 8.0   Final    Protein 03/01/2022 Negative  NEG mg/dL Final    Glucose 03/01/2022 Negative  NEG mg/dL Final    Ketone 03/01/2022 Negative  NEG mg/dL Final    Bilirubin 03/01/2022 Negative  NEG   Final    Blood 03/01/2022 Negative  NEG   Final    Urobilinogen 03/01/2022 1.0  0.2 - 1.0 EU/dL Final    Nitrites 03/01/2022 Negative  NEG   Final    Leukocyte Esterase 03/01/2022 Negative  NEG   Final    UA:UC IF INDICATED 03/01/2022 CULTURE NOT INDICATED BY UA RESULT  CNI   Final    WBC 03/01/2022 0-4  0 - 4 /hpf Final    RBC 03/01/2022 0-5  0 - 5 /hpf Final    Epithelial cells 03/01/2022 FEW  FEW /lpf Final    Epithelial cell category consists of squamous cells and /or transitional urothelial cells. Renal tubular cells, if present, are separately identified as such.     Bacteria 03/01/2022 Negative  NEG /hpf Final    Hyaline cast 03/01/2022 0-2  0 - 5 /lpf Final    Hemoglobin A1c 03/01/2022 5.0  4.0 - 5.6 % Final    Comment: NEW METHOD  PLEASE NOTE NEW REFERENCE RANGE  (NOTE)  HbA1C Interpretive Ranges  <5.7              Normal  5.7 - 6.4         Consider Prediabetes  >6.5              Consider Diabetes      Est. average glucose 03/01/2022 97  mg/dL Final    Special Requests: 03/01/2022 NO SPECIAL REQUESTS    Final    Culture result: 03/01/2022 MRSA NOT PRESENT    Final    Culture result: 03/01/2022 Screening of patient nares for MRSA is for surveillance purposes and, if positive, to facilitate isolation considerations in high risk settings. It is not intended for automatic decolonization interventions per se as regimens are not sufficiently effective to warrant routine use. Final   Admission on 02/07/2022, Discharged on 02/07/2022   Component Date Value Ref Range Status    Glucose (POC) 02/07/2022 120 (A) 65 - 117 mg/dL Final    Comment: (NOTE)  The FDA has indicated that no capillary point of care blood glucose  monitoring systems are approved for use in \"critically ill\" patients,  however they have not defined this population. The College of  American Pathologists has recommended that these devices should not  be used in cases such as severe hypotension, dehydration, shock, and  hyperglycemic-hyperosmolar state, amongst others. Venous or arterial  collection is the recommended specimen for testing these patients. Performed by 02/07/2022 [id number] [address] Outpatient Visit on 02/03/2022   Component Date Value Ref Range Status    Specimen source 02/03/2022 Nasopharyngeal    Final    SARS-CoV-2 02/03/2022 Not detected  NOTD   Final    Comment:      The specimen is NEGATIVE for SARS-CoV-2, the novel coronavirus associated with COVID-19. A negative result does not rule out COVID-19.        Melvin SARS-CoV-2 for use on the Melvin 6800/8800 Systems is a real-time RT-PCR test intended for the qualitative detection of nucleic acids from SARS-CoV-2  in clinician-collected nasal, nasopharyngeal,and oropharyngeal swab specimens from individuals who meet COVID-19 clinical and/or epidemiological criteria. Melvin SARS-CoV-2 is for use only under Emergency Use Authorization (EUA) in laboratories certified under [address] (CLIA), 42 [place] 677N, that meet requirements to perform high or moderate complexity tests. An individual without symptoms of COVID-19 and who is not shedding SARS-CoV-2 virus would expect to have a negative (not detected) result in this assay.   Fact sheet for Healthcare Providers: [employer].co.nz  Fact sheet for Patients: http://[employer]/                           24849/download       Methodology: RT-PCR     Abstract on 11/16/2021   Component Date Value Ref Range Status    SARS-CoV-2, MIKHAIL 05/24/2021 Positive   Final   Ancillary Procedure on 10/14/2021   Component Date Value Ref Range Status    IVSd 10/14/2021 0.94  0.60 - 1.00 cm Final    LVIDd 10/14/2021 5.36  4.20 - 5.90 cm Final    LVIDs 10/14/2021 3.11  cm Final    LVPWd 10/14/2021 0.78  0.60 - 1.00 cm Final    LVOT Peak Gradient 10/14/2021 3.70  mmHg Final    LVOT Peak Velocity 10/14/2021 96.21  cm/s Final    LV IVRT 10/14/2021 72.31  ms Final    RVSP 10/14/2021 35.20  mmHg Final    LA Major Axis 10/14/2021 3.74  cm Final    LA Volume BP 10/14/2021 67.56  18.0 - 58.0 mL Final    LA Area 4C 10/14/2021 19.38  cm2 Final    LA Volume 2C 10/14/2021 67.22 (A) 18.00 - 58.00 mL Final    LA Volume 4C 10/14/2021 54.16  18.00 - 58.00 mL Final    LA Volume DISK BP 10/14/2021 61.27  18.0 - 58.0 mL Final    Est. RA Pressure 10/14/2021 3.00  mmHg Final    AV Peak Gradient 10/14/2021 5.26  mmHg Final    AV Peak Velocity 10/14/2021 114.63  cm/s Final    MV \"A\" Wave Duration 10/14/2021 129.40  ms Final    MV A Velocity 10/14/2021 61.36  cm/s Final    MV E Wave Deceleration Time 10/14/2021 213.42  ms Final    MV E Velocity 10/14/2021 82.85  cm/s Final    E/E' Lateral 10/14/2021 4.91   Final    LV E' Lateral Velocity 10/14/2021 16.87  cm/s Final    TAPSE 10/14/2021 3.23 (A) 1.50 - 2.00 cm Final    TR Peak Gradient 10/14/2021 32.20  mmHg Final    TR Max Velocity 10/14/2021 283.73  cm/s Final    Ascending Aorta 10/14/2021 3.66  cm Final    Aortic Root 10/14/2021 3.41  cm Final    MV E/A 10/14/2021 1.35   Final    LV Mass 2D 10/14/2021 167.8  88 - 224 g Final    LV Mass 2D Index 10/14/2021 89.2  49 - 115 g/m2 Final    LA Minor Axis 10/14/2021 1.99  cm Final    LA Volume Index BP 10/14/2021 35.94  16 - 28 ml/m2 Final    LA Volume Index 2C 10/14/2021 35.76  16 - 28 ml/m2 Final    LA Volume Index 4C 10/14/2021 28.81  16 - 28 ml/m2 Final   Orders Only on 09/16/2021   Component Date Value Ref Range Status    C-Reactive protein 09/16/2021 <0.29  0.00 - 0.60 mg/dL Final    Comment: CRP is a nonspecific acute phase reactant that shows rapid, marked increases  with inflammation, infection, trauma, tissue necrosis, malignancies and  autoimmune diseases. Sequential CRP levels are useful in monitoring response to  antibacterial therapy. This assay is not equivalent to the hsCRP test since the  presence of one or more of the foregoing disease processes obviates the risk  stratification information available from hsCRP testing.       Sed rate, automated 09/16/2021 4  0 - 20 mm/hr Final    Immunoglobulin G, Qt. 09/16/2021 1,251  603 - 1,613 mg/dL Final    Immunoglobulin A, Qt. 09/16/2021 358  61 - 437 mg/dL Final    Immunoglobulin M, Qt. 09/16/2021 119  20 - 172 mg/dL Final    Protein, total 09/16/2021 7.3  6.0 - 8.5 g/dL Final    Albumin 09/16/2021 4.1  2.9 - 4.4 g/dL Final    Alpha-1-Globulin 09/16/2021 0.2  0.0 - 0.4 g/dL Final    Alpha-2-Globulin 09/16/2021 0.5  0.4 - 1.0 g/dL Final    Beta Globulin 09/16/2021 0.9  0.7 - 1.3 g/dL Final    Gamma Globulin 09/16/2021 1.5  0.4 - 1.8 g/dL Final    M-Cruzito 09/16/2021 Not Observed  Not Observed g/dL Final    Globulin, total 09/16/2021 3.2  2.2 - 3.9 g/dL Final    A/G ratio 09/16/2021 1.3  0.7 - 1.7   Final    Immunofixation Result 09/16/2021 Comment Final    Comment: (NOTE)  The immunofixation pattern appears unremarkable. Evidence of  monoclonal protein is not apparent. Free Kappa Lt Chains, serum 09/16/2021 19.2  3.3 - 19.4 mg/L Final    Free Lambda Lt Chains, serum 09/16/2021 20.6  5.7 - 26.3 mg/L Final    Kappa/Lambda ratio, serum 09/16/2021 0.93  0.26 - 1.65   Final    Comment: (NOTE)  Performed At: [facility]  [facility] [address], [place] [id number]  Verónica Beckman MD MZ:[id number]      SOTERO, Direct 09/16/2021 Negative  Negative   Final    Comment: (NOTE)  Performed At: [facility] & [address] [id number]  Verónica Beckman MD RJ:[id number]      HIV 1/2 Interpretation 09/16/2021 NONREACTIVE  NONREACTIVE   Final    HIV 1/2 result comment 09/16/2021 SEE NOTE    Final    Comment: While this assay is highly sensitive, a non-reactive/negative result for HIV  antibodies HIV-1 and HIV-2 and p24 antigen, does not exclude the possibility of  exposure to or infection with HIV. HIV antibodies and/or p24 antigen may be  undetectable in some stages of the infection and in some clinical conditions. Test performed by the [employer] HIV Ag/Ab Combo (CHIV), 4th generation  assay. Recommend consulting relevant CDC guidelines for informing test subject  of the result and its interpretation.       Sodium 09/16/2021 137  136 - 145 mmol/L Final    Potassium 09/16/2021 4.4  3.5 - 5.1 mmol/L Final    Chloride 09/16/2021 103  97 - 108 mmol/L Final    CO2 09/16/2021 29  21 - 32 mmol/L Final    Anion gap 09/16/2021 5  5 - 15 mmol/L Final    Glucose 09/16/2021 30 (A) 65 - 100 mg/dL Final    RESULTS VERIFIED, PHONED TO AND READ BACK BY MD RENETTA @0202/[facility]    BUN 09/16/2021 20  6 - 20 MG/DL Final    Creatinine 09/16/2021 0.86  0.70 - 1.30 MG/DL Final    BUN/Creatinine ratio 09/16/2021 23 (A) 12 - 20   Final    GFR est AA 09/16/2021 >60  >60 ml/min/1.73m2 Final    GFR est non-AA 09/16/2021 >60  >60 ml/min/1.73m2 Final    Comment: Estimated GFR is calculated using the IDMS-traceable Modification of Diet in  Renal Disease (MDRD) Study equation, reported for both  Americans  (GFRAA) and non- Americans (GFRNA), and normalized to 1.73m2 body  surface area. The physician must decide which value applies to the patient. Calcium 09/16/2021 9.5  8.5 - 10.1 MG/DL Final    Bilirubin, total 09/16/2021 0.6  0.2 - 1.0 MG/DL Final    ALT (SGPT) 09/16/2021 35  12 - 78 U/L Final    AST (SGOT) 09/16/2021 40 (A) 15 - 37 U/L Final    Alk. phosphatase 09/16/2021 75  45 - 117 U/L Final    Protein, total 09/16/2021 7.5  6.4 - 8.2 g/dL Final    Albumin 09/16/2021 3.9  3.5 - 5.0 g/dL Final    Globulin 09/16/2021 3.6  2.0 - 4.0 g/dL Final    A-G Ratio 09/16/2021 1.1  1.1 - 2.2   Final    Reticulocyte count 09/16/2021 1.4  0.7 - 2.1 % Final    Absolute Retic Cnt. 09/16/2021 0.0608  0.0260 - 0.0950 M/ul Final    Erythropoietin 09/16/2021 9.9  2.6 - 18.5 mIU/mL Final    Comment: (NOTE)  [employer] DxI 800 Immunoassay System  Values obtained with different assay methods or kits cannot be used  interchangeably. Results cannot be interpreted as absolute evidence  of the presence or absence of malignant disease. Performed At: [address] [id number]  Sheryle Sicks MD TC:[id number]      LD 09/16/2021 198  85 - 241 U/L Final    PERIPHERAL SMEAR 09/16/2021 Pathologic examination results can be viewed in [facility] Care Chart Review under the Pathology tab. Final        Haptoglobin 09/16/2021 50  30 - 200 mg/dL Final    Copper, serum 09/16/2021 103  69 - 132 ug/dL Final    Comment: (NOTE)  This test was developed and its performance characteristics  determined by Cari Ballesteros. It has not been cleared or approved  by the Food and Drug Administration.                                  Detection Limit = 5  Performed At: [facility] & [address] [id number]  Sheryle Sicks MD VE:[id number]      Folate 09/16/2021 21.3 (A) 5.0 - 21.0 ng/mL Final    Vitamin B12 09/16/2021 408  193 - 986 pg/mL Final    Ferritin 09/16/2021 297  26 - 388 NG/ML Final    Iron 09/16/2021 114  35 - 150 ug/dL Final    TIBC 09/16/2021 290  250 - 450 ug/dL Final    Iron % saturation 09/16/2021 39  20 - 50 % Final    WBC 09/16/2021 6.4  4.1 - 11.1 K/uL Final    RBC 09/16/2021 4.22  4.10 - 5.70 M/uL Final    HGB 09/16/2021 13.5  12.1 - 17.0 g/dL Final    HCT 09/16/2021 41.0  36.6 - 50.3 % Final    MCV 09/16/2021 97.2  80.0 - 99.0 FL Final    MCH 09/16/2021 32.0  26.0 - 34.0 PG Final    MCHC 09/16/2021 32.9  30.0 - 36.5 g/dL Final    RDW 09/16/2021 14.1  11.5 - 14.5 % Final    PLATELET 70/48/1480 835  150 - 400 K/uL Final    MPV 09/16/2021 10.7  8.9 - 12.9 FL Final    NRBC 09/16/2021 0.0  0  WBC Final    ABSOLUTE NRBC 09/16/2021 0.00  0.00 - 0.01 K/uL Final    NEUTROPHILS 09/16/2021 69  32 - 75 % Final    LYMPHOCYTES 09/16/2021 21  12 - 49 % Final    MONOCYTES 09/16/2021 7  5 - 13 % Final    EOSINOPHILS 09/16/2021 2  0 - 7 % Final    BASOPHILS 09/16/2021 1  0 - 1 % Final    IMMATURE GRANULOCYTES 09/16/2021 0  0.0 - 0.5 % Final    ABS. NEUTROPHILS 09/16/2021 4.4  1.8 - 8.0 K/UL Final    ABS. LYMPHOCYTES 09/16/2021 1.3  0.8 - 3.5 K/UL Final    ABS. MONOCYTES 09/16/2021 0.5  0.0 - 1.0 K/UL Final    ABS. EOSINOPHILS 09/16/2021 0.1  0.0 - 0.4 K/UL Final    ABS. BASOPHILS 09/16/2021 0.1  0.0 - 0.1 K/UL Final    ABS. IMM. GRANS. 09/16/2021 0.0  0.00 - 0.04 K/UL Final    DF 09/16/2021 AUTOMATED    Final         Radiographic Studies:  XR Results (most recent):  Results from Appointment encounter on 05/04/22    XR SHOULDER RT AP/LAT MIN 2 V    Narrative  X-rays of the right shoulder 3 views done today shows areas of subcortical lucency at the humeral neck. No signs of obvious acute fracture propagation. Mild glenohumeral joint space narrowing. Type II acromion.      ARTURO Results (most recent):  No results found for this or any previous visit. CT Results (most recent):  No results found for this or any previous visit. DEXA Results (most recent):  Results from Hospital Encounter encounter on 05/18/22    DEXA BONE DENSITY STUDY AXIAL    Narrative  Bone Mineral Density    Indication:  Screening for osteoporosis  Age: 77  Sex: Male. Number of falls in the past year:   None. Risk factors for osteoporosis:  Insulin-dependent diabetes, daily alcohol intake    Current medication for osteoporosis: None. Comparison:  None. Technique: Imaging was performed on the AKT    Excluded sites: 0    Findings:    Fractures identified on Lateral scanogram:  0    Femoral Neck Left:  Bone mineral density (gm/cm2):  0.914 g/cm?  % of peak bone mass:  88%  % for age matched controls:  101%  T-score:  -0.9  Z-score:  0.1    Femoral Neck Right:  Bone mineral density (gm/cm2):  0.939 g/cm?  % of peak bone mass:  90%  % for age matched controls:  104%  T-score:  -0.7  Z-score:  0.3    Total Hip Left:  Bone mineral density (gm/cm2):  1.016 g/cm?  % of peak bone mass:  101%  % for age matched controls:  102%  T-score:  0.1  Z-score:  0.2    Total Hip Right:  Bone mineral density (gm/cm2):  1.005 g/cm?  % of peak bone mass:  100%  % for age matched controls:  101%  T-score:  0.0  Z-score:  0.1    Lumbar Spine:  L1-4 or specify  Bone mineral density (gm/cm2):  1.205 g/cm?  % of peak bone mass:  102%  % for age matched controls:  106%  T-score:  0.2  Z-score:  0.6    33% Radius Left:  Bone mineral density (gm/cm2):  0.972 g/cm?  % of peak bone mass:  111%  % for age matched controls:  111%  T-score:  1.1  Z-score:  0.5    Impression  This patient is normal using the World Health Organization criteria    Recommendations:  Therapy recommendations need to be tailored to each individual patient. Please reconsider testing based on risk factors.  Currently, Medicare will only  reimburse for a central DXA examination every two years, unless the patient is  on chronic glucocorticoid therapy. Note: Please note that reliable, valid comparisons cannot be made between  studies which have been performed on machines from different manufacturers. If  clinically warranted, a follow up study performed at this site, on the same  unit, would allow the most sensitive assessment of change in bone mineral  density. 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 CONTRAST: 15 mL ProHance. FINDINGS: A.C. joint: Moderate osteoarthritis. Lorean Snare Anterior acromion process type:  2    Bone marrow: An approximately 4.2 cm x 4.1 cm x 4.0 cm lobulated, marrow  infiltrating, heterogeneously enhancing lesion proximal humeral metaphysis (with  associated anteromedial cortical breakthrough and 1.8 cm x 1.6 cm x 1.3 cm  extraosseous soft tissue component as seen on 9-22 and 11-18), concerning for  malignancy. Extensive surrounding perilesional edema and enhancement. Joint fluid: Moderate size glenohumeral joint effusion. Infraspinatus and  subscapularis mild tendinosis. Rotator cuff tendons: Low-grade interstitial tear at the anterior supraspinatus  footprint (series 4, images 708). Background mild supraspinatus tendinosis. .    Biceps tendon: Intact and located within the bicipital groove. Muscles: No edema or atrophy. Glenoid labrum: Extensive SLAP tear. Joint capsule: within normal limits. Glenohumeral articular cartilage: Diffuse chondral signal terminating thinning. Marginal osteophytes. Soft tissue mass: None. Impression  1.   Large marrow-infiltrating lesion with cortical breakthrough and extraosseous  soft tissue component in the proximal humeral metaphysis, concerning for  malignancy. 2.  Low-grade interstitial tear of the anterior supraspinatus tendon. 3.  Moderate acromioclavicular and mild glenohumeral osteoarthritis. 4. SLAP tear. Assessment/Plan:       ICD-10-CM ICD-9-CM    1. Routine adult health maintenance  Z00.00 V70.0       2. Type 1 diabetes mellitus with stable proliferative retinopathy, unspecified laterality ([facility])  E10.3559 250.51 HEMOGLOBIN A1C WITH EAG     362.02 CBC WITH AUTOMATED DIFF      MICROALBUMIN, UR, RAND W/ MICROALB/CREAT RATIO      3. Essential hypertension  F92 089.9 METABOLIC PANEL, COMPREHENSIVE      4. Hypercholesterolemia  E78.00 272.0 LIPID PANEL      5. Type 1 diabetes mellitus with hyperglycemia ([facility])   E10.65 250.01 HEMOGLOBIN A1C WITH EAG      6. Urinary frequency  R35.0 788.41 URINALYSIS W/ RFLX MICROSCOPIC      7. Non-small cell cancer of right lung ([facility])  C34.91 162.9              Lung cancer:  - Non-small cell lung cancer, EGFR associated, now under treatment by oncology at [facility]. Deferring additional considerations here to his oncology team.     Healthcare Maintenance:  - Preventive measures are reviewed as per above  - Up to date on routine interventions except as noted above  - Orders placed to update gaps as noted  - Notes: Labs ordered as noted above.     Diabetes Mellitus:   - Type: Type 1 Diabetes   - Current A1C:   Lab Results   Component Value Date/Time    Hemoglobin A1c 5.0 03/01/2022 03:44 PM    Hemoglobin A1c (POC) 5.7 06/05/2018 03:44 PM    Hemoglobin A1c, External 5.1 04/08/2021 12:00 AM       - Target P6A: <4%   - Complications: none   - Relevant Diabetes Medications:  Key Antihyperglycemic Medications               insulin lispro (HumaLOG U-100 Insulin) 100 unit/mL injection (Taking) USE VIA INSULIN PUMP     insulin pump (PATIENT SUPPLIED) Misc (Taking) by SubCUTAneous route as needed. [facility] with endocrinology for management of his insulin pump. Essential Hypertension/Blood Pressure Management:   - Home BP Readings: not doing   - Current Control: optimal   - Target BP: < 140/90 mmhg   - Relevant BP Meds:  Key CAD CHF Meds               lisinopriL (PRINIVIL, ZESTRIL) 20 mg tablet (Taking) Take 1 Tablet by mouth two (2) times a day for 360 days. pravastatin (PRAVACHOL) 40 mg tablet (Taking) TAKE 1 TABLET DAILY               - Plan: continue current treatment regimen, continue current meds, dietary sodium restriction, regular aerobic exercise   - Notes: [facility]    Hyperlipidemia/Dyslipidemia:   - Summary of Cardiovascular Risks and Goals:     LDL goal is under 100  diabetic  hypertension  hyperlipidemia   -   Lab Results   Component Value Date/Time    LDL, calculated 80.2 06/25/2021 11:06 AM    HDL Cholesterol 83 06/25/2021 11:06 AM       - Relevant Cholesterol Meds:  Key Antihyperlipidemia Meds               pravastatin (PRAVACHOL) 40 mg tablet (Taking) TAKE 1 TABLET DAILY              - Cholesterol at target: yes   - Does patient meet USPSTF and ACC/AHA indications for pharmacotherapy (e.g., statin): yes   - GI symptoms with meds: NO   - Muscle aches with meds: NO   - Other Adverse effects with meds: NO   - Medication Plan: continue   - Notes: ---        I have reviewed the patient's medical history in detail and updated the computerized patient record. We had a prolonged discussion about these complex clinical issues and went over the various important aspects to consider. All questions were answered. Advised the patient to call back or return to office if symptoms do not improve, change in nature, or persist.     The patient was given an after visit summary or informed of [employer] which includes patient instructions, diagnoses, current medications, & vitals. he expressed understanding with the diagnosis and plan.      Afua Martinez, MD    Please note that this dictation was completed with [employer]o, the computer voice recognition software. Quite often unanticipated grammatical, syntax, homophones, and other interpretive errors are inadvertently transcribed by the computer software. Please disregard these errors. Please excuse any errors that have escaped final proofreading. This is the Subsequent Medicare Annual Wellness Exam, performed 12 months or more after the Initial AWV or the last Subsequent AWV    I have reviewed the patient's medical history in detail and updated the computerized patient record. Assessment/Plan   Education and counseling provided:  Are appropriate based on today's review and evaluation    1. Routine adult health maintenance  2. Type 1 diabetes mellitus with stable proliferative retinopathy, unspecified laterality (HCC)  -     HEMOGLOBIN A1C WITH EAG; Future  -     CBC WITH AUTOMATED DIFF; Future  -     MICROALBUMIN, UR, RAND W/ MICROALB/CREAT RATIO; Future  3. Essential hypertension  -     METABOLIC PANEL, COMPREHENSIVE; Future  4. Hypercholesterolemia  -     LIPID PANEL; Future  5. Type 1 diabetes mellitus with hyperglycemia (HCC)   -     HEMOGLOBIN A1C WITH EAG; Future  6. Urinary frequency  -     URINALYSIS W/ RFLX MICROSCOPIC; Future  7. Non-small cell cancer of right lung (HCC)       Depression Risk Factor Screening     3 most recent PHQ Screens 4/28/2022   Little interest or pleasure in doing things Not at all   Feeling down, depressed, irritable, or hopeless Not at all   Total Score PHQ 2 0       Alcohol & Drug Abuse Risk Screen    Do you average more than 1 drink per night or more than 7 drinks a week: No    In the past three months have you have had more than 4 drinks containing alcohol on one occasion: No          Functional Ability and Level of Safety    Hearing: Hearing is good. Activities of Daily Living: The home contains: no safety equipment.   Patient does total self care      Ambulation: with no difficulty     Fall Risk:  Fall Risk Assessment, last 12 mths 2/25/2022   Able to walk? Yes   Fall in past 12 months? 0   Do you feel unsteady?  0   Are you worried about falling 0      Abuse Screen:  Patient is not abused       Cognitive Screening    Has your family/caregiver stated any concerns about your memory: no     Cognitive Screening: Normal - Verbal Fluency Test    Health Maintenance Due     Health Maintenance Due   Topic Date Due    Pneumococcal 65+ years (1 - PCV) Never done    DTaP/Tdap/Td series (1 - Tdap) Never done    Shingrix Vaccine Age 50> (1 of 2) Never done    AAA Screening 73-69 YO Male Smoking Patients  Never done    MICROALBUMIN Q1  01/04/2022    Lipid Screen  06/25/2022    Medicare Yearly Exam  06/26/2022    Foot Exam Q1  07/30/2022    Flu Vaccine (1) 09/01/2022       Patient Care Team   Patient Care Team:  Vinnie Freeman MD as PCP - General (Internal Medicine Physician)  Margarita Adames MD as PCP - [facility]  Alfie Harris MD as Physician (Cardiovascular Disease Physician)  EMMA Estevez as Physician (Orthopedic Surgery)    History     Patient Active Problem List   Diagnosis Code    Type I diabetes mellitus with proliferative retinopathy ([facility] [address] P63.0638    Microalbuminuria R80.9    Ileitis K52.9    Essential hypertension I10    Hypercholesterolemia E78.00    Long-term use of high-risk medication Z79.899    History of COVID-19 Z86.16    Acute idiopathic gout of right foot M10.071    Postviral fatigue syndrome G93.3    Anemia with chronic illness D63.8    Bacteremia due to Streptococcus R78.81, B95.5    Cervical radiculopathy M54.12    Neck pain M54.2    Low back pain M54.50    Cervical disc disease M50.90    Cervical stenosis of spinal canal M48.02    Cervical stenosis of spine M48.02    S/P cervical spinal fusion Z98.1    Opioid use, unspecified with unspecified opioid-induced disorder F11.99    Valvular heart disease I38    History of anemia Z86.2    History of acute bacterial endocarditis Z86.79    Former smoker, stopped smoking in distant past Z87.891    Arthritis M19.90    Adverse effect of anesthesia T41.45XA    History of echocardiogram Z92.89     Past Medical History:   Diagnosis Date    Arthritis     Essential hypertension 12/11/2017    Former smoker, stopped smoking in distant past     History of acute bacterial endocarditis 08/09/2021    History of anemia     History of echocardiogram 10/2021    LV: Estimated LVEF is 55 - 60%. Normal cavity size, wall thickness, systolic function (ejection fraction normal) and diastolic function. Wall motion: normal. LA: Mildly dilated left atrium. RA: Mildly dilated right atrium. MV: Mitral valve thickening. Mitral valve prolapse. Moderate mitral valve regurgitation is present. TV: Mild tricuspid valve regurgitation is present. PV: Mild pulmonic valve re    History of fusion of cervical spine 03/2022    Hypercholesterolemia 12/11/2017    Ileitis 12/11/2017    Insulin pump in place     Long-term use of high-risk medication 12/11/2017    Lung cancer metastatic to bone ([address] 05/2022    With right shoulder and Brain mets - Martina Turner (NSCLC @ [facility])    Microalbuminuria 12/11/2017    Non-small cell lung cancer metastatic to brain ([address] 05/2022    Tx = Martina Turner    Personal history of COVID-19 03/2021    Type I diabetes mellitus with proliferative retinopathy ([facility] [address] 12/11/2017    INSULIN pump; follows with Endocrinology (Dr. Kimberly Xiao)      Past Surgical History:   Procedure Laterality Date    COLONOSCOPY N/A 2/7/2022    COLONOSCOPY performed by Roberot Avila MD at [facility] ENDOSCOPY    COLONOSCOPY,YULI DUBOSE  2/7/2022         HX LUMBAR LAMINECTOMY  2015    HX ORTHOPAEDIC  2010    RIGHT KNEE OPEN SURGERY    HX TONSILLECTOMY       Current Outpatient Medications   Medication Sig Dispense Refill    osimertinib (TAGRISSO) 80 mg tablet Tagrisso 80 mg tablet   Take 1 tablet every day by oral route.       insulin lispro (HumaLOG U-100 Insulin) 100 unit/mL injection USE VIA INSULIN PUMP 60 mL 3    lancets (Accu-Chek Softclix Lancets) misc USE ONE LANCET TO TEST 7 TIMES DAILY 700 Lancet 0    diclofenac EC (VOLTAREN) 75 mg EC tablet Take 1 Tablet by mouth two (2) times a day. 60 Tablet 0    lisinopriL (PRINIVIL, ZESTRIL) 20 mg tablet Take 1 Tablet by mouth two (2) times a day for 360 days. 180 Tablet 3    glucose blood VI test strips (Accu-Chek Elizabet Plus test strp) strip USE ONE STRIP TO TEST SEVEN TIMES A  Strip 0    pravastatin (PRAVACHOL) 40 mg tablet TAKE 1 TABLET DAILY 90 Tablet 1    multivitamin (ONE A DAY) tablet Take 1 Tablet by mouth daily. insulin pump (PATIENT SUPPLIED) Misc by SubCUTAneous route as needed. NOVALOG INSULIN       Allergies   Allergen Reactions    Oxycodone-Acetaminophen Other (comments)     AGGRESIVE BEHAVIOR  Other reaction(s): Other (see comments)  AGGRESIVE BEHAVIOR       Family History   Problem Relation Age of Onset    Cancer Mother         LUNG    Cancer Father         LUNG    No Known Problems Sister     Anesth Problems Neg Hx      Social History     Tobacco Use    Smoking status: Former     Packs/day: 1.00     Years: 5.00     Pack years: 5.00     Types: Cigarettes     Quit date: 1975     Years since quittin.7    Smokeless tobacco: Never   Substance Use Topics    Alcohol use:  Yes     Alcohol/week: 3.0 standard drinks     Types: 3 Glasses of wine per week     Comment: 3 glasses of wine a night         Irene Aparicio MD

## 2022-09-06 ENCOUNTER — OFFICE VISIT (OUTPATIENT)
Dept: INTERNAL MEDICINE CLINIC | Age: 67
End: 2022-09-06
Payer: MEDICARE

## 2022-09-06 VITALS
HEIGHT: 72 IN | SYSTOLIC BLOOD PRESSURE: 128 MMHG | OXYGEN SATURATION: 98 % | TEMPERATURE: 98.5 F | HEART RATE: 63 BPM | WEIGHT: 145.8 LBS | BODY MASS INDEX: 19.75 KG/M2 | DIASTOLIC BLOOD PRESSURE: 68 MMHG

## 2022-09-06 DIAGNOSIS — Z00.00 MEDICARE ANNUAL WELLNESS VISIT, SUBSEQUENT: ICD-10-CM

## 2022-09-06 DIAGNOSIS — E78.00 HYPERCHOLESTEROLEMIA: ICD-10-CM

## 2022-09-06 DIAGNOSIS — C34.91 NON-SMALL CELL CANCER OF RIGHT LUNG (HCC): ICD-10-CM

## 2022-09-06 DIAGNOSIS — E10.3559 TYPE 1 DIABETES MELLITUS WITH STABLE PROLIFERATIVE RETINOPATHY, UNSPECIFIED LATERALITY (HCC): Primary | ICD-10-CM

## 2022-09-06 DIAGNOSIS — E10.65 TYPE 1 DIABETES MELLITUS WITH HYPERGLYCEMIA (HCC): ICD-10-CM

## 2022-09-06 DIAGNOSIS — R35.0 URINARY FREQUENCY: ICD-10-CM

## 2022-09-06 DIAGNOSIS — I10 ESSENTIAL HYPERTENSION: ICD-10-CM

## 2022-09-06 PROCEDURE — 99214 OFFICE O/P EST MOD 30 MIN: CPT | Performed by: INTERNAL MEDICINE

## 2022-09-06 PROCEDURE — 1123F ACP DISCUSS/DSCN MKR DOCD: CPT | Performed by: INTERNAL MEDICINE

## 2022-09-06 PROCEDURE — 3044F HG A1C LEVEL LT 7.0%: CPT | Performed by: INTERNAL MEDICINE

## 2022-09-06 PROCEDURE — G0439 PPPS, SUBSEQ VISIT: HCPCS | Performed by: INTERNAL MEDICINE

## 2022-09-06 NOTE — PATIENT INSTRUCTIONS
Medicare Wellness Visit, Male    The best way to live healthy is to have a lifestyle where you eat a well-balanced diet, exercise regularly, limit alcohol use, and quit all forms of tobacco/nicotine, if applicable. Regular preventive services are another way to keep healthy. Preventive services (vaccines, screening tests, monitoring & exams) can help personalize your care plan, which helps you manage your own care. Screening tests can find health problems at the earliest stages, when they are easiest to treat. [facility] follows the current, evidence-based guidelines published by the [facility] ([facility]) when recommending preventive services for our patients. Because we follow these guidelines, sometimes recommendations change over time as research supports it. (For example, a prostate screening blood test is no longer routinely recommended for men with no symptoms). Of course, you and your doctor may decide to screen more often for some diseases, based on your risk and co-morbidities (chronic disease you are already diagnosed with). Preventive services for you include:  - Medicare offers their members a free annual wellness visit, which is time for you and your primary care provider to discuss and plan for your preventive service needs. Take advantage of this benefit every year!  -All adults over age 72 should receive the recommended pneumonia vaccines. Current USPSTF guidelines recommend a series of two vaccines for the best pneumonia protection.   -All adults should have a flu vaccine yearly and tetanus vaccine every 10 years.  -All adults age 48 and older should receive the shingles vaccines (series of two vaccines).        -All adults age 38-68 who are overweight should have a diabetes screening test once every three years.   -Other screening tests & preventive services for persons with diabetes include: an eye exam to screen for diabetic retinopathy, a kidney function test, a foot exam, and stricter control over your cholesterol.   -Cardiovascular screening for adults with routine risk involves an electrocardiogram (ECG) at intervals determined by the provider.   -Colorectal cancer screening should be done for adults age 54-65 with no increased risk factors for colorectal cancer. There are a number of acceptable methods of screening for this type of cancer. Each test has its own benefits and drawbacks. Discuss with your provider what is most appropriate for you during your annual wellness visit. The different tests include: colonoscopy (considered the best screening method), a fecal occult blood test, a fecal DNA test, and sigmoidoscopy.  -All adults born between [facility] should be screened once for Hepatitis C.  -An Abdominal Aortic Aneurysm (AAA) Screening is recommended for men age 73-68 who has ever smoked in their lifetime.      Here is a list of your current Health Maintenance items (your personalized list of preventive services) with a due date:  Health Maintenance Due   Topic Date Due    Pneumococcal Vaccine (1 - PCV) Never done    DTaP/Tdap/Td  (1 - Tdap) Never done    Shingles Vaccine (1 of 2) Never done    AAA Screening  Never done    Albumin Urine Test  01/04/2022    Cholesterol Test   06/25/2022    Diabetic Foot Care  07/30/2022    Yearly Flu Vaccine (1) 09/01/2022

## 2022-09-06 NOTE — PROGRESS NOTES
Esau Phillips is a 77 y.o. male     Chief Complaint   Patient presents with    Establish Care     New to provider    Physical       Visit Vitals  /68 (BP 1 Location: Right arm, BP Patient Position: Sitting, BP Cuff Size: Adult)   Pulse 63   Temp 98.5 °F (36.9 °C) (Oral)   Ht 6' (1.829 m)   Wt 145 lb 12.8 oz (66.1 kg)   SpO2 98%   BMI 19.77 kg/m²       Health Maintenance Due   Topic Date Due    Pneumococcal 65+ years (1 - PCV) Never done    DTaP/Tdap/Td series (1 - Tdap) Never done    Shingrix Vaccine Age 50> (1 of 2) Never done    AAA Screening 73-67 YO Male Smoking Patients  Never done    MICROALBUMIN Q1  01/04/2022    Lipid Screen  06/25/2022    Medicare Yearly Exam  06/26/2022    Foot Exam Q1  07/30/2022    Flu Vaccine (1) 09/01/2022         1. \"Have you been to the ER, urgent care clinic since your last visit? Hospitalized since your last visit? \" No    2. \"Have you seen or consulted any other health care providers outside of the [address] [place] [address] since your last visit? \" No     3. For patients aged 39-70: Has the patient had a colonoscopy / FIT/ Cologuard? Yes - no Care Gap present      If the patient is female:    4. For patients aged 41-77: Has the patient had a mammogram within the past 2 years? NA - based on age or sex      11. For patients aged 21-65: Has the patient had a pap smear?  NA - based on age or sex

## 2022-09-09 ENCOUNTER — APPOINTMENT (OUTPATIENT)
Dept: INTERNAL MEDICINE CLINIC | Age: 67
End: 2022-09-09

## 2022-09-09 DIAGNOSIS — E10.65 TYPE 1 DIABETES MELLITUS WITH HYPERGLYCEMIA (HCC): ICD-10-CM

## 2022-09-09 DIAGNOSIS — R35.0 URINARY FREQUENCY: ICD-10-CM

## 2022-09-09 DIAGNOSIS — E10.3559 TYPE 1 DIABETES MELLITUS WITH STABLE PROLIFERATIVE RETINOPATHY, UNSPECIFIED LATERALITY (HCC): ICD-10-CM

## 2022-09-09 DIAGNOSIS — E78.00 HYPERCHOLESTEROLEMIA: ICD-10-CM

## 2022-09-09 DIAGNOSIS — I10 ESSENTIAL HYPERTENSION: ICD-10-CM

## 2022-09-10 LAB
ALBUMIN SERPL-MCNC: 3.8 G/DL (ref 3.5–5)
ALBUMIN/GLOB SERPL: 1.4 {RATIO} (ref 1.1–2.2)
ALP SERPL-CCNC: 70 U/L (ref 45–117)
ALT SERPL-CCNC: 38 U/L (ref 12–78)
ANION GAP SERPL CALC-SCNC: 5 MMOL/L (ref 5–15)
APPEARANCE UR: CLEAR
AST SERPL-CCNC: 25 U/L (ref 15–37)
BASOPHILS # BLD: 0.1 K/UL (ref 0–0.1)
BASOPHILS NFR BLD: 1 % (ref 0–1)
BILIRUB SERPL-MCNC: 0.6 MG/DL (ref 0.2–1)
BILIRUB UR QL: NEGATIVE
BUN SERPL-MCNC: 20 MG/DL (ref 6–20)
BUN/CREAT SERPL: 22 (ref 12–20)
CALCIUM SERPL-MCNC: 8.7 MG/DL (ref 8.5–10.1)
CHLORIDE SERPL-SCNC: 103 MMOL/L (ref 97–108)
CHOLEST SERPL-MCNC: 177 MG/DL
CO2 SERPL-SCNC: 30 MMOL/L (ref 21–32)
COLOR UR: NORMAL
CREAT SERPL-MCNC: 0.93 MG/DL (ref 0.7–1.3)
CREAT UR-MCNC: 48.8 MG/DL
DIFFERENTIAL METHOD BLD: ABNORMAL
EOSINOPHIL # BLD: 0.2 K/UL (ref 0–0.4)
EOSINOPHIL NFR BLD: 5 % (ref 0–7)
ERYTHROCYTE [DISTWIDTH] IN BLOOD BY AUTOMATED COUNT: 12.6 % (ref 11.5–14.5)
EST. AVERAGE GLUCOSE BLD GHB EST-MCNC: 108 MG/DL
GLOBULIN SER CALC-MCNC: 2.7 G/DL (ref 2–4)
GLUCOSE SERPL-MCNC: 31 MG/DL (ref 65–100)
GLUCOSE UR STRIP.AUTO-MCNC: NEGATIVE MG/DL
HBA1C MFR BLD: 5.4 % (ref 4–5.6)
HCT VFR BLD AUTO: 39.5 % (ref 36.6–50.3)
HDLC SERPL-MCNC: 71 MG/DL
HDLC SERPL: 2.5 {RATIO} (ref 0–5)
HGB BLD-MCNC: 12.6 G/DL (ref 12.1–17)
HGB UR QL STRIP: NEGATIVE
IMM GRANULOCYTES # BLD AUTO: 0 K/UL (ref 0–0.04)
IMM GRANULOCYTES NFR BLD AUTO: 0 % (ref 0–0.5)
KETONES UR QL STRIP.AUTO: NEGATIVE MG/DL
LDLC SERPL CALC-MCNC: 95 MG/DL (ref 0–100)
LEUKOCYTE ESTERASE UR QL STRIP.AUTO: NEGATIVE
LYMPHOCYTES # BLD: 1.7 K/UL (ref 0.8–3.5)
LYMPHOCYTES NFR BLD: 33 % (ref 12–49)
MCH RBC QN AUTO: 33.1 PG (ref 26–34)
MCHC RBC AUTO-ENTMCNC: 31.9 G/DL (ref 30–36.5)
MCV RBC AUTO: 103.7 FL (ref 80–99)
MICROALBUMIN UR-MCNC: <0.5 MG/DL
MICROALBUMIN/CREAT UR-RTO: <10 MG/G (ref 0–30)
MONOCYTES # BLD: 0.6 K/UL (ref 0–1)
MONOCYTES NFR BLD: 11 % (ref 5–13)
NEUTS SEG # BLD: 2.7 K/UL (ref 1.8–8)
NEUTS SEG NFR BLD: 50 % (ref 32–75)
NITRITE UR QL STRIP.AUTO: NEGATIVE
NRBC # BLD: 0 K/UL (ref 0–0.01)
NRBC BLD-RTO: 0 PER 100 WBC
PH UR STRIP: 7.5 [PH] (ref 5–8)
PLATELET # BLD AUTO: 198 K/UL (ref 150–400)
PMV BLD AUTO: 12.2 FL (ref 8.9–12.9)
POTASSIUM SERPL-SCNC: 4.9 MMOL/L (ref 3.5–5.1)
PROT SERPL-MCNC: 6.5 G/DL (ref 6.4–8.2)
PROT UR STRIP-MCNC: NEGATIVE MG/DL
RBC # BLD AUTO: 3.81 M/UL (ref 4.1–5.7)
SODIUM SERPL-SCNC: 138 MMOL/L (ref 136–145)
SP GR UR REFRACTOMETRY: 1.01 (ref 1–1.03)
TRIGL SERPL-MCNC: 55 MG/DL (ref ?–150)
UROBILINOGEN UR QL STRIP.AUTO: 0.2 EU/DL (ref 0.2–1)
VLDLC SERPL CALC-MCNC: 11 MG/DL
WBC # BLD AUTO: 5.3 K/UL (ref 4.1–11.1)

## 2022-09-11 NOTE — PROGRESS NOTES
Notify patient. Labs generally normal but severe hypoglycemia. This is concerning given ongoing use of insulin pump.  He should be urged to discuss reducing his insulin settings with his endocrinologist.

## 2022-09-13 ENCOUNTER — TELEPHONE (OUTPATIENT)
Dept: INTERNAL MEDICINE CLINIC | Age: 67
End: 2022-09-13

## 2022-09-13 DIAGNOSIS — R35.0 URINARY FREQUENCY: Primary | ICD-10-CM

## 2022-10-17 RX ORDER — LISINOPRIL 20 MG/1
TABLET ORAL
Qty: 180 TABLET | Refills: 1 | Status: SHIPPED | OUTPATIENT
Start: 2022-10-17

## 2022-10-17 NOTE — TELEPHONE ENCOUNTER
RX refill request from the patient/pharmacy. Patient last seen 09- with labs, and next appt. scheduled for 12-  Requested Prescriptions     Pending Prescriptions Disp Refills    lisinopriL (PRINIVIL, ZESTRIL) 20 mg tablet [Pharmacy Med Name: LISINOPRIL 20 MG TABLET] 180 Tablet 1     Sig: TAKE ONE TABLET BY MOUTH TWICE A DAY    .

## 2022-10-18 DIAGNOSIS — M75.41 IMPINGEMENT SYNDROME OF RIGHT SHOULDER: Primary | ICD-10-CM

## 2022-10-19 RX ORDER — DICLOFENAC SODIUM 75 MG/1
75 TABLET, DELAYED RELEASE ORAL 2 TIMES DAILY
Qty: 60 TABLET | Refills: 3 | Status: SHIPPED | OUTPATIENT
Start: 2022-10-19

## 2022-11-14 RX ORDER — PRAVASTATIN SODIUM 40 MG/1
40 TABLET ORAL DAILY
Qty: 90 TABLET | Refills: 3 | Status: SHIPPED | OUTPATIENT
Start: 2022-11-14

## 2022-11-14 NOTE — TELEPHONE ENCOUNTER
PCP: Andrei Matson MD    Last appt: 2022  Future Appointments   Date Time Provider Carmel Vargas   2022  1:30 PM Andrei Matson MD [facility] AMB       Requested Prescriptions     Pending Prescriptions Disp Refills    pravastatin (PRAVACHOL) 40 mg tablet 90 Tablet 1     Si Tablet daily.        Prior labs and Blood pressures:  BP Readings from Last 3 Encounters:   22 128/68   22 118/68   22 128/78     Lab Results   Component Value Date/Time    Sodium 138 2022 08:34 AM    Potassium 4.9 2022 08:34 AM    Chloride 103 2022 08:34 AM    CO2 30 2022 08:34 AM    Anion gap 5 2022 08:34 AM    Glucose 31 (LL) 2022 08:34 AM    BUN 20 2022 08:34 AM    Creatinine 0.93 2022 08:34 AM    BUN/Creatinine ratio 22 (H) 2022 08:34 AM    GFR est AA >60 2022 08:34 AM    GFR est non-AA >60 2022 08:34 AM    Calcium 8.7 2022 08:34 AM

## 2022-12-05 ENCOUNTER — TELEPHONE (OUTPATIENT)
Dept: INTERNAL MEDICINE CLINIC | Age: 67
End: 2022-12-05

## 2022-12-05 NOTE — TELEPHONE ENCOUNTER
Patient called and stated that he is unsure why an appointment was needed three months after his last appointment. Stated that he typically see's his PCP every year and see his endocrinologist every three months. Patient requested appointment that was previously scheduled for tomorrow be moved out and would like to check with Dr. Bernabe Blackburn to verify there wasn't a specific reason that he would need to come in sooner than that?

## 2022-12-08 ENCOUNTER — PATIENT MESSAGE (OUTPATIENT)
Dept: INTERNAL MEDICINE CLINIC | Age: 67
End: 2022-12-08

## 2023-01-23 NOTE — ED NOTES
Code sepsis called after confer with Dr. Russell Russo Consult Note  Palliative Medicine Clinic      Consult Requested By: Dr. Daquan Chavez*    Primary Care Physician: Primary Doctor No    Reason for Consult: Advance care planning and symptom management in the setting of Heart Failure      ASSESSMENT/PLAN:     Plan/Recommendations:  Tera was seen today for establish care.    Diagnoses and all orders for this visit:    Chronic systolic heart failure / ICD (implantable cardioverter-defibrillator) in place / Dyspnea, unspecified type  -     Ambulatory referral/consult to [facility] Palliative Care  - EF is 15%  - Followed by Cardiology, undergoing workup for advanced therapies   -His main goal is life prolongation   -encouraged him to be compliant with diuretics and diet         Palliative care encounter    Medicolegal: Has decision making capacity.   He signed a HCPOA naming his friend Zahida and his cousin Cristhian as his HCPOA.     Psychosocial:  support system consists of cousin and friend     Spiritual: yes, Buddhist     Prognostication: Patient with advanced heart failure - his prognosis is poor     Understanding of disease and Illness Trajectory: Patient  has  moderate understanding of his illness, they can benefit from continued education on what to expect in the future.      Goals of care:    Advance Care Planning     Date: 01/23/2023  I initiated the process of advance care planning today and explained the importance of this process to the patient.  I introduced the concept of advance directives to the patient, as well. Then the patient received detailed information about the importance of designating a Health Care Power of  (HCPOA).  The patient has not previously appointed a HCPOA. After our discussion, the patient has decided to complete a HCPOA and has appointed his  friend , health care agent:  Zahida Jolie  & health care agent number:    . He was also instructed to communicate with this person about their wishes for future  "healthcare, should he become sick and lose decision-making capacity. He stated that he trusts them to make the final decisions, that he talks to both of them all the time and they are aware of his condition. He states that they will be available to care for him if need be.      We did specifically address the patient's likely prognosis, which is poor.  We explored the patient's values and preferences for future care.  The patient endorses that what is most important right now is to focus on extending life as long as possible, even it it means sacrificing quality and curative/life-prolongation (regardless of treatment burdens). He states that he will take     Accordingly, we have decided that the best plan to meet the patient's goals includes continuing with treatment             Code status: Full Code     Advance directives:HCPOA on file     18 min time was spent on advance care planning, goals of care discussion, emotional support, formulating and communicating prognosis and goals of care, exploring burden/benefit of various approaches of treatment.        Follow up: 6 weeks    Plan discussed with Cardiology team     SUBJECTIVE:     History obtained from: Patient     complaint:   Chief Complaint   Patient presents with    Establish Care       History of Present Illness:  Mr. Tera Griffiths is 55 y.o. year old male presenting with Heart Failure.  Referred to Palliative Care for evaluation and management of physical symptoms, advance care planning,, and additional support.. He attended the appointment alone      Interval History:    He is endorsing dyspnea, limiting his activity, unable to walk as much or to go up a flight of stairs.  He is endorsing orthopnea, he is waking up at night feeling "like its my last breath" and occasional palpitations- this is affecting his sleep.     He states that is difficult to "keep the fluid off, it is coming right back"    He denies any other sx, including anxiety, " depression    Disease History:  HFrEF and LVEF 10% undergoing evaluation for advanced therapies  CAD, RCA NSTEMI complicated with VF arrest and HF in 2021  DM type 2, dyslipidemia, HTN      Past Medical History:   Diagnosis Date    Atrial fibrillation     CHF (congestive heart failure)     Coronary atherosclerosis of native coronary artery     Diabetes mellitus, type 2     Encounter for blood transfusion     Hypertension      Past Surgical History:   Procedure Laterality Date    CARDIAC DEFIBRILLATOR PLACEMENT Left     HERNIA REPAIR      RIGHT HEART CATHETERIZATION Right 9/30/2022    Procedure: INSERTION, CATHETER, RIGHT HEART;  Surgeon: Caden Aden MD;  Location: [facility] CATH LAB;  Service: Cardiology;  Laterality: Right;    TREATMENT OF CARDIAC ARRHYTHMIA N/A 11/22/2022    Procedure: CARDIOVERSION;  Surgeon: Marvin Sanon MD;  Location: [facility] EP LAB;  Service: Cardiology;  Laterality: N/A;  afib, KIA, DCCV, anes, MB, 3 Prep     No family history on file.  Review of patient's allergies indicates:  No Known Allergies    Medications:    Current Outpatient Medications:     amiodarone (PACERONE) 200 MG Tab, Take 1 tablet (200 mg total) by mouth 2 (two) times daily., Disp: 60 tablet, Rfl: 3    apixaban (ELIQUIS) 5 mg Tab, Take 5 mg by mouth 2 (two) times daily., Disp: , Rfl:     atorvastatin (LIPITOR) 80 MG tablet, Take 80 mg by mouth once daily., Disp: , Rfl:     canagliflozin (INVOKANA) 100 mg Tab tablet, Take 100 mg by mouth once daily., Disp: , Rfl:     clopidogreL (PLAVIX) 75 mg tablet, Take 75 mg by mouth once daily., Disp: , Rfl:     ferrous sulfate (FEOSOL) 325 mg (65 mg iron) Tab tablet, Take 325 mg by mouth every other day., Disp: , Rfl:     glimepiride (AMARYL) 4 MG tablet, Take 4 mg by mouth every morning., Disp: , Rfl:     LIDOcaine (LIDODERM) 5 %, Place 1 patch onto the skin once daily., Disp: , Rfl:     metoprolol succinate (TOPROL-XL) 25 MG 24 hr tablet, Take 0.5 tablets (12.5 mg total) by mouth  every evening., Disp: 15 tablet, Rfl: 11    pantoprazole (PROTONIX) 40 MG tablet, Take 40 mg by mouth once daily., Disp: , Rfl:     sacubitriL-valsartan (ENTRESTO)  mg per tablet, Take 1 tablet by mouth 2 (two) times daily., Disp: 60 tablet, Rfl: 5    spironolactone (ALDACTONE) 25 MG tablet, Take 25 mg by mouth once daily., Disp: , Rfl:     tamsulosin (FLOMAX) 0.4 mg Cap, Take 1 capsule by mouth every evening., Disp: , Rfl:     torsemide (DEMADEX) 20 MG Tab, Take 1 tablet (20 mg total) by mouth 2 (two) times a day., Disp: 60 tablet, Rfl: 11     database queried on 01/23/2023  by Kimberly Baker . The results reviewed and considered with the clinical data in the decision whether or not to prescribe a controlled substance.    None    OBJECTIVE:       ROS:  Review of Systems   Constitutional:  Negative for activity change, appetite change and fatigue.   HENT:  Negative for hearing loss and sore throat.    Eyes:  Negative for visual disturbance.   Respiratory:  Positive for shortness of breath.    Cardiovascular:  Negative for chest pain.   Gastrointestinal:  Negative for constipation, diarrhea and nausea.   Genitourinary:  Negative for dysuria.   Musculoskeletal:  Negative for back pain and gait problem.   Neurological:  Negative for headaches and memory loss.   Psychiatric/Behavioral:  Positive for sleep disturbance. Negative for confusion. The patient is not nervous/anxious.        Review of Symptoms      Symptom Assessment (ESAS 0-10 Scale)  Pain:  0  Dyspnea:  6  Anxiety:  0  Nausea:  0  Depression:  0  Anorexia:  0  Fatigue:  0  Insomnia:  4  Restlessness:  0  Agitation:  0     CAM / Delirium:  Negative  Constipation:  Negative  Diarrhea:  Negative    Anxiety:  Is not nervous/anxious  Constipation:  No constipation    Modified Violeta Scale:  3    Performance Status:  70    Living Arrangements:  Lives with family    Psychosocial/Cultural:   See Palliative Psychosocial Note: Yes  Lives with his cousin. The  mother of his children is part of his support system   **Primary  to Follow**  Palliative Care  Consult: Yes    Spiritual:  F - Missy and Belief:  Gnosticism     Advance Care Planning   Advance Directives:   Living Will: No    LaPOST: No    Do Not Resuscitate Status: No    Medical Power of : Yes    Agent's Name:  Zahida    Decision Making:  Patient answered questions  Goals of Care: The patient endorses that what is most important right now is to focus on extending life as long as possible, even it it means sacrificing quality and curative/life-prolongation (regardless of treatment burdens)    Accordingly, we have decided that the best plan to meet the patient's goals includes continuing with treatment            Physical Exam:  Vitals: Temp: 97.8 °F (36.6 °C) (01/23/23 1035)  Pulse: 60 (01/23/23 1035)  BP: (!) 162/101 (01/23/23 1035)  SpO2: (!) 94 % (01/23/23 1035)  Physical Exam  Constitutional:       General: He is not in acute distress.  HENT:      Head: Normocephalic and atraumatic.   Eyes:      General: No scleral icterus.  Pulmonary:      Effort: Pulmonary effort is normal. No respiratory distress.   Abdominal:      General: Abdomen is protuberant.      Palpations: Abdomen is soft.      Tenderness: There is no abdominal tenderness. There is no guarding.   Musculoskeletal:      Cervical back: Neck supple.      Right lower leg: Edema present.      Left lower leg: Edema present.   Skin:     Findings: No rash.   Neurological:      Mental Status: He is alert and oriented to person, place, and time.   Psychiatric:         Mood and Affect: Mood and affect normal.         Labs:  CBC:   WBC   Date Value Ref Range Status   01/11/2023 3.06 (L) 3.90 - 12.70 K/uL Final       Hemoglobin   Date Value Ref Range Status   01/11/2023 14.5 14.0 - 18.0 g/dL Final       Hematocrit   Date Value Ref Range Status   01/11/2023 49.8 40.0 - 54.0 % Final       MCV   Date Value Ref Range Status   01/11/2023  90 82 - 98 fL Final       Platelets   Date Value Ref Range Status   01/11/2023 269 150 - 450 K/uL Final           LFT:   Lab Results   Component Value Date    AST 19 01/11/2023    ALKPHOS 84 01/11/2023    BILITOT 0.8 01/11/2023       Albumin:   Albumin   Date Value Ref Range Status   01/11/2023 4.2 3.5 - 5.2 g/dL Final     Protein:   Total Protein   Date Value Ref Range Status   01/11/2023 7.8 6.0 - 8.4 g/dL Final       Radiology:I have reviewed all pertinent imaging results/findings within the past 24 hours.  Results for orders placed during the hospital encounter of 01/03/23    Echo    Interpretation Summary  · The left ventricle is severely enlarged with eccentric hypertrophy and severely decreased systolic function.  · The estimated ejection fraction is 15%.  · There is left ventricular global hypokinesis.  · Grade I left ventricular diastolic dysfunction.  · Mild right ventricular enlargement with moderately reduced right ventricular systolic function.  · Normal central venous pressure (3 mmHg).  · Severe left atrial enlargement.  · Moderate posterolateral pericardial effusion. Max diameter 1.4cm.        75 minutes of total time spent on the encounter, which includes face to face time and non-face to face time preparing to see the patient (eg, review of tests), Obtaining and/or reviewing separately obtained history, Documenting clinical information in the electronic or other health record, Independently interpreting results (not separately reported) and communicating results to the patient/family/caregiver, or Care coordination (not separately reported).    18 minutes spent in discussing ACP    This note was partially created using [employer] Voice Recognition software. Typographical and content errors may occur with this process. While efforts are made to detect and correct such errors, in some cases errors will persist. For this reason, wording in this document should be considered in the proper context and not  strictly verbatim.      Encounter occurred during period of COVID-19 emergency. Encounter performed under the concurrent guidelines, limitations and protocols.      Signature: Kimberly Baker MD

## 2023-05-21 RX ORDER — DICLOFENAC SODIUM 75 MG/1
75 TABLET, DELAYED RELEASE ORAL 2 TIMES DAILY
COMMUNITY
Start: 2022-05-09

## 2023-05-21 RX ORDER — LISINOPRIL 20 MG/1
1 TABLET ORAL 2 TIMES DAILY
COMMUNITY
Start: 2022-10-17

## 2023-05-21 RX ORDER — INSULIN LISPRO 100 [IU]/ML
INJECTION, SOLUTION INTRAVENOUS; SUBCUTANEOUS
COMMUNITY
Start: 2022-07-28

## 2023-05-21 RX ORDER — PRAVASTATIN SODIUM 40 MG
40 TABLET ORAL DAILY
COMMUNITY
Start: 2022-11-14

## 2023-07-05 LAB — HBA1C MFR BLD HPLC: 5 %

## 2023-08-08 NOTE — TELEPHONE ENCOUNTER
PCP: Bharat Nagel MD    Last appt: 9/6/2022  No future appointments.     Requested Prescriptions     Pending Prescriptions Disp Refills    lisinopril (PRINIVIL;ZESTRIL) 20 MG tablet 30 tablet 1     Sig: Take 1 tablet by mouth 2 times daily       Prior labs and Blood pressures:  BP Readings from Last 3 Encounters:   09/06/22 128/68   02/28/22 120/76   02/25/22 98/62     Lab Results   Component Value Date/Time     09/09/2022 08:34 AM    K 4.9 09/09/2022 08:34 AM     09/09/2022 08:34 AM    CO2 30 09/09/2022 08:34 AM    BUN 20 09/09/2022 08:34 AM    GFRAA >60 09/09/2022 08:34 AM     No results found for: HBA1C, NWA1DVDS  Lab Results   Component Value Date/Time    CHOL 177 09/09/2022 08:34 AM    HDL 71 09/09/2022 08:34 AM    VLDL 13 01/04/2021 04:39 PM     No results found for: VITD3, VD3RIA        Lab Results   Component Value Date/Time    TSH 1.52 06/25/2021 11:06 AM

## 2023-08-09 RX ORDER — LISINOPRIL 20 MG/1
20 TABLET ORAL 2 TIMES DAILY
Qty: 30 TABLET | Refills: 1 | OUTPATIENT
Start: 2023-08-09

## 2023-08-15 RX ORDER — LISINOPRIL 20 MG/1
20 TABLET ORAL 2 TIMES DAILY
Qty: 60 TABLET | Refills: 0 | Status: SHIPPED | OUTPATIENT
Start: 2023-08-15

## 2023-08-15 NOTE — TELEPHONE ENCOUNTER
RX refill request from the patient/pharmacy. Patient last seen 09- with labs, and does not have a follow up appointment  Requested Prescriptions     Pending Prescriptions Disp Refills    lisinopril (PRINIVIL;ZESTRIL) 20 MG tablet 60 tablet 0     Sig: Take 1 tablet by mouth 2 times daily    .

## 2023-09-13 NOTE — TELEPHONE ENCOUNTER
Requested Prescriptions     Pending Prescriptions Disp Refills    pravastatin (PRAVACHOL) 40 MG tablet 90 tablet 0     Sig: Take 1 tablet by mouth daily       RX refill request from the patient/pharmacy. Patient last seen 9/9/22 with labs, and next appt. scheduled for 10/12/23.

## 2023-09-13 NOTE — TELEPHONE ENCOUNTER
Pharmacy: [facility] [address]    pravastatin (PRAVACHOL) 40 mg tablet 90 Tablet 3 11/14/2022     Sig - Route:  Take 1 Tablet by mouth daily. - Oral

## 2023-09-14 RX ORDER — PRAVASTATIN SODIUM 40 MG
40 TABLET ORAL DAILY
Qty: 90 TABLET | Refills: 0 | Status: SHIPPED | OUTPATIENT
Start: 2023-09-14

## 2023-09-19 RX ORDER — LISINOPRIL 20 MG/1
20 TABLET ORAL 2 TIMES DAILY
Qty: 60 TABLET | Refills: 0 | Status: SHIPPED | OUTPATIENT
Start: 2023-09-19

## 2023-09-19 NOTE — TELEPHONE ENCOUNTER
PCP: Ted Woodard MD    Last appt: 9/6/2022  Future Appointments   Date Time Provider [address]   10/12/2023  2:00 PM Tyrell Walker, APRN - NP [facility] BS AMB       Requested Prescriptions     Pending Prescriptions Disp Refills    lisinopril (PRINIVIL;ZESTRIL) 20 MG tablet 60 tablet 0     Sig: Take 1 tablet by mouth 2 times daily       Prior labs and Blood pressures:  BP Readings from Last 3 Encounters:   09/06/22 128/68   02/28/22 120/76   02/25/22 98/62     Lab Results   Component Value Date/Time     09/09/2022 08:34 AM    K 4.9 09/09/2022 08:34 AM     09/09/2022 08:34 AM    CO2 30 09/09/2022 08:34 AM    BUN 20 09/09/2022 08:34 AM    GFRAA >60 09/09/2022 08:34 AM     No results found for: \"HBA1C\", \"[id number]\"  Lab Results   Component Value Date/Time    CHOL 177 09/09/2022 08:34 AM    HDL 71 09/09/2022 08:34 AM    VLDL 13 01/04/2021 04:39 PM     No results found for: \"VITD3\", \"VD3RIA\"        Lab Results   Component Value Date/Time    TSH 1.52 06/25/2021 11:06 AM

## 2023-09-19 NOTE — TELEPHONE ENCOUNTER
Pharmacy: [facility]   Patient requesting 90 day supply. Medication  lisinopril (PRINIVIL;ZESTRIL) 20 MG tablet [[id number]]  lisinopril (PRINIVIL;ZESTRIL) 20 MG tablet [[id number]]     Order Details  Dose: 20 mg Route: Oral Frequency: 2 TIMES DAILY   Dispense Quantity: 120 tablet Refills: 0    Note to Pharmacy: Must establish care with a new provider before any more refills.          Sig: Take 1 tablet by mouth 2 times daily

## 2023-10-20 RX ORDER — LISINOPRIL 20 MG/1
20 TABLET ORAL 2 TIMES DAILY
Qty: 90 TABLET | Refills: 0 | Status: SHIPPED | OUTPATIENT
Start: 2023-10-20

## 2023-10-20 NOTE — TELEPHONE ENCOUNTER
RX refill request from the patient/pharmacy. Patient last seen 10-09-23 with labs, and next appt not scheduled.     Requested Prescriptions     Pending Prescriptions Disp Refills    lisinopril (PRINIVIL;ZESTRIL) 20 MG tablet 90 tablet 0     Sig: Take 1 tablet by mouth 2 times daily

## 2023-10-20 NOTE — TELEPHONE ENCOUNTER
Pharmacy: [facility]  Please fill for a 90 day supply. [facility] is [facility]    lisinopril (PRINIVIL;ZESTRIL) 20 MG tablet [[id number]]     Order Details  Dose: 20 mg Route: Oral Frequency: 2 TIMES DAILY   Dispense Quantity: 60 tablet Refills: 0    Note to Pharmacy: Must establish care with a new provider before any more refills.          Sig: Take 1 tablet by mouth 2 times daily

## 2023-12-06 NOTE — TELEPHONE ENCOUNTER
PCP: Darrel Parikh MD    Last appt: 9/6/2022    Future Appointments   Date Time Provider [address]   4/3/2024  9:00 AM Harriett Dubin, MD [facility] BS AMB       Requested Prescriptions     Pending Prescriptions Disp Refills    pravastatin (PRAVACHOL) 40 MG tablet 90 tablet 0     Sig: Take 1 tablet by mouth daily

## 2023-12-07 RX ORDER — PRAVASTATIN SODIUM 40 MG
40 TABLET ORAL DAILY
Qty: 90 TABLET | Refills: 0 | Status: SHIPPED | OUTPATIENT
Start: 2023-12-07

## 2023-12-07 RX ORDER — LISINOPRIL 20 MG/1
20 TABLET ORAL 2 TIMES DAILY
Qty: 180 TABLET | Refills: 0 | Status: SHIPPED | OUTPATIENT
Start: 2023-12-07

## 2023-12-07 NOTE — TELEPHONE ENCOUNTER
Requested Prescriptions     Pending Prescriptions Disp Refills    lisinopril (PRINIVIL;ZESTRIL) 20 MG tablet 180 tablet 1     Sig: Take 1 tablet by mouth 2 times daily       RX refill request from the patient/pharmacy. Patient last seen 9/9/22 with labs, and next appt. scheduled for 4/3/24.

## 2024-01-20 ENCOUNTER — APPOINTMENT (OUTPATIENT)
Facility: HOSPITAL | Age: 69
End: 2024-01-20
Payer: MEDICARE

## 2024-01-20 ENCOUNTER — HOSPITAL ENCOUNTER (EMERGENCY)
Facility: HOSPITAL | Age: 69
Discharge: HOME | End: 2024-01-20
Attending: EMERGENCY MEDICINE
Payer: MEDICARE

## 2024-01-20 VITALS
RESPIRATION RATE: 20 BRPM | SYSTOLIC BLOOD PRESSURE: 117 MMHG | WEIGHT: 154.32 LBS | DIASTOLIC BLOOD PRESSURE: 66 MMHG | HEIGHT: 72 IN | BODY MASS INDEX: 20.9 KG/M2 | OXYGEN SATURATION: 93 % | HEART RATE: 74 BPM | TEMPERATURE: 98.8 F

## 2024-01-20 DIAGNOSIS — S42.201A CLOSED FRACTURE OF PROXIMAL END OF RIGHT HUMERUS, UNSPECIFIED FRACTURE MORPHOLOGY, INITIAL ENCOUNTER: Primary | ICD-10-CM

## 2024-01-20 LAB
ALBUMIN SERPL-MCNC: 3.8 G/DL (ref 3.5–5)
ALBUMIN/GLOB SERPL: 1.4 (ref 1.1–2.2)
ALP SERPL-CCNC: 54 U/L (ref 45–117)
ALT SERPL-CCNC: 38 U/L (ref 12–78)
ANION GAP SERPL CALC-SCNC: 7 MMOL/L (ref 5–15)
AST SERPL-CCNC: 54 U/L (ref 15–37)
BASOPHILS # BLD: 0.1 K/UL (ref 0–0.1)
BASOPHILS NFR BLD: 1 % (ref 0–1)
BILIRUB SERPL-MCNC: 0.7 MG/DL (ref 0.2–1)
BUN SERPL-MCNC: 15 MG/DL (ref 6–20)
BUN/CREAT SERPL: 19 (ref 12–20)
CALCIUM SERPL-MCNC: 9.2 MG/DL (ref 8.5–10.1)
CHLORIDE SERPL-SCNC: 103 MMOL/L (ref 97–108)
CO2 SERPL-SCNC: 25 MMOL/L (ref 21–32)
CREAT SERPL-MCNC: 0.8 MG/DL (ref 0.7–1.3)
DIFFERENTIAL METHOD BLD: ABNORMAL
EOSINOPHIL # BLD: 0.1 K/UL (ref 0–0.4)
EOSINOPHIL NFR BLD: 1 % (ref 0–7)
ERYTHROCYTE [DISTWIDTH] IN BLOOD BY AUTOMATED COUNT: 12.5 % (ref 11.5–14.5)
GLOBULIN SER CALC-MCNC: 2.8 G/DL (ref 2–4)
GLUCOSE BLD STRIP.AUTO-MCNC: 111 MG/DL (ref 65–117)
GLUCOSE BLD STRIP.AUTO-MCNC: 191 MG/DL (ref 65–117)
GLUCOSE SERPL-MCNC: 96 MG/DL (ref 65–100)
HCT VFR BLD AUTO: 33.4 % (ref 36.6–50.3)
HGB BLD-MCNC: 11.8 G/DL (ref 12.1–17)
IMM GRANULOCYTES # BLD AUTO: 0.1 K/UL (ref 0–0.04)
IMM GRANULOCYTES NFR BLD AUTO: 1 % (ref 0–0.5)
LYMPHOCYTES # BLD: 0.8 K/UL (ref 0.8–3.5)
LYMPHOCYTES NFR BLD: 8 % (ref 12–49)
MCH RBC QN AUTO: 34.7 PG (ref 26–34)
MCHC RBC AUTO-ENTMCNC: 35.3 G/DL (ref 30–36.5)
MCV RBC AUTO: 98.2 FL (ref 80–99)
MONOCYTES # BLD: 0.7 K/UL (ref 0–1)
MONOCYTES NFR BLD: 7 % (ref 5–13)
NEUTS SEG # BLD: 8.3 K/UL (ref 1.8–8)
NEUTS SEG NFR BLD: 82 % (ref 32–75)
NRBC # BLD: 0 K/UL (ref 0–0.01)
NRBC BLD-RTO: 0 PER 100 WBC
PLATELET # BLD AUTO: 146 K/UL (ref 150–400)
PMV BLD AUTO: 11.1 FL (ref 8.9–12.9)
POTASSIUM SERPL-SCNC: 4.2 MMOL/L (ref 3.5–5.1)
PROT SERPL-MCNC: 6.6 G/DL (ref 6.4–8.2)
RBC # BLD AUTO: 3.4 M/UL (ref 4.1–5.7)
SERVICE CMNT-IMP: ABNORMAL
SERVICE CMNT-IMP: NORMAL
SODIUM SERPL-SCNC: 135 MMOL/L (ref 136–145)
WBC # BLD AUTO: 9.9 K/UL (ref 4.1–11.1)

## 2024-01-20 PROCEDURE — 6360000002 HC RX W HCPCS: Performed by: EMERGENCY MEDICINE

## 2024-01-20 PROCEDURE — 73030 X-RAY EXAM OF SHOULDER: CPT

## 2024-01-20 PROCEDURE — 96374 THER/PROPH/DIAG INJ IV PUSH: CPT

## 2024-01-20 PROCEDURE — 80053 COMPREHEN METABOLIC PANEL: CPT

## 2024-01-20 PROCEDURE — 99284 EMERGENCY DEPT VISIT MOD MDM: CPT

## 2024-01-20 PROCEDURE — 2500000003 HC RX 250 WO HCPCS: Performed by: EMERGENCY MEDICINE

## 2024-01-20 PROCEDURE — 96376 TX/PRO/DX INJ SAME DRUG ADON: CPT

## 2024-01-20 PROCEDURE — 96375 TX/PRO/DX INJ NEW DRUG ADDON: CPT

## 2024-01-20 PROCEDURE — 85025 COMPLETE CBC W/AUTO DIFF WBC: CPT

## 2024-01-20 PROCEDURE — 36415 COLL VENOUS BLD VENIPUNCTURE: CPT

## 2024-01-20 PROCEDURE — 82962 GLUCOSE BLOOD TEST: CPT

## 2024-01-20 RX ORDER — HYDROMORPHONE HYDROCHLORIDE 1 MG/ML
0.5 INJECTION, SOLUTION INTRAMUSCULAR; INTRAVENOUS; SUBCUTANEOUS
Status: COMPLETED | OUTPATIENT
Start: 2024-01-20 | End: 2024-01-20

## 2024-01-20 RX ORDER — ONDANSETRON 2 MG/ML
4 INJECTION INTRAMUSCULAR; INTRAVENOUS
Status: COMPLETED | OUTPATIENT
Start: 2024-01-20 | End: 2024-01-20

## 2024-01-20 RX ORDER — HYDROMORPHONE HYDROCHLORIDE 2 MG/1
2 TABLET ORAL EVERY 6 HOURS PRN
Qty: 12 TABLET | Refills: 0 | Status: SHIPPED | OUTPATIENT
Start: 2024-01-20 | End: 2024-01-23

## 2024-01-20 RX ORDER — MORPHINE SULFATE 2 MG/ML
2 INJECTION, SOLUTION INTRAMUSCULAR; INTRAVENOUS
Status: COMPLETED | OUTPATIENT
Start: 2024-01-20 | End: 2024-01-20

## 2024-01-20 RX ORDER — HYDROMORPHONE HYDROCHLORIDE 1 MG/ML
1 INJECTION, SOLUTION INTRAMUSCULAR; INTRAVENOUS; SUBCUTANEOUS
Status: COMPLETED | OUTPATIENT
Start: 2024-01-20 | End: 2024-01-20

## 2024-01-20 RX ADMIN — MORPHINE SULFATE 2 MG: 2 INJECTION, SOLUTION INTRAMUSCULAR; INTRAVENOUS at 06:38

## 2024-01-20 RX ADMIN — ONDANSETRON 4 MG: 2 INJECTION INTRAMUSCULAR; INTRAVENOUS at 07:46

## 2024-01-20 RX ADMIN — HYDROMORPHONE HYDROCHLORIDE 0.5 MG: 1 INJECTION, SOLUTION INTRAMUSCULAR; INTRAVENOUS; SUBCUTANEOUS at 07:17

## 2024-01-20 RX ADMIN — HYDROMORPHONE HYDROCHLORIDE 1 MG: 1 INJECTION, SOLUTION INTRAMUSCULAR; INTRAVENOUS; SUBCUTANEOUS at 08:24

## 2024-01-20 ASSESSMENT — LIFESTYLE VARIABLES
HOW OFTEN DO YOU HAVE A DRINK CONTAINING ALCOHOL: 4 OR MORE TIMES A WEEK
HOW MANY STANDARD DRINKS CONTAINING ALCOHOL DO YOU HAVE ON A TYPICAL DAY: 3 OR 4

## 2024-01-20 ASSESSMENT — PAIN SCALES - GENERAL
PAINLEVEL_OUTOF10: 10
PAINLEVEL_OUTOF10: 10

## 2024-01-20 NOTE — ED NOTES
Pt ambulatory down the juarez at this time, assisted to position of comfort, sitting on the edge of the bed. Pt requesting a wheelchair and to go home at this time.

## 2024-01-20 NOTE — ED NOTES
Report given to YULISA Luna. Nurse was informed of reason for arrival, vitals, labs, medications, orders, procedures, results, anything left pending and current plan of action. Questions were asked and received prior to departure from the patient.

## 2024-01-20 NOTE — ED NOTES
Pt is currently A&Ox4, reports to RN that he lives at home alone and is ambulatory at baseline. Pt states that he fell out of bed while having a, \"diabetic fit\" and hurt his R shoulder. Pt resistant to having blood sugar checked as he wears a continuous insulin pump but agreed, sugar 191. Pt reports pain to his R shoulder, RN attempted to apply ordered shoulder immobilizer. Pt reports that he is in too much pain to finish the application or attempt an ambulation trial at this time. Pt's ex wife is at bedside requesting to speak w/ MD. MD aware, more pain control has been ordered, RN will continue to monitor.

## 2024-01-20 NOTE — ED NOTES
I have reviewed the provider's instructions with the patient, answering all questions to his satisfaction. Pt wheeled to waiting room by ER staff

## 2024-01-20 NOTE — ED PROVIDER NOTES
[facility] EMERGENCY DEPT  EMERGENCY DEPARTMENT ENCOUNTER       Pt Name: Vicente Manning  MRN: [id number]  Birthdate 1955  Date of evaluation: 1/20/2024  Provider: Keli Ruelas MD   PCP: No primary care provider on file.  Note Started: 6:31 AM EST 1/20/24     CHIEF COMPLAINT       Chief Complaint   Patient presents with    Fall     Patient came in via stretcher from home via EMS with cc of fall from his 4 ft bed, denies any LOC, he fell and land on his right side. Complainns 10/10 pain on his right shoulder. bS 114mg/dl per EMS, and took tylenol 325mg. Arrives in a shoulder sling.        HISTORY OF PRESENT ILLNESS: 1 or more elements      History From: patient, History limited by: none     Vicente Manning is a 68 y.o. male who presents after a fall with a cc of R shoulder pain       Please See MDM for Additional Details of the HPI/PMH  Nursing Notes were all reviewed and agreed with or any disagreements were addressed in the HPI.     REVIEW OF SYSTEMS        Positives and Pertinent negatives as per HPI.    PAST HISTORY     Past Medical History:  Past Medical History:   Diagnosis Date    Arthritis     Essential hypertension 12/11/2017    Facial weakness 05/2022    2/2 Lung cancer mets;  An MRI brain from 7/29 demonstrated evidence of lesions at the anterosuperior vermis as well as at the apex of the right internal acoustic canal    Former smoker, stopped smoking in distant past     History of acute bacterial endocarditis 08/09/2021    History of anemia     History of echocardiogram 10/2021    LV: Estimated LVEF is 55 - 60%. Normal cavity size, wall thickness, systolic function (ejection fraction normal) and diastolic function. Wall motion: normal. LA: Mildly dilated left atrium.RA: Mildly dilated right atrium. MV: Mitral valve thickening. Mitral valve prolapse. Moderate mitral valve regurgitation is present. TV: Mild tricuspid valve regurgitation is present. PV: Mild pulmonic valve re    History of fusion of  that sling and shoulder immobilizer is standard of care for a humerus fracture and that I have already discussed it with orthopedics.  Patient is received an additional dose of pain medication.  He continues to yell at staff with any attempts at interventions or assistance.  He tells me he is \"yelling at the pain.\"  Discussed that after this additional dose of pain medication we will attempt to ambulate the patient [JASMEET]   0922 Patient able to ambulate with a steady gait  BG has remained stable [JASMEET]      ED Course User Index  [JASMEET] Keli Ruelas MD         FINAL IMPRESSION     1. Closed fracture of proximal end of right humerus, unspecified fracture morphology, initial encounter          DISPOSITION/PLAN   Vicente Manning's  results have been reviewed with him.  He has been counseled regarding his diagnosis, treatment, and plan.  He verbally conveys understanding and agreement of the signs, symptoms, diagnosis, treatment and prognosis and additionally agrees to follow up as discussed.  He also agrees with the care-plan and conveys that all of his questions have been answered.  I have also provided discharge instructions for him that include: educational information regarding their diagnosis and treatment, and list of reasons why they would want to return to the ED prior to their follow-up appointment, should his condition change.     CLINICAL IMPRESSION    Discharge Note: The patient is stable for discharge home. The signs, symptoms, diagnosis, and discharge instructions have been discussed, understanding conveyed, and agreed upon. The patient is to follow up as recommended or return to ER should their symptoms worsen.      PATIENT REFERRED TO:  Petr Mcguire MD  [address] [address] [phone]          [facility]  [address]  [facility] [address] [phone]           DISCHARGE MEDICATIONS:     Medication List        START

## 2024-03-20 RX ORDER — PRAVASTATIN SODIUM 40 MG
40 TABLET ORAL DAILY
Qty: 30 TABLET | Refills: 0 | Status: SHIPPED | OUTPATIENT
Start: 2024-03-20

## 2024-03-20 NOTE — TELEPHONE ENCOUNTER
PCP: No primary care provider on file.    Last appt: last saw Dr. Santamaria on 9/6/2022    Future Appointments   Date Time Provider Department Center   4/3/2024  9:00 AM Nile Liang MD [facility] BS AMB   7/17/2024  1:30 PM Patricia Vela PA [facility] BS AMB       Requested Prescriptions     Pending Prescriptions Disp Refills    pravastatin (PRAVACHOL) 40 MG tablet [Pharmacy Med Name: PRAVASTATIN TABS 40MG] 90 tablet 3     Sig: TAKE 1 TABLET DAILY

## 2024-04-01 SDOH — HEALTH STABILITY: PHYSICAL HEALTH: ON AVERAGE, HOW MANY DAYS PER WEEK DO YOU ENGAGE IN MODERATE TO STRENUOUS EXERCISE (LIKE A BRISK WALK)?: 0 DAYS

## 2024-04-03 ENCOUNTER — OFFICE VISIT (OUTPATIENT)
Age: 69
End: 2024-04-03
Payer: MEDICARE

## 2024-04-03 VITALS
BODY MASS INDEX: 18.53 KG/M2 | HEIGHT: 72 IN | HEART RATE: 70 BPM | SYSTOLIC BLOOD PRESSURE: 124 MMHG | WEIGHT: 136.8 LBS | DIASTOLIC BLOOD PRESSURE: 78 MMHG | OXYGEN SATURATION: 100 % | TEMPERATURE: 97.8 F | RESPIRATION RATE: 20 BRPM

## 2024-04-03 DIAGNOSIS — E87.1 HYPONATREMIA: ICD-10-CM

## 2024-04-03 DIAGNOSIS — E03.9 HYPOTHYROIDISM, UNSPECIFIED TYPE: ICD-10-CM

## 2024-04-03 DIAGNOSIS — Z23 NEED FOR PROPHYLACTIC VACCINATION AGAINST STREPTOCOCCUS PNEUMONIAE (PNEUMOCOCCUS): ICD-10-CM

## 2024-04-03 DIAGNOSIS — E44.1 MILD PROTEIN-CALORIE MALNUTRITION (HCC): ICD-10-CM

## 2024-04-03 DIAGNOSIS — C34.91 MALIGNANT NEOPLASM OF UNSPECIFIED PART OF RIGHT BRONCHUS OR LUNG (HCC): Primary | ICD-10-CM

## 2024-04-03 DIAGNOSIS — F11.99 OPIOID USE, UNSPECIFIED WITH UNSPECIFIED OPIOID-INDUCED DISORDER (HCC): ICD-10-CM

## 2024-04-03 DIAGNOSIS — E10.65 TYPE 1 DIABETES MELLITUS WITH HYPERGLYCEMIA (HCC): ICD-10-CM

## 2024-04-03 DIAGNOSIS — I10 ESSENTIAL HYPERTENSION: ICD-10-CM

## 2024-04-03 PROCEDURE — 3017F COLORECTAL CA SCREEN DOC REV: CPT | Performed by: STUDENT IN AN ORGANIZED HEALTH CARE EDUCATION/TRAINING PROGRAM

## 2024-04-03 PROCEDURE — 3074F SYST BP LT 130 MM HG: CPT | Performed by: STUDENT IN AN ORGANIZED HEALTH CARE EDUCATION/TRAINING PROGRAM

## 2024-04-03 PROCEDURE — 99214 OFFICE O/P EST MOD 30 MIN: CPT | Performed by: STUDENT IN AN ORGANIZED HEALTH CARE EDUCATION/TRAINING PROGRAM

## 2024-04-03 PROCEDURE — 3078F DIAST BP <80 MM HG: CPT | Performed by: STUDENT IN AN ORGANIZED HEALTH CARE EDUCATION/TRAINING PROGRAM

## 2024-04-03 PROCEDURE — 2022F DILAT RTA XM EVC RTNOPTHY: CPT | Performed by: STUDENT IN AN ORGANIZED HEALTH CARE EDUCATION/TRAINING PROGRAM

## 2024-04-03 PROCEDURE — 1036F TOBACCO NON-USER: CPT | Performed by: STUDENT IN AN ORGANIZED HEALTH CARE EDUCATION/TRAINING PROGRAM

## 2024-04-03 PROCEDURE — G8420 CALC BMI NORM PARAMETERS: HCPCS | Performed by: STUDENT IN AN ORGANIZED HEALTH CARE EDUCATION/TRAINING PROGRAM

## 2024-04-03 PROCEDURE — PBSHW PNEUMOCOCCAL, PCV20, PREVNAR 20, (AGE 6W+), IM, PF: Performed by: STUDENT IN AN ORGANIZED HEALTH CARE EDUCATION/TRAINING PROGRAM

## 2024-04-03 PROCEDURE — 1123F ACP DISCUSS/DSCN MKR DOCD: CPT | Performed by: STUDENT IN AN ORGANIZED HEALTH CARE EDUCATION/TRAINING PROGRAM

## 2024-04-03 PROCEDURE — 3046F HEMOGLOBIN A1C LEVEL >9.0%: CPT | Performed by: STUDENT IN AN ORGANIZED HEALTH CARE EDUCATION/TRAINING PROGRAM

## 2024-04-03 PROCEDURE — G8427 DOCREV CUR MEDS BY ELIG CLIN: HCPCS | Performed by: STUDENT IN AN ORGANIZED HEALTH CARE EDUCATION/TRAINING PROGRAM

## 2024-04-03 PROCEDURE — 90677 PCV20 VACCINE IM: CPT | Performed by: STUDENT IN AN ORGANIZED HEALTH CARE EDUCATION/TRAINING PROGRAM

## 2024-04-03 SDOH — ECONOMIC STABILITY: FOOD INSECURITY: WITHIN THE PAST 12 MONTHS, YOU WORRIED THAT YOUR FOOD WOULD RUN OUT BEFORE YOU GOT MONEY TO BUY MORE.: NEVER TRUE

## 2024-04-03 SDOH — ECONOMIC STABILITY: HOUSING INSECURITY
IN THE LAST 12 MONTHS, WAS THERE A TIME WHEN YOU DID NOT HAVE A STEADY PLACE TO SLEEP OR SLEPT IN A SHELTER (INCLUDING NOW)?: NO

## 2024-04-03 SDOH — ECONOMIC STABILITY: FOOD INSECURITY: WITHIN THE PAST 12 MONTHS, THE FOOD YOU BOUGHT JUST DIDN'T LAST AND YOU DIDN'T HAVE MONEY TO GET MORE.: NEVER TRUE

## 2024-04-03 SDOH — ECONOMIC STABILITY: INCOME INSECURITY: HOW HARD IS IT FOR YOU TO PAY FOR THE VERY BASICS LIKE FOOD, HOUSING, MEDICAL CARE, AND HEATING?: NOT HARD AT ALL

## 2024-04-03 ASSESSMENT — PATIENT HEALTH QUESTIONNAIRE - PHQ9
SUM OF ALL RESPONSES TO PHQ QUESTIONS 1-9: 1
2. FEELING DOWN, DEPRESSED OR HOPELESS: NOT AT ALL
1. LITTLE INTEREST OR PLEASURE IN DOING THINGS: SEVERAL DAYS
SUM OF ALL RESPONSES TO PHQ QUESTIONS 1-9: 1
SUM OF ALL RESPONSES TO PHQ9 QUESTIONS 1 & 2: 1

## 2024-04-03 NOTE — PROGRESS NOTES
Chief Complaint   Patient presents with    New Patient     \"Have you been to the ER, urgent care clinic since your last visit?  Hospitalized since your last visit?\"    NO    “Have you seen or consulted any other health care providers outside of [facility] since your last visit?”    NO             
Microalbuminuria 12/11/2017    Neck pain 12/28/2021    Cervical radiculopathy 11/15/2021    Cervical disc disease 12/28/2021    Essential hypertension 12/11/2017    Type I diabetes mellitus with proliferative retinopathy (HCC) 12/11/2017    Hypercholesterolemia 12/11/2017    Cervical stenosis of spine 03/16/2022    S/P cervical spinal fusion 04/28/2022    Opioid use, unspecified with unspecified opioid-induced disorder (HCC) 04/28/2022    Valvular heart disease     History of anemia     History of acute bacterial endocarditis 08/09/2021    Former smoker, stopped smoking in distant past     Arthritis     Adverse effect of anesthesia     History of echocardiogram 10/01/2021     Resolved Ambulatory Problems     Diagnosis Date Noted    No Resolved Ambulatory Problems     Past Medical History:   Diagnosis Date    Facial weakness 05/2022    History of fusion of cervical spine 03/2022    Insulin pump in place     Lung cancer metastatic to bone (HCC) 05/2022    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 Lives nearby.     /78 (Site: Right Upper Arm, Position: Sitting)   Pulse 70   Temp 97.8 °F (36.6 °C)   Resp 20   Ht 1.829 m (6')   Wt 62.1 kg (136 lb 12.8 oz)   SpO2 100%   BMI 18.55 kg/m²       Physical Exam  Constitutional:       General: He is not in acute distress.     Appearance: Normal appearance. He is not toxic-appearing.   HENT:      Head: Normocephalic and atraumatic.      Mouth/Throat:      Mouth: Mucous membranes are moist.      Pharynx: No posterior oropharyngeal erythema.   Eyes:      Extraocular Movements: Extraocular movements intact.   Cardiovascular:      Rate and Rhythm: Normal rate and regular rhythm.      Heart sounds: No murmur heard.     No friction rub. No gallop.   Pulmonary:      Effort: Pulmonary effort is normal.      Breath sounds: Normal breath sounds. No wheezing, rhonchi

## 2024-04-04 ENCOUNTER — TELEPHONE (OUTPATIENT)
Age: 69
End: 2024-04-04

## 2024-04-04 ENCOUNTER — CLINICAL DOCUMENTATION (OUTPATIENT)
Dept: CASE MANAGEMENT | Age: 69
End: 2024-04-04

## 2024-04-04 LAB
ALBUMIN SERPL-MCNC: 3.9 G/DL (ref 3.5–5)
ALBUMIN/GLOB SERPL: 1.4 (ref 1.1–2.2)
ALP SERPL-CCNC: 82 U/L (ref 45–117)
ALT SERPL-CCNC: 23 U/L (ref 12–78)
ANION GAP SERPL CALC-SCNC: 7 MMOL/L (ref 5–15)
AST SERPL-CCNC: 42 U/L (ref 15–37)
BASOPHILS # BLD: 0 K/UL (ref 0–0.1)
BASOPHILS NFR BLD: 1 % (ref 0–1)
BILIRUB SERPL-MCNC: 0.8 MG/DL (ref 0.2–1)
BUN SERPL-MCNC: 14 MG/DL (ref 6–20)
BUN/CREAT SERPL: 20 (ref 12–20)
CALCIUM SERPL-MCNC: 9.4 MG/DL (ref 8.5–10.1)
CHLORIDE SERPL-SCNC: 88 MMOL/L (ref 97–108)
CO2 SERPL-SCNC: 27 MMOL/L (ref 21–32)
CREAT SERPL-MCNC: 0.7 MG/DL (ref 0.7–1.3)
DIFFERENTIAL METHOD BLD: ABNORMAL
EOSINOPHIL # BLD: 0.1 K/UL (ref 0–0.4)
EOSINOPHIL NFR BLD: 3 % (ref 0–7)
ERYTHROCYTE [DISTWIDTH] IN BLOOD BY AUTOMATED COUNT: 13.9 % (ref 11.5–14.5)
EST. AVERAGE GLUCOSE BLD GHB EST-MCNC: 82 MG/DL
GLOBULIN SER CALC-MCNC: 2.8 G/DL (ref 2–4)
GLUCOSE SERPL-MCNC: 129 MG/DL (ref 65–100)
HBA1C MFR BLD: 4.5 % (ref 4–5.6)
HCT VFR BLD AUTO: 34.9 % (ref 36.6–50.3)
HGB BLD-MCNC: 11.8 G/DL (ref 12.1–17)
IMM GRANULOCYTES # BLD AUTO: 0 K/UL (ref 0–0.04)
IMM GRANULOCYTES NFR BLD AUTO: 0 % (ref 0–0.5)
LYMPHOCYTES # BLD: 0.8 K/UL (ref 0.8–3.5)
LYMPHOCYTES NFR BLD: 16 % (ref 12–49)
MCH RBC QN AUTO: 34.9 PG (ref 26–34)
MCHC RBC AUTO-ENTMCNC: 33.8 G/DL (ref 30–36.5)
MCV RBC AUTO: 103.3 FL (ref 80–99)
MONOCYTES # BLD: 0.7 K/UL (ref 0–1)
MONOCYTES NFR BLD: 15 % (ref 5–13)
NEUTS SEG # BLD: 3.1 K/UL (ref 1.8–8)
NEUTS SEG NFR BLD: 65 % (ref 32–75)
NRBC # BLD: 0 K/UL (ref 0–0.01)
NRBC BLD-RTO: 0 PER 100 WBC
PLATELET # BLD AUTO: 224 K/UL (ref 150–400)
PMV BLD AUTO: 11.3 FL (ref 8.9–12.9)
POTASSIUM SERPL-SCNC: 5 MMOL/L (ref 3.5–5.1)
PROT SERPL-MCNC: 6.7 G/DL (ref 6.4–8.2)
RBC # BLD AUTO: 3.38 M/UL (ref 4.1–5.7)
RBC MORPH BLD: ABNORMAL
SODIUM SERPL-SCNC: 122 MMOL/L (ref 136–145)
T4 FREE SERPL-MCNC: 1.1 NG/DL (ref 0.8–1.5)
TSH SERPL DL<=0.05 MIU/L-ACNC: 1.64 UIU/ML (ref 0.36–3.74)
WBC # BLD AUTO: 4.7 K/UL (ref 4.1–11.1)

## 2024-04-04 NOTE — ACP (ADVANCE CARE PLANNING)
Advance Care Planning   Ambulatory ACP Specialist Patient Outreach    Date:  4/4/2024    ACP Specialist:  Shoaib Delgado LCSW    Outreach call to patient in follow-up to ACP Specialist referral from:Nile Liang MD    [x] PCP  [] Provider   [] Ambulatory Care Management [] Other     For:                  [x] Advance Directive Assistance              [] Complete Portable DNR order              [] Complete POST/POLST/MOST              [x] Code Status Discussion             [x] Discuss Goals of Care             [] Early ACP Decision-Making              [] Other (Specify)    Date Referral Received:4/4/2024    Next Step:   [x] ACP scheduled conversation  [] Outreach again in one week               [x] Email / Mail ACP Info Sheets  [x] Email / Mail Advance Directive   [] Closing referral.  Routing closure to referring provider/staff and to ACP Specialist .    [] Closure letter mailed to patient with invitation to contact ACP Specialist if / when ready.   [] Other (Specify here):         [x] At this time, Healthcare Decision Maker Is:        [] Primary agent named in scanned advance directive.    [x] Legal Next of Kin-   Primary Decision Maker: Chilo Manning - Child - [phone]    [] Unable to determine legal decision maker at this time.         Outreaches:    [x] 1st -  Date:  4/4/20204             Intervention:  [x] Spoke with Patient   [] Left Voice mail [] Email / Mail    [] Momoxhart  [] Other (Specify) :     Outcomes: ACP Specialist made an initial outreach telephone call to patient's mobile/home number listed in the medical record to offer patient an Advance Care Planning appointment.  Pt answered telephone call and is willing to participate in an ACP appointment and this has been scheduled for Wednesday, April 17, 2024 at 11:00am.  Informational materials have been emailed to patient for review prior to appointment date.     Thank you for this referral.    Shoaib Delgado LCSW,

## 2024-04-05 ENCOUNTER — NURSE ONLY (OUTPATIENT)
Age: 69
End: 2024-04-05

## 2024-04-05 ENCOUNTER — HOSPITAL ENCOUNTER (EMERGENCY)
Facility: HOSPITAL | Age: 69
Discharge: HOME | End: 2024-04-05
Attending: EMERGENCY MEDICINE
Payer: MEDICARE

## 2024-04-05 VITALS
RESPIRATION RATE: 18 BRPM | DIASTOLIC BLOOD PRESSURE: 88 MMHG | TEMPERATURE: 97.7 F | HEART RATE: 68 BPM | HEIGHT: 72 IN | BODY MASS INDEX: 22.69 KG/M2 | OXYGEN SATURATION: 100 % | SYSTOLIC BLOOD PRESSURE: 110 MMHG | WEIGHT: 167.55 LBS

## 2024-04-05 DIAGNOSIS — E87.1 HYPONATREMIA: ICD-10-CM

## 2024-04-05 DIAGNOSIS — E87.1 HYPONATREMIA: Primary | ICD-10-CM

## 2024-04-05 LAB
ALBUMIN SERPL-MCNC: 3.7 G/DL (ref 3.5–5)
ALBUMIN/GLOB SERPL: 1.4 (ref 1.1–2.2)
ALP SERPL-CCNC: 80 U/L (ref 45–117)
ALT SERPL-CCNC: 22 U/L (ref 12–78)
ANION GAP SERPL CALC-SCNC: 6 MMOL/L (ref 5–15)
APPEARANCE UR: CLEAR
AST SERPL-CCNC: 34 U/L (ref 15–37)
BACTERIA URNS QL MICRO: NEGATIVE /HPF
BASOPHILS # BLD: 0.1 K/UL (ref 0–0.1)
BASOPHILS NFR BLD: 1 % (ref 0–1)
BILIRUB SERPL-MCNC: 0.6 MG/DL (ref 0.2–1)
BILIRUB UR QL: NEGATIVE
BUN SERPL-MCNC: 20 MG/DL (ref 6–20)
BUN/CREAT SERPL: 28 (ref 12–20)
CALCIUM SERPL-MCNC: 8.9 MG/DL (ref 8.5–10.1)
CHLORIDE SERPL-SCNC: 92 MMOL/L (ref 97–108)
CO2 SERPL-SCNC: 26 MMOL/L (ref 21–32)
COLOR UR: ABNORMAL
CREAT SERPL-MCNC: 0.72 MG/DL (ref 0.7–1.3)
DIFFERENTIAL METHOD BLD: ABNORMAL
EOSINOPHIL # BLD: 0.1 K/UL (ref 0–0.4)
EOSINOPHIL NFR BLD: 1 % (ref 0–7)
EPITH CASTS URNS QL MICRO: ABNORMAL /LPF
ERYTHROCYTE [DISTWIDTH] IN BLOOD BY AUTOMATED COUNT: 13.4 % (ref 11.5–14.5)
GLOBULIN SER CALC-MCNC: 2.7 G/DL (ref 2–4)
GLUCOSE SERPL-MCNC: 211 MG/DL (ref 65–100)
GLUCOSE UR STRIP.AUTO-MCNC: NEGATIVE MG/DL
HCT VFR BLD AUTO: 31.8 % (ref 36.6–50.3)
HGB BLD-MCNC: 10.9 G/DL (ref 12.1–17)
HGB UR QL STRIP: NEGATIVE
HYALINE CASTS URNS QL MICRO: ABNORMAL /LPF (ref 0–2)
IMM GRANULOCYTES # BLD AUTO: 0 K/UL (ref 0–0.04)
IMM GRANULOCYTES NFR BLD AUTO: 1 % (ref 0–0.5)
KETONES UR QL STRIP.AUTO: ABNORMAL MG/DL
LEUKOCYTE ESTERASE UR QL STRIP.AUTO: NEGATIVE
LYMPHOCYTES # BLD: 0.8 K/UL (ref 0.8–3.5)
LYMPHOCYTES NFR BLD: 14 % (ref 12–49)
MCH RBC QN AUTO: 35.4 PG (ref 26–34)
MCHC RBC AUTO-ENTMCNC: 34.3 G/DL (ref 30–36.5)
MCV RBC AUTO: 103.2 FL (ref 80–99)
MONOCYTES # BLD: 0.8 K/UL (ref 0–1)
MONOCYTES NFR BLD: 13 % (ref 5–13)
NEUTS SEG # BLD: 4.2 K/UL (ref 1.8–8)
NEUTS SEG NFR BLD: 70 % (ref 32–75)
NITRITE UR QL STRIP.AUTO: NEGATIVE
NRBC # BLD: 0 K/UL (ref 0–0.01)
NRBC BLD-RTO: 0 PER 100 WBC
PH UR STRIP: 7 (ref 5–8)
PLATELET # BLD AUTO: 196 K/UL (ref 150–400)
PMV BLD AUTO: 10.6 FL (ref 8.9–12.9)
POTASSIUM SERPL-SCNC: 5 MMOL/L (ref 3.5–5.1)
PROT SERPL-MCNC: 6.4 G/DL (ref 6.4–8.2)
PROT UR STRIP-MCNC: NEGATIVE MG/DL
RBC # BLD AUTO: 3.08 M/UL (ref 4.1–5.7)
RBC #/AREA URNS HPF: ABNORMAL /HPF (ref 0–5)
SODIUM SERPL-SCNC: 124 MMOL/L (ref 136–145)
SP GR UR REFRACTOMETRY: 1.02
URINE CULTURE IF INDICATED: ABNORMAL
UROBILINOGEN UR QL STRIP.AUTO: 1 EU/DL (ref 0.2–1)
WBC # BLD AUTO: 5.9 K/UL (ref 4.1–11.1)
WBC URNS QL MICRO: ABNORMAL /HPF (ref 0–4)

## 2024-04-05 PROCEDURE — 99284 EMERGENCY DEPT VISIT MOD MDM: CPT

## 2024-04-05 PROCEDURE — 80053 COMPREHEN METABOLIC PANEL: CPT

## 2024-04-05 PROCEDURE — 81001 URINALYSIS AUTO W/SCOPE: CPT

## 2024-04-05 PROCEDURE — 36415 COLL VENOUS BLD VENIPUNCTURE: CPT

## 2024-04-05 PROCEDURE — 85025 COMPLETE CBC W/AUTO DIFF WBC: CPT

## 2024-04-05 PROCEDURE — 96360 HYDRATION IV INFUSION INIT: CPT

## 2024-04-05 PROCEDURE — 2580000003 HC RX 258: Performed by: EMERGENCY MEDICINE

## 2024-04-05 RX ORDER — 0.9 % SODIUM CHLORIDE 0.9 %
500 INTRAVENOUS SOLUTION INTRAVENOUS ONCE
Status: COMPLETED | OUTPATIENT
Start: 2024-04-05 | End: 2024-04-05

## 2024-04-05 RX ADMIN — SODIUM CHLORIDE 500 ML: 9 INJECTION, SOLUTION INTRAVENOUS at 18:37

## 2024-04-05 ASSESSMENT — PAIN SCALES - GENERAL: PAINLEVEL_OUTOF10: 0

## 2024-04-05 ASSESSMENT — PAIN - FUNCTIONAL ASSESSMENT: PAIN_FUNCTIONAL_ASSESSMENT: 0-10

## 2024-04-05 NOTE — RESULT ENCOUNTER NOTE
Sodium level is dangerously low, please go immediately to ED for correction.     The MCV (or size of the red blood cells) is high meaning you are likely not getting adequate nutrition.     Your hemoglobin A1c is 4.5% which is much lower than it should be. Please discuss reducing your insulin dose with your endocrinologist as soon as possible.

## 2024-04-05 NOTE — TELEPHONE ENCOUNTER
Attempted to contact patient by phone x2 re low sodium level. No answer each time, left voicemail message instructing patient to go to the ED for treatment.     Nile Liang MD

## 2024-04-05 NOTE — ED PROVIDER NOTES
oriented with retained perception and understanding of events, context, and relative risk.    The patient is refusing the following elements of my recommended plan of action:  Admission for sodium correction    The patient understands that by refusing all or part of my recommendations, [he/she] accepts and tolerates greater risk. The risk is greater but not so high as to evidence loss of capacity or other grounds to forfeit civil rights and autonomy.    The patient also understands the risks, benefits, and alternatives to my recommendations, including:  Risks include worsening condition, seizures, death    This discussion was witnessed by Zee MÁRQUEZ    Changing the patient's course and risk would require taking away the patient's civil rights and autonomy with chemical or physical restraints. Given the facts at this time, I do not have sufficient ethical or legal justification to take away the patient's civil rights and autonomy.                FINAL IMPRESSION     1. Hyponatremia          DISPOSITION/PLAN   Vicente LUCIO Viktoryanely's  results have been reviewed with him.  He has been counseled regarding his diagnosis, treatment, and plan.  He verbally conveys understanding and agreement of the signs, symptoms, diagnosis, treatment and prognosis and additionally agrees to follow up as discussed.  He also agrees with the care-plan and conveys that all of his questions have been answered.  I have also provided discharge instructions for him that include: educational information regarding their diagnosis and treatment, and list of reasons why they would want to return to the ED prior to their follow-up appointment, should his condition change.     CLINICAL IMPRESSION    AMA     PATIENT REFERRED TO:  Nile Liang MD  [address]  [facility] [address] [phone]    Schedule an appointment as soon as possible for a visit       \[facility]\"" EMERGENCY DEPT  [address]  [address]  [phone]    As needed, If symptoms worsen       DISCHARGE MEDICATIONS:     Medication List        ASK your doctor about these medications      diclofenac 75 MG EC tablet  Commonly known as: VOLTAREN     insulin lispro 100 UNIT/ML Soln injection vial  Commonly known as: HUMALOG     lisinopril 20 MG tablet  Commonly known as: PRINIVIL;ZESTRIL  Take 1 tablet by mouth 2 times daily     Osimertinib Mesylate 80 MG Tabs     pravastatin 40 MG tablet  Commonly known as: PRAVACHOL  TAKE 1 TABLET DAILY     pregabalin 75 MG capsule  Commonly known as: LYRICA  Take 1 capsule by mouth 2 times daily for 180 days. Supervising Physician: Mic Gonzalez MD NPI: [id number] LEE: [id number] Max Daily Amount: 150 mg                DISCONTINUED MEDICATIONS:  Discharge Medication List as of 4/5/2024  7:48 PM          I am the Primary Clinician of Record.   Keli Ruelas MD (electronically signed)    (Please note that parts of this dictation were completed with voice recognition software. Quite often unanticipated grammatical, syntax, homophones, and other interpretive errors are inadvertently transcribed by the computer software. Please disregards these errors. Please excuse any errors that have escaped final proofreading.)         Keli Ruelas MD  04/06/24 0055

## 2024-04-05 NOTE — ED NOTES
Patient discharged from the ED by Suraj FUENTES. Diagnosis, medications, precautions and follow-ups were reviewed with the patient/family. Questions were asked and answered prior to departure. Patient departed the ED via ambulation and was accompanied by self.    AMA form witnessed by this RN with MD and patient.

## 2024-04-06 LAB
ANION GAP SERPL CALC-SCNC: 6 MMOL/L (ref 5–15)
BUN SERPL-MCNC: 16 MG/DL (ref 6–20)
BUN/CREAT SERPL: 20 (ref 12–20)
CALCIUM SERPL-MCNC: 9.1 MG/DL (ref 8.5–10.1)
CHLORIDE SERPL-SCNC: 91 MMOL/L (ref 97–108)
CO2 SERPL-SCNC: 26 MMOL/L (ref 21–32)
CREAT SERPL-MCNC: 0.8 MG/DL (ref 0.7–1.3)
GLUCOSE SERPL-MCNC: 181 MG/DL (ref 65–100)
POTASSIUM SERPL-SCNC: 5 MMOL/L (ref 3.5–5.1)
SODIUM SERPL-SCNC: 123 MMOL/L (ref 136–145)

## 2024-04-10 ENCOUNTER — HOSPITAL ENCOUNTER (INPATIENT)
Facility: HOSPITAL | Age: 69
LOS: 2 days | Discharge: HOME | End: 2024-04-12
Attending: HOSPITALIST | Admitting: HOSPITALIST
Payer: MEDICARE

## 2024-04-10 DIAGNOSIS — E87.1 HYPONATREMIA: Primary | ICD-10-CM

## 2024-04-10 DIAGNOSIS — R53.1 GENERAL WEAKNESS: ICD-10-CM

## 2024-04-10 LAB
ALBUMIN SERPL-MCNC: 3.4 G/DL (ref 3.5–5)
ALBUMIN/GLOB SERPL: 1.3 (ref 1.1–2.2)
ALP SERPL-CCNC: 74 U/L (ref 45–117)
ALT SERPL-CCNC: 20 U/L (ref 12–78)
ANION GAP SERPL CALC-SCNC: 5 MMOL/L (ref 5–15)
AST SERPL-CCNC: 34 U/L (ref 15–37)
BASOPHILS # BLD: 0.1 K/UL (ref 0–0.1)
BASOPHILS NFR BLD: 1 % (ref 0–1)
BILIRUB SERPL-MCNC: 0.6 MG/DL (ref 0.2–1)
BUN SERPL-MCNC: 22 MG/DL (ref 6–20)
BUN/CREAT SERPL: 31 (ref 12–20)
CALCIUM SERPL-MCNC: 8.9 MG/DL (ref 8.5–10.1)
CHLORIDE SERPL-SCNC: 95 MMOL/L (ref 97–108)
CO2 SERPL-SCNC: 26 MMOL/L (ref 21–32)
CREAT SERPL-MCNC: 0.7 MG/DL (ref 0.7–1.3)
DIFFERENTIAL METHOD BLD: ABNORMAL
EOSINOPHIL # BLD: 0.1 K/UL (ref 0–0.4)
EOSINOPHIL NFR BLD: 1 % (ref 0–7)
ERYTHROCYTE [DISTWIDTH] IN BLOOD BY AUTOMATED COUNT: 13 % (ref 11.5–14.5)
GLOBULIN SER CALC-MCNC: 2.7 G/DL (ref 2–4)
GLUCOSE SERPL-MCNC: 183 MG/DL (ref 65–100)
HCT VFR BLD AUTO: 28.9 % (ref 36.6–50.3)
HGB BLD-MCNC: 9.7 G/DL (ref 12.1–17)
IMM GRANULOCYTES # BLD AUTO: 0.1 K/UL (ref 0–0.04)
IMM GRANULOCYTES NFR BLD AUTO: 1 % (ref 0–0.5)
LYMPHOCYTES # BLD: 0.6 K/UL (ref 0.8–3.5)
LYMPHOCYTES NFR BLD: 10 % (ref 12–49)
MCH RBC QN AUTO: 34.5 PG (ref 26–34)
MCHC RBC AUTO-ENTMCNC: 33.6 G/DL (ref 30–36.5)
MCV RBC AUTO: 102.8 FL (ref 80–99)
MONOCYTES # BLD: 0.7 K/UL (ref 0–1)
MONOCYTES NFR BLD: 12 % (ref 5–13)
NEUTS SEG # BLD: 4.2 K/UL (ref 1.8–8)
NEUTS SEG NFR BLD: 75 % (ref 32–75)
NRBC # BLD: 0 K/UL (ref 0–0.01)
NRBC BLD-RTO: 0 PER 100 WBC
PLATELET # BLD AUTO: 177 K/UL (ref 150–400)
PMV BLD AUTO: 10.4 FL (ref 8.9–12.9)
POTASSIUM SERPL-SCNC: 4.4 MMOL/L (ref 3.5–5.1)
PROT SERPL-MCNC: 6.1 G/DL (ref 6.4–8.2)
RBC # BLD AUTO: 2.81 M/UL (ref 4.1–5.7)
RBC MORPH BLD: ABNORMAL
SODIUM SERPL-SCNC: 126 MMOL/L (ref 136–145)
SODIUM UR-SCNC: 136 MMOL/L
SPECIMEN HOLD: NORMAL
WBC # BLD AUTO: 5.8 K/UL (ref 4.1–11.1)

## 2024-04-10 PROCEDURE — 85025 COMPLETE CBC W/AUTO DIFF WBC: CPT

## 2024-04-10 PROCEDURE — 1100000000 HC RM PRIVATE

## 2024-04-10 PROCEDURE — 80053 COMPREHEN METABOLIC PANEL: CPT

## 2024-04-10 PROCEDURE — 84300 ASSAY OF URINE SODIUM: CPT

## 2024-04-10 PROCEDURE — 6370000000 HC RX 637 (ALT 250 FOR IP): Performed by: HOSPITALIST

## 2024-04-10 PROCEDURE — 93005 ELECTROCARDIOGRAM TRACING: CPT | Performed by: HOSPITALIST

## 2024-04-10 PROCEDURE — 36415 COLL VENOUS BLD VENIPUNCTURE: CPT

## 2024-04-10 PROCEDURE — 99285 EMERGENCY DEPT VISIT HI MDM: CPT

## 2024-04-10 RX ORDER — MAGNESIUM SULFATE IN WATER 40 MG/ML
2000 INJECTION, SOLUTION INTRAVENOUS PRN
Status: DISCONTINUED | OUTPATIENT
Start: 2024-04-10 | End: 2024-04-12 | Stop reason: HOSPADM

## 2024-04-10 RX ORDER — GLUCAGON 1 MG/ML
1 KIT INJECTION PRN
Status: DISCONTINUED | OUTPATIENT
Start: 2024-04-10 | End: 2024-04-11

## 2024-04-10 RX ORDER — ACETAMINOPHEN 325 MG/1
650 TABLET ORAL EVERY 4 HOURS PRN
Status: DISCONTINUED | OUTPATIENT
Start: 2024-04-10 | End: 2024-04-11 | Stop reason: SDUPTHER

## 2024-04-10 RX ORDER — SODIUM CHLORIDE 0.9 % (FLUSH) 0.9 %
5-40 SYRINGE (ML) INJECTION PRN
Status: DISCONTINUED | OUTPATIENT
Start: 2024-04-10 | End: 2024-04-12 | Stop reason: HOSPADM

## 2024-04-10 RX ORDER — OXYBUTYNIN CHLORIDE 5 MG/1
15 TABLET ORAL DAILY
Status: DISCONTINUED | OUTPATIENT
Start: 2024-04-11 | End: 2024-04-12 | Stop reason: HOSPADM

## 2024-04-10 RX ORDER — ACETAMINOPHEN 650 MG/1
650 SUPPOSITORY RECTAL EVERY 6 HOURS PRN
Status: DISCONTINUED | OUTPATIENT
Start: 2024-04-10 | End: 2024-04-12 | Stop reason: HOSPADM

## 2024-04-10 RX ORDER — POTASSIUM CHLORIDE 20 MEQ/1
40 TABLET, EXTENDED RELEASE ORAL PRN
Status: DISCONTINUED | OUTPATIENT
Start: 2024-04-10 | End: 2024-04-12 | Stop reason: HOSPADM

## 2024-04-10 RX ORDER — ACETAMINOPHEN 500 MG
1000 TABLET ORAL 2 TIMES DAILY
COMMUNITY

## 2024-04-10 RX ORDER — IBUPROFEN 200 MG
400 TABLET ORAL 2 TIMES DAILY
COMMUNITY

## 2024-04-10 RX ORDER — ONDANSETRON 4 MG/1
4 TABLET, ORALLY DISINTEGRATING ORAL EVERY 8 HOURS PRN
Status: DISCONTINUED | OUTPATIENT
Start: 2024-04-10 | End: 2024-04-12 | Stop reason: HOSPADM

## 2024-04-10 RX ORDER — OXYBUTYNIN CHLORIDE 5 MG/1
10 TABLET ORAL
COMMUNITY

## 2024-04-10 RX ORDER — OXYBUTYNIN CHLORIDE 5 MG/1
10 TABLET ORAL
Status: DISCONTINUED | OUTPATIENT
Start: 2024-04-10 | End: 2024-04-12 | Stop reason: HOSPADM

## 2024-04-10 RX ORDER — POTASSIUM CHLORIDE 7.45 MG/ML
10 INJECTION INTRAVENOUS PRN
Status: DISCONTINUED | OUTPATIENT
Start: 2024-04-10 | End: 2024-04-12 | Stop reason: HOSPADM

## 2024-04-10 RX ORDER — OXYBUTYNIN CHLORIDE 5 MG/1
15 TABLET ORAL DAILY
COMMUNITY

## 2024-04-10 RX ORDER — LISINOPRIL 20 MG/1
20 TABLET ORAL DAILY
Status: DISCONTINUED | OUTPATIENT
Start: 2024-04-10 | End: 2024-04-12 | Stop reason: HOSPADM

## 2024-04-10 RX ORDER — SODIUM CHLORIDE 0.9 % (FLUSH) 0.9 %
5-40 SYRINGE (ML) INJECTION EVERY 12 HOURS SCHEDULED
Status: DISCONTINUED | OUTPATIENT
Start: 2024-04-10 | End: 2024-04-12 | Stop reason: HOSPADM

## 2024-04-10 RX ORDER — SODIUM CHLORIDE 9 MG/ML
INJECTION, SOLUTION INTRAVENOUS PRN
Status: DISCONTINUED | OUTPATIENT
Start: 2024-04-10 | End: 2024-04-12 | Stop reason: HOSPADM

## 2024-04-10 RX ORDER — PRAVASTATIN SODIUM 40 MG
40 TABLET ORAL DAILY
Status: DISCONTINUED | OUTPATIENT
Start: 2024-04-10 | End: 2024-04-12 | Stop reason: HOSPADM

## 2024-04-10 RX ORDER — PREGABALIN 75 MG/1
75 CAPSULE ORAL 2 TIMES DAILY
Status: DISCONTINUED | OUTPATIENT
Start: 2024-04-10 | End: 2024-04-12 | Stop reason: HOSPADM

## 2024-04-10 RX ORDER — ACETAMINOPHEN 325 MG/1
650 TABLET ORAL EVERY 6 HOURS PRN
Status: DISCONTINUED | OUTPATIENT
Start: 2024-04-10 | End: 2024-04-12 | Stop reason: HOSPADM

## 2024-04-10 RX ORDER — POLYETHYLENE GLYCOL 3350 17 G/17G
17 POWDER, FOR SOLUTION ORAL DAILY PRN
Status: DISCONTINUED | OUTPATIENT
Start: 2024-04-10 | End: 2024-04-12 | Stop reason: HOSPADM

## 2024-04-10 RX ORDER — DEXTROSE MONOHYDRATE 100 MG/ML
INJECTION, SOLUTION INTRAVENOUS CONTINUOUS PRN
Status: DISCONTINUED | OUTPATIENT
Start: 2024-04-10 | End: 2024-04-11

## 2024-04-10 RX ORDER — ONDANSETRON 2 MG/ML
4 INJECTION INTRAMUSCULAR; INTRAVENOUS EVERY 6 HOURS PRN
Status: DISCONTINUED | OUTPATIENT
Start: 2024-04-10 | End: 2024-04-12 | Stop reason: HOSPADM

## 2024-04-10 RX ADMIN — OXYBUTYNIN CHLORIDE 10 MG: 5 TABLET ORAL at 23:43

## 2024-04-10 RX ADMIN — PREGABALIN 75 MG: 75 CAPSULE ORAL at 23:43

## 2024-04-10 ASSESSMENT — LIFESTYLE VARIABLES
HOW MANY STANDARD DRINKS CONTAINING ALCOHOL DO YOU HAVE ON A TYPICAL DAY: 1 OR 2
HOW OFTEN DO YOU HAVE A DRINK CONTAINING ALCOHOL: 4 OR MORE TIMES A WEEK

## 2024-04-10 ASSESSMENT — PAIN SCALES - GENERAL: PAINLEVEL_OUTOF10: 7

## 2024-04-10 ASSESSMENT — PAIN - FUNCTIONAL ASSESSMENT: PAIN_FUNCTIONAL_ASSESSMENT: 0-10

## 2024-04-10 NOTE — ED PROVIDER NOTES
[facility] EMERGENCY DEPT  EMERGENCY DEPARTMENT ENCOUNTER       Pt Name: Vicente Manning  MRN: [id number]  Birthdate 1955  Date of Evaluation: 4/10/2024  Provider: Lu Le PA-C   PCP: Nile Liang MD  Note Started: 4:33 PM 4/10/24     CHIEF COMPLAINT       Chief Complaint   Patient presents with    Abnormal Lab     Pt sent over by his pcp for low sodium and possible admission.  Pt's only complaint is fatigue.          HISTORY OF PRESENT ILLNESS: 1 or more elements      History From: Patient  None     Vicente Manning is a 68 y.o. male who presents to the ED today due to hyponatremia.  Patient states that he has been having over the last few months general weakness and fatigue.  Has been feeling as though his gait is abnormal.  States he was recently here and noted that his sodium level is low.  He reports that he was not going to be admitted at that time however could not do so at the time.  Continued symptoms.  Returns here for further evaluation.     Nursing Notes were all reviewed and agreed with or any disagreements were addressed in the HPI.     REVIEW OF SYSTEMS      Review of Systems     Positives and Pertinent negatives as per HPI.    PAST HISTORY     Past Medical History:  Past Medical History:   Diagnosis Date    Arthritis     Essential hypertension 12/11/2017    Facial weakness 05/2022    2/2 Lung cancer mets;  An MRI brain from 7/29 demonstrated evidence of lesions at the anterosuperior vermis as well as at the apex of the right internal acoustic canal    Former smoker, stopped smoking in distant past     History of acute bacterial endocarditis 08/09/2021    History of anemia     History of echocardiogram 10/2021    LV: Estimated LVEF is 55 - 60%. Normal cavity size, wall thickness, systolic function (ejection fraction normal) and diastolic function. Wall motion: normal. LA: Mildly dilated left atrium.RA: Mildly dilated right atrium. MV: Mitral valve thickening. Mitral valve  MG EC tablet  Commonly known as: VOLTAREN     insulin lispro 100 UNIT/ML Soln injection vial  Commonly known as: HUMALOG     lisinopril 20 MG tablet  Commonly known as: PRINIVIL;ZESTRIL  Take 1 tablet by mouth 2 times daily     Osimertinib Mesylate 80 MG Tabs     pravastatin 40 MG tablet  Commonly known as: PRAVACHOL  TAKE 1 TABLET DAILY     pregabalin 75 MG capsule  Commonly known as: LYRICA  Take 1 capsule by mouth 2 times daily for 180 days. Supervising Physician: Mic Gonzalez MD NPI: [id number] LEE: [id number] Max Daily Amount: 150 mg                DISCONTINUED MEDICATIONS:  Current Discharge Medication List          I have discussed the history and physical, results, MDM, and treatment plan with my supervising physician, Dr. Garcia, who agrees with my plan.     I am the Primary Clinician of Record.   Lu Le PA-C (electronically signed)    (Please note that parts of this dictation were completed with voice recognition software. Quite often unanticipated grammatical, syntax, homophones, and other interpretive errors are inadvertently transcribed by the computer software. Please disregards these errors. Please excuse any errors that have escaped final proofreading.)         Lu Le PA-C  04/10/24 2583

## 2024-04-10 NOTE — H&P
Hospitalist Admission Note    NAME: Vicente Manning   :  1955   MRN:  [id number]     Date/Time:  4/10/2024 6:18 PM    Patient PCP: Nile Liang MD    Please note that this dictation was completed with [employer], the computer voice recognition software.  Quite often unanticipated grammatical, syntax, homophones, and other interpretive errors are inadvertently transcribed by the computer software.  Please disregard these errors.  Please excuse any errors that have escaped final proofreading  ______________________________________________________________________  Given the patient's current clinical presentation, I have a high level of concern for decompensation if discharged from the emergency department.  Complex decision making was performed, which includes reviewing the patient's available past medical records, laboratory results, and x-ray films.       My assessment of this patient's clinical condition and my plan of care is as follows.    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 Yifan Orr at [facility]  --recently started on radiation tx per patient  --consider contact oncologist at [facility] regarding Tagrisso and hyponatremia  --has FU with orthopedics regarding bony mass in right shoulder    HTN  --continue lisinopril    OAB  --continue oxybutynin bid    DM type 1  --continue home insulin pump; consult diabetes management    Hx Bell's palsy  Hx MV endocarditis with alpha strep 2021    Medical Decision Making:   I personally reviewed labs: Yes, as listed below  I personally reviewed imaging:   Toxic drug monitoring:   Discussed case with: ED provider. After discussion I am in agreement that acuity of patient's  12 hour(s))   EKG 12 Lead    Collection Time: 04/10/24  2:46 PM   Result Value Ref Range    Ventricular Rate 79 BPM    Atrial Rate 79 BPM    P-R Interval 160 ms    QRS Duration 90 ms    Q-T Interval 378 ms    QTc Calculation (Bazett) 433 ms    P Axis 70 degrees    R Axis -19 degrees    T Axis 49 degrees    Diagnosis       Normal sinus rhythm  Normal ECG  When compared with ECG of 30-JUL-2021 09:50,  No significant change was found     CBC with Auto Differential    Collection Time: 04/10/24  2:52 PM   Result Value Ref Range    WBC 5.8 4.1 - 11.1 K/uL    RBC 2.81 (L) 4.10 - 5.70 M/uL    Hemoglobin 9.7 (L) 12.1 - 17.0 g/dL    Hematocrit 28.9 (L) 36.6 - 50.3 %    .8 (H) 80.0 - 99.0 FL    MCH 34.5 (H) 26.0 - 34.0 PG    MCHC 33.6 30.0 - 36.5 g/dL    RDW 13.0 11.5 - 14.5 %    Platelets 177 150 - 400 K/uL    MPV 10.4 8.9 - 12.9 FL    Nucleated RBCs 0.0 0  WBC    nRBC 0.00 0.00 - 0.01 K/uL    Neutrophils % 75 32 - 75 %    Lymphocytes % 10 (L) 12 - 49 %    Monocytes % 12 5 - 13 %    Eosinophils % 1 0 - 7 %    Basophils % 1 0 - 1 %    Immature Granulocytes % 1 (H) 0.0 - 0.5 %    Neutrophils Absolute 4.2 1.8 - 8.0 K/UL    Lymphocytes Absolute 0.6 (L) 0.8 - 3.5 K/UL    Monocytes Absolute 0.7 0.0 - 1.0 K/UL    Eosinophils Absolute 0.1 0.0 - 0.4 K/UL    Basophils Absolute 0.1 0.0 - 0.1 K/UL    Immature Granulocytes Absolute 0.1 (H) 0.00 - 0.04 K/UL    Differential Type SMEAR SCANNED      RBC Comment MACROCYTOSIS  1+       Comprehensive Metabolic Panel    Collection Time: 04/10/24  2:52 PM   Result Value Ref Range    Sodium 126 (L) 136 - 145 mmol/L    Potassium 4.4 3.5 - 5.1 mmol/L    Chloride 95 (L) 97 - 108 mmol/L    CO2 26 21 - 32 mmol/L    Anion Gap 5 5 - 15 mmol/L    Glucose 183 (H) 65 - 100 mg/dL    BUN 22 (H) 6 - 20 MG/DL    Creatinine 0.70 0.70 - 1.30 MG/DL    Bun/Cre Ratio 31 (H) 12 - 20      Est, Glom Filt Rate >90 >60 ml/min/1.73m2    Calcium 8.9 8.5 - 10.1 MG/DL    Total Bilirubin 0.6 0.2 - 1.0 MG/DL

## 2024-04-11 LAB
ANION GAP SERPL CALC-SCNC: 5 MMOL/L (ref 5–15)
BUN SERPL-MCNC: 14 MG/DL (ref 6–20)
BUN/CREAT SERPL: 26 (ref 12–20)
CALCIUM SERPL-MCNC: 8.6 MG/DL (ref 8.5–10.1)
CHLORIDE SERPL-SCNC: 93 MMOL/L (ref 97–108)
CO2 SERPL-SCNC: 24 MMOL/L (ref 21–32)
CREAT SERPL-MCNC: 0.53 MG/DL (ref 0.7–1.3)
EKG ATRIAL RATE: 79 BPM
EKG DIAGNOSIS: NORMAL
EKG P AXIS: 70 DEGREES
EKG P-R INTERVAL: 160 MS
EKG Q-T INTERVAL: 378 MS
EKG QRS DURATION: 90 MS
EKG QTC CALCULATION (BAZETT): 433 MS
EKG R AXIS: -19 DEGREES
EKG T AXIS: 49 DEGREES
EKG VENTRICULAR RATE: 79 BPM
GLUCOSE SERPL-MCNC: 147 MG/DL (ref 65–100)
POTASSIUM SERPL-SCNC: 4.2 MMOL/L (ref 3.5–5.1)
SODIUM SERPL-SCNC: 122 MMOL/L (ref 136–145)
SODIUM SERPL-SCNC: 128 MMOL/L (ref 136–145)

## 2024-04-11 PROCEDURE — 36415 COLL VENOUS BLD VENIPUNCTURE: CPT

## 2024-04-11 PROCEDURE — 80048 BASIC METABOLIC PNL TOTAL CA: CPT

## 2024-04-11 PROCEDURE — 6370000000 HC RX 637 (ALT 250 FOR IP): Performed by: INTERNAL MEDICINE

## 2024-04-11 PROCEDURE — 1100000000 HC RM PRIVATE

## 2024-04-11 PROCEDURE — 84295 ASSAY OF SERUM SODIUM: CPT

## 2024-04-11 PROCEDURE — 2580000003 HC RX 258: Performed by: HOSPITALIST

## 2024-04-11 RX ORDER — TOLVAPTAN 15 MG/1
15 TABLET ORAL ONCE
Status: COMPLETED | OUTPATIENT
Start: 2024-04-11 | End: 2024-04-11

## 2024-04-11 RX ORDER — DEXTROSE MONOHYDRATE 100 MG/ML
INJECTION, SOLUTION INTRAVENOUS CONTINUOUS PRN
Status: DISCONTINUED | OUTPATIENT
Start: 2024-04-11 | End: 2024-04-12 | Stop reason: HOSPADM

## 2024-04-11 RX ADMIN — SODIUM CHLORIDE, PRESERVATIVE FREE 10 ML: 5 INJECTION INTRAVENOUS at 10:47

## 2024-04-11 RX ADMIN — TOLVAPTAN 15 MG: 15 TABLET ORAL at 14:10

## 2024-04-11 ASSESSMENT — PAIN DESCRIPTION - LOCATION: LOCATION: SHOULDER

## 2024-04-11 ASSESSMENT — PAIN SCALES - GENERAL
PAINLEVEL_OUTOF10: 0
PAINLEVEL_OUTOF10: 7
PAINLEVEL_OUTOF10: 0

## 2024-04-11 ASSESSMENT — PAIN DESCRIPTION - ORIENTATION: ORIENTATION: RIGHT

## 2024-04-11 NOTE — CONSULTS
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 *      [x] High complexity decision making was performed  [x] Patient is at high-risk of decompensation with multiple organ involvement    Subjective:   HPI: Vicente Manning is a 68 y.o. male is admitted with   Chief Complaint   Patient presents with    Abnormal Lab     Pt sent over by his pcp for low sodium and possible admission.  Pt's only complaint is fatigue.      .  Patient was sent to ER by PCP for hyponatremia.  Patient is found to have a sodium level of 126 yesterday in ER.  Sodium level has dropped to 122 today.  Patient sodium level of 126 on March 1, 2024 as per records from [facility].  His sodium level was 122 on April 3, 2024  he is not aware of any hyponatremia prior to getting call call from his PCP yesterday-he had labs done on 826.14 which showed sodium of 124.  Patient having shoulder pain  He denies any nausea or vomiting.  He drinks about 4 to 5 cups of liquid per day.  He has been on  medication for his lung cancer- osimertinib       Review of

## 2024-04-11 NOTE — PROGRESS NOTES
Hospitalist Progress Note    NAME:   Vicente Manning   : 1955   MRN: [id number]     Date/Time: 2024 12:29 PM  Patient PCP: Nile Liang MD    Estimated discharge date: ?  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 Yifan Orr at [facility]  --recently started on radiation tx per patient  --consider contact oncologist at [facility] regarding Tagrisso and hyponatremia  --IP (at the request of pt) orthopedics consult  regarding bony mass in right shoulder     HTN  --continue lisinopril     OAB  --continue oxybutynin bid     DM type 1  --continue home insulin pump-- pt managing it  consult diabetes management     Hx Bell's palsy  Hx MV endocarditis with alpha strep 2021     Medical Decision Making:   I personally reviewed labs: Yes, as listed below  I personally reviewed imaging: none  Toxic drug monitoring: none  Discussed case with: Pt, RN, CM on IDRs, Dr AZ Bronson (nephrology)        Code Status: full  DVT Prophylaxis: SCDs  GI Prophylaxis: none    Subjective:     Chief Complaint / Reason for Physician Visit:  F/u for Low Na+, Stage 4 Lung AdenoCa- mets to Bone and Brain, SIADH?, DM on insulin pump, Mildred palsy, R Shoulder bony mass  \"I am ok, I will hold my Tagrisso till nephrology comments on cause of low Na+\".  Discussed with RN events overnight.     Pt initially refused to hold his Tagrisso and doesn't believe its causing Na+ to be low, then agreed to hold for now till nephro input. Pt requesting Ortho consult for Dr Roman for the R Shoulder Bony mass which requires Radiation as per [facility] but needs Ortho clearance first    Objective:     VITALS:   Last 24hrs VS reviewed  since prior progress note. Most recent are:  Patient Vitals for the past 24 hrs:   BP Temp Temp src Pulse Resp SpO2 Height Weight   04/11/24 1030 118/67 98.4 °F (36.9 °C) Oral 78 16 99 % -- --   04/11/24 0315 (!) 122/57 98 °F (36.7 °C) Oral 72 16 -- -- --   04/10/24 2100 (!) 128/54 98.3 °F (36.8 °C) Oral 80 16 -- -- --   04/10/24 1930 -- -- -- 79 14 98 % -- --   04/10/24 1715 133/77 -- -- 71 17 99 % -- --   04/10/24 1643 138/72 -- -- 64 14 97 % -- --   04/10/24 1440 128/60 98.4 °F (36.9 °C) -- 80 -- 98 % -- --   04/10/24 1437 128/60 98.4 °F (36.9 °C) Oral 80 16 98 % 1.829 m (6') 64.8 kg (142 lb 13.7 oz)       No intake or output data in the 24 hours ending 04/11/24 1229     I had a face to face encounter and independently examined this patient on 4/11/2024, as outlined below:  PHYSICAL EXAM:  General: Alert, cooperative  EENT:  EOMI. Anicteric sclerae.  Resp:  CTA bilaterally, no wheezing or rales.  No accessory muscle use  CV:  Regular  rhythm,  No edema  GI:  Soft, Non distended, Non tender.  +Bowel sounds  Neurologic:  Alert and oriented X 3, normal speech, Facial Palsy noted (LMN/[place]) at baseline/chronic  Psych:   Good insight. Not anxious nor agitated  Skin:  No rashes.  No jaundice    Reviewed most current lab test results and cultures  YES  Reviewed most current radiology test results   YES  Review and summation of old records today    NO  Reviewed patient's current orders and MAR    YES  PMH/SH reviewed - no change compared to H&P    Procedures: see electronic medical records for all procedures/Xrays and details which were not copied into this note but were reviewed prior to creation of Plan.      LABS:  I reviewed today's most current labs and imaging studies.  Pertinent labs include:  Recent Labs     04/10/24  1452   WBC 5.8   HGB 9.7*   HCT 28.9*        Recent Labs     04/10/24  1452 04/11/24  0418   * 122*   K 4.4 4.2   CL 95* 93*   CO2 26 24   GLUCOSE 183* 147*   BUN 22* 14   CREATININE

## 2024-04-12 VITALS
HEART RATE: 107 BPM | RESPIRATION RATE: 18 BRPM | HEIGHT: 72 IN | SYSTOLIC BLOOD PRESSURE: 129 MMHG | WEIGHT: 142.86 LBS | TEMPERATURE: 98 F | OXYGEN SATURATION: 98 % | BODY MASS INDEX: 19.35 KG/M2 | DIASTOLIC BLOOD PRESSURE: 68 MMHG

## 2024-04-12 LAB
ALBUMIN SERPL-MCNC: 3.5 G/DL (ref 3.5–5)
ANION GAP SERPL CALC-SCNC: 7 MMOL/L (ref 5–15)
BUN SERPL-MCNC: 19 MG/DL (ref 6–20)
BUN/CREAT SERPL: 23 (ref 12–20)
CALCIUM SERPL-MCNC: 9.4 MG/DL (ref 8.5–10.1)
CHLORIDE SERPL-SCNC: 100 MMOL/L (ref 97–108)
CO2 SERPL-SCNC: 24 MMOL/L (ref 21–32)
CREAT SERPL-MCNC: 0.83 MG/DL (ref 0.7–1.3)
GLUCOSE SERPL-MCNC: 145 MG/DL (ref 65–100)
PHOSPHATE SERPL-MCNC: 4.5 MG/DL (ref 2.6–4.7)
POTASSIUM SERPL-SCNC: 4.8 MMOL/L (ref 3.5–5.1)
SODIUM SERPL-SCNC: 130 MMOL/L (ref 136–145)
SODIUM SERPL-SCNC: 131 MMOL/L (ref 136–145)

## 2024-04-12 PROCEDURE — 36415 COLL VENOUS BLD VENIPUNCTURE: CPT

## 2024-04-12 PROCEDURE — 80069 RENAL FUNCTION PANEL: CPT

## 2024-04-12 PROCEDURE — 84295 ASSAY OF SERUM SODIUM: CPT

## 2024-04-12 PROCEDURE — 2580000003 HC RX 258: Performed by: HOSPITALIST

## 2024-04-12 PROCEDURE — 6370000000 HC RX 637 (ALT 250 FOR IP): Performed by: HOSPITALIST

## 2024-04-12 RX ORDER — LISINOPRIL 20 MG/1
20 TABLET ORAL DAILY
Qty: 30 TABLET | Refills: 0 | Status: SHIPPED
Start: 2024-04-12

## 2024-04-12 RX ADMIN — SODIUM CHLORIDE, PRESERVATIVE FREE 10 ML: 5 INJECTION INTRAVENOUS at 10:21

## 2024-04-12 NOTE — CARE COORDINATION
Cleared for D/C from CM standpoint    Care Management Initial Assessment       RUR: 13%  Readmission? No  1st IM letter given? Yes   1st  letter given: No     Chart reviewed. CM aware of discharge order. Met with pt at bedside to complete assessment. Verified contact information and demographics. Pt lives alone in a 2 level home with 5 ILANA. Pt is independent with ADLs and ambulatory without a device. Pt drives- has supportive son and ex-wife. Preferred pharmacy is [facility]. Pt will drive self home today. He is comfortable scheduling is own specialists appointments at [facility] upon d/c. 2nd IM given. No CM needs identified.       04/12/24 1200   Service Assessment   Patient Orientation Alert and Oriented   Cognition Alert   History Provided By Patient   Primary Caregiver Self   Support Systems Children;Family Members;Friends/Neighbors   Patient's Healthcare Decision Maker is: Legal Next of Kin   PCP Verified by CM Yes   Last Visit to PCP Within last 3 months   Prior Functional Level Independent in ADLs/IADLs   Current Functional Level Independent in ADLs/IADLs   Can patient return to prior living arrangement Yes   Ability to make needs known: Good   Family able to assist with home care needs: Yes   Would you like for me to discuss the discharge plan with any other family members/significant others, and if so, who? No   Financial Resources Medicare;Other (Comment)  (BCBS)   Social/Functional History   Lives With Alone   Type of Home House   Home Layout Two level;Able to Live on Main level with bedroom/bathroom   Home Access Stairs to enter with rails   Entrance Stairs - Number of Steps 5   Home Equipment None   Receives Help From Family   ADL Assistance Independent   Homemaking Assistance Independent   Ambulation Assistance Independent   Transfer Assistance Independent   Active  Yes   Mode of Transportation Car   Occupation Retired   Discharge Planning   Type of Residence House   Living  Arrangements Alone   Current Services Prior To Admission None   Potential Assistance Needed N/A   DME Ordered? No   Potential Assistance Purchasing Medications No   Type of Home Care Services None   Patient expects to be discharged to: House   Services At/After Discharge   Transition of Care Consult (CM Consult) Discharge Planning   Services At/After Discharge None    Resource Information Provided? No   Mode of Transport at Discharge Self   Hospital Transport Time of Discharge 1200   Confirm Follow Up Transport Self   Condition of Participation: Discharge Planning   The Plan for Transition of Care is related to the following treatment goals: return home   The Patient and/or Patient Representative was provided with a Choice of Provider? Patient   The Patient and/Or Patient Representative agree with the Discharge Plan? Yes   Freedom of Choice list was provided with basic dialogue that supports the patient's individualized plan of care/goals, treatment preferences, and shares the quality data associated with the providers?  No  (no services indicated)       Advance Care Planning     General Advance Care Planning (ACP) Conversation    Date of Conversation: 4/12/2024  Conducted with: Patient with Decision Making Capacity    Healthcare Decision Maker:    Primary Decision Maker: ViktorChilo ny [facility] [phone]  Click here to complete Healthcare Decision Makers including selection of the Healthcare Decision Maker Relationship (ie \"Primary\").   Today we documented Decision Maker(s) consistent with Legal Next of Kin hierarchy.    Content/Action Overview:  Has ACP document(s) NOT on file - requested patient to provide  Reviewed DNR/DNI and patient elects Full Code (Attempt Resuscitation)    Length of Voluntary ACP Conversation in minutes:  <16 minutes (Non-Billable)    GABE Oropeza   Care Manager, [facility]  [phone]

## 2024-04-12 NOTE — PROGRESS NOTES
Nephrology Progress Note  [facility] / [place] [facility]  [address], [address]  [place] [address]  Phone - [phone]  Fax - [phone]                 Patient: Vicente Manning                     YOB: 1955        Date- 4/12/2024                                     Admit Date: 4/10/2024   CC: Follow up for hyponatremia          IMPRESSION & PLAN:   hyponatremia likely due to SIADH from pain and cancer medication,Osimertinib   Shoulder pain  History of lung cancer-stage IV adenocarcinoma with mets to bone and brain diagnosed in 2022  History of hypertension  History of diabetes  History of sepsis-MV endocarditis with alpha strep 7/2021       PLAN-  No more samsca  Avoid osimertinib  Check bmp on Monday  Okay to d/c home today     Subjective:  Interval History:   -  na improved to 131  He is feeling much better      Objective:   Vitals:    04/11/24 1455 04/11/24 2215 04/12/24 0325 04/12/24 0830   BP: 122/73 115/62 135/66 129/68   Pulse: 70 75 72 (!) 107   Resp: 16 16 16 18   Temp: 97.5 °F (36.4 °C) 97.5 °F (36.4 °C) 98 °F (36.7 °C) 98 °F (36.7 °C)   TempSrc: Oral Oral Oral Oral   SpO2: 100% 100% 100% 98%   Weight:       Height:          No intake/output data recorded.  No intake/output data recorded.      Physical exam:    GEN: NAD  NECK- no mass  RESP: No wheezing  NEURO: Normal speech, Non focal  PSYCH: Normal Mood    Chart reviewed.         Pertinent Notes reviewed.     Data Review :  Lab Results   Component Value Date/Time     04/12/2024 02:17 AM     04/12/2024 02:17 AM    K 4.8 04/12/2024 02:17 AM     04/12/2024 02:17 AM    CO2 24 04/12/2024 02:17 AM    BUN 19 04/12/2024 02:17 AM    CREATININE 0.83 04/12/2024 02:17 AM    GLUCOSE 145 04/12/2024 02:17 AM    CALCIUM 9.4 04/12/2024 02:17 AM       Lab Results   Component Value Date    WBC 5.8 04/10/2024    HGB 9.7 (L) 04/10/2024    HCT 28.9 (L) 04/10/2024    MC[facility]

## 2024-04-12 NOTE — PROGRESS NOTES
1326 - PCP hospital follow-up transitional care appointment has been scheduled with Dr. Nile Liang on 4/18/24 1130. [facility] placed [facility] information AVS for patient resource.   Pending patient discharge.  Marina Zelaya Care Management Assistant     Attempted to schedule PCP hospital follow up. Sent PCP office a message, awaiting return call from PCP office with appt information. [facility] placed [facility] information AVS for patient resource.  Pending patient discharge. Marina Zelaya Care Management Assistant

## 2024-04-12 NOTE — DISCHARGE SUMMARY
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  --recently started on radiation tx per patient  --consider contact oncologist at [facility] regarding Tagrisso and hyponatremia  --IP (at the request of pt) orthopedics consult  regarding bony mass in right shoulder     HTN  --continue lisinopril     OAB  --continue oxybutynin bid     DM type 1  --continue home insulin pump-- pt managing it  consult diabetes management     Hx Bell's palsy  Hx MV endocarditis with alpha strep 7/2021    Discharge Exam:  Patient seen and examined by me on discharge day.  Pertinent Findings:  Patient Vitals for the past 24 hrs:   BP Temp Temp src Pulse Resp SpO2   04/12/24 0830 129/68 98 °F (36.7 °C) Oral (!) 107 18 98 %   04/12/24 0325 135/66 98 °F (36.7 °C) Oral 72 16 100 %   04/11/24 2215 115/62 97.5 °F (36.4 °C) Oral 75 16 100 %   04/11/24 1455 122/73 97.5 °F (36.4 °C) Oral 70 16 100 %       Gen:    Not in distress  Chest: Clear lungs  CVS:   Regular rhythm.  No edema  Abd:  Soft, not distended, not tender  Neuro: awake, moving all exts    Discharge/Recent Laboratory Results:  Recent Labs     04/12/24  0217   *  130*   K 4.8      CO2 24   BUN 19   CREATININE 0.83   GLUCOSE 145*   CALCIUM 9.4   PHOS 4.5     Recent Labs     04/10/24  1452   HGB 9.7*   HCT 28.9*   WBC 5.8          Discharge Medications:     Medication List        CONTINUE taking these medications      ibuprofen 200 MG tablet  Commonly known as: ADVIL;MOTRIN     insulin lispro 100 UNIT/ML Soln injection vial  Commonly known as: HUMALOG     lisinopril 20  minutes

## 2024-04-12 NOTE — DISCHARGE INSTRUCTIONS
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   Keep your medication in the bottles provided by the pharmacist and keep a list of the medication names, dosages, and times to be taken in your wallet.   Do not take other medications without consulting your doctor.     Pain Management: per above medications    What to do at Home    Recommended diet:  cardiac diet, diabetic diet, and low fat, low cholesterol diet    Recommended activity: activity as tolerated    If you have questions regarding the hospital related prescriptions or hospital related issues please call at .    If you experience any of the following symptoms then please call your primary care physician or return to the emergency room if you cannot get hold of your doctor:  Fever, chills, nausea, vomiting, diarrhea, change in mentation, falling, bleeding, shortness of breath,     Follow Up:  FU with oncology Dr. Yifan Orr at [facility] as discussed to change the chemo drug as able for help with recurrent Hyponatremia  [facility] for  clearance for Radiation therapy for the R shoulder bony mass  [facility] or Dr Isrrael Bronson ([facility]) for management of SIADH/low Na+        Information obtained by :  I understand that if any problems occur once I am at home I am to contact my physician.    I understand and acknowledge receipt of the instructions indicated above.                                                                                                                                           Physician's or R.N.'s Signature                                                                  Date/Time                                                                                                                                              Patient or Representative Signature                                                          Date/Time

## 2024-04-16 ENCOUNTER — CLINICAL DOCUMENTATION (OUTPATIENT)
Dept: CASE MANAGEMENT | Age: 69
End: 2024-04-16

## 2024-04-18 ENCOUNTER — OFFICE VISIT (OUTPATIENT)
Age: 69
End: 2024-04-18
Payer: MEDICARE

## 2024-04-18 VITALS
RESPIRATION RATE: 16 BRPM | OXYGEN SATURATION: 99 % | HEART RATE: 69 BPM | BODY MASS INDEX: 18.45 KG/M2 | SYSTOLIC BLOOD PRESSURE: 115 MMHG | TEMPERATURE: 98.2 F | HEIGHT: 72 IN | WEIGHT: 136.2 LBS | DIASTOLIC BLOOD PRESSURE: 69 MMHG

## 2024-04-18 DIAGNOSIS — E87.1 HYPONATREMIA: Primary | ICD-10-CM

## 2024-04-18 DIAGNOSIS — Z09 HOSPITAL DISCHARGE FOLLOW-UP: ICD-10-CM

## 2024-04-18 PROCEDURE — 3078F DIAST BP <80 MM HG: CPT | Performed by: STUDENT IN AN ORGANIZED HEALTH CARE EDUCATION/TRAINING PROGRAM

## 2024-04-18 PROCEDURE — 1123F ACP DISCUSS/DSCN MKR DOCD: CPT | Performed by: STUDENT IN AN ORGANIZED HEALTH CARE EDUCATION/TRAINING PROGRAM

## 2024-04-18 PROCEDURE — 1111F DSCHRG MED/CURRENT MED MERGE: CPT | Performed by: STUDENT IN AN ORGANIZED HEALTH CARE EDUCATION/TRAINING PROGRAM

## 2024-04-18 PROCEDURE — G8419 CALC BMI OUT NRM PARAM NOF/U: HCPCS | Performed by: STUDENT IN AN ORGANIZED HEALTH CARE EDUCATION/TRAINING PROGRAM

## 2024-04-18 PROCEDURE — 3017F COLORECTAL CA SCREEN DOC REV: CPT | Performed by: STUDENT IN AN ORGANIZED HEALTH CARE EDUCATION/TRAINING PROGRAM

## 2024-04-18 PROCEDURE — 3074F SYST BP LT 130 MM HG: CPT | Performed by: STUDENT IN AN ORGANIZED HEALTH CARE EDUCATION/TRAINING PROGRAM

## 2024-04-18 PROCEDURE — G8427 DOCREV CUR MEDS BY ELIG CLIN: HCPCS | Performed by: STUDENT IN AN ORGANIZED HEALTH CARE EDUCATION/TRAINING PROGRAM

## 2024-04-18 PROCEDURE — 1036F TOBACCO NON-USER: CPT | Performed by: STUDENT IN AN ORGANIZED HEALTH CARE EDUCATION/TRAINING PROGRAM

## 2024-04-18 PROCEDURE — 99213 OFFICE O/P EST LOW 20 MIN: CPT | Performed by: STUDENT IN AN ORGANIZED HEALTH CARE EDUCATION/TRAINING PROGRAM

## 2024-04-18 RX ORDER — LEVOTHYROXINE SODIUM 0.03 MG/1
25 TABLET ORAL DAILY
COMMUNITY
Start: 2024-04-07

## 2024-04-18 RX ORDER — HYDROMORPHONE HYDROCHLORIDE 2 MG/1
TABLET ORAL
COMMUNITY
Start: 2024-04-17

## 2024-04-18 RX ORDER — BLOOD SUGAR DIAGNOSTIC
STRIP MISCELLANEOUS
COMMUNITY
Start: 2024-04-10

## 2024-04-18 NOTE — PROGRESS NOTES
\"Have you been to the ER, urgent care clinic since your last visit?  Hospitalized since your last visit?\"    Yes// ED f/u    “Have you seen or consulted any other health care providers outside of [facility] since your last visit?”    NO            Click Here for Release of Records Request

## 2024-04-18 NOTE — PROGRESS NOTES
HISTORY OF PRESENT ILLNESS   Vicente Manning is a 68 y.o. male who  has a past medical history of Arthritis, Essential hypertension, Facial weakness, Former smoker, stopped smoking in distant past, History of acute bacterial endocarditis, History of anemia, History of echocardiogram, History of fusion of cervical spine, Hypercholesterolemia, Ileitis, Insulin pump in place, Long-term use of high-risk medication, Lung cancer metastatic to bone (HCC), Microalbuminuria, Non-small cell lung cancer metastatic to brain (HCC), Personal history of COVID-19, and Type I diabetes mellitus with proliferative retinopathy (HCC).     Pt presents for hospital follow up for hyponatremia.     Hypothyroidism:   DM1: following with Dr. Emmie Weeks.  A1c was 4.5% on 4/3/24, advised to discuss reducing insulin dose w endocrinologist asap. Pt disagrees that the A1c is too low.    He has an appointment with Dr. Matos to discuss his recent A1c.    Hyponatremia: evaluated in ED and refused admission initially but ultimately went back and was admitted. Thought \"Likely due to SIADH due to Pain & Cancer med- Tagrisso -cont fluid restrictions 1500ml/day , OP [facility] oncology followup to get off Tagrisso if possible otherwise close BMP/sodium monitoring and correction as needed with Tolvaptan time to time.\"   Feels very fatigued still, doesn't think that the improvement in sodium made much difference.     HTN: has been on lisinopril 20 mg.   No chest pain, lightheadedness, dizziness    HLD: pravastatin 40 mg.     OAB: oxybutynin.     Metastatic non-small cell lung cancer   Following with [facility] oncology and palliative care. wt has been declining. He is typically 155 lb, now at 136 lb.   7/17/24: 136 lb    Alcohol use: 3-4 glasses of wine nightly.     Anxiety and depression: had been on sertraline in the past but did not like the way it made him feel. Has appointment w psych Dr. Barnes.     Broke his right arm 9/2023, had surgery, steel plates

## 2024-04-19 ENCOUNTER — TELEPHONE (OUTPATIENT)
Age: 69
End: 2024-04-19

## 2024-04-19 LAB
ANION GAP SERPL CALC-SCNC: 5 MMOL/L (ref 5–15)
BUN SERPL-MCNC: 16 MG/DL (ref 6–20)
BUN/CREAT SERPL: 24 (ref 12–20)
CALCIUM SERPL-MCNC: 9 MG/DL (ref 8.5–10.1)
CHLORIDE SERPL-SCNC: 95 MMOL/L (ref 97–108)
CO2 SERPL-SCNC: 27 MMOL/L (ref 21–32)
CREAT SERPL-MCNC: 0.66 MG/DL (ref 0.7–1.3)
GLUCOSE SERPL-MCNC: 77 MG/DL (ref 65–100)
POTASSIUM SERPL-SCNC: 4.8 MMOL/L (ref 3.5–5.1)
SODIUM SERPL-SCNC: 127 MMOL/L (ref 136–145)

## 2024-04-19 NOTE — RESULT ENCOUNTER NOTE
Sodium has decreased since you were discharged from the hospital. Please be sure to discuss with Dr. Yifan Orr.

## 2024-04-23 NOTE — TELEPHONE ENCOUNTER
Called, no answer left message to call office back. Voicemail does not identify patient.  Per Dr. Liang    Sodium has decreased since you were discharged from the hospital. Please be sure to discuss with Dr. Yifan Orr.

## 2024-04-23 NOTE — TELEPHONE ENCOUNTER
Pt returning call and wants you to leave a detailed message.    Pt is having surgery today and needs to know what you continue to call about.  Pt states this is best number for him.

## 2024-05-03 ENCOUNTER — CLINICAL DOCUMENTATION (OUTPATIENT)
Dept: CASE MANAGEMENT | Age: 69
End: 2024-05-03

## 2024-06-27 ENCOUNTER — TELEPHONE (OUTPATIENT)
Age: 69
End: 2024-06-27

## 2024-06-27 NOTE — TELEPHONE ENCOUNTER
Mukund//[employer] states he needs to get Last Office Notes faxed over to got with Order received/Signed by Dr. Liang. Please call if any questions. Thank you      Fax# is [phone]

## 2024-06-28 ENCOUNTER — TELEPHONE (OUTPATIENT)
Age: 69
End: 2024-06-28

## 2024-06-28 NOTE — TELEPHONE ENCOUNTER
Ben with [employer] called in states received signed prescription.     Requests last OV notes faxed to: [phone]

## 2024-08-07 LAB — HBA1C MFR BLD HPLC: 5.3 %

## 2024-09-06 RX ORDER — PRAVASTATIN SODIUM 40 MG
40 TABLET ORAL DAILY
Qty: 90 TABLET | Refills: 1 | Status: SHIPPED | OUTPATIENT
Start: 2024-09-06

## 2024-09-06 NOTE — TELEPHONE ENCOUNTER
pravastatin (PRAVACHOL) 40 MG tablet      [id number] [address] - P [phone] - F [phone]

## 2024-09-06 NOTE — TELEPHONE ENCOUNTER
PCP: Nile Liang MD    Last appt:   4/18/2024    Future Appointments   Date Time Provider Department Center   10/3/2024 10:00 AM Nile Liang MD [facility]       Requested Prescriptions     Pending Prescriptions Disp Refills    pravastatin (PRAVACHOL) 40 MG tablet 30 tablet 0     Sig: Take 1 tablet by mouth daily

## 2024-10-03 ENCOUNTER — OFFICE VISIT (OUTPATIENT)
Age: 69
End: 2024-10-03
Payer: MEDICARE

## 2024-10-03 VITALS
TEMPERATURE: 97.1 F | BODY MASS INDEX: 18.42 KG/M2 | DIASTOLIC BLOOD PRESSURE: 55 MMHG | HEART RATE: 69 BPM | HEIGHT: 72 IN | WEIGHT: 136 LBS | RESPIRATION RATE: 16 BRPM | OXYGEN SATURATION: 100 % | SYSTOLIC BLOOD PRESSURE: 95 MMHG

## 2024-10-03 DIAGNOSIS — Z23 FLU VACCINE NEED: ICD-10-CM

## 2024-10-03 DIAGNOSIS — E78.00 HYPERCHOLESTEROLEMIA: ICD-10-CM

## 2024-10-03 DIAGNOSIS — E03.9 HYPOTHYROIDISM, UNSPECIFIED TYPE: ICD-10-CM

## 2024-10-03 DIAGNOSIS — E10.3559 TYPE 1 DIABETES MELLITUS WITH STABLE PROLIFERATIVE RETINOPATHY, UNSPECIFIED LATERALITY (HCC): ICD-10-CM

## 2024-10-03 DIAGNOSIS — C34.91 MALIGNANT NEOPLASM OF UNSPECIFIED PART OF RIGHT BRONCHUS OR LUNG (HCC): ICD-10-CM

## 2024-10-03 DIAGNOSIS — I10 ESSENTIAL HYPERTENSION: Primary | ICD-10-CM

## 2024-10-03 DIAGNOSIS — E44.1 MILD PROTEIN-CALORIE MALNUTRITION (HCC): ICD-10-CM

## 2024-10-03 DIAGNOSIS — E87.1 HYPONATREMIA: ICD-10-CM

## 2024-10-03 DIAGNOSIS — D69.6 THROMBOCYTOPENIA (HCC): ICD-10-CM

## 2024-10-03 DIAGNOSIS — D63.0 ANEMIA IN NEOPLASTIC DISEASE: ICD-10-CM

## 2024-10-03 PROBLEM — Z92.89 HISTORY OF ECHOCARDIOGRAM: Status: RESOLVED | Noted: 2021-10-01 | Resolved: 2024-10-03

## 2024-10-03 PROBLEM — M10.071 ACUTE IDIOPATHIC GOUT OF RIGHT FOOT: Status: RESOLVED | Noted: 2021-06-25 | Resolved: 2024-10-03

## 2024-10-03 PROBLEM — R78.81 BACTEREMIA DUE TO STREPTOCOCCUS: Status: RESOLVED | Noted: 2021-07-30 | Resolved: 2024-10-03

## 2024-10-03 PROBLEM — B95.5 BACTEREMIA DUE TO STREPTOCOCCUS: Status: RESOLVED | Noted: 2021-07-30 | Resolved: 2024-10-03

## 2024-10-03 PROCEDURE — G8427 DOCREV CUR MEDS BY ELIG CLIN: HCPCS | Performed by: STUDENT IN AN ORGANIZED HEALTH CARE EDUCATION/TRAINING PROGRAM

## 2024-10-03 PROCEDURE — 99214 OFFICE O/P EST MOD 30 MIN: CPT | Performed by: STUDENT IN AN ORGANIZED HEALTH CARE EDUCATION/TRAINING PROGRAM

## 2024-10-03 PROCEDURE — G8419 CALC BMI OUT NRM PARAM NOF/U: HCPCS | Performed by: STUDENT IN AN ORGANIZED HEALTH CARE EDUCATION/TRAINING PROGRAM

## 2024-10-03 PROCEDURE — G8482 FLU IMMUNIZE ORDER/ADMIN: HCPCS | Performed by: STUDENT IN AN ORGANIZED HEALTH CARE EDUCATION/TRAINING PROGRAM

## 2024-10-03 PROCEDURE — 2022F DILAT RTA XM EVC RTNOPTHY: CPT | Performed by: STUDENT IN AN ORGANIZED HEALTH CARE EDUCATION/TRAINING PROGRAM

## 2024-10-03 PROCEDURE — 1123F ACP DISCUSS/DSCN MKR DOCD: CPT | Performed by: STUDENT IN AN ORGANIZED HEALTH CARE EDUCATION/TRAINING PROGRAM

## 2024-10-03 PROCEDURE — 3044F HG A1C LEVEL LT 7.0%: CPT | Performed by: STUDENT IN AN ORGANIZED HEALTH CARE EDUCATION/TRAINING PROGRAM

## 2024-10-03 PROCEDURE — 1036F TOBACCO NON-USER: CPT | Performed by: STUDENT IN AN ORGANIZED HEALTH CARE EDUCATION/TRAINING PROGRAM

## 2024-10-03 PROCEDURE — 3078F DIAST BP <80 MM HG: CPT | Performed by: STUDENT IN AN ORGANIZED HEALTH CARE EDUCATION/TRAINING PROGRAM

## 2024-10-03 PROCEDURE — 3074F SYST BP LT 130 MM HG: CPT | Performed by: STUDENT IN AN ORGANIZED HEALTH CARE EDUCATION/TRAINING PROGRAM

## 2024-10-03 PROCEDURE — 3017F COLORECTAL CA SCREEN DOC REV: CPT | Performed by: STUDENT IN AN ORGANIZED HEALTH CARE EDUCATION/TRAINING PROGRAM

## 2024-10-03 PROCEDURE — PBSHW INFLUENZA, FLUAD TRIVALENT, (AGE 65 Y+), IM, PRESERVATIVE FREE, 0.5ML: Performed by: STUDENT IN AN ORGANIZED HEALTH CARE EDUCATION/TRAINING PROGRAM

## 2024-10-03 PROCEDURE — 90653 IIV ADJUVANT VACCINE IM: CPT | Performed by: STUDENT IN AN ORGANIZED HEALTH CARE EDUCATION/TRAINING PROGRAM

## 2024-10-03 ASSESSMENT — PATIENT HEALTH QUESTIONNAIRE - PHQ9
SUM OF ALL RESPONSES TO PHQ QUESTIONS 1-9: 0
2. FEELING DOWN, DEPRESSED OR HOPELESS: NOT AT ALL
SUM OF ALL RESPONSES TO PHQ QUESTIONS 1-9: 0
1. LITTLE INTEREST OR PLEASURE IN DOING THINGS: NOT AT ALL
SUM OF ALL RESPONSES TO PHQ9 QUESTIONS 1 & 2: 0

## 2024-10-03 NOTE — PROGRESS NOTES
\"Have you been to the ER, urgent care clinic since your last visit?  Hospitalized since your last visit?\"    NO    “Have you seen or consulted any other health care providers outside our system since your last visit?”    [facility]// oncologist      “Have you had a diabetic eye exam?”    09/2024// Dr. Mar     Date of last diabetic eye exam: 9/15/2021          
bronchus or lung (HCC) 04/03/2024    Mild protein-calorie malnutrition (HCC) 04/03/2024    Hyponatremia 04/10/2024     Resolved Ambulatory Problems     Diagnosis Date Noted    No Resolved Ambulatory Problems     Past Medical History:   Diagnosis Date    Facial weakness 05/2022    History of fusion of cervical spine 03/2022    Insulin pump in place     Lung cancer metastatic to bone (HCC) 05/2022    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 Lives nearby.   Wt Readings from Last 3 Encounters:   10/03/24 61.7 kg (136 lb)   04/18/24 61.8 kg (136 lb 3.2 oz)   04/10/24 64.8 kg (142 lb 13.7 oz)       BP (!) 95/55 (Site: Left Upper Arm, Position: Sitting, Cuff Size: Medium Adult)   Pulse 69   Temp 97.1 °F (36.2 °C) (Temporal)   Resp 16   Ht 1.829 m (6' 0.01\")   Wt 61.7 kg (136 lb)   SpO2 100%   BMI 18.44 kg/m²       Physical Exam  Constitutional:       General: He is not in acute distress.     Appearance: Normal appearance. He is not toxic-appearing.   HENT:      Head: Normocephalic and atraumatic.      Mouth/Throat:      Pharynx: No posterior oropharyngeal erythema.   Eyes:      General: No scleral icterus.     Extraocular Movements: Extraocular movements intact.      Conjunctiva/sclera:      Left eye: Left conjunctiva is not injected.   Cardiovascular:      Rate and Rhythm: Normal rate.   Pulmonary:      Effort: Pulmonary effort is normal.   Musculoskeletal:      Cervical back: No tenderness.   Skin:     General: Skin is warm and dry.   Neurological:      General: No focal deficit present.      Mental Status: He is alert and oriented to person, place, and time.   Psychiatric:         Mood and Affect: Mood normal.            Current Outpatient Medications:     pravastatin (PRAVACHOL) 40 MG tablet, Take 1 tablet by mouth daily, Disp: 90 tablet, Rfl: 1    ACCU-CHEK HENNY PLUS strip, USE TO CHECK BLOOD SUGAR

## 2024-11-11 ENCOUNTER — TELEPHONE (OUTPATIENT)
Age: 69
End: 2024-11-11

## 2024-11-11 NOTE — TELEPHONE ENCOUNTER
Sharyn called in from [employer] [employer] to confirm we received an order form for neuro screening, please call Sharyn to verify if we received form at [phone]

## 2024-11-20 NOTE — TELEPHONE ENCOUNTER
[facility] Calling again to confirm receipt of orders    State form is for neurological screening genetic testing.    Psr consulted with pcp  Per pcp, caller advised [facility] providers do not sign orders for genetic testing and specialist is recommended if test is wanted.    Caller states ok.

## 2024-12-26 ENCOUNTER — TELEPHONE (OUTPATIENT)
Age: 69
End: 2024-12-26

## 2024-12-26 NOTE — TELEPHONE ENCOUNTER
Bonilla calling in from [facility] to schedule hospital follow up    Pt discharged 12/20/24 [facility] for [facility] to be seen 7-10 days could not find available slot. Please advise.

## 2025-02-12 ENCOUNTER — TELEPHONE (OUTPATIENT)
Age: 70
End: 2025-02-12
